# Patient Record
Sex: FEMALE | Race: WHITE | Employment: OTHER | URBAN - METROPOLITAN AREA
[De-identification: names, ages, dates, MRNs, and addresses within clinical notes are randomized per-mention and may not be internally consistent; named-entity substitution may affect disease eponyms.]

---

## 2017-01-30 DIAGNOSIS — G47.00 INSOMNIA: Primary | ICD-10-CM

## 2017-01-30 DIAGNOSIS — F32.A DEPRESSIVE DISORDER: ICD-10-CM

## 2017-02-01 NOTE — TELEPHONE ENCOUNTER
Amitriptyline 25 mg     Last Written Prescription Date: 10/13/15  Last Fill Quantity: 60, # refills: 5  Last Office Visit with [facility], [facility] or [facility] prescribing provider: 10/31/16        BP Readings from Last 3 Encounters:   10/31/16 118/68   08/01/16 130/66   06/14/16 120/68     Last PHQ-9 score on record=   PHQ-9 SCORE 9/27/2016   Total Score -   Total Score 1

## 2017-02-01 NOTE — TELEPHONE ENCOUNTER
Prescription approved per [facility] Refill Protocol. Sent PHQ9 by mail. Due end of March.  Rachael Ramirez RN- Triage [employer]

## 2017-02-08 DIAGNOSIS — F41.1 GAD (GENERALIZED ANXIETY DISORDER): ICD-10-CM

## 2017-02-08 NOTE — TELEPHONE ENCOUNTER
diazepam (VALIUM) 5 MG tablet      Last Written Prescription Date:  10/14/16  Last Fill Quantity: 30,   # refills: 3  Last [place] Visit with [facility] or [facility] prescribing provider: 10/31/16  Future Office visit:       Routing refill request to provider for review/approval because:  Drug not on the [facility], [facility] or [facility] refill protocol or controlled substance

## 2017-02-10 DIAGNOSIS — F41.9 ANXIETY: Primary | ICD-10-CM

## 2017-02-10 ASSESSMENT — PATIENT HEALTH QUESTIONNAIRE - PHQ9: SUM OF ALL RESPONSES TO PHQ QUESTIONS 1-9: 2

## 2017-02-11 RX ORDER — DIAZEPAM 5 MG
TABLET ORAL
Qty: 30 TABLET | Refills: 3 | Status: SHIPPED | OUTPATIENT
Start: 2017-02-11 | End: 2017-06-05

## 2017-02-13 RX ORDER — DIAZEPAM 5 MG
TABLET ORAL
Qty: 30 TABLET | Refills: 2 | OUTPATIENT
Start: 2017-02-13

## 2017-02-13 NOTE — TELEPHONE ENCOUNTER
This Rx was already filled on the 10th and faxed the 11th.  [facility] said I did not have security to decline this Rx  Routing to Dr Velasquez.  Rachael Ramirez RN- Triage [employer]        diazepam (VALIUM) 5 MG tablet      Last Written Prescription Date:  2/11/17  Last Fill Quantity: 30,   # refills: 3  Last Office Visit with [facility], [facility] or [facility] prescribing provider: 10/31/16  Future Office visit:

## 2017-02-13 NOTE — TELEPHONE ENCOUNTER
Yes it has, but I'm not sure if it is a change in policy but epic will not let me decline this RX.  Could you please decline to pharmacy.  Rachael Ramirez RN- Triage [employer]

## 2017-02-22 ENCOUNTER — OFFICE VISIT (OUTPATIENT)
Dept: FAMILY MEDICINE | Facility: CLINIC | Age: 72
End: 2017-02-22
Payer: COMMERCIAL

## 2017-02-22 VITALS
RESPIRATION RATE: 14 BRPM | OXYGEN SATURATION: 96 % | DIASTOLIC BLOOD PRESSURE: 82 MMHG | HEART RATE: 68 BPM | BODY MASS INDEX: 32.19 KG/M2 | TEMPERATURE: 99.9 F | SYSTOLIC BLOOD PRESSURE: 126 MMHG | WEIGHT: 176 LBS

## 2017-02-22 DIAGNOSIS — N76.0 VAGINITIS AND VULVOVAGINITIS: Primary | ICD-10-CM

## 2017-02-22 LAB
MICRO REPORT STATUS: NORMAL
SPECIMEN SOURCE: NORMAL
WET PREP SPEC: NORMAL

## 2017-02-22 PROCEDURE — 87210 SMEAR WET MOUNT SALINE/INK: CPT | Performed by: PHYSICIAN ASSISTANT

## 2017-02-22 PROCEDURE — 99207 ZZC NO CHARGE LOS: CPT | Performed by: PHYSICIAN ASSISTANT

## 2017-02-22 NOTE — MR AVS SNAPSHOT
After Visit Summary   2/22/2017    Sadaf Rubi    MRN: [id number]           Patient Information     Date Of Birth          1945        Visit Information        Provider Department      2/22/2017 3:50 PM Leesa Bellamy PA-C [facility]        Today's Diagnoses     Vaginitis and vulvovaginitis    -  1      Care Instructions    Types of Vaginal Infections:  Bacterial vaginosis (BV). BV is due to an imbalance in the normal bacteria in the vagina. Lactobacillus bacteria decrease. As a result, the numbers of bad bacteria increase.  Candidiasis(yeast infection). Yeast is a type of fungus. A yeast infection occurs when yeast cells in the vagina increase. They then attack vaginal tissues. A type of yeast called Candida albicans is often involved.  Trichomoniasis ( trich ). Trich is a parasite. It is passed from one person to another during sex. Men with trich often don t have any symptoms. In women, it can take weeks or months before symptoms appear.  Vaginal Infection: Bacterial Vaginosis  Both good and bad bacteria are present in a healthy vagina. Bacterial vaginosis (BV) occurs when these bacteria get out of balance. The numbers of good bacteria decrease. This allows the numbers of bad bacteria to increase and cause BV. In most cases, BV is not a serious problem. This sheet tells you more about BV and how it can be treated.  Causes of Bacterial Vaginosis  The cause of BV is not clear. Douching may lead to it. Having sex with a new partner or more than one partner makes it more likely.  Symptoms of Bacterial Vaginosis  Symptoms of BV vary for each woman. Some women have few symptoms or none at all. If symptoms are present, they can include:    Thin, milky white or gray discharge    Unpleasant  fishy  odor    Irritation, itching, and burning at opening of vagina    Burning or irritation with sex or urination   Diagnosing Bacterial Vaginosis  Your  healthcare provider will ask about your symptoms and health history. He or she will also perform a pelvic exam. This is an exam of your vagina and cervix. A sample of vaginal fluid or discharge may be taken. This sample is checked for signs of BV.  Treating Bacterial Vaginosis  BV is often treated with antibiotics. They may be given in oral pill form or as a vaginal cream. To use these medications:    Be sure to take all of your medication, even if your symptoms go away.    If you re taking antibiotic pills, do not drink alcohol until you re finished with all of your medication.    If you re using vaginal cream, apply it as directed. Be aware that the cream may make condoms and diaphragms less effective.    Call your healthcare provider if symptoms do not go away within 4 days of starting treatment. Also call if you have a reaction to the medication.   Why Treatment Matters  Even if you have no symptoms or your symptoms are mild, BV should be treated. Untreated BV can lead to health problems such as:    Increased risk of  delivery if you re pregnant.    Increased risk of complications after surgery on the reproductive organs.    Possible increased risk of pelvic inflammatory disease (PID).   Preventing Vaginal Infection  These steps can help you stay comfortable during treatment of a vaginal infection. They also help prevent vaginal infections in the future.  Keeping a Healthy Balance  Factors that change the normal balance in the vagina calead to a vaginal infection. To help keep the balance normal, try these tips:  Change out of wet bathing suits and damp exercise clothes as soon as possible. Yeast thrive in a warm, moist environment.  Avoid wearing tight pants. Choose cotton underwear and pantyhose that have a cotton crotch. Cotton keeps you cooler and drier than synthetics.  Don't douche unless directed by your health care provider. Douching can destroy friendly bacteria and change the vagina's normal  "balance.  Wipe from front to back after using the toilet. This prevents bacteria from spreading from the anus to the vulva.  Wash the vulva with mild, unscented soap or with plain water.  Wash your diaphragm, spermicide applicators, and sex toys with mild soap and water after use. Dry them thoroughly before putting them away.  Change tampons often (every 2 hours to 4 hours). Leaving a tampon in for too long may disrupt the balance of vaginal bacteria.  Staying Healthy Overall  Good overall health can help you resist infection. To be healthier:  Help protect yourself from STDs by using latex condoms for intercourse. Ask your health care provider for more information about safer sex.  Eat a variety of healthy foods.  Exercise regularly. Maintain a healthy weight. If you need to lose weight, ask your health care provider for advice on how to start.  Get enough rest and sleep.        Follow-ups after your visit        Who to contact     If you have questions or need follow up information about today's clinic visit or your schedule please contact [facility] directly at [phone].  Normal or non-critical lab and imaging results will be communicated to you by [employer], letter or phone within 4 business days after the clinic has received the results. If you do not hear from us within 7 days, please contact the clinic through [employer] or phone. If you have a critical or abnormal lab result, we will notify you by phone as soon as possible.  Submit refill requests through [email or web address] or call your pharmacy and they will forward the refill request to us. Please allow 3 business days for your refill to be completed.          Additional Information About Your Visit        [email or web address] Information     [email or web address] lets you send messages to your doctor, view your test results, renew your prescriptions, schedule appointments and more. To sign up, go to [email or web address] . Click on \"Log in\" on the left side of " "the screen, which will take you to the Welcome page. Then click on \"Sign up Now\" on the right side of the page.     You will be asked to enter the access code listed below, as well as some personal information. Please follow the directions to create your username and password.     Your access code is: [email or web address]  Expires: 2017  4:15 PM     Your access code will  in 90 days. If you need help or a new code, please call your [facility] clinic or [phone].        Care EveryWhere ID     This is your Care EveryWhere ID. This could be used by other organizations to access your [facility] medical records  [id number]        Your Vitals Were     Pulse Temperature Respirations Pulse Oximetry BMI (Body Mass Index)       68 99.9  F (37.7  C) (Tympanic) 14 96% 32.19 kg/m2        Blood Pressure from Last 3 Encounters:   17 126/82   10/31/16 118/68   16 130/66    Weight from Last 3 Encounters:   17 176 lb (79.8 kg)   10/31/16 174 lb 9.6 oz (79.2 kg)   16 172 lb (78 kg)              We Performed the Following     Wet prep        Primary Care Provider Office Phone # Fax #    Sean Velasquez [phone]       [facility] [address] [address]        Thank you!     Thank you for choosing [facility]  for your care. Our goal is always to provide you with excellent care. Hearing back from our patients is one way we can continue to improve our services. Please take a few minutes to complete the written survey that you may receive in the mail after your visit with us. Thank you!             Your Updated Medication List - Protect others around you: Learn how to safely use, store and throw away your medicines at www.disposemymeds.org.          This list is accurate as of: 17  4:46 PM.  Always use your most recent med list.                   Brand Name Dispense Instructions for use    acetaminophen-codeine 300-30 MG per " tablet    TYLENOL #3    30 tablet    TAKE ONE TABLET BY MOUTH TWICE A DAY AS NEEDED       * amitriptyline 25 MG tablet    ELAVIL    60 tablet    TAKE 2 TABLETS (50 MG) BYMOUTH NIGHTLY AS NEEDED  FOR SLEEP       * amitriptyline 25 MG tablet    ELAVIL    60 tablet    TAKE 2 TABLETS (50 MG) BYMOUTH NIGHTLY AS NEEDED  FOR SLEEP       amLODIPine 5 MG tablet    NORVASC    90 tablet    Take 1 tablet (5 mg) by mouth daily       aspirin 81 MG tablet     120 tablet    Take 1 tablet (81 mg) by mouth daily       cyanocolbalamin 500 MCG tablet    vitamin  B-12     Take 1 tablet by mouth daily.       diazepam 5 MG tablet    VALIUM    30 tablet    TAKE ONE TABLET BY MOUTH EVERY DAY AS NEEDED FOR  ANXIETY       escitalopram 10 MG tablet    LEXAPRO    90 tablet    Take 1 tablet (10 mg) by mouth daily       levothyroxine 75 MCG tablet    SYNTHROID/LEVOTHROID    30 tablet    TAKE 1 TABLET (75 MCG) BY MOUTH DAILY       nadolol 40 MG tablet    CORGARD    90 tablet    TAKE 1 TABLET (40 MG) BY MOUTH DAILY       polyethylene glycol powder    MIRALAX/GLYCOLAX    1020 g    TAKE 17 G BY MOUTH 2 TIMES DAILY       pravastatin 40 MG tablet    PRAVACHOL    90 tablet    TAKE 1 TABLET (40 MG) BY MOUTH DAILY       vitamin D 2000 UNITS tablet     100 tablet    Take 2,000 Units by mouth daily       * Notice:  This list has 2 medication(s) that are the same as other medications prescribed for you. Read the directions carefully, and ask your doctor or other care provider to review them with you.

## 2017-02-22 NOTE — PROGRESS NOTES
SUBJECTIVE:                                                    Sadaf Rubi is a 71 year old female who presents to clinic today for the following health issues:      Vaginal Symptoms     Onset: 1 week    Description:  Vaginal Discharge: none   Itching (Pruritis): YES  Burning sensation:  no   Odor: no     Accompanying Signs & Symptoms:  Pain with Urination: no   Abdominal Pain: no   Fever: no    History:   Sexually active: no   New Partner: no   Possibility of Pregnancy:  No    Precipitating factors:   Recent Antibiotic Use: no     Alleviating factors:  n/a   Therapies Tried and outcome: n/a  Additional complaints: None    HPI additional notes: Sadaf presents today with   Chief Complaint   Patient presents with     Vaginal Problem   Pt started have stomach upset after rooming and sample collection and had to rush to the bathroom.  Does not feel well and needs to head home before symptoms get worse.  Talked briefly with pt, said that was fine and we would call her about her test results which were not back yet at the time.     ROS:  C: Negative for fever, chills, recent change in weight  : POSITIVE for vaginal itching/ irritation. Negative for dysuria, vaginal discharge and vaginal discharge with odor  P: Negative for changes in mood or affect  ROS otherwise negative.    Chart Review:  History   Smoking Status     Never Smoker   Smokeless Tobacco     Never Used     PHQ-9 SCORE 3/21/2016 9/27/2016 2/9/2017   Total Score - - -   Total Score 1 1 2     SHAWNEE-7 SCORE 3/21/2016 4/20/2016 6/9/2016   Total Score 5 0 0     Patient Active Problem List   Diagnosis     Anxiety     Insomnia     Dysthymic disorder     Palpitation     Shortness of breath     Osteopenia     Anemia     Preventive measure     Family history of coagulation disorder     Hypothyroidism     Vitamin D deficiency disease     Hypertension goal BP (blood pressure) < 140/80     Hyperlipidemia with target LDL less than 100     Dizziness     Balance  problems     Abnormal liver enzymes     ACP (advance care planning)     Episodic tension-type headache, not intractable     Pain in unspecified knee     Slow transit constipation     Insomnia, unspecified insomnia     SHAWNEE (generalized anxiety disorder)     Hypothyroidism, unspecified type     Past Surgical History   Procedure Laterality Date     Knee surgery  1/2006     meniscus left     Lumpectomy breast  1/2001     benign     Urethra surgery  1977     Knee surgery  2007     right meniscus     Knee surgery  09/10/2012     right knee replacement ; L TKA 3/13     Gyn surgery  1990's     hysterectomy SAMANTHA & BSO     Problem list, Medication list, Allergies, Medical/Social/Surg hx reviewed in [facility], updated as appropriate.   OBJECTIVE:                                                    /82 (BP Location: Right arm, Patient Position: Chair, Cuff Size: Adult Regular)  Pulse 68  Temp 99.9  F (37.7  C) (Tympanic)  Resp 14  Wt 176 lb (79.8 kg)  SpO2 96%  BMI 32.19 kg/m2  Body mass index is 32.19 kg/(m^2).  Not performed, pt had to leave clinic    Diagnostic test results: Negative wet prep.     ASSESSMENT/PLAN:                                                          ICD-10-CM    1. Vaginitis and vulvovaginitis N76.0 Wet prep     Visit ended as pt had to leave due to acute GI illness. Will call pt with test results and start treatment if needed.    Please see patient instructions for treatment details.    Follow up in 7-10 days if not improving as anticipated.    Leesa Bellamy PA-C  [facility]

## 2017-02-22 NOTE — PATIENT INSTRUCTIONS
Types of Vaginal Infections:  Bacterial vaginosis (BV). BV is due to an imbalance in the normal bacteria in the vagina. Lactobacillus bacteria decrease. As a result, the numbers of bad bacteria increase.  Candidiasis(yeast infection). Yeast is a type of fungus. A yeast infection occurs when yeast cells in the vagina increase. They then attack vaginal tissues. A type of yeast called Candida albicans is often involved.  Trichomoniasis ( trich ). Trich is a parasite. It is passed from one person to another during sex. Men with trich often don t have any symptoms. In women, it can take weeks or months before symptoms appear.  Vaginal Infection: Bacterial Vaginosis  Both good and bad bacteria are present in a healthy vagina. Bacterial vaginosis (BV) occurs when these bacteria get out of balance. The numbers of good bacteria decrease. This allows the numbers of bad bacteria to increase and cause BV. In most cases, BV is not a serious problem. This sheet tells you more about BV and how it can be treated.  Causes of Bacterial Vaginosis  The cause of BV is not clear. Douching may lead to it. Having sex with a new partner or more than one partner makes it more likely.  Symptoms of Bacterial Vaginosis  Symptoms of BV vary for each woman. Some women have few symptoms or none at all. If symptoms are present, they can include:    Thin, milky white or gray discharge    Unpleasant  fishy  odor    Irritation, itching, and burning at opening of vagina    Burning or irritation with sex or urination   Diagnosing Bacterial Vaginosis  Your healthcare provider will ask about your symptoms and health history. He or she will also perform a pelvic exam. This is an exam of your vagina and cervix. A sample of vaginal fluid or discharge may be taken. This sample is checked for signs of BV.  Treating Bacterial Vaginosis  BV is often treated with antibiotics. They may be given in oral pill form or as a vaginal cream. To use these  medications:    Be sure to take all of your medication, even if your symptoms go away.    If you re taking antibiotic pills, do not drink alcohol until you re finished with all of your medication.    If you re using vaginal cream, apply it as directed. Be aware that the cream may make condoms and diaphragms less effective.    Call your healthcare provider if symptoms do not go away within 4 days of starting treatment. Also call if you have a reaction to the medication.   Why Treatment Matters  Even if you have no symptoms or your symptoms are mild, BV should be treated. Untreated BV can lead to health problems such as:    Increased risk of  delivery if you re pregnant.    Increased risk of complications after surgery on the reproductive organs.    Possible increased risk of pelvic inflammatory disease (PID).   Preventing Vaginal Infection  These steps can help you stay comfortable during treatment of a vaginal infection. They also help prevent vaginal infections in the future.  Keeping a Healthy Balance  Factors that change the normal balance in the vagina calead to a vaginal infection. To help keep the balance normal, try these tips:  Change out of wet bathing suits and damp exercise clothes as soon as possible. Yeast thrive in a warm, moist environment.  Avoid wearing tight pants. Choose cotton underwear and pantyhose that have a cotton crotch. Cotton keeps you cooler and drier than synthetics.  Don't douche unless directed by your health care provider. Douching can destroy friendly bacteria and change the vagina's normal balance.  Wipe from front to back after using the toilet. This prevents bacteria from spreading from the anus to the vulva.  Wash the vulva with mild, unscented soap or with plain water.  Wash your diaphragm, spermicide applicators, and sex toys with mild soap and water after use. Dry them thoroughly before putting them away.  Change tampons often (every 2 hours to 4 hours). Leaving a  tampon in for too long may disrupt the balance of vaginal bacteria.  Staying Healthy Overall  Good overall health can help you resist infection. To be healthier:  Help protect yourself from STDs by using latex condoms for intercourse. Ask your health care provider for more information about safer sex.  Eat a variety of healthy foods.  Exercise regularly. Maintain a healthy weight. If you need to lose weight, ask your health care provider for advice on how to start.  Get enough rest and sleep.

## 2017-03-03 DIAGNOSIS — K59.01 SLOW TRANSIT CONSTIPATION: ICD-10-CM

## 2017-03-06 RX ORDER — POLYETHYLENE GLYCOL 3350 17 G/17G
POWDER, FOR SOLUTION ORAL
Qty: 527 G | Refills: 11 | Status: SHIPPED | OUTPATIENT
Start: 2017-03-06 | End: 2018-05-04

## 2017-03-06 NOTE — TELEPHONE ENCOUNTER
Prescription approved per [facility] Refill Protocol.  Rachael Ramirez RN- Triage [employer]

## 2017-03-06 NOTE — TELEPHONE ENCOUNTER
polyethylene glycol (MIRALAX/GLYCOLAX   Last Written Prescription Date: 12/9/15  Last Fill Quantity: 1020 g,  # refills: 11   Last Office Visit with [facility], [facility] or [facility] prescribing provider: 2/22/17

## 2017-04-07 DIAGNOSIS — G44.219 EPISODIC TENSION-TYPE HEADACHE, NOT INTRACTABLE: ICD-10-CM

## 2017-04-07 NOTE — TELEPHONE ENCOUNTER
Controlled Substance Refill Request for acetaminophen-codeine (TYLENOL #3) 300-30 MG per tablet  Problem List Complete:  Yes    Last Written Prescription Date:  11/29/2016  Last Fill Quantity: 30,   # refills: 1    [place] [facility] [place]: 2/22/2017    Clinic visit frequency required: unspecified     Future Office visit:     Controlled substance agreement on file: No.     Processing:  May be called or faxed to pharmacy   checked in past 6 months?  No, route to RN chronic pain not on problem list [place]  review not required    Thank you,  Kevin Santos RN

## 2017-04-25 DIAGNOSIS — I10 HYPERTENSION GOAL BP (BLOOD PRESSURE) < 140/80: ICD-10-CM

## 2017-04-26 NOTE — TELEPHONE ENCOUNTER
amLODIPine (NORVASC) 5 MG tablet      Last Written Prescription Date: 10/31/16  Last Fill Quantity: 90, # refills: 3  Last Office Visit with [facility], [facility] or [facility] prescribing provider: 2/22/17       Potassium   Date Value Ref Range Status   08/01/2016 4.5 3.4 - 5.3 mmol/L Final     Creatinine   Date Value Ref Range Status   08/01/2016 0.86 0.52 - 1.04 mg/dL Final     BP Readings from Last 3 Encounters:   02/22/17 126/82   10/31/16 118/68   08/01/16 130/66

## 2017-04-28 RX ORDER — AMLODIPINE BESYLATE 5 MG/1
TABLET ORAL
Qty: 60 TABLET | Refills: 2 | OUTPATIENT
Start: 2017-04-28

## 2017-05-02 DIAGNOSIS — I10 HYPERTENSION GOAL BP (BLOOD PRESSURE) < 140/80: ICD-10-CM

## 2017-05-02 RX ORDER — AMLODIPINE BESYLATE 5 MG/1
5 TABLET ORAL DAILY
Qty: 90 TABLET | Refills: 2 | Status: SHIPPED | OUTPATIENT
Start: 2017-05-02 | End: 2018-02-01

## 2017-05-02 RX ORDER — AMLODIPINE BESYLATE 5 MG/1
TABLET ORAL
Qty: 90 TABLET | Refills: 3 | OUTPATIENT
Start: 2017-05-02

## 2017-05-02 NOTE — TELEPHONE ENCOUNTER
Dose change since 10/31/16 to 5 mg once daily.   Rx was set at historical on 10/31/16 and was never sent to the pharmacy.     Prescription approved per [facility] Refill Protocol.

## 2017-05-02 NOTE — TELEPHONE ENCOUNTER
Amlodipine 5 mg      Last Written Prescription Date: 10/31/16  Last Fill Quantity: 90, # refills: 3    Last Office Visit with [facility], [facility] or [facility] prescribing provider:  2/22/17   Future Office Visit:        BP Readings from Last 3 Encounters:   02/22/17 126/82   10/31/16 118/68   08/01/16 130/66

## 2017-05-21 DIAGNOSIS — F41.1 GAD (GENERALIZED ANXIETY DISORDER): ICD-10-CM

## 2017-05-22 NOTE — TELEPHONE ENCOUNTER
escitalopram (LEXAPRO) 10 MG tablet     Last Written Prescription Date: 10/31/16  Last Fill Quantity: 90, # refills: 3  Last Office Visit with [facility] primary care provider:  2/22/17        Last PHQ-9 score on record=   PHQ-9 SCORE 2/9/2017   Total Score -   Total Score 2

## 2017-05-23 RX ORDER — ESCITALOPRAM OXALATE 10 MG/1
TABLET ORAL
Qty: 90 TABLET | Refills: 2 | Status: SHIPPED | OUTPATIENT
Start: 2017-05-23 | End: 2018-07-07

## 2017-06-05 ENCOUNTER — OFFICE VISIT (OUTPATIENT)
Dept: FAMILY MEDICINE | Facility: CLINIC | Age: 72
End: 2017-06-05
Payer: COMMERCIAL

## 2017-06-05 VITALS
SYSTOLIC BLOOD PRESSURE: 120 MMHG | OXYGEN SATURATION: 96 % | BODY MASS INDEX: 31.83 KG/M2 | RESPIRATION RATE: 20 BRPM | HEART RATE: 71 BPM | HEIGHT: 62 IN | DIASTOLIC BLOOD PRESSURE: 68 MMHG | TEMPERATURE: 97.9 F | WEIGHT: 173 LBS

## 2017-06-05 DIAGNOSIS — E03.9 HYPOTHYROIDISM, UNSPECIFIED TYPE: ICD-10-CM

## 2017-06-05 DIAGNOSIS — R39.11 URINARY HESITANCY: Primary | ICD-10-CM

## 2017-06-05 DIAGNOSIS — I10 HYPERTENSION GOAL BP (BLOOD PRESSURE) < 140/80: ICD-10-CM

## 2017-06-05 DIAGNOSIS — E78.5 HYPERLIPIDEMIA WITH TARGET LDL LESS THAN 100: ICD-10-CM

## 2017-06-05 DIAGNOSIS — G44.219 EPISODIC TENSION-TYPE HEADACHE, NOT INTRACTABLE: ICD-10-CM

## 2017-06-05 DIAGNOSIS — F41.1 GAD (GENERALIZED ANXIETY DISORDER): ICD-10-CM

## 2017-06-05 DIAGNOSIS — D50.8 OTHER IRON DEFICIENCY ANEMIA: ICD-10-CM

## 2017-06-05 LAB
BASOPHILS # BLD AUTO: 0 10E9/L (ref 0–0.2)
BASOPHILS NFR BLD AUTO: 0.3 %
DIFFERENTIAL METHOD BLD: NORMAL
EOSINOPHIL # BLD AUTO: 0.3 10E9/L (ref 0–0.7)
EOSINOPHIL NFR BLD AUTO: 2.9 %
ERYTHROCYTE [DISTWIDTH] IN BLOOD BY AUTOMATED COUNT: 13.4 % (ref 10–15)
HCT VFR BLD AUTO: 44.9 % (ref 35–47)
HGB BLD-MCNC: 15.2 G/DL (ref 11.7–15.7)
LYMPHOCYTES # BLD AUTO: 2.4 10E9/L (ref 0.8–5.3)
LYMPHOCYTES NFR BLD AUTO: 27 %
MCH RBC QN AUTO: 30.6 PG (ref 26.5–33)
MCHC RBC AUTO-ENTMCNC: 33.9 G/DL (ref 31.5–36.5)
MCV RBC AUTO: 91 FL (ref 78–100)
MONOCYTES # BLD AUTO: 0.6 10E9/L (ref 0–1.3)
MONOCYTES NFR BLD AUTO: 6.9 %
NEUTROPHILS # BLD AUTO: 5.5 10E9/L (ref 1.6–8.3)
NEUTROPHILS NFR BLD AUTO: 62.9 %
PLATELET # BLD AUTO: 201 10E9/L (ref 150–450)
RBC # BLD AUTO: 4.96 10E12/L (ref 3.8–5.2)
WBC # BLD AUTO: 8.7 10E9/L (ref 4–11)

## 2017-06-05 PROCEDURE — 82728 ASSAY OF FERRITIN: CPT | Performed by: INTERNAL MEDICINE

## 2017-06-05 PROCEDURE — 84443 ASSAY THYROID STIM HORMONE: CPT | Performed by: INTERNAL MEDICINE

## 2017-06-05 PROCEDURE — 83540 ASSAY OF IRON: CPT | Performed by: INTERNAL MEDICINE

## 2017-06-05 PROCEDURE — 83550 IRON BINDING TEST: CPT | Performed by: INTERNAL MEDICINE

## 2017-06-05 PROCEDURE — 85025 COMPLETE CBC W/AUTO DIFF WBC: CPT | Performed by: INTERNAL MEDICINE

## 2017-06-05 PROCEDURE — 36415 COLL VENOUS BLD VENIPUNCTURE: CPT | Performed by: INTERNAL MEDICINE

## 2017-06-05 PROCEDURE — 99214 OFFICE O/P EST MOD 30 MIN: CPT | Performed by: INTERNAL MEDICINE

## 2017-06-05 RX ORDER — PRAVASTATIN SODIUM 40 MG
TABLET ORAL
Qty: 90 TABLET | Refills: 3 | Status: SHIPPED | OUTPATIENT
Start: 2017-06-05 | End: 2018-06-24

## 2017-06-05 RX ORDER — LEVOTHYROXINE SODIUM 75 UG/1
TABLET ORAL
Qty: 90 TABLET | Refills: 3 | Status: SHIPPED | OUTPATIENT
Start: 2017-06-05 | End: 2018-06-09

## 2017-06-05 RX ORDER — NADOLOL 40 MG/1
TABLET ORAL
Qty: 90 TABLET | Refills: 3 | Status: SHIPPED | OUTPATIENT
Start: 2017-06-05 | End: 2018-05-14

## 2017-06-05 RX ORDER — DIAZEPAM 5 MG
TABLET ORAL
Qty: 30 TABLET | Refills: 3 | Status: SHIPPED | OUTPATIENT
Start: 2017-06-05 | End: 2017-11-01

## 2017-06-05 ASSESSMENT — ANXIETY QUESTIONNAIRES
6. BECOMING EASILY ANNOYED OR IRRITABLE: NOT AT ALL
2. NOT BEING ABLE TO STOP OR CONTROL WORRYING: NOT AT ALL
5. BEING SO RESTLESS THAT IT IS HARD TO SIT STILL: NOT AT ALL
GAD7 TOTAL SCORE: 0
1. FEELING NERVOUS, ANXIOUS, OR ON EDGE: NOT AT ALL
3. WORRYING TOO MUCH ABOUT DIFFERENT THINGS: NOT AT ALL
IF YOU CHECKED OFF ANY PROBLEMS ON THIS QUESTIONNAIRE, HOW DIFFICULT HAVE THESE PROBLEMS MADE IT FOR YOU TO DO YOUR WORK, TAKE CARE OF THINGS AT HOME, OR GET ALONG WITH OTHER PEOPLE: NOT DIFFICULT AT ALL
7. FEELING AFRAID AS IF SOMETHING AWFUL MIGHT HAPPEN: NOT AT ALL

## 2017-06-05 ASSESSMENT — PATIENT HEALTH QUESTIONNAIRE - PHQ9: 5. POOR APPETITE OR OVEREATING: NOT AT ALL

## 2017-06-05 NOTE — MR AVS SNAPSHOT
After Visit Summary   6/5/2017    Sadaf Rubi    MRN: [id number]           Patient Information     Date Of Birth          1945        Visit Information        Provider Department      6/5/2017 3:30 PM Sean Velasquez MD [facility]        Today's Diagnoses     Urinary hesitancy    -  1    Hypothyroidism, unspecified type        Hypertension goal BP (blood pressure) < 140/80        SHAWNEE (generalized anxiety disorder)        Episodic tension-type headache, not intractable        Other iron deficiency anemia        Hyperlipidemia with target LDL less than 100          Care Instructions    You are planning a urology consult.                     You are planning to try tapering down the amitriptyline,and possibly stopping this and/or the lexapro.                                         I will let you know your lab results.              Follow-ups after your visit        Additional Services     UROLOGY ADULT REFERRAL       Your provider has referred you to: [facility]: [facility] [phone]   [email or web address]    Please be aware that coverage of these services is subject to the terms and limitations of your health insurance plan.  Call member services at your health plan with any benefit or coverage questions.      Please bring the following with you to your appointment:    (1) Any X-Rays, CTs or MRIs which have been performed.  Contact the facility where they were done to arrange for  prior to your scheduled appointment.    (2) List of current medications  (3) This referral request   (4) Any documents/labs given to you for this referral                  Who to contact     If you have questions or need follow up information about today's clinic visit or your schedule please contact [facility] directly at [phone].  Normal or non-critical lab and imaging results will be communicated  "to you by MyChart, letter or phone within 4 business days after the clinic has received the results. If you do not hear from us within 7 days, please contact the clinic through MyChart or phone. If you have a critical or abnormal lab result, we will notify you by phone as soon as possible.  Submit refill requests through [employer] or call your pharmacy and they will forward the refill request to us. Please allow 3 business days for your refill to be completed.          Additional Information About Your Visit        Care EveryWhere ID     This is your Care EveryWhere ID. This could be used by other organizations to access your [facility] medical records  [id number]        Your Vitals Were     Pulse Temperature Respirations Height Pulse Oximetry Breastfeeding?    71 97.9  F (36.6  C) 20 5' 2\" (1.575 m) 96% No    BMI (Body Mass Index)                   31.64 kg/m2            Blood Pressure from Last 3 Encounters:   06/05/17 120/68   02/22/17 126/82   10/31/16 118/68    Weight from Last 3 Encounters:   06/05/17 173 lb (78.5 kg)   02/22/17 176 lb (79.8 kg)   10/31/16 174 lb 9.6 oz (79.2 kg)              We Performed the Following     CBC with platelets and differential     Ferritin     Iron and iron binding capacity     TSH with free T4 reflex     UROLOGY ADULT REFERRAL          Today's Medication Changes          These changes are accurate as of: 6/5/17  4:00 PM.  If you have any questions, ask your nurse or doctor.               These medicines have changed or have updated prescriptions.        Dose/Directions    acetaminophen-codeine 300-30 MG per tablet   Commonly known as:  TYLENOL #3   This may have changed:  See the new instructions.   Used for:  Episodic tension-type headache, not intractable   Changed by:  Sean Velasquez MD        TAKE ONE TABLET BY MOUTH TWICE A DAY AS NEEDED   Quantity:  30 tablet   Refills:  3       escitalopram 10 MG tablet   Commonly known as:  LEXAPRO   This may have changed:  See the new " instructions.   Used for:  SHAWNEE (generalized anxiety disorder)        TAKE 1 TABLET (10 MG) BY MOUTH DAILY   Quantity:  90 tablet   Refills:  2       levothyroxine 75 MCG tablet   Commonly known as:  SYNTHROID/LEVOTHROID   This may have changed:  See the new instructions.   Used for:  Hypothyroidism, unspecified type   Changed by:  Sean Velasquez MD        TAKE 1 TABLET (75 MCG) BY MOUTH DAILY   Quantity:  90 tablet   Refills:  3       nadolol 40 MG tablet   Commonly known as:  CORGARD   This may have changed:  See the new instructions.   Used for:  Hypertension goal BP (blood pressure) < 140/80   Changed by:  Sean Velasquez MD        TAKE 1 TABLET (40 MG) BY MOUTH DAILY   Quantity:  90 tablet   Refills:  3       pravastatin 40 MG tablet   Commonly known as:  PRAVACHOL   This may have changed:  See the new instructions.   Used for:  Hyperlipidemia with target LDL less than 100   Changed by:  Sean Velasquez MD        TAKE 1 TABLET (40 MG) BY MOUTH DAILY   Quantity:  90 tablet   Refills:  3            Where to get your medicines      These medications were sent to [address] [address]     Phone:  [phone]     levothyroxine 75 MCG tablet    nadolol 40 MG tablet    pravastatin 40 MG tablet         Some of these will need a paper prescription and others can be bought over the counter.  Ask your nurse if you have questions.     Bring a paper prescription for each of these medications     acetaminophen-codeine 300-30 MG per tablet    diazepam 5 MG tablet                Primary Care Provider Office Phone # Fax #    Sean Velasquez [phone]       [facility] [address]  [facility] [address]        Thank you!     Thank you for choosing [facility]  for your care. Our goal is always to provide you with excellent care. Hearing back from our patients is one way we can  continue to improve our services. Please take a few minutes to complete the written survey that you may receive in the mail after your visit with us. Thank you!             Your Updated Medication List - Protect others around you: Learn how to safely use, store and throw away your medicines at www.disposemymeds.org.          This list is accurate as of: 6/5/17  4:00 PM.  Always use your most recent med list.                   Brand Name Dispense Instructions for use    acetaminophen-codeine 300-30 MG per tablet    TYLENOL #3    30 tablet    TAKE ONE TABLET BY MOUTH TWICE A DAY AS NEEDED       amitriptyline 25 MG tablet    ELAVIL    60 tablet    TAKE 2 TABLETS (50 MG) BYMOUTH NIGHTLY AS NEEDED  FOR SLEEP       amLODIPine 5 MG tablet    NORVASC    90 tablet    Take 1 tablet (5 mg) by mouth daily       aspirin 81 MG tablet     120 tablet    Take 1 tablet (81 mg) by mouth daily       cyanocolbalamin 500 MCG tablet    vitamin  B-12     Take 1 tablet by mouth daily.       diazepam 5 MG tablet    VALIUM    30 tablet    TAKE ONE TABLET BY MOUTH EVERY DAY AS NEEDED FOR  ANXIETY       escitalopram 10 MG tablet    LEXAPRO    90 tablet    TAKE 1 TABLET (10 MG) BY MOUTH DAILY       levothyroxine 75 MCG tablet    SYNTHROID/LEVOTHROID    90 tablet    TAKE 1 TABLET (75 MCG) BY MOUTH DAILY       nadolol 40 MG tablet    CORGARD    90 tablet    TAKE 1 TABLET (40 MG) BY MOUTH DAILY       polyethylene glycol powder    MIRALAX/GLYCOLAX    527 g    TAKE 17 G BY MOUTH 2     TIMES DAILY       pravastatin 40 MG tablet    PRAVACHOL    90 tablet    TAKE 1 TABLET (40 MG) BY MOUTH DAILY       vitamin D 2000 UNITS tablet     100 tablet    Take 2,000 Units by mouth daily

## 2017-06-05 NOTE — PROGRESS NOTES
"  SUBJECTIVE:                                                    Sadaf Rubi is a 71 year old female who presents to clinic today for the following health issues:      Medication Followup of Escitalopram and Elavil, Iron, and Vit. D    Taking Medication as prescribed: not recently on the Escitaloprm    Side Effects:  Fatigue and other?    Medication Helping Symptoms:  Has concerns       Hyperlipidemia Follow-Up      Rate your low fat/cholesterol diet?: good    Taking statin?  Yes, no muscle aches from statin    Other lipid medications/supplements?:  none     Hypertension Follow-up      Outpatient blood pressures are being checked at home.      Low Salt Diet: no added salt      Also has concerns re: Bladder/Vaginal \"prolapse\" issue. Needs refills to cover when she is to be out of town.                                                                                                                                                                                                                                                                                                                                                                    Urinary hesitancy; getting worse.                           Only tolerates 2.5 mg lexapro ; sx are better.  Wants to stop to at some point.             Also wants to stop amitriptyline.        She still takes 50 mg at h.s.              Still having HAs.    She thinks it unlikely that amitriptyline is preventing any of her headaches.                           She is going out of town. She wants me to write a refill for diazepam and Tylenol with codeine.                       We reviewed her iron deficiency anemia. She did have a colonoscopy, she reports small polyps were removed, and a three-year follow-up was advised. We never got that report.            Also here for hypertension follow-up.               In addition, she would like eventually to stop taking pravastatin.          " she is trying to learn more about calorie counting.   she was surprised to learn that one of her favorite restaurant meals contained 1800 say.                                                                   Problem list and histories reviewed & adjusted, as indicated.      Patient Active Problem List    Diagnosis Date Noted     Hypertension goal BP (blood pressure) < 140/80 03/21/2013     Priority: High     Palpitation      Priority: High     false + nuc med stress test; cor angio shows only a 20% LAD lesion.    CONSIDER A STATIN ; (pt is not interested; wants to work on her diet)               30 day event monitor no arrythmias found in 5/11       Shortness of breath      Priority: High     CT pulm angio neg; spirometry nml       Dysthymic disorder 10/15/2012     Priority: High     Buspirone since 3/12;chronic valium prn use       Anxiety      Priority: High     Onset 1980's or earlier                    Chronic buspar; uses valium also, 5 mg per day or less.                   In 3/16, stopped buspar and started lexapro with good results.     Last  check 08/04/2016-no concerns.  No CSA on file              Urinary hesitancy 06/05/2017     Priority: Medium     hx of urethral dilatations, and then surgery       Other iron deficiency anemia 06/05/2017     Priority: Medium     Hypothyroidism, unspecified type 08/01/2016     Priority: Medium     SHAWNEE (generalized anxiety disorder) 03/21/2016     Priority: Medium     Patient is followed by Sean Velasquez MD for ongoing prescription of anxiolytic medication.  All refills should be approved by this provider, or covering partner.    Medication(s): diazepam 5 mg.   Maximum quantity per month: 30  Clinic visit frequency required: Q 6  months     Controlled substance agreement:  Encounter-Level CSA:     There are no encounter-level csa.        Pain Clinic evaluation in the past: No    DIRE Total Score(s):  No flowsheet data found.    Last [facility] website verification:   done on ?   [email or web address]                 Insomnia, unspecified insomnia 11/09/2015     Priority: Medium     Slow transit constipation 10/07/2015     Priority: Medium     Episodic tension-type headache, not intractable 10/05/2015     Priority: Medium     Uses occas Tyl #3 for this            Migraines per pt; triggers include lack of sleep, change in barometric pressure.                   Has seen neuro in the past.      check 7-11-16  No CSA       Pain in unspecified knee 10/05/2015     Priority: Medium      check 7-11-16, no concerns  No CSA       Abnormal liver enzymes 07/03/2014     Priority: Medium     ALT>AST in 7/14; better,not normal in 8/14.           nml in 7/15       Dizziness 06/16/2014     Priority: Medium     Better with less computer scrolling       Balance problems 06/16/2014     Priority: Medium     Hypothyroidism 03/21/2013     Priority: Medium     Vitamin D deficiency disease 03/21/2013     Priority: Medium     Hyperlipidemia with target LDL less than 100 03/21/2013     Priority: Medium     > 20 lb weight loss in 2013 via dieting.                    LDL >150; statin suggested in 7/15  Diagnosis updated by automated process. Provider to review and confirm.       Family history of coagulation disorder 02/26/2013     Priority: Medium     Pt reports her sister was + for factor V Leiden; pt reports that she is negative for this       Anemia 12/18/2012     Priority: Medium     hgb 11+ and ferritin 9 in 12/12; likely due to blood loss with TKA; will Rx with iron; B-12 348; add 500 mcg;         hgb 14.9 with ferritin 30 in 12/13; finish bottle of iron and then stop.      14.7 and 35 in 7/15.  15 and 23 in 8/16       Osteopenia 12/14/2012     Priority: Medium     T -1.7 L fem neck , -0.3 spine in 12/12; similar to 6/10 results.       Vit D 26; incr by 1000 IU                          In 12/12, vit D > 96; stop for one month, then take 2,000 IU per day       Insomnia      Priority:  "Medium     rx amitriptyline started many yrs ago;       Has some possible SE by ; she wants to continue       ACP (advance care planning) 2015     Priority: Low     Advance Care Planning 2015: ACP Review and Resources Provided:  Reviewed chart for advance care plan.  Sadaf Rubi has no plan or code status on file. Discussed available resources and provided with information. Added by Sayra Cruz             Preventive measure 2013     Priority: Low     [employer] data base under the 12 note.             Mammogram ;7/15                     colonoscopy ; attempted ; aborted; poor prep ; redone the next day; no report; pt reports still not totally cleaned out; per pt, she had \"benign polyps\"  3 year f/u advised by LUIS                      S/p SAMANTHA/BSO       Past Surgical History:   Procedure Laterality Date     GYN SURGERY      hysterectomy SAMANTHA & BSO     KNEE SURGERY  2006    meniscus left     KNEE SURGERY      right meniscus     KNEE SURGERY  09/10/2012    right knee replacement ; L TKA 3/13     LUMPECTOMY BREAST  2001    benign     URETHRA SURGERY       Social History     Social History     Marital status:      Spouse name: N/A     Number of children: N/A     Years of education: N/A     Occupational History     Not on file.     Social History Main Topics     Smoking status: Never Smoker     Smokeless tobacco: Never Used     Alcohol use Yes      Comment: glass of wine with dinner     Drug use: No     Sexual activity: No     Other Topics Concern     Parent/Sibling W/ Cabg, Mi Or Angioplasty Before 65f 55m? No     Social History Narrative    Mother  when pt was 8 yrs old       Current Outpatient Prescriptions   Medication Sig Dispense Refill     escitalopram (LEXAPRO) 10 MG tablet TAKE 1 TABLET (10 MG) BY MOUTH DAILY (Patient taking differently: TAKing 1/4 tablet every day) 90 tablet 2     amLODIPine (NORVASC) 5 MG tablet Take 1 tablet (5 mg) by mouth " daily 90 tablet 2     acetaminophen-codeine (TYLENOL #3) 300-30 MG per tablet TAKE ONE TABLET BY MOUTH TWICE A DAY AS NEEDED 30 tablet 0     polyethylene glycol (MIRALAX/GLYCOLAX) powder TAKE 17 G BY MOUTH 2     TIMES DAILY 527 g 11     diazepam (VALIUM) 5 MG tablet TAKE ONE TABLET BY MOUTH EVERY DAY AS NEEDED FOR  ANXIETY 30 tablet 3     levothyroxine (SYNTHROID, LEVOTHROID) 75 MCG tablet TAKE 1 TABLET (75 MCG) BY MOUTH DAILY 30 tablet 11     pravastatin (PRAVACHOL) 40 MG tablet TAKE 1 TABLET (40 MG) BY MOUTH DAILY 90 tablet 3     nadolol (CORGARD) 40 MG tablet TAKE 1 TABLET (40 MG) BY MOUTH DAILY 90 tablet 3     amitriptyline (ELAVIL) 25 MG tablet TAKE 2 TABLETS (50 MG) BYMOUTH NIGHTLY AS NEEDED  FOR SLEEP 60 tablet 5     Cholecalciferol (VITAMIN D) 2000 UNITS tablet Take 2,000 Units by mouth daily 100 tablet 3     aspirin 81 MG tablet Take 1 tablet (81 mg) by mouth daily 120 tablet      cyanocolbalamin (VITAMIN  B-12) 500 MCG tablet Take 1 tablet by mouth daily.       Allergies   Allergen Reactions     Bees      Ciprofloxacin      Not an allergy but did not tolerate well in 4/13     Other [Seasonal Allergies]      msg and mushrooms     Penicillins      BP Readings from Last 3 Encounters:   06/05/17 120/68   02/22/17 126/82   10/31/16 118/68    Wt Readings from Last 3 Encounters:   06/05/17 173 lb (78.5 kg)   02/22/17 176 lb (79.8 kg)   10/31/16 174 lb 9.6 oz (79.2 kg)                    Reviewed and updated as needed this visit by clinical staff  Tobacco  Allergies  Meds  Med Hx  Surg Hx  Fam Hx  Soc Hx      Reviewed and updated as needed this visit by Provider         ROS:  CONSTITUTIONAL:NEGATIVE for fever, chills, change in weight  RESP:NEGATIVE for significant cough or SOB  CV: NEGATIVE for chest pain, palpitations or peripheral edema  GI: NEGATIVE for hematochezia and melena  : negative for dysuria and hematuria    OBJECTIVE:                                                    /68 (BP Location:  "Left arm, Patient Position: Chair, Cuff Size: Adult Large)  Pulse 71  Temp 97.9  F (36.6  C)  Resp 20  Ht 5' 2\" (1.575 m)  Wt 173 lb (78.5 kg)  SpO2 96%  Breastfeeding? No  BMI 31.64 kg/m2  Body mass index is 31.64 kg/(m^2).  GENERAL APPEARANCE: alert and no distress  CV: regular rates and rhythm, normal S1 S2, no S3 or S4 and no murmur, click or rub  PSYCH: anxious    Diagnostic test results:  Pending      ASSESSMENT/PLAN:                                                        ICD-10-CM    1. Urinary hesitancy R39.11 UROLOGY ADULT REFERRAL    hx of urethral dilatations, and then surgery   2. Other iron deficiency anemia D50.8 Iron and iron binding capacity     Ferritin     CBC with platelets and differential   3. Hypothyroidism, unspecified type E03.9 levothyroxine (SYNTHROID/LEVOTHROID) 75 MCG tablet     TSH with free T4 reflex   4. Hypertension goal BP (blood pressure) < 140/80 I10 nadolol (CORGARD) 40 MG tablet   5. SHAWNEE (generalized anxiety disorder) F41.1 diazepam (VALIUM) 5 MG tablet   6. Episodic tension-type headache, not intractable G44.219 acetaminophen-codeine (TYLENOL #3) 300-30 MG per tablet   7. Hyperlipidemia with target LDL less than 100 E78.5 pravastatin (PRAVACHOL) 40 MG tablet       Summary and implications:  We reviewed multiple issues. I suggested she not be too aggressive  with tapering and stopping amitriptyline and/or Lexapro.                          Refilled her medications.                          Check labs and adjust medications as indicated.  Referred to urology.  Patient Instructions   You are planning a urology consult.                     You are planning to try tapering down the amitriptyline,and possibly stopping this and/or the lexapro.                                         I will let you know your lab results.          Sean Velasquez MD  [facility]   Results for orders placed or performed in visit on 06/05/17   TSH with free T4 " reflex   Result Value Ref Range    TSH 1.14 0.40 - 4.00 mU/L   Iron and iron binding capacity   Result Value Ref Range    Iron 80 35 - 180 ug/dL    Iron Binding Cap 318 240 - 430 ug/dL    Iron Saturation Index 25 15 - 46 %   Ferritin   Result Value Ref Range    Ferritin 42 8 - 252 ng/mL   CBC with platelets and differential   Result Value Ref Range    WBC 8.7 4.0 - 11.0 10e9/L    RBC Count 4.96 3.8 - 5.2 10e12/L    Hemoglobin 15.2 11.7 - 15.7 g/dL    Hematocrit 44.9 35.0 - 47.0 %    MCV 91 78 - 100 fl    MCH 30.6 26.5 - 33.0 pg    MCHC 33.9 31.5 - 36.5 g/dL    RDW 13.4 10.0 - 15.0 %    Platelet Count 201 150 - 450 10e9/L    Diff Method Automated Method     % Neutrophils 62.9 %    % Lymphocytes 27.0 %    % Monocytes 6.9 %    % Eosinophils 2.9 %    % Basophils 0.3 %    Absolute Neutrophil 5.5 1.6 - 8.3 10e9/L    Absolute Lymphocytes 2.4 0.8 - 5.3 10e9/L    Absolute Monocytes 0.6 0.0 - 1.3 10e9/L    Absolute Eosinophils 0.3 0.0 - 0.7 10e9/L    Absolute Basophils 0.0 0.0 - 0.2 10e9/L     Letter sent.                The thyroid level is good. Keep taking the same dosage.  Your hemoglobin and iron levels are also good.

## 2017-06-05 NOTE — PATIENT INSTRUCTIONS
You are planning a urology consult.                     You are planning to try tapering down the amitriptyline,and possibly stopping this and/or the lexapro.                                         I will let you know your lab results.

## 2017-06-05 NOTE — LETTER
[place] [id number] [facility]  [address]  [facility] [address] [phone]                                                                                                           Sadaf Rubi  [address]  [facility] [address]    June 7, 2017      Dear Sadaf,    The results of your recent tests were reviewed and are enclosed.               The thyroid level is good. Keep taking the same dosage.  Your hemoglobin and iron levels are also good.       Thank you for choosing [facility].  We appreciate the opportunity to serve you and look forward to supporting your healthcare needs in the future.    If you have any questions or concerns, please call me or my staff at [phone].      Sincerely,    Sean Velasquez MD    Results for orders placed or performed in visit on 06/05/17   TSH with free T4 reflex   Result Value Ref Range    TSH 1.14 0.40 - 4.00 mU/L   Iron and iron binding capacity   Result Value Ref Range    Iron 80 35 - 180 ug/dL    Iron Binding Cap 318 240 - 430 ug/dL    Iron Saturation Index 25 15 - 46 %   Ferritin   Result Value Ref Range    Ferritin 42 8 - 252 ng/mL   CBC with platelets and differential   Result Value Ref Range    WBC 8.7 4.0 - 11.0 10e9/L    RBC Count 4.96 3.8 - 5.2 10e12/L    Hemoglobin 15.2 11.7 - 15.7 g/dL    Hematocrit 44.9 35.0 - 47.0 %    MCV 91 78 - 100 fl    MCH 30.6 26.5 - 33.0 pg    MCHC 33.9 31.5 - 36.5 g/dL    RDW 13.4 10.0 - 15.0 %    Platelet Count 201 150 - 450 10e9/L    Diff Method Automated Method     % Neutrophils 62.9 %    % Lymphocytes 27.0 %    % Monocytes 6.9 %    % Eosinophils 2.9 %    % Basophils 0.3 %    Absolute Neutrophil 5.5 1.6 - 8.3 10e9/L    Absolute Lymphocytes 2.4 0.8 - 5.3 10e9/L    Absolute Monocytes 0.6 0.0 - 1.3 10e9/L    Absolute Eosinophils 0.3 0.0 - 0.7 10e9/L    Absolute Basophils 0.0 0.0 - 0.2 10e9/L

## 2017-06-05 NOTE — NURSING NOTE
"Chief Complaint   Patient presents with     Fatigue     Hypertension     Lipids     Recheck Medication       Initial /68 (BP Location: Left arm, Patient Position: Chair, Cuff Size: Adult Large)  Pulse 71  Temp 97.9  F (36.6  C)  Resp 20  Ht 5' 2\" (1.575 m)  Wt 173 lb (78.5 kg)  SpO2 96%  Breastfeeding? No  BMI 31.64 kg/m2 Estimated body mass index is 31.64 kg/(m^2) as calculated from the following:    Height as of this encounter: 5' 2\" (1.575 m).    Weight as of this encounter: 173 lb (78.5 kg).  Medication Reconciliation: complete   Rafaela Leahy LPN  "

## 2017-06-05 NOTE — LETTER
My Depression Action Plan  Name: Sadaf Rubi   Date of Birth 1945  Date: 6/5/2017    My doctor: Sean Velasquez   My clinic: [address]  [facility] [address] [phone]          GREEN    ZONE   Good Control    What it looks like:     Things are going generally well. You have normal up s and down s. You may even feel depressed from time to time, but bad moods usually last less than a day.   What you need to do:  1. Continue to care for yourself (see self care plan)  2. Check your depression survival kit and update it as needed  3. Follow your physician s recommendations including any medication.  4. Do not stop taking medication unless you consult with your physician first.           YELLOW         ZONE Getting Worse    What it looks like:     Depression is starting to interfere with your life.     It may be hard to get out of bed; you may be starting to isolate yourself from others.    Symptoms of depression are starting to last most all day and this has happened for several days.     You may have suicidal thoughts but they are not constant.   What you need to do:     1. Call your care team, your response to treatment will improve if you keep your care team informed of your progress. Yellow periods are signs an adjustment may need to be made.     2. Continue your self-care, even if you have to fake it!    3. Talk to someone in your support network    4. Open up your depression survival kit           RED    ZONE Medical Alert - Get Help    What it looks like:     Depression is seriously interfering with your life.     You may experience these or other symptoms: You can t get out of bed most days, can t work or engage in other necessary activities, you have trouble taking care of basic hygiene, or basic responsibilities, thoughts of suicide or death that will not go away, self-injurious behavior.     What you need to  do:  1. Call your care team and request a same-day appointment. If they are not available (weekends or after hours) call your local crisis line, emergency room or 911.      Electronically signed by: Rafaela Leahy, June 5, 2017    Depression Self Care Plan / Survival Kit    Self-Care for Depression  Here s the deal. Your body and mind are really not as separate as most people think.  What you do and think affects how you feel and how you feel influences what you do and think. This means if you do things that people who feel good do, it will help you feel better.  Sometimes this is all it takes.  There is also a place for medication and therapy depending on how severe your depression is, so be sure to consult with your medical provider and/ or Behavioral Health Consultant if your symptoms are worsening or not improving.     In order to better manage my stress, I will:    Exercise  Get some form of exercise, every day. This will help reduce pain and release endorphins, the  feel good  chemicals in your brain. This is almost as good as taking antidepressants!  This is not the same as joining a gym and then never going! (they count on that by the way ) It can be as simple as just going for a walk or doing some gardening, anything that will get you moving.      Hygiene   Maintain good hygiene (Get out of bed in the morning, Make your bed, Brush your teeth, Take a shower, and Get dressed like you were going to work, even if you are unemployed).  If your clothes don't fit try to get ones that do.    Diet  I will strive to eat foods that are good for me, drink plenty of water, and avoid excessive sugar, caffeine, alcohol, and other mood-altering substances.  Some foods that are helpful in depression are: complex carbohydrates, B vitamins, flaxseed, fish or fish oil, fresh fruits and vegetables.    Psychotherapy  I agree to participate in Individual Therapy (if recommended).    Medication  If prescribed medications, I  agree to take them.  Missing doses can result in serious side effects.  I understand that drinking alcohol, or other illicit drug use, may cause potential side effects.  I will not stop my medication abruptly without first discussing it with my provider.    Staying Connected With Others  I will stay in touch with my friends, family members, and my primary care provider/team.    Use your imagination  Be creative.  We all have a creative side; it doesn t matter if it s oil painting, sand castles, or mud pies! This will also kick up the endorphins.    Witness Beauty  (AKA stop and smell the roses) Take a look outside, even in mid-winter. Notice colors, textures. Watch the squirrels and birds.     Service to others  Be of service to others.  There is always someone else in need.  By helping others we can  get out of ourselves  and remember the really important things.  This also provides opportunities for practicing all the other parts of the program.    Humor  Laugh and be silly!  Adjust your TV habits for less news and crime-drama and more comedy.    Control your stress  Try breathing deep, massage therapy, biofeedback, and meditation. Find time to relax each day.     My support system    Clinic Contact:  Phone number:    Contact 1:  Phone number:    Contact 2:  Phone number:    Restorationist/:  Phone number:    Therapist:  Phone number:    Local crisis center:    Phone number:    Other community support:  Phone number:

## 2017-06-06 LAB
FERRITIN SERPL-MCNC: 42 NG/ML (ref 8–252)
IRON SATN MFR SERPL: 25 % (ref 15–46)
IRON SERPL-MCNC: 80 UG/DL (ref 35–180)
TIBC SERPL-MCNC: 318 UG/DL (ref 240–430)
TSH SERPL DL<=0.005 MIU/L-ACNC: 1.14 MU/L (ref 0.4–4)

## 2017-06-06 ASSESSMENT — ANXIETY QUESTIONNAIRES: GAD7 TOTAL SCORE: 0

## 2017-06-06 ASSESSMENT — PATIENT HEALTH QUESTIONNAIRE - PHQ9: SUM OF ALL RESPONSES TO PHQ QUESTIONS 1-9: 0

## 2017-06-09 DIAGNOSIS — R39.11 URINARY HESITANCY: Primary | ICD-10-CM

## 2017-06-19 ENCOUNTER — OFFICE VISIT (OUTPATIENT)
Dept: UROLOGY | Facility: CLINIC | Age: 72
End: 2017-06-19
Payer: COMMERCIAL

## 2017-06-19 VITALS
WEIGHT: 170 LBS | HEIGHT: 63 IN | SYSTOLIC BLOOD PRESSURE: 130 MMHG | HEART RATE: 70 BPM | DIASTOLIC BLOOD PRESSURE: 86 MMHG | BODY MASS INDEX: 30.12 KG/M2

## 2017-06-19 DIAGNOSIS — N30.01 ACUTE CYSTITIS WITH HEMATURIA: Primary | ICD-10-CM

## 2017-06-19 DIAGNOSIS — R39.11 URINARY HESITANCY: ICD-10-CM

## 2017-06-19 LAB
ALBUMIN UR-MCNC: NEGATIVE MG/DL
APPEARANCE UR: ABNORMAL
BILIRUB UR QL STRIP: NEGATIVE
COLOR UR AUTO: YELLOW
GLUCOSE UR STRIP-MCNC: NEGATIVE MG/DL
HGB UR QL STRIP: ABNORMAL
KETONES UR STRIP-MCNC: NEGATIVE MG/DL
LEUKOCYTE ESTERASE UR QL STRIP: ABNORMAL
NITRATE UR QL: POSITIVE
PH UR STRIP: 7 PH (ref 5–7)
RESIDUAL VOLUME (RV) (EXTERNAL): 140
SP GR UR STRIP: 1.01 (ref 1–1.03)
URN SPEC COLLECT METH UR: ABNORMAL
UROBILINOGEN UR STRIP-ACNC: 0.2 EU/DL (ref 0.2–1)

## 2017-06-19 PROCEDURE — 87088 URINE BACTERIA CULTURE: CPT | Performed by: PHYSICIAN ASSISTANT

## 2017-06-19 PROCEDURE — 87186 SC STD MICRODIL/AGAR DIL: CPT | Performed by: PHYSICIAN ASSISTANT

## 2017-06-19 PROCEDURE — 81003 URINALYSIS AUTO W/O SCOPE: CPT | Performed by: PHYSICIAN ASSISTANT

## 2017-06-19 PROCEDURE — 87086 URINE CULTURE/COLONY COUNT: CPT | Performed by: PHYSICIAN ASSISTANT

## 2017-06-19 PROCEDURE — 99203 OFFICE O/P NEW LOW 30 MIN: CPT | Mod: 25 | Performed by: PHYSICIAN ASSISTANT

## 2017-06-19 PROCEDURE — 51798 US URINE CAPACITY MEASURE: CPT | Performed by: PHYSICIAN ASSISTANT

## 2017-06-19 RX ORDER — SULFAMETHOXAZOLE/TRIMETHOPRIM 800-160 MG
1 TABLET ORAL 2 TIMES DAILY
Qty: 14 TABLET | Refills: 0 | Status: SHIPPED | OUTPATIENT
Start: 2017-06-19 | End: 2017-08-30

## 2017-06-19 ASSESSMENT — PAIN SCALES - GENERAL: PAINLEVEL: NO PAIN (0)

## 2017-06-19 NOTE — LETTER
"6/19/2017       RE: Sadaf Rubi  [address]  [facility] [address]     Dear Colleague,    Thank you for referring your patient, Sadaf Rubi, to the [facility] [place] at [facility]. Please see a copy of my visit note below.    CC: Urinary hesitancy.    HPI: It is a pleasure to see Sadaf Rubi, a very pleasant 71 year old female asked to be seen in consultation by Dr. Velasquez for the above. This problem has been going on for years and is progressively worsening. The patient voids q four hours during the day, nocturia x one. There is no urgency associated with symptoms no urge incontinence. The patient does not wear protective pads.  No leakage with coughing, sneezing, bending at the waist.  She  knows she has a cystocele and sometimes can see a bulge.  Saw 10-15 years ago Dr. Jed Redmond for urethral dilation and \"cauterization of the bladder\" for recurrent UTIs. He eventually surgically opened the urethra more.  No recurrent UTIs for many years.  Could not leave a sample initally, and PVR was 140 after voiding 1 hour ago.      Denies any dysuria, gross hematuria, pyuria.  Constantly has a sensation of incomplete bladder emptying, needing to strain or need to return to void again.  No history of kidney stones. The patient does not have any neurologic issues other than intermittent migraines.  Intermittently reports constipation with migraine medication use. Fluid consumption includes a minimal amount of water daily, and one cup of coffee.    Past Medical History:   Diagnosis Date     Anxiety      Depressive disorder, not elsewhere classified     1960's; due to 1st marriage; sx relieved with divorce     Hypertension      Insomnia      Knee pain      Palpitation 11/11    false + nuc med stress test; cor angio shows only a 20% LAD lesion.        Shortness of breath 11/11    CT pulm angio neg; spirometry nml     " Unspecified hypothyroidism     Hypothyroidism     Past Surgical History:   Procedure Laterality Date     GYN SURGERY  1990's    hysterectomy SAMANTHA & BSO     KNEE SURGERY  1/2006    meniscus left     KNEE SURGERY  2007    right meniscus     KNEE SURGERY  09/10/2012    right knee replacement ; L TKA 3/13     LUMPECTOMY BREAST  1/2001    benign     URETHRA SURGERY  1977     Current Outpatient Prescriptions   Medication Sig Dispense Refill     amitriptyline (ELAVIL) 25 MG tablet TAKE 2 TABLETS BY MOUTH NIGHTLY AS NEEDED FOR SLEEP 180 tablet 2     levothyroxine (SYNTHROID/LEVOTHROID) 75 MCG tablet TAKE 1 TABLET (75 MCG) BY MOUTH DAILY 90 tablet 3     nadolol (CORGARD) 40 MG tablet TAKE 1 TABLET (40 MG) BY MOUTH DAILY 90 tablet 3     diazepam (VALIUM) 5 MG tablet TAKE ONE TABLET BY MOUTH EVERY DAY AS NEEDED FOR  ANXIETY 30 tablet 3     acetaminophen-codeine (TYLENOL #3) 300-30 MG per tablet TAKE ONE TABLET BY MOUTH TWICE A DAY AS NEEDED 30 tablet 3     pravastatin (PRAVACHOL) 40 MG tablet TAKE 1 TABLET (40 MG) BY MOUTH DAILY 90 tablet 3     escitalopram (LEXAPRO) 10 MG tablet TAKE 1 TABLET (10 MG) BY MOUTH DAILY (Patient taking differently: TAKing 1/4 tablet every day) 90 tablet 2     amLODIPine (NORVASC) 5 MG tablet Take 1 tablet (5 mg) by mouth daily 90 tablet 2     polyethylene glycol (MIRALAX/GLYCOLAX) powder TAKE 17 G BY MOUTH 2     TIMES DAILY 527 g 11     Cholecalciferol (VITAMIN D) 2000 UNITS tablet Take 2,000 Units by mouth daily 100 tablet 3     aspirin 81 MG tablet Take 1 tablet (81 mg) by mouth daily 120 tablet      cyanocolbalamin (VITAMIN  B-12) 500 MCG tablet Take 1 tablet by mouth daily.       Allergies   Allergen Reactions     Bees      Ciprofloxacin      Not an allergy but did not tolerate well in 4/13     Other [Seasonal Allergies]      msg and mushrooms     Penicillins      Family History: There is no h/o  malignancy.  There is no h/o urolithiasis.    Social History: The patient does not smoke  cigarettes (5 pack/year history and quit 32 years ago). Denies EtOH and illicit drug use.      ROS: A comprehensive 14 point ROS was obtained and otherwise negative except for that outlined above in the HPI.    PHYSICAL EXAM:   There were no vitals filed for this visit.  GENERAL: Well groomed, well developed, well nourished female in NAD.  HEENT: EOMI, AT, NC.  SKIN: Warm to touch, dry.  No visible rashes or lesions on examined areas.  RESP: No increased respiratory effort.   LYMPH: No LE edema.  ABD: Soft, NT, ND.  No CVAT bilaterally.  MS: Full ROM in extremities.    LABORATORY: Pending    IMAGING: None      ASSESSMENT/PLAN:  Ms. Sadaf Rubi is a very pleasant 71 year old female with urinary hesitancy. We discussed potential management options including continued observation, behavioral modifications such as timed voiding, double voiding, pelvic floor physical therapy, medications (anticholinergics or Myrbetriq), intravesical Botox injections, PTNS, InterStim, or additional evaluation with cystoscopy and/or video urodynamics to further evaluate the anatomy and function of the lower urinary tract. The patient has elected to proceed with the following:  - Urodynamics and follow-up with Dr. Simpson with possible cystoscopy  - Reviewed behavioral therapies, such as timed voiding, pelvic floor relaxation while voiding and not voiding in a hurry.  Consider possible pelvic floor physical therapy and biofeedback in the future if she is willing at that time.    I have enjoyed participating in the medical care of this very pleasant patient.  Please don't hesitate to contact me with any questions or concerns.     30 min spent face to face with the patient, >50% of that time spent on counseling and coordination of care.       Again, thank you for allowing me to participate in the care of your patient.      Sincerely,    Katiana Driscoll PA-C, MARY

## 2017-06-19 NOTE — PROGRESS NOTES
"CC: Urinary hesitancy.    HPI: It is a pleasure to see Sadaf Rubi, a very pleasant 71 year old female asked to be seen in consultation by Dr. Velasquez for the above. This problem has been going on for years and is progressively worsening. The patient voids q four hours during the day, nocturia x one. There is no urgency associated with symptoms no urge incontinence. The patient does not wear protective pads.  No leakage with coughing, sneezing, bending at the waist.  She  knows she has a cystocele and sometimes can see a bulge.  Saw 10-15 years ago Dr. Jed Redmond for urethral dilation and \"cauterization of the bladder\" for recurrent UTIs. He eventually surgically opened the urethra more.  No recurrent UTIs for many years.  Could not leave a sample initally, and PVR was 140 after voiding 1 hour ago.      Denies any dysuria, gross hematuria, pyuria.  Constantly has a sensation of incomplete bladder emptying, needing to strain or need to return to void again.  No history of kidney stones. The patient does not have any neurologic issues other than intermittent migraines.  Intermittently reports constipation with migraine medication use. Fluid consumption includes a minimal amount of water daily, and one cup of coffee.    Past Medical History:   Diagnosis Date     Anxiety      Depressive disorder, not elsewhere classified     1960's; due to 1st marriage; sx relieved with divorce     Hypertension      Insomnia      Knee pain      Palpitation 11/11    false + nuc med stress test; cor angio shows only a 20% LAD lesion.        Shortness of breath 11/11    CT pulm angio neg; spirometry nml     Unspecified hypothyroidism     Hypothyroidism     Past Surgical History:   Procedure Laterality Date     GYN SURGERY  1990's    hysterectomy SAMANTHA & BSO     KNEE SURGERY  1/2006    meniscus left     KNEE SURGERY  2007    right meniscus     KNEE SURGERY  09/10/2012    right knee replacement ; L TKA 3/13     LUMPECTOMY BREAST  " 1/2001    benign     URETHRA SURGERY  1977     Current Outpatient Prescriptions   Medication Sig Dispense Refill     amitriptyline (ELAVIL) 25 MG tablet TAKE 2 TABLETS BY MOUTH NIGHTLY AS NEEDED FOR SLEEP 180 tablet 2     levothyroxine (SYNTHROID/LEVOTHROID) 75 MCG tablet TAKE 1 TABLET (75 MCG) BY MOUTH DAILY 90 tablet 3     nadolol (CORGARD) 40 MG tablet TAKE 1 TABLET (40 MG) BY MOUTH DAILY 90 tablet 3     diazepam (VALIUM) 5 MG tablet TAKE ONE TABLET BY MOUTH EVERY DAY AS NEEDED FOR  ANXIETY 30 tablet 3     acetaminophen-codeine (TYLENOL #3) 300-30 MG per tablet TAKE ONE TABLET BY MOUTH TWICE A DAY AS NEEDED 30 tablet 3     pravastatin (PRAVACHOL) 40 MG tablet TAKE 1 TABLET (40 MG) BY MOUTH DAILY 90 tablet 3     escitalopram (LEXAPRO) 10 MG tablet TAKE 1 TABLET (10 MG) BY MOUTH DAILY (Patient taking differently: TAKing 1/4 tablet every day) 90 tablet 2     amLODIPine (NORVASC) 5 MG tablet Take 1 tablet (5 mg) by mouth daily 90 tablet 2     polyethylene glycol (MIRALAX/GLYCOLAX) powder TAKE 17 G BY MOUTH 2     TIMES DAILY 527 g 11     Cholecalciferol (VITAMIN D) 2000 UNITS tablet Take 2,000 Units by mouth daily 100 tablet 3     aspirin 81 MG tablet Take 1 tablet (81 mg) by mouth daily 120 tablet      cyanocolbalamin (VITAMIN  B-12) 500 MCG tablet Take 1 tablet by mouth daily.       Allergies   Allergen Reactions     Bees      Ciprofloxacin      Not an allergy but did not tolerate well in 4/13     Other [Seasonal Allergies]      msg and mushrooms     Penicillins      Family History: There is no h/o  malignancy.  There is no h/o urolithiasis.    Social History: The patient does not smoke cigarettes (5 pack/year history and quit 32 years ago). Denies EtOH and illicit drug use.      ROS: A comprehensive 14 point ROS was obtained and otherwise negative except for that outlined above in the HPI.    PHYSICAL EXAM:   There were no vitals filed for this visit.  GENERAL: Well groomed, well developed, well nourished female  in NAD.  HEENT: EOMI, AT, NC.  SKIN: Warm to touch, dry.  No visible rashes or lesions on examined areas.  RESP: No increased respiratory effort.   LYMPH: No LE edema.  ABD: Soft, NT, ND.  No CVAT bilaterally.  MS: Full ROM in extremities.    LABORATORY: Pending    IMAGING: None      ASSESSMENT/PLAN:  Ms. Sadaf Rubi is a very pleasant 71 year old female with urinary hesitancy. We discussed potential management options including continued observation, behavioral modifications such as timed voiding, double voiding, pelvic floor physical therapy, medications (anticholinergics or Myrbetriq), intravesical Botox injections, PTNS, InterStim, or additional evaluation with cystoscopy and/or video urodynamics to further evaluate the anatomy and function of the lower urinary tract. The patient has elected to proceed with the following:  - Urodynamics and follow-up with Dr. Simpson with possible cystoscopy  - Reviewed behavioral therapies, such as timed voiding, pelvic floor relaxation while voiding and not voiding in a hurry.  Consider possible pelvic floor physical therapy and biofeedback in the future if she is willing at that time.    I have enjoyed participating in the medical care of this very pleasant patient.  Please don't hesitate to contact me with any questions or concerns.     Katiana Driscoll PA-C  [facility]    30 min spent face to face with the patient, >50% of that time spent on counseling and coordination of care.

## 2017-06-19 NOTE — MR AVS SNAPSHOT
After Visit Summary   6/19/2017    Sadaf Rubi    MRN: [id number]           Patient Information     Date Of Birth          1945        Visit Information        Provider Department      6/19/2017 11:30 AM Katiana Driscoll PA-C [facility]        Today's Diagnoses     Urinary hesitancy           Follow-ups after your visit        Your next 10 appointments already scheduled     Jul 17, 2017  2:00 PM CDT   (Arrive by 1:45 PM)   Urodynamics with UA NURSE   [facility] ([facility])    [address]  [address]  [facility] [id number] [phone]            Sep 13, 2017 10:00 AM CDT   NEW FEMALE PELVIC with Sayda Simpson MD,  CYF   [facility] ([facility])    [address]  [address]  [facility] [id number] [phone]              Who to contact     If you have questions or need follow up information about today's clinic visit or your schedule please contact [facility] directly at [phone].  Normal or non-critical lab and imaging results will be communicated to you by MyChart, letter or phone within 4 business days after the clinic has received the results. If you do not hear from us within 7 days, please contact the clinic through MyChart or phone. If you have a critical or abnormal lab result, we will notify you by phone as soon as possible.  Submit refill requests through [facility] or call your pharmacy and they will forward the refill request to us. Please allow 3 business days for your refill to be completed.          Additional Information About Your Visit        Care EveryWhere ID     This is your Care EveryWhere ID. This could be used by other organizations to access your [place] medical records  [id number]        Your Vitals Were     Pulse Height Breastfeeding? BMI (Body Mass  "Index)          70 1.6 m (5' 3\") No 30.11 kg/m2         Blood Pressure from Last 3 Encounters:   06/19/17 130/86   06/05/17 120/68   02/22/17 126/82    Weight from Last 3 Encounters:   06/19/17 77.1 kg (170 lb)   06/05/17 78.5 kg (173 lb)   02/22/17 79.8 kg (176 lb)              We Performed the Following     MEASURE POST-VOID RESIDUAL URINE/BLADDER CAPACITY, US NON-IMAGING     UA without Microscopic          Today's Medication Changes          These changes are accurate as of: 6/19/17 12:28 PM.  If you have any questions, ask your nurse or doctor.               These medicines have changed or have updated prescriptions.        Dose/Directions    escitalopram 10 MG tablet   Commonly known as:  LEXAPRO   This may have changed:  See the new instructions.   Used for:  SHAWNEE (generalized anxiety disorder)        TAKE 1 TABLET (10 MG) BY MOUTH DAILY   Quantity:  90 tablet   Refills:  2                Primary Care Provider Office Phone # Fax #    Sean Velasquez [phone]       [facility] [address] [facility] [address]        Thank you!     Thank you for choosing [facility] [place]  for your care. Our goal is always to provide you with excellent care. Hearing back from our patients is one way we can continue to improve our services. Please take a few minutes to complete the written survey that you may receive in the mail after your visit with us. Thank you!             Your Updated Medication List - Protect others around you: Learn how to safely use, store and throw away your medicines at www.disposemymeds.org.          This list is accurate as of: 6/19/17 12:28 PM.  Always use your most recent med list.                   Brand Name Dispense Instructions for use    acetaminophen-codeine 300-30 MG per tablet    TYLENOL #3    30 tablet    TAKE ONE TABLET BY MOUTH TWICE A DAY AS NEEDED       amitriptyline 25 MG tablet    ELAVIL    180 tablet    " TAKE 2 TABLETS BY MOUTH NIGHTLY AS NEEDED FOR SLEEP       amLODIPine 5 MG tablet    NORVASC    90 tablet    Take 1 tablet (5 mg) by mouth daily       aspirin 81 MG tablet     120 tablet    Take 1 tablet (81 mg) by mouth daily       cyanocolbalamin 500 MCG tablet    vitamin  B-12     Take 1 tablet by mouth daily.       diazepam 5 MG tablet    VALIUM    30 tablet    TAKE ONE TABLET BY MOUTH EVERY DAY AS NEEDED FOR  ANXIETY       escitalopram 10 MG tablet    LEXAPRO    90 tablet    TAKE 1 TABLET (10 MG) BY MOUTH DAILY       levothyroxine 75 MCG tablet    SYNTHROID/LEVOTHROID    90 tablet    TAKE 1 TABLET (75 MCG) BY MOUTH DAILY       nadolol 40 MG tablet    CORGARD    90 tablet    TAKE 1 TABLET (40 MG) BY MOUTH DAILY       polyethylene glycol powder    MIRALAX/GLYCOLAX    527 g    TAKE 17 G BY MOUTH 2     TIMES DAILY       pravastatin 40 MG tablet    PRAVACHOL    90 tablet    TAKE 1 TABLET (40 MG) BY MOUTH DAILY       vitamin D 2000 UNITS tablet     100 tablet    Take 2,000 Units by mouth daily

## 2017-06-19 NOTE — NURSING NOTE
Chief Complaint   Patient presents with     Urinary Problem     Patient is here for hesitency. She said that in th epast she was seeing a urologist to have her urethera dilated. She used to have frequent UTI's.     Ning Osborne LPN 11:48 AM June 19, 2017

## 2017-06-22 LAB
BACTERIA SPEC CULT: ABNORMAL
Lab: ABNORMAL
MICRO REPORT STATUS: ABNORMAL
MICROORGANISM SPEC CULT: ABNORMAL
SPECIMEN SOURCE: ABNORMAL

## 2017-06-23 NOTE — PROGRESS NOTES
Called patient and she did pick-up ABT. She is also coming in on nurse clinic next week on Monday. Rachelle Busby LPN

## 2017-07-12 DIAGNOSIS — R39.11 URINARY HESITANCY: Primary | ICD-10-CM

## 2017-07-17 ENCOUNTER — OFFICE VISIT (OUTPATIENT)
Dept: UROLOGY | Facility: CLINIC | Age: 72
End: 2017-07-17
Payer: COMMERCIAL

## 2017-07-17 DIAGNOSIS — R39.11 URINARY HESITANCY: ICD-10-CM

## 2017-07-17 LAB
ALBUMIN UR-MCNC: NEGATIVE MG/DL
APPEARANCE UR: CLEAR
BILIRUB UR QL STRIP: NEGATIVE
COLOR UR AUTO: YELLOW
GLUCOSE UR STRIP-MCNC: NEGATIVE MG/DL
HGB UR QL STRIP: NEGATIVE
KETONES UR STRIP-MCNC: NEGATIVE MG/DL
LEUKOCYTE ESTERASE UR QL STRIP: ABNORMAL
NITRATE UR QL: NEGATIVE
PH UR STRIP: 7 PH (ref 5–7)
SP GR UR STRIP: 1.01 (ref 1–1.03)
URN SPEC COLLECT METH UR: ABNORMAL
UROBILINOGEN UR STRIP-ACNC: 0.2 EU/DL (ref 0.2–1)

## 2017-07-17 PROCEDURE — 81003 URINALYSIS AUTO W/O SCOPE: CPT | Performed by: UROLOGY

## 2017-07-17 PROCEDURE — 51784 ANAL/URINARY MUSCLE STUDY: CPT | Mod: TC | Performed by: UROLOGY

## 2017-07-17 PROCEDURE — 51728 CYSTOMETROGRAM W/VP: CPT | Mod: TC | Performed by: UROLOGY

## 2017-07-17 PROCEDURE — 51741 ELECTRO-UROFLOWMETRY FIRST: CPT | Mod: TC | Performed by: UROLOGY

## 2017-07-17 PROCEDURE — 51798 US URINE CAPACITY MEASURE: CPT | Performed by: UROLOGY

## 2017-07-17 PROCEDURE — 51797 INTRAABDOMINAL PRESSURE TEST: CPT | Mod: TC | Performed by: UROLOGY

## 2017-07-17 NOTE — PATIENT INSTRUCTIONS
You have just had urodynamics.  You may burn with urination and may see blood on the toilet paper when you wipe.  Take one antibiotic this afternoon and one on the morning.  Call with any questions or concerns.    Nicole Bragg LPN

## 2017-07-17 NOTE — MR AVS SNAPSHOT
After Visit Summary   7/17/2017    Sadaf Rubi    MRN: [id number]           Patient Information     Date Of Birth          1945        Visit Information        Provider Department      7/17/2017 2:00 PM  NURSE [facility]        Today's Diagnoses     Urinary hesitancy          Care Instructions    You have just had urodynamics.  You may burn with urination and may see blood on the toilet paper when you wipe.  Take one antibiotic this afternoon and one on the morning.  Call with any questions or concerns.    Nicole Bragg LPN            Follow-ups after your visit        Your next 10 appointments already scheduled     Sep 05, 2017  3:30 PM CDT   Nurse Visit with  NURSE   [facility] ([facility])    [address]  [address]  [facility] [id number] [phone]            Sep 13, 2017 10:00 AM CDT   NEW FEMALE PELVIC with Sayda Simpson MD,  CYF   [facility] ([facility])    [address]  [address]  [facility] [id number] [phone]              Who to contact     If you have questions or need follow up information about today's clinic visit or your schedule please contact [facility] directly at [phone].  Normal or non-critical lab and imaging results will be communicated to you by MyChart, letter or phone within 4 business days after the clinic has received the results. If you do not hear from us within 7 days, please contact the clinic through MyChart or phone. If you have a critical or abnormal lab result, we will notify you by phone as soon as possible.  Submit refill requests through Kindermint or call your pharmacy and they will forward the refill request to us. Please allow 3 business days for your refill to be completed.          Additional Information About Your Visit         Care EveryWhere ID     This is your Care EveryWhere ID. This could be used by other organizations to access your [place] medical records  [id number]         Blood Pressure from Last 3 Encounters:   06/19/17 130/86   06/05/17 120/68   02/22/17 126/82    Weight from Last 3 Encounters:   06/19/17 77.1 kg (170 lb)   06/05/17 78.5 kg (173 lb)   02/22/17 79.8 kg (176 lb)              We Performed the Following     CYSTOMETROGRAM COMPLEX W VOIDING PRESS PROFILE (04317)     EMG ANAL/URETH SPHINCTER, OTHER THAN NEEDLE (83280)     UA without Microscopic [[id number]]     VOIDING PRESSURE STUDY, INTRA-ABDOMINAL (03490)          Today's Medication Changes          These changes are accurate as of: 7/17/17  3:34 PM.  If you have any questions, ask your nurse or doctor.               These medicines have changed or have updated prescriptions.        Dose/Directions    escitalopram 10 MG tablet   Commonly known as:  LEXAPRO   This may have changed:  See the new instructions.   Used for:  SHAWNEE (generalized anxiety disorder)        TAKE 1 TABLET (10 MG) BY MOUTH DAILY   Quantity:  90 tablet   Refills:  2                Primary Care Provider Office Phone # Fax #    Sean Velasquez [phone]       [facility] [address] [facility] [address]        Equal Access to Services     JESSICA MCCULLOUGH AH: Hadii joellen ku hadasho Soomaali, waaxda luqadaha, qaybta kaalmada adeegyada, isha ritchie. So [facility] [phone].    ATENCIÓN: Si habla español, tiene a agrawal disposición servicios gratuitos de asistencia lingüística. Llame al [phone].    We comply with applicable federal civil rights laws and [place] laws. We do not discriminate on the basis of race, color, national origin, age, disability sex, sexual orientation or gender identity.            Thank you!     Thank you for choosing [facility] [place]  for your care. Our goal is always to  provide you with excellent care. Hearing back from our patients is one way we can continue to improve our services. Please take a few minutes to complete the written survey that you may receive in the mail after your visit with us. Thank you!             Your Updated Medication List - Protect others around you: Learn how to safely use, store and throw away your medicines at www.disposemymeds.org.          This list is accurate as of: 7/17/17  3:34 PM.  Always use your most recent med list.                   Brand Name Dispense Instructions for use Diagnosis    acetaminophen-codeine 300-30 MG per tablet    TYLENOL #3    30 tablet    TAKE ONE TABLET BY MOUTH TWICE A DAY AS NEEDED    Episodic tension-type headache, not intractable       amitriptyline 25 MG tablet    ELAVIL    180 tablet    TAKE 2 TABLETS BY MOUTH NIGHTLY AS NEEDED FOR SLEEP    Insomnia, Depressive disorder       amLODIPine 5 MG tablet    NORVASC    90 tablet    Take 1 tablet (5 mg) by mouth daily    Hypertension goal BP (blood pressure) < 140/80       aspirin 81 MG tablet     120 tablet    Take 1 tablet (81 mg) by mouth daily        cyanocolbalamin 500 MCG tablet    vitamin  B-12     Take 1 tablet by mouth daily.        diazepam 5 MG tablet    VALIUM    30 tablet    TAKE ONE TABLET BY MOUTH EVERY DAY AS NEEDED FOR  ANXIETY    SHAWNEE (generalized anxiety disorder)       escitalopram 10 MG tablet    LEXAPRO    90 tablet    TAKE 1 TABLET (10 MG) BY MOUTH DAILY    SHAWNEE (generalized anxiety disorder)       levothyroxine 75 MCG tablet    SYNTHROID/LEVOTHROID    90 tablet    TAKE 1 TABLET (75 MCG) BY MOUTH DAILY    Hypothyroidism, unspecified type       nadolol 40 MG tablet    CORGARD    90 tablet    TAKE 1 TABLET (40 MG) BY MOUTH DAILY    Hypertension goal BP (blood pressure) < 140/80       polyethylene glycol powder    MIRALAX/GLYCOLAX    527 g    TAKE 17 G BY MOUTH 2     TIMES DAILY    Slow transit constipation       pravastatin 40 MG tablet    PRAVACHOL    90  tablet    TAKE 1 TABLET (40 MG) BY MOUTH DAILY    Hyperlipidemia with target LDL less than 100       sulfamethoxazole-trimethoprim 800-160 MG per tablet    BACTRIM DS    14 tablet    Take 1 tablet by mouth 2 times daily    Acute cystitis with hematuria       vitamin D 2000 UNITS tablet     100 tablet    Take 2,000 Units by mouth daily

## 2017-07-17 NOTE — PROGRESS NOTES
Sadaf MELQUIADES Betancourton comes into clinic today at the request of Katiana CONLEY for urodynamics.  This service provided today was under the supervising provider of the day, Brandt Daley, who was available if needed.  Procedure explained.  Consent form signed.  Patient tolerated procedure fairly well.  Will take one antibiotic this afternoon and one in the morning.  See UDS sheet for more info.  Nicole Bragg LPN

## 2017-08-03 ENCOUNTER — TELEPHONE (OUTPATIENT)
Dept: UROLOGY | Facility: CLINIC | Age: 72
End: 2017-08-03

## 2017-08-03 NOTE — TELEPHONE ENCOUNTER
Returning patient's phone call.  She had urodynamics 7/17/17 and is wondering about results.  I explained that typically a patient will have a follow up appointment to go over the results.  I told her I would see if Katiana would give her a call with results.  I will route this call to Katiana.  Nicole Bragg LPN

## 2017-08-04 NOTE — TELEPHONE ENCOUNTER
Patient has nurse appointment beginning of September for UA.  She has an appointment the following week with Dr. Simpson for pelvic exam and possible cysto.  Patient aware.  Nicole Bragg LPN

## 2017-08-30 ENCOUNTER — OFFICE VISIT (OUTPATIENT)
Dept: FAMILY MEDICINE | Facility: CLINIC | Age: 72
End: 2017-08-30
Payer: COMMERCIAL

## 2017-08-30 VITALS
DIASTOLIC BLOOD PRESSURE: 80 MMHG | SYSTOLIC BLOOD PRESSURE: 122 MMHG | WEIGHT: 172 LBS | BODY MASS INDEX: 30.48 KG/M2 | HEART RATE: 60 BPM | TEMPERATURE: 97.8 F | HEIGHT: 63 IN | OXYGEN SATURATION: 98 % | RESPIRATION RATE: 20 BRPM

## 2017-08-30 DIAGNOSIS — R39.11 URINARY HESITANCY: Primary | ICD-10-CM

## 2017-08-30 DIAGNOSIS — F41.9 ANXIETY: ICD-10-CM

## 2017-08-30 DIAGNOSIS — F32.A DEPRESSIVE DISORDER: ICD-10-CM

## 2017-08-30 DIAGNOSIS — G47.00 INSOMNIA, UNSPECIFIED TYPE: ICD-10-CM

## 2017-08-30 PROCEDURE — 99213 OFFICE O/P EST LOW 20 MIN: CPT | Performed by: INTERNAL MEDICINE

## 2017-08-30 NOTE — MR AVS SNAPSHOT
After Visit Summary   8/30/2017    Sadaf Rubi    MRN: [id number]           Patient Information     Date Of Birth          1945        Visit Information        Provider Department      8/30/2017 3:15 PM Sean Velasquez MD [facility]        Today's Diagnoses     Urinary hesitancy    -  1    Palpitation        Anxiety          Care Instructions    Let's do this: cut back the amitriptyline to 25 mg at bedtime.                Go ahead with the cystoscopy.                                   Keep checking your vital signs.             Follow-ups after your visit        Your next 10 appointments already scheduled     Sep 05, 2017  3:30 PM CDT   Nurse Visit with  NURSE   [facility] [place] ([facility])    [address]  [address]  [facility] [id number] [phone]            Sep 13, 2017 10:00 AM CDT   NEW FEMALE PELVIC with Sayda Simpson MD,  CYF   [facility] [place] ([facility])    [address]  [address]  [facility] [phone] [phone]              Who to contact     If you have questions or need follow up information about today's clinic visit or your schedule please contact [facility] directly at [phone].  Normal or non-critical lab and imaging results will be communicated to you by MyChart, letter or phone within 4 business days after the clinic has received the results. If you do not hear from us within 7 days, please contact the clinic through MyChart or phone. If you have a critical or abnormal lab result, we will notify you by phone as soon as possible.  Submit refill requests through Kartela or call your pharmacy and they will forward the refill request to us. Please allow 3 business days for your refill to be completed.          Additional Information About Your Visit        Care EveryWhere ID     This  "is your Care EveryWhere ID. This could be used by other organizations to access your [place] medical records  [id number]        Your Vitals Were     Pulse Temperature Respirations Height Pulse Oximetry Breastfeeding?    60 97.8  F (36.6  C) 20 5' 3\" (1.6 m) 98% No    BMI (Body Mass Index)                   30.47 kg/m2            Blood Pressure from Last 3 Encounters:   08/30/17 122/80   06/19/17 130/86   06/05/17 120/68    Weight from Last 3 Encounters:   08/30/17 172 lb (78 kg)   06/19/17 170 lb (77.1 kg)   06/05/17 173 lb (78.5 kg)              Today, you had the following     No orders found for display         Today's Medication Changes          These changes are accurate as of: 8/30/17  4:01 PM.  If you have any questions, ask your nurse or doctor.               These medicines have changed or have updated prescriptions.        Dose/Directions    escitalopram 10 MG tablet   Commonly known as:  LEXAPRO   This may have changed:  See the new instructions.   Used for:  SHAWNEE (generalized anxiety disorder)        TAKE 1 TABLET (10 MG) BY MOUTH DAILY   Quantity:  90 tablet   Refills:  2                Primary Care Provider Office Phone # Fax #    Sean Velasquez [phone] [address] [facility] [address]        Equal Access to Services     JESSICA MCCULLOUGH : Hadii joellen ku hadasho Soomaali, waaxda luqadaha, qaybta kaalmada adeegyada, isha ritchie. So [facility] [phone].    ATENCIÓN: Si habla español, tiene a agrawal disposición servicios gratuitos de asistencia lingüística. Llame al [phone].    We comply with applicable federal civil rights laws and [place] laws. We do not discriminate on the basis of race, color, national origin, age, disability sex, sexual orientation or gender identity.            Thank you!     Thank you for choosing [facility]  for your care. Our goal is always to provide you with excellent care. Hearing " back from our patients is one way we can continue to improve our services. Please take a few minutes to complete the written survey that you may receive in the mail after your visit with us. Thank you!             Your Updated Medication List - Protect others around you: Learn how to safely use, store and throw away your medicines at www.disposemymeds.org.          This list is accurate as of: 8/30/17  4:01 PM.  Always use your most recent med list.                   Brand Name Dispense Instructions for use Diagnosis    acetaminophen-codeine 300-30 MG per tablet    TYLENOL #3    30 tablet    TAKE ONE TABLET BY MOUTH TWICE A DAY AS NEEDED    Episodic tension-type headache, not intractable       amitriptyline 25 MG tablet    ELAVIL    180 tablet    TAKE 2 TABLETS BY MOUTH NIGHTLY AS NEEDED FOR SLEEP    Insomnia, Depressive disorder       amLODIPine 5 MG tablet    NORVASC    90 tablet    Take 1 tablet (5 mg) by mouth daily    Hypertension goal BP (blood pressure) < 140/80       aspirin 81 MG tablet     120 tablet    Take 1 tablet (81 mg) by mouth daily        cyanocolbalamin 500 MCG tablet    vitamin  B-12     Take 1 tablet by mouth daily.        diazepam 5 MG tablet    VALIUM    30 tablet    TAKE ONE TABLET BY MOUTH EVERY DAY AS NEEDED FOR  ANXIETY    SHAWNEE (generalized anxiety disorder)       escitalopram 10 MG tablet    LEXAPRO    90 tablet    TAKE 1 TABLET (10 MG) BY MOUTH DAILY    SHAWNEE (generalized anxiety disorder)       levothyroxine 75 MCG tablet    SYNTHROID/LEVOTHROID    90 tablet    TAKE 1 TABLET (75 MCG) BY MOUTH DAILY    Hypothyroidism, unspecified type       nadolol 40 MG tablet    CORGARD    90 tablet    TAKE 1 TABLET (40 MG) BY MOUTH DAILY    Hypertension goal BP (blood pressure) < 140/80       polyethylene glycol powder    MIRALAX/GLYCOLAX    527 g    TAKE 17 G BY MOUTH 2     TIMES DAILY    Slow transit constipation       pravastatin 40 MG tablet    PRAVACHOL    90 tablet    TAKE 1 TABLET (40 MG) BY MOUTH  DAILY    Hyperlipidemia with target LDL less than 100       vitamin D 2000 UNITS tablet     100 tablet    Take 2,000 Units by mouth daily

## 2017-08-30 NOTE — PATIENT INSTRUCTIONS
Let's do this: cut back the amitriptyline to 25 mg at bedtime.                Go ahead with the cystoscopy.                                   Keep checking your vital signs.

## 2017-08-30 NOTE — PROGRESS NOTES
"  SUBJECTIVE:   Sadaf Rubi is a 71 year old female who presents to clinic today for the following health issues:      Discuss Urologist appts.-has concerns               Went for urodynamics 7/17/17; pt states problems with urethral catheterization, and other aspects of the procedures etc.              I can find no result in EP[facility] for any of this testing.               She relates problems with the lab there also; does not want to go back to the urology clinic; \" I don't feel comfortable with what they are doing\".                cc.                            She has not cut back amitriptyline ; \" I forgot\".        Problem list and histories reviewed & adjusted, as indicated.      Current Outpatient Prescriptions   Medication Sig Dispense Refill     amitriptyline (ELAVIL) 25 MG tablet TAKE 2 TABLETS BY MOUTH NIGHTLY AS NEEDED FOR SLEEP 180 tablet 2     levothyroxine (SYNTHROID/LEVOTHROID) 75 MCG tablet TAKE 1 TABLET (75 MCG) BY MOUTH DAILY 90 tablet 3     nadolol (CORGARD) 40 MG tablet TAKE 1 TABLET (40 MG) BY MOUTH DAILY 90 tablet 3     diazepam (VALIUM) 5 MG tablet TAKE ONE TABLET BY MOUTH EVERY DAY AS NEEDED FOR  ANXIETY 30 tablet 3     acetaminophen-codeine (TYLENOL #3) 300-30 MG per tablet TAKE ONE TABLET BY MOUTH TWICE A DAY AS NEEDED 30 tablet 3     pravastatin (PRAVACHOL) 40 MG tablet TAKE 1 TABLET (40 MG) BY MOUTH DAILY 90 tablet 3     escitalopram (LEXAPRO) 10 MG tablet TAKE 1 TABLET (10 MG) BY MOUTH DAILY (Patient taking differently: TAKing 1/4 tablet every day) 90 tablet 2     amLODIPine (NORVASC) 5 MG tablet Take 1 tablet (5 mg) by mouth daily 90 tablet 2     polyethylene glycol (MIRALAX/GLYCOLAX) powder TAKE 17 G BY MOUTH 2     TIMES DAILY 527 g 11     Cholecalciferol (VITAMIN D) 2000 UNITS tablet Take 2,000 Units by mouth daily 100 tablet 3     aspirin 81 MG tablet Take 1 tablet (81 mg) by mouth daily 120 tablet      cyanocolbalamin (VITAMIN  B-12) 500 MCG tablet Take 1 tablet by mouth " "daily.       Allergies   Allergen Reactions     Bees      Ciprofloxacin      Not an allergy but did not tolerate well in 4/13     Other [Seasonal Allergies]      msg and mushrooms     Penicillins      Wasp Venom Protein Hives     BP Readings from Last 3 Encounters:   08/30/17 122/80   06/19/17 130/86   06/05/17 120/68    Wt Readings from Last 3 Encounters:   08/30/17 172 lb (78 kg)   06/19/17 170 lb (77.1 kg)   06/05/17 173 lb (78.5 kg)                      Reviewed and updated as needed this visit by clinical staff       Reviewed and updated as needed this visit by Provider         ROS:  CONSTITUTIONAL:NEGATIVE for fever, chills, change in weight  : negative for, dysuria and hematuria    OBJECTIVE:                                                    /80 (BP Location: Left arm, Patient Position: Chair, Cuff Size: Adult Regular)  Pulse 60  Temp 97.8  F (36.6  C)  Resp 20  Ht 5' 3\" (1.6 m)  Wt 172 lb (78 kg)  SpO2 98%  Breastfeeding? No  BMI 30.47 kg/m2  Body mass index is 30.47 kg/(m^2).  GENERAL APPEARANCE: alert and mild distress  PSYCH: anxious    Diagnostic test results:  none        ASSESSMENT/PLAN:                                                        ICD-10-CM    1. Urinary hesitancy R39.11    2. Anxiety F41.9    3. Insomnia, unspecified type G47.00 amitriptyline (ELAVIL) 25 MG tablet   4. Depressive disorder F32.9 amitriptyline (ELAVIL) 25 MG tablet       We reviewed her situation at length.         I expressed confidence in her upcoming urology appointment with .     I strongly suggested that she keep this appointment and have the cystoscopy done.                         In the meantime, I again suggested to her that amitriptyline certainly could be aggravating her urinary hesitancy.            Patient Instructions   Let's do this: cut back the amitriptyline to 25 mg at bedtime.                Go ahead with the cystoscopy.                                   Keep checking your vital " signs.         Sean Velasquez MD  [facility]

## 2017-08-30 NOTE — NURSING NOTE
"Chief Complaint   Patient presents with     Consult       Initial /80 (BP Location: Left arm, Patient Position: Chair, Cuff Size: Adult Regular)  Pulse 60  Temp 97.8  F (36.6  C)  Resp 20  Ht 5' 3\" (1.6 m)  Wt 172 lb (78 kg)  SpO2 98%  Breastfeeding? No  BMI 30.47 kg/m2 Estimated body mass index is 30.47 kg/(m^2) as calculated from the following:    Height as of this encounter: 5' 3\" (1.6 m).    Weight as of this encounter: 172 lb (78 kg).  Medication Reconciliation: complete   Rafaela Leahy LPN  "

## 2017-09-11 DIAGNOSIS — R35.0 URINARY FREQUENCY: Primary | ICD-10-CM

## 2017-09-11 LAB
ALBUMIN UR-MCNC: NEGATIVE MG/DL
APPEARANCE UR: CLEAR
BILIRUB UR QL STRIP: NEGATIVE
COLOR UR AUTO: YELLOW
GLUCOSE UR STRIP-MCNC: NEGATIVE MG/DL
HGB UR QL STRIP: NEGATIVE
KETONES UR STRIP-MCNC: NEGATIVE MG/DL
LEUKOCYTE ESTERASE UR QL STRIP: ABNORMAL
NITRATE UR QL: NEGATIVE
PH UR STRIP: 6 PH (ref 5–7)
SOURCE: ABNORMAL
SP GR UR STRIP: 1.02 (ref 1–1.03)
UROBILINOGEN UR STRIP-ACNC: 0.2 EU/DL (ref 0.2–1)

## 2017-09-11 PROCEDURE — 87086 URINE CULTURE/COLONY COUNT: CPT | Performed by: UROLOGY

## 2017-09-11 PROCEDURE — 99211 OFF/OP EST MAY X REQ PHY/QHP: CPT | Performed by: UROLOGY

## 2017-09-11 PROCEDURE — 81003 URINALYSIS AUTO W/O SCOPE: CPT | Performed by: UROLOGY

## 2017-09-11 NOTE — PROGRESS NOTES
Sadaf Rubi comes in on the nurse clinic today to leave urine specimen for UAUC one week prior to cysto with Dr Simpson. Specimen obtained and will call if needed as dictated by report.      This service provided today was under the supervising provider of the day Dr Torres, who was available if needed.      KATE SoniA

## 2017-09-12 LAB
BACTERIA SPEC CULT: NO GROWTH
Lab: NORMAL
SPECIMEN SOURCE: NORMAL

## 2017-09-13 ENCOUNTER — OFFICE VISIT (OUTPATIENT)
Dept: UROLOGY | Facility: CLINIC | Age: 72
End: 2017-09-13
Payer: COMMERCIAL

## 2017-09-13 VITALS
DIASTOLIC BLOOD PRESSURE: 84 MMHG | WEIGHT: 170 LBS | HEIGHT: 63 IN | HEART RATE: 60 BPM | SYSTOLIC BLOOD PRESSURE: 112 MMHG | BODY MASS INDEX: 30.12 KG/M2

## 2017-09-13 DIAGNOSIS — N39.0 RECURRENT UTI: ICD-10-CM

## 2017-09-13 DIAGNOSIS — R33.9 INCOMPLETE BLADDER EMPTYING: ICD-10-CM

## 2017-09-13 DIAGNOSIS — M62.89 PELVIC FLOOR DYSFUNCTION: ICD-10-CM

## 2017-09-13 DIAGNOSIS — R39.11 URINARY HESITANCY: Primary | ICD-10-CM

## 2017-09-13 DIAGNOSIS — N39.8 VOIDING DYSFUNCTION: ICD-10-CM

## 2017-09-13 DIAGNOSIS — N39.498 OTHER URINARY INCONTINENCE: ICD-10-CM

## 2017-09-13 DIAGNOSIS — M79.18 MYALGIA OF PELVIC FLOOR: ICD-10-CM

## 2017-09-13 LAB — RESIDUAL VOLUME (RV) (EXTERNAL): 204

## 2017-09-13 PROCEDURE — 51741 ELECTRO-UROFLOWMETRY FIRST: CPT | Mod: 26 | Performed by: UROLOGY

## 2017-09-13 PROCEDURE — 51798 US URINE CAPACITY MEASURE: CPT | Performed by: UROLOGY

## 2017-09-13 PROCEDURE — 52000 CYSTOURETHROSCOPY: CPT | Performed by: UROLOGY

## 2017-09-13 PROCEDURE — 51784 ANAL/URINARY MUSCLE STUDY: CPT | Mod: 26 | Performed by: UROLOGY

## 2017-09-13 PROCEDURE — 51728 CYSTOMETROGRAM W/VP: CPT | Mod: 26 | Performed by: UROLOGY

## 2017-09-13 PROCEDURE — 51797 INTRAABDOMINAL PRESSURE TEST: CPT | Mod: 26 | Performed by: UROLOGY

## 2017-09-13 PROCEDURE — 99207 ZZC NO CHARGE LOS: CPT | Performed by: UROLOGY

## 2017-09-13 ASSESSMENT — ENCOUNTER SYMPTOMS
LEG SWELLING: 0
SKIN CHANGES: 0
DYSURIA: 0
FLANK PAIN: 0
HEMATURIA: 0
TACHYCARDIA: 0
DIFFICULTY URINATING: 1
ORTHOPNEA: 0
HYPOTENSION: 0
LIGHT-HEADEDNESS: 0
CLAUDICATION: 0
PALPITATIONS: 1
HYPERTENSION: 0
EXERCISE INTOLERANCE: 0
LEG PAIN: 0
SYNCOPE: 0
POOR WOUND HEALING: 0
NAIL CHANGES: 0
SLEEP DISTURBANCES DUE TO BREATHING: 0

## 2017-09-13 ASSESSMENT — PAIN SCALES - GENERAL: PAINLEVEL: NO PAIN (0)

## 2017-09-13 NOTE — MR AVS SNAPSHOT
After Visit Summary   9/13/2017    Sadaf Rubi    MRN: [id number]           Patient Information     Date Of Birth          1945        Visit Information        Provider Department      9/13/2017 10:00 AM Sayda Simpson MD; [facility] [place]        Today's Diagnoses     Recurrent UTI    -  1    Urinary hesitancy        Voiding dysfunction        Myalgia of pelvic floor        Pelvic floor dysfunction        Incomplete bladder emptying        Other urinary incontinence          Care Instructions    Websites with free information:    American Urogynecologic Society patient website: www.[email or web address]    Total Control Program: www.totalcontrolprogram.com    Please see one of the dedicated pelvic floor physical therapist ([facility] [phone])    Please submit a urine specimen next week to look for cancer cells    Please return to see me in 3 months, sooner if needed    It was a pleasure meeting with you today.  Thank you for allowing me and my team the privilege of caring for you today.  YOU are the reason we are here, and I truly hope we provided you with the excellent service you deserve.  Please let us know if there is anything else we can do for you so that we can be sure you are leaving completely satisfied with your care experience.                Follow-ups after your visit        Additional Services     JUSTIN Physical Therapy Referral       **This order will print in the [facility] Scheduling Office**    Physical Therapy, Hand Therapy and Chiropractic Care are available through:    *[facility]  *[facility]  *[facility]    Call one number to schedule at any of the above locations: [phone].    Your provider has referred you to: Physical Therapy at [facility] or [facility]    Indication/Reason for Referral: Women's Health (Please Complete Special Programs  SmartList)  Onset of Illness: years  Therapy Orders: Evaluate and Treat  Special Programs: None and Women's Health: Pelvic Dysfunction: myofascial tenderness of the pelvic floor, pelvic floor dysfucntion  Pelvic Floor Weakness: urinary hesitancy, voiding dysfunction, incomplete emptying, small volume urinary incontinence and  Biofeedback, E-Stim/TENS/TENS Rental if deemed appropriate by therapist, Exercise/HEP and Myofascial Release/Massage (internal)  Special Request: Exercise: Home Exercise Program  Manual Therapy: Myofascial Release/Massage (internal)  Modalities: As Indicated E-Stim/TENS    Fabiano Mario      Additional Comments for the Therapist or Chiropractor: No kegels please.  Core strengthening    Please be aware that coverage of these services is subject to the terms and limitations of your health insurance plan.  Call member services at your health plan with any benefit or coverage questions.      Please bring the following to your appointment:    *Your personal calendar for scheduling future appointments  *Comfortable clothing                  Your next 10 appointments already scheduled     Dec 20, 2017  4:15 PM CST   Return Visit with Sayda Simpson MD   [facility] [place] ([facility])    [address]  [address]  [place] [id number] [phone]              Future tests that were ordered for you today     Open Future Orders        Priority Expected Expires Ordered    Cytology non gyn [[id number]] Routine 9/18/2017 9/13/2018 9/13/2017            Who to contact     If you have questions or need follow up information about today's clinic visit or your schedule please contact [facility] [place] directly at [phone].  Normal or non-critical lab and imaging results will be communicated to you by MyChart, letter or phone within 4 business days after the clinic has received the results. If you do not hear from us  "within 7 days, please contact the clinic through [employer] or phone. If you have a critical or abnormal lab result, we will notify you by phone as soon as possible.  Submit refill requests through [employer] or call your pharmacy and they will forward the refill request to us. Please allow 3 business days for your refill to be completed.          Additional Information About Your Visit        Snapverse[employer]vet Information     [employer] lets you send messages to your doctor, view your test results, renew your prescriptions, schedule appointments and more. To sign up, go to [email or web address] . Click on \"Log in\" on the left side of the screen, which will take you to the Welcome page. Then click on \"Sign up Now\" on the right side of the page.     You will be asked to enter the access code listed below, as well as some personal information. Please follow the directions to create your username and password.     Your access code is: [email or web address]  Expires: 2017 11:22 AM     Your access code will  in 90 days. If you need help or a new code, please call your [place] clinic or [phone].        Care EveryWhere ID     This is your Care EveryWhere ID. This could be used by other organizations to access your [place] medical records  [id number]        Your Vitals Were     Pulse Height Breastfeeding? BMI (Body Mass Index)          60 1.6 m (5' 3\") No 30.11 kg/m2         Blood Pressure from Last 3 Encounters:   17 112/84   17 122/80   17 130/86    Weight from Last 3 Encounters:   17 77.1 kg (170 lb)   17 78 kg (172 lb)   17 77.1 kg (170 lb)              We Performed the Following     JUSTIN Physical Therapy Referral     MEASURE POST-VOID RESIDUAL URINE/BLADDER CAPACITY, US NON-IMAGING     UA without Microscopic          Today's Medication Changes          These changes are accurate as of: 17 11:22 AM.  If you have any questions, ask your nurse or doctor.               These medicines " have changed or have updated prescriptions.        Dose/Directions    escitalopram 10 MG tablet   Commonly known as:  LEXAPRO   This may have changed:  See the new instructions.   Used for:  SHAWNEE (generalized anxiety disorder)        TAKE 1 TABLET (10 MG) BY MOUTH DAILY   Quantity:  90 tablet   Refills:  2                Primary Care Provider Office Phone # Fax #    Sean Velasquez [phone]       [address] [facility] [address]        Equal Access to Services     [facility] : Hadii aad ku hadasho Soomaali, waaxda luqadaha, qaybta kaalmada adeegyada, waxay idiin hayaan adeeg kharash la'debbie . So [facility] [place] [facility] [phone].    ATENCIÓN: Si habla español, tiene a agrawal disposición servicios gratuitos de asistencia lingüística. [facility] [phone].    We comply with applicable federal civil rights laws and [place] laws. We do not discriminate on the basis of race, color, national origin, age, disability sex, sexual orientation or gender identity.            Thank you!     Thank you for choosing [facility] [place]  for your care. Our goal is always to provide you with excellent care. Hearing back from our patients is one way we can continue to improve our services. Please take a few minutes to complete the written survey that you may receive in the mail after your visit with us. Thank you!             Your Updated Medication List - Protect others around you: Learn how to safely use, store and throw away your medicines at www.disposemymeds.org.          This list is accurate as of: 9/13/17 11:22 AM.  Always use your most recent med list.                   Brand Name Dispense Instructions for use Diagnosis    acetaminophen-codeine 300-30 MG per tablet    TYLENOL #3    30 tablet    TAKE ONE TABLET BY MOUTH TWICE A DAY AS NEEDED    Episodic tension-type headache, not intractable       amitriptyline 25 MG tablet    ELAVIL    90 tablet    Take 1 tablet (25 mg) by mouth  At Bedtime    Insomnia, unspecified type, Depressive disorder       amLODIPine 5 MG tablet    NORVASC    90 tablet    Take 1 tablet (5 mg) by mouth daily    Hypertension goal BP (blood pressure) < 140/80       aspirin 81 MG tablet     120 tablet    Take 1 tablet (81 mg) by mouth daily        cyanocolbalamin 500 MCG tablet    vitamin  B-12     Take 1 tablet by mouth daily.        diazepam 5 MG tablet    VALIUM    30 tablet    TAKE ONE TABLET BY MOUTH EVERY DAY AS NEEDED FOR  ANXIETY    SHAWNEE (generalized anxiety disorder)       escitalopram 10 MG tablet    LEXAPRO    90 tablet    TAKE 1 TABLET (10 MG) BY MOUTH DAILY    SHAWNEE (generalized anxiety disorder)       levothyroxine 75 MCG tablet    SYNTHROID/LEVOTHROID    90 tablet    TAKE 1 TABLET (75 MCG) BY MOUTH DAILY    Hypothyroidism, unspecified type       nadolol 40 MG tablet    CORGARD    90 tablet    TAKE 1 TABLET (40 MG) BY MOUTH DAILY    Hypertension goal BP (blood pressure) < 140/80       polyethylene glycol powder    MIRALAX/GLYCOLAX    527 g    TAKE 17 G BY MOUTH 2     TIMES DAILY    Slow transit constipation       pravastatin 40 MG tablet    PRAVACHOL    90 tablet    TAKE 1 TABLET (40 MG) BY MOUTH DAILY    Hyperlipidemia with target LDL less than 100       vitamin D 2000 UNITS tablet     100 tablet    Take 2,000 Units by mouth daily

## 2017-09-13 NOTE — PATIENT INSTRUCTIONS
Websites with free information:    American Urogynecologic Society patient website: www.[email or web address]    Total Control Program: www.totalcontrolprogram.com    Please see one of the dedicated pelvic floor physical therapist ([facility] [phone])    Please submit a urine specimen next week to look for cancer cells    Please return to see me in 3 months, sooner if needed    It was a pleasure meeting with you today.  Thank you for allowing me and my team the privilege of caring for you today.  YOU are the reason we are here, and I truly hope we provided you with the excellent service you deserve.  Please let us know if there is anything else we can do for you so that we can be sure you are leaving completely satisfied with your care experience.

## 2017-09-13 NOTE — LETTER
"9/13/2017       RE: Sadaf Rubi  [address]  [place] [address]     Dear Colleague,    Thank you for referring your patient, Sadaf Rubi, to the [facility] [place] at [facility]. Please see a copy of my visit note below.    September 13, 2017    Return visit    Patient returns today for follow up.  She has expressed some dissatisfaction with the urodynamics testing.   She denies any changes in her health since last visit.    /84  Pulse 60  Ht 1.6 m (5' 3\")  Wt 77.1 kg (170 lb)  Breastfeeding? No  BMI 30.11 kg/m2  She is comfortable, in no distress, non-labored breathing.  Abdomen is soft, non-tender, non-distended.  Normal external female genitalia.  Negative CST.  Pelvic exam are remarkable for myofascial tenderness of the pelvic floor    Cystoscopy Note: After informed consent was obtained patient was prepped and draped in the standard fashion.  The flexible cystoscope was inserted into a normal appearing urethral meatus.  The urothelium was carefully examined and there were no obvious tumors, masses, stones, foreign bodies, or other urothelial abnormalities noted.  Bilateral ureteral orifices were noted in the normal orthotopic position and both effluxed clear urine.  The cystoscope was retroflexed and the bladder neck was unremarkable.  The urethra was carefully examined upon removing the cystoscope and was unremarkable.  Patient tolerated the procedure without complications noted.       mL by bladder scan initially, after voiding a second time < 150 mL    UDS reviewed by me.  1st sensation at 130mL, strong desire 158 mL and alf 172 mL Good compliance.  No USI, DO, DOI but did demonstrate transurethral urine loss when she was getting dressed, apparently felt the need to defecate throughout the entire study.  Her PVR was 29mL    A/P: 71 year old F with history of Miguel A, urinary hesitancy, voiding " dysfunction, myofascial tenderness of the pelvic floor and pelvic floor dysfucntion    Urine cytology    We discussed how her pelvic floor symptoms are related to the physical exam findings and her pelvic floor myofascial dysfunction. Especially in light of variable PVRs  We discussed how the recommended treatment is dedicated pelvic floor therapy.  We discussed how the pelvic floor physical therapy works and patient is agreeable.  Referral was placed.      RTC 3 months, sooner if needed    Sayda Simpson MD MPH   of Urology    CC  Patient Care Team:  Sean Velasquez MD as PCP - General (Internal Medicine)

## 2017-09-13 NOTE — PROGRESS NOTES
"September 13, 2017    Return visit    Patient returns today for follow up.  She has expressed some dissatisfaction with the urodynamics testing.   She denies any changes in her health since last visit.    /84  Pulse 60  Ht 1.6 m (5' 3\")  Wt 77.1 kg (170 lb)  Breastfeeding? No  BMI 30.11 kg/m2  She is comfortable, in no distress, non-labored breathing.  Abdomen is soft, non-tender, non-distended.  Normal external female genitalia.  Negative CST.  Pelvic exam are remarkable for myofascial tenderness of the pelvic floor    Cystoscopy Note: After informed consent was obtained patient was prepped and draped in the standard fashion.  The flexible cystoscope was inserted into a normal appearing urethral meatus.  The urothelium was carefully examined and there were no obvious tumors, masses, stones, foreign bodies, or other urothelial abnormalities noted.  Bilateral ureteral orifices were noted in the normal orthotopic position and both effluxed clear urine.  The cystoscope was retroflexed and the bladder neck was unremarkable.  The urethra was carefully examined upon removing the cystoscope and was unremarkable.  Patient tolerated the procedure without complications noted.       mL by bladder scan initially, after voiding a second time < 150 mL    UDS reviewed by me.  1st sensation at 130mL, strong desire 158 mL and group home 172 mL Good compliance.  No USI, DO, DOI but did demonstrate transurethral urine loss when she was getting dressed, apparently felt the need to defecate throughout the entire study.  Her PVR was 29mL    A/P: 71 year old F with history of Miguel A, urinary hesitancy, voiding dysfunction, myofascial tenderness of the pelvic floor and pelvic floor dysfucntion    Urine cytology    We discussed how her pelvic floor symptoms are related to the physical exam findings and her pelvic floor myofascial dysfunction. Especially in light of variable PVRs  We discussed how the recommended treatment is " dedicated pelvic floor therapy.  We discussed how the pelvic floor physical therapy works and patient is agreeable.  Referral was placed.      RTC 3 months, sooner if needed    Sayda Simpson MD MPH   of Urology    CC  Patient Care Team:  Sean Velasquez MD as PCP - General (Internal Medicine)

## 2017-09-13 NOTE — NURSING NOTE
Chief Complaint   Patient presents with     Incontinence     Patient is here for not having good bladder control.      Ning Osborne LPN 9:33 AM September 13, 2017

## 2017-09-18 DIAGNOSIS — N39.498 OTHER URINARY INCONTINENCE: ICD-10-CM

## 2017-09-18 DIAGNOSIS — R33.9 INCOMPLETE BLADDER EMPTYING: ICD-10-CM

## 2017-09-18 DIAGNOSIS — N39.8 VOIDING DYSFUNCTION: ICD-10-CM

## 2017-09-18 DIAGNOSIS — R39.11 URINARY HESITANCY: ICD-10-CM

## 2017-09-18 PROCEDURE — 88112 CYTOPATH CELL ENHANCE TECH: CPT | Performed by: UROLOGY

## 2017-09-19 LAB — COPATH REPORT: NORMAL

## 2017-09-22 ENCOUNTER — TELEPHONE (OUTPATIENT)
Dept: UROLOGY | Facility: CLINIC | Age: 72
End: 2017-09-22

## 2017-09-22 NOTE — TELEPHONE ENCOUNTER
Returning patient's phone call.  She is requesting results of her urine cytology.  I told her results were negative.  She was very happy.  Nicole Bragg LPN

## 2017-10-05 ENCOUNTER — THERAPY VISIT (OUTPATIENT)
Dept: PHYSICAL THERAPY | Facility: CLINIC | Age: 72
End: 2017-10-05
Payer: COMMERCIAL

## 2017-10-05 DIAGNOSIS — M99.05 PELVIC SOMATIC DYSFUNCTION: Primary | ICD-10-CM

## 2017-10-05 DIAGNOSIS — N39.46 MIXED INCONTINENCE URGE AND STRESS (MALE)(FEMALE): ICD-10-CM

## 2017-10-05 PROCEDURE — G8987 SELF CARE CURRENT STATUS: HCPCS | Mod: GP | Performed by: PHYSICAL THERAPIST

## 2017-10-05 PROCEDURE — G8988 SELF CARE GOAL STATUS: HCPCS | Mod: GP | Performed by: PHYSICAL THERAPIST

## 2017-10-05 PROCEDURE — 97112 NEUROMUSCULAR REEDUCATION: CPT | Mod: GP | Performed by: PHYSICAL THERAPIST

## 2017-10-05 PROCEDURE — 97140 MANUAL THERAPY 1/> REGIONS: CPT | Mod: GP | Performed by: PHYSICAL THERAPIST

## 2017-10-05 PROCEDURE — 97530 THERAPEUTIC ACTIVITIES: CPT | Mod: GP | Performed by: PHYSICAL THERAPIST

## 2017-10-05 PROCEDURE — 97161 PT EVAL LOW COMPLEX 20 MIN: CPT | Mod: GP | Performed by: PHYSICAL THERAPIST

## 2017-10-05 PROCEDURE — 97110 THERAPEUTIC EXERCISES: CPT | Mod: GP | Performed by: PHYSICAL THERAPIST

## 2017-10-05 NOTE — PROGRESS NOTES
Subjective:  SUBJECTIVE:  Patient reports onset of symptoms of urgency and frequency of urination and at times feels fearful of leaking or loss of control of her bladder.  She has a history of urethral dilations in the past, this was done several times.  Then ended up having urethral enlargement surgery.  She has gone through UDS but reports it was an uncomfortable test and was unable to complete it because it was not inserted correctly.  MD orders dated 9/13/2017 .Symptoms include urgency, frequency and difficulties emptying completely, slow stream.  Since onset symptoms have been getting better, worse or staying the same? Same     Urination:  Do you leak on the way to the bathroom or with a strong urge to void? Yes    Do you leak with cough,sneeze, jumping, running?Yes   Any other activities that cause leaking? NA  Do you have triggers that make you feel you can't wait to go to the bathroom? Not answered .  Type of pad and number used per day? 1 Poise Pad at times  When you leak what is the amount? small    How long can you delay the need to urinate? depends.   How many times do you get up to urinate at night? 1-3  Can you stop the flow of urine when on the toilet? Yes  Is the volume of urine passed usually: average. (8sec rule=  250ml with average bladder storing  400-600ml)    Do you strain to pass urine? Yes  Do you have a slow or hesitant urinary stream? sometimes  Do you have difficulty initiating the urine stream? Sometimes but seldom    How many bladder infections have you had in last 12 months?none    Fluid intake(one glass is 8oz or one cup) 5 glasses/day, 1-2 caffinated glasses/day  1-2 alcohol glasses/day.  Also reports she drinks 1 mocha lite a day and 1 glass of Snapple juice    Bowel habits:  Frequency of bowel movements?1 times a day  Consistancy of stool? soft formed,   Do you ignore the urge to defecate? No  Do you strain to pass stool? No    Pelvic Pain:  When do you have pelvic pain? no  Given  birth? Yes Any complications?no, # of vaginal delieveries?3,  # of episiotomies?2.  Are you sexually active?No  Have you ever been worried for your physical safety? No  Any abdominal or pelvic surgeries? Hysterectomy, surgical urethral enlargement  Are you having any regular exercise?no  Have you practiced the PF(kegel) exercises for 4 or more weeks?no    HPI                    Objective:    System                                 Pelvic Dysfunction Evaluation:      Diagnostic Tests:        Post Void Residual:  140 cc per MD note      Cystoscopy:  Negative and normal            Flexibility:    Tightness present at:Adductors; Piriformis and Gluteals    Abdominal Wall:    Diastasis Recti:  Normal      Pelvic Clock Exam:          Levator ANI:  ++        External Assessment:    Skin Condition:  Normal    Bearing Down/Coughing:  Normal  Tissue Symmetry:  Normal    Muscle Contraction/Perineal Mobility:  Slight lift, no urogential triangle descent  Internal Assessment:      Contraction/Grade:  Weak squeeze, 2 second hold (2)          SEMG Biofeedback:    Equipment:  MR20    Suraface electrode placement--Perianal:  Yes  Baseline EMG PM:  3 uV          Position:  Supine                     General     ROS    Assessment/Plan:      Patient is a 71 year old female with pelvic complaints.    Patient has the following significant findings with corresponding treatment plan.                Diagnosis 1:  Pelvic floor dysfunction  Pain -  manual therapy, self management, education and home program  Decreased ROM/flexibility - manual therapy, therapeutic exercise, therapeutic activity and home program  Decreased strength - therapeutic exercise, therapeutic activities and home program  Impaired muscle performance - biofeedback, neuro re-education and home program  Decreased function - therapeutic activities and home program  Impaired posture - neuro re-education, therapeutic activities and home program    Therapy Evaluation Codes:    1) History comprised of:   Personal factors that impact the plan of care:      Past/current experiences.    Comorbidity factors that impact the plan of care are:      Bowel/bladder changes, Depression, High blood pressure and Overweight.     Medications impacting care: Anti-depressant and High blood pressure.  2) Examination of Body Systems comprised of:   Body structures and functions that impact the plan of care:      Lumbar spine and Pelvis.   Activity limitations that impact the plan of care are:      Frequency, Urgency and Urinary incontinence.  3) Clinical presentation characteristics are:   Stable/Uncomplicated.  4) Decision-Making    Low complexity using standardized patient assessment instrument and/or measureable assessment of functional outcome.  Cumulative Therapy Evaluation is: Low complexity.    Previous and current functional limitations:  (See Goal Flow Sheet for this information)    Short term and Long term goals: (See Goal Flow Sheet for this information)     Communication ability:  Patient appears to be able to clearly communicate and understand verbal and written communication and follow directions correctly.  Treatment Explanation - The following has been discussed with the patient:   RX ordered/plan of care  Anticipated outcomes  Possible risks and side effects  This patient would benefit from PT intervention to resume normal activities.   Rehab potential is good.    Frequency:  1 X week, once daily  Duration:  for 4 weeks tapering to 1 X a month over 2 months  Discharge Plan:  Achieve all LTG.  Independent in home treatment program.  Reach maximal therapeutic benefit.    Please refer to the daily flowsheet for treatment today, total treatment time and time spent performing 1:1 timed codes.

## 2017-10-05 NOTE — LETTER
DEPARTMENT OF HEALTH AND HUMAN SERVICES  CENTERS FOR MEDICARE & MEDICAID SERVICES    PLAN/UPDATED PLAN OF PROGRESS FOR OUTPATIENT REHABILITATION    PATIENTS NAME:  Sadaf Rubi   : 1945  PROVIDER NUMBER:    [id number]  [id number]   PROVIDER NAME: [place] [facility]YS[facility]ERAP[facility]  MEDICAL RECORD NUMBER: [id number]   START OF CARE DATE:  SOC Date: 10/05/17   TYPE:  PT  PRIMARY/TREATMENT DIAGNOSIS: (Pertinent Medical Diagnosis)  Pelvic somatic dysfunction  Mixed incontinence urge and stress (male)(female)  VISITS FROM START OF CARE: 1 Rxs Used: 1     Subjective:  SUBJECTIVE:  Patient reports onset of symptoms of urgency and frequency of urination and at times feels fearful of leaking or loss of control of her bladder.  She has a history of urethral dilations in the past, this was done several times.  Then ended up having urethral enlargement surgery.  She has gone through UDS but reports it was an uncomfortable test and was unable to complete it because it was not inserted correctly.  MD orders dated 2017 .Symptoms include urgency, frequency and difficulties emptying completely, slow stream.  Since onset symptoms have been getting better, worse or staying the same? Same     Urination:  Do you leak on the way to the bathroom or with a strong urge to void? Yes    Do you leak with cough,sneeze, jumping, running?Yes   Any other activities that cause leaking? NA  Do you have triggers that make you feel you can't wait to go to the bathroom? Not answered .  Type of pad and number used per day? 1 Poise Pad at times  When you leak what is the amount? small  How long can you delay the need to urinate? depends.   How many times do you get up to urinate at night? 1-3  Can you stop the flow of urine when on the toilet? Yes  Is the volume of urine passed usually: average. (8sec rule=  250ml with average bladder storing  400-600ml)  Do you strain to pass urine? Yes  Do you have a slow  or hesitant urinary stream? sometimes  Do you have difficulty initiating the urine stream? Sometimes but seldom  How many bladder infections have you had in last 12 months?none  Fluid intake(one glass is 8oz or one cup) 5 glasses/day, 1-2 caffinated glasses/day  1-2 alcohol glasses/day.  Also reports she drinks 1 mocha lite a day and 1 glass of Snapple juice  Bowel habits:  PATIENTS NAME:  Sadaf Rubi   : 1945    Frequency of bowel movements?1 times a day  Consistancy of stool? soft formed,   Do you ignore the urge to defecate? No  Do you strain to pass stool? No  Pelvic Pain:  When do you have pelvic pain? no  Given birth? Yes Any complications?no, # of vaginal delieveries?3,  # of episiotomies?2.  Are you sexually active?No  Have you ever been worried for your physical safety? No  Any abdominal or pelvic surgeries? Hysterectomy, surgical urethral enlargement  Are you having any regular exercise?no  Have you practiced the PF(kegel) exercises for 4 or more weeks?no               Objective:  Pelvic Dysfunction Evaluation:    Diagnostic Tests:    Post Void Residual:  140 cc per MD note  Cystoscopy:  Negative and normal  Flexibility:    Tightness present at:Adductors; Piriformis and Gluteals  Abdominal Wall:    Diastasis Recti:  Normal  Pelvic Clock Exam:   Levator ANI:  ++  External Assessment:    Skin Condition:  Normal  Bearing Down/Coughing:  Normal  Tissue Symmetry:  Normal  Muscle Contraction/Perineal Mobility:  Slight lift, no urogential triangle descent  Internal Assessment:    Contraction/Grade:  Weak squeeze, 2 second hold (2)  SEMG Biofeedback:    Equipment:  [employer]  [facility] electrode placement--Perianal:  Yes  Baseline EMG PM:  3 uV  Position:  Supine       Assessment/Plan:    Patient is a 71 year old female with pelvic complaints.    Patient has the following significant findings with corresponding treatment plan.                Diagnosis 1:  Pelvic floor dysfunction  Pain -  manual therapy,  self management, education and home program  Decreased ROM/flexibility - manual therapy, therapeutic exercise, therapeutic activity and home program  Decreased strength - therapeutic exercise, therapeutic activities and home program  Impaired muscle performance - biofeedback, neuro re-education and home program  Decreased function - therapeutic activities and home program  Impaired posture - neuro re-education, therapeutic activities and home program  Therapy Evaluation Codes:   1) History comprised of:   Personal factors that impact the plan of care:      Past/current experiences.    Comorbidity factors that impact the plan of care are:      Bowel/bladder changes, Depression, High blood pressure and Overweight.     Medications impacting care: Anti-depressant and High blood pressure.  2) Examination of Body Systems comprised of:   Body structures and functions that impact the plan of care:      Lumbar spine and Pelvis.   Activity limitations that impact the plan of care are:      Frequency, Urgency and Urinary incontinence.  3) Clinical presentation characteristics are:   Stable/Uncomplicated.  4) Decision-Making    Low complexity using standardized patient assessment instrument and/or measureable assessment of functional outcome.  Cumulative Therapy Evaluation is: Low complexity.  Previous and current functional limitations:  (See Goal Flow Sheet for this information)    Short term and Long term goals: (See Goal Flow Sheet for this information)   Communication ability:  Patient appears to be able to clearly communicate and understand verbal and written communication and follow directions correctly.  Treatment Explanation - The following has been discussed with the patient:   RX ordered/plan of care  Anticipated outcomes  Possible risks and side effects  This patient would benefit from PT intervention to resume normal activities.   Rehab potential is good.  Frequency:  1 X week, once daily  Duration:  for 4 weeks  "tapering to 1 X a month over 2 months  Discharge Plan:  Achieve all LTG.  Independent in home treatment program.  Reach maximal therapeutic benefit.                Caregiver Signature/Credentials _____________________________ Date ________       Treating Provider: Aly Scherer PT   I have reviewed and certified the need for these services and plan of treatment while under my care.        PHYSICIAN'S SIGNATURE:   ____________________________________  Date___________     Sayda See Gabriele Simpson MD    Certification period:  Beginning of Cert date period: 10/05/17 to  End of Cert period date: 01/02/18     Functional Level Progress Report: Please see attached \"Goal Flow sheet for Functional level.\"    ____X____ Continue Services or       ________ DC Services                Service dates: From  SOC Date: 10/05/17 date to present                         "

## 2017-10-06 PROBLEM — M99.05 PELVIC SOMATIC DYSFUNCTION: Status: ACTIVE | Noted: 2017-10-06

## 2017-10-06 PROBLEM — N39.46 MIXED INCONTINENCE URGE AND STRESS (MALE)(FEMALE): Status: ACTIVE | Noted: 2017-10-06

## 2017-10-06 NOTE — PROGRESS NOTES
Subjective:    Patient is a 71 year old female presenting with rehab left ankle/foot hpi.                                      Pertinent medical history includes:  Overweight, high blood pressure, depression, implanted device, thyroid problems and migraines.  Medical allergies: yes (latex, penicillin).  Other surgeries include:  Orthopedic surgery and other (hysterectomy, B TKAs).  Current medications:  Thyroid medication, high blood pressure medication and anti-depressants.      Primary job tasks include:  Prolonged sitting and lifting.                                Objective:    System    Physical Exam    General     ROS    Assessment/Plan:

## 2017-10-17 ENCOUNTER — THERAPY VISIT (OUTPATIENT)
Dept: PHYSICAL THERAPY | Facility: CLINIC | Age: 72
End: 2017-10-17
Payer: COMMERCIAL

## 2017-10-17 DIAGNOSIS — M99.05 PELVIC SOMATIC DYSFUNCTION: ICD-10-CM

## 2017-10-17 DIAGNOSIS — N39.46 MIXED INCONTINENCE URGE AND STRESS (MALE)(FEMALE): ICD-10-CM

## 2017-10-17 PROCEDURE — 97112 NEUROMUSCULAR REEDUCATION: CPT | Mod: GP | Performed by: PHYSICAL THERAPIST

## 2017-10-17 PROCEDURE — 97110 THERAPEUTIC EXERCISES: CPT | Mod: GP | Performed by: PHYSICAL THERAPIST

## 2017-10-17 PROCEDURE — 97140 MANUAL THERAPY 1/> REGIONS: CPT | Mod: GP | Performed by: PHYSICAL THERAPIST

## 2017-11-01 DIAGNOSIS — F41.9 ANXIETY: ICD-10-CM

## 2017-11-01 DIAGNOSIS — F41.1 GAD (GENERALIZED ANXIETY DISORDER): ICD-10-CM

## 2017-11-01 NOTE — TELEPHONE ENCOUNTER
Message routed to provider per new protocol - send all Rx's to PCP instead of team. Rx can be called to pharmacy if approved.

## 2017-11-01 NOTE — TELEPHONE ENCOUNTER
Controlled Substance Refill Request for diazepam (VALIUM) 5 MG tablet    Problem List Complete:  No   Onset 1980's or earlier                    Chronic buspar; uses valium also, 5 mg per day or less.                   In 3/16, stopped buspar and started lexapro with good results.     Last  check 08/04/2016-no concerns.  No CSA on file           PROVIDER TO CONSIDER COMPLETION OF PROBLEM LIST AND OVERVIEW/CONTROLLED SUBSTANCE AGREEMENT    Last Written Prescription Date:  06/05/2017  Last Fill Quantity: 30,   # refills: 3    Last Office Visit with [facility] [place] care provider: 08/30/2017    Future Office visit:     Controlled substance agreement on file: No.     Processing:  Fax Rx to [facility] pharmacy     checked in past 6 months?  Yes Last  check 08/04/2016-no concerns.

## 2017-11-02 RX ORDER — DIAZEPAM 5 MG
TABLET ORAL
Qty: 30 TABLET | Refills: 3 | Status: SHIPPED | OUTPATIENT
Start: 2017-11-02 | End: 2018-02-14

## 2017-11-14 ENCOUNTER — THERAPY VISIT (OUTPATIENT)
Dept: PHYSICAL THERAPY | Facility: CLINIC | Age: 72
End: 2017-11-14
Payer: COMMERCIAL

## 2017-11-14 DIAGNOSIS — N39.46 MIXED INCONTINENCE URGE AND STRESS (MALE)(FEMALE): ICD-10-CM

## 2017-11-14 DIAGNOSIS — M99.05 PELVIC SOMATIC DYSFUNCTION: ICD-10-CM

## 2017-11-14 PROCEDURE — 97140 MANUAL THERAPY 1/> REGIONS: CPT | Mod: GP | Performed by: PHYSICAL THERAPIST

## 2017-11-14 PROCEDURE — 97530 THERAPEUTIC ACTIVITIES: CPT | Mod: GP | Performed by: PHYSICAL THERAPIST

## 2017-12-07 ENCOUNTER — THERAPY VISIT (OUTPATIENT)
Dept: PHYSICAL THERAPY | Facility: CLINIC | Age: 72
End: 2017-12-07
Payer: COMMERCIAL

## 2017-12-07 DIAGNOSIS — N39.46 MIXED INCONTINENCE URGE AND STRESS (MALE)(FEMALE): ICD-10-CM

## 2017-12-07 DIAGNOSIS — M99.05 PELVIC SOMATIC DYSFUNCTION: ICD-10-CM

## 2017-12-07 PROCEDURE — 97530 THERAPEUTIC ACTIVITIES: CPT | Mod: GP | Performed by: PHYSICAL THERAPIST

## 2017-12-07 PROCEDURE — 97140 MANUAL THERAPY 1/> REGIONS: CPT | Mod: GP | Performed by: PHYSICAL THERAPIST

## 2017-12-07 PROCEDURE — G8988 SELF CARE GOAL STATUS: HCPCS | Mod: GP | Performed by: PHYSICAL THERAPIST

## 2017-12-07 PROCEDURE — G8987 SELF CARE CURRENT STATUS: HCPCS | Mod: GP | Performed by: PHYSICAL THERAPIST

## 2017-12-30 ENCOUNTER — RADIANT APPOINTMENT (OUTPATIENT)
Dept: MAMMOGRAPHY | Facility: CLINIC | Age: 72
End: 2017-12-30
Attending: INTERNAL MEDICINE
Payer: COMMERCIAL

## 2017-12-30 DIAGNOSIS — Z12.31 VISIT FOR SCREENING MAMMOGRAM: ICD-10-CM

## 2017-12-30 PROCEDURE — G0202 SCR MAMMO BI INCL CAD: HCPCS | Mod: TC

## 2018-01-15 ENCOUNTER — OFFICE VISIT (OUTPATIENT)
Dept: FAMILY MEDICINE | Facility: CLINIC | Age: 73
End: 2018-01-15
Payer: COMMERCIAL

## 2018-01-15 VITALS
TEMPERATURE: 102.3 F | HEART RATE: 81 BPM | WEIGHT: 172 LBS | OXYGEN SATURATION: 95 % | SYSTOLIC BLOOD PRESSURE: 124 MMHG | BODY MASS INDEX: 30.47 KG/M2 | DIASTOLIC BLOOD PRESSURE: 84 MMHG | RESPIRATION RATE: 20 BRPM

## 2018-01-15 DIAGNOSIS — R50.9 FEVER, UNSPECIFIED FEVER CAUSE: Primary | ICD-10-CM

## 2018-01-15 DIAGNOSIS — J18.9 WALKING PNEUMONIA: ICD-10-CM

## 2018-01-15 PROBLEM — D50.8 OTHER IRON DEFICIENCY ANEMIA: Status: ACTIVE | Noted: 2017-06-05

## 2018-01-15 LAB
FLUAV+FLUBV AG SPEC QL: NEGATIVE
FLUAV+FLUBV AG SPEC QL: NEGATIVE
SPECIMEN SOURCE: NORMAL

## 2018-01-15 PROCEDURE — 99213 OFFICE O/P EST LOW 20 MIN: CPT | Performed by: PHYSICIAN ASSISTANT

## 2018-01-15 PROCEDURE — 87804 INFLUENZA ASSAY W/OPTIC: CPT | Performed by: PHYSICIAN ASSISTANT

## 2018-01-15 RX ORDER — CODEINE PHOSPHATE AND GUAIFENESIN 10; 100 MG/5ML; MG/5ML
2 SOLUTION ORAL EVERY 4 HOURS PRN
Qty: 120 ML | Refills: 0 | Status: SHIPPED | OUTPATIENT
Start: 2018-01-15 | End: 2018-01-20

## 2018-01-15 RX ORDER — BENZONATATE 200 MG/1
200 CAPSULE ORAL 3 TIMES DAILY PRN
Qty: 30 CAPSULE | Refills: 1 | Status: SHIPPED | OUTPATIENT
Start: 2018-01-15 | End: 2018-02-26

## 2018-01-15 RX ORDER — OSELTAMIVIR PHOSPHATE 75 MG/1
75 CAPSULE ORAL 2 TIMES DAILY
Qty: 10 CAPSULE | Refills: 0 | Status: CANCELLED | OUTPATIENT
Start: 2018-01-15

## 2018-01-15 RX ORDER — AZITHROMYCIN 250 MG/1
TABLET, FILM COATED ORAL
Qty: 6 TABLET | Refills: 0 | Status: SHIPPED | OUTPATIENT
Start: 2018-01-15 | End: 2018-02-26

## 2018-01-15 NOTE — PROGRESS NOTES
SUBJECTIVE:   Sadaf Rubi is a 72 year old female who presents to clinic today for the following health issues:    Additional concerns: hand tremors. Started weeks ago.    RESPIRATORY SYMPTOMS      Duration: x 1 day    Description  nasal congestion, headache, rhinorrhea, cough and chills    Severity: severe    Accompanying signs and symptoms: See below    History (predisposing factors):  none    Precipitating or alleviating factors: None    Therapies tried and outcome:  guaifenesin    ENT Symptoms             Symptoms: cc Present Absent Comment   Fever/Chills  X     Fatigue  X     Muscle Aches   X    Eye Irritation   X    Sneezing  X     Nasal Noam/Drg  X     Sinus Pressure/Pain   X    Loss of smell   X    Dental pain   X    Sore Throat   X    Swollen Glands   X    Ear Pain/Fullness   X    Cough  X     Wheeze  X  mild   Chest Pain  X  When coughing   Shortness of breath   X    Rash   X    Other   X      Symptom duration:  x 1 day   Symptom severity:  severe   Treatments tried:  Robitussin   Contacts:   was seen this past sat. Dx with bronchitis, was not tested for flu because he had had symptoms for too long.       Reviewed and updated as needed this visit by clinical staff  Tobacco  Allergies  Meds  Problems  Med Hx  Surg Hx  Fam Hx  Soc Hx        Reviewed and updated as needed this visit by Provider  Tobacco  Allergies  Meds  Problems  Med Hx  Surg Hx  Fam Hx  Soc Hx        Additional complaints: None    HPI additional notes: Danny presents today with   Chief Complaint   Patient presents with     URI     cough   Has a history of chronic nasal congestion.  But symptoms got progressively worse with severe cough yesterday.  She has not been checking her temperature at home but has felt feverish.  Has not been eating well and has trouble sleeping due to cough.         ROS:  C: POSITIVE for fever and chills.  Skin: Negative for worrisome rashes or lesions  ENT/MOUTH:POSITIVE for congestion,  sore throat and post-nasal drainage.  Negative for ear pain and sinus pressure.  Resp: POSITIVE for cough occasionally productive with  SOB and wheezing  MS: Negative for significant arthralgias or myalgias  NEURO: POSITIVE for headache, migraine and dizziness/lightheadedness  P: Negative for changes in mood or affect  ROS otherwise negative.    Chart Review:  History   Smoking Status     Never Smoker   Smokeless Tobacco     Never Used     Patient Active Problem List   Diagnosis     Dysthymic disorder     Palpitation     Shortness of breath     Osteopenia     Preventive measure     Family history of coagulation disorder     Vitamin D deficiency disease     Hypertension goal BP (blood pressure) < 140/80     Hyperlipidemia with target LDL less than 100     Dizziness     Balance problems     Abnormal liver enzymes     ACP (advance care planning)     Episodic tension-type headache, not intractable     Pain in unspecified knee     Slow transit constipation     Persistent insomnia     SHAWNEE (generalized anxiety disorder)     Hypothyroidism, unspecified type     Urinary hesitancy     Other iron deficiency anemia     Pelvic somatic dysfunction     Mixed incontinence urge and stress (male)(female)     Past Surgical History:   Procedure Laterality Date     GYN SURGERY  1990's    hysterectomy SAMANTHA & BSO     KNEE SURGERY  1/2006    meniscus left     KNEE SURGERY  2007    right meniscus     KNEE SURGERY  09/10/2012    right knee replacement ; L TKA 3/13     LUMPECTOMY BREAST  1/2001    benign     URETHRA SURGERY  1977     Problem list, Medication list, Allergies, Medical/Social/Surg hx reviewed in [facility], updated as appropriate.   OBJECTIVE:                                                    /84 (BP Location: Left arm, Patient Position: Sitting, Cuff Size: Adult Regular)  Pulse 81  Temp 102.3  F (39.1  C) (Tympanic)  Resp 20  Wt 172 lb (78 kg)  SpO2 95%  BMI 30.47 kg/m2  Body mass index is 30.47 kg/(m^2).  GENERAL: alert,  active and mild distress  EYES: Grossly normal to inspection, EOMI, PERRL  HENT: Ear canals normal; TMs pearly gray without effusion. Nasal mucosa moist without edema or discharge. Oral mucous membranes moist, no lesions or ulcerations. Pharynx pink.  Uvula midline.  No postnasal drainage. Sinuses non-tender to palpation.  NECK: Non-tender, no adenopathy.  RESP: no rales or rhonchi, decreased respiratory effort and cough with deep inspiration   CV: regular rate and rhythm, normal S1 S2.  No peripheral edema.  SKIN: no suspicious lesions, no rashes  PSYCH: Alert and oriented times 3;  Able to articulate logical thoughts. Affect is normal.    Diagnostic test results: Negative rapid flu     ASSESSMENT/PLAN:                                                          ICD-10-CM    1. Fever, unspecified fever cause R50.9 Influenza A/B antigen     guaiFENesin-codeine (ROBITUSSIN AC) 100-10 MG/5ML SOLN solution     benzonatate (TESSALON) 200 MG capsule     azithromycin (ZITHROMAX) 250 MG tablet   2. Walking pneumonia J18.9 azithromycin (ZITHROMAX) 250 MG tablet     Call if worsening or starting having wheezing while breathing and will send in prescription for prednisone.    Please see patient instructions for treatment details.    Follow up in 7-10 days if not improving as anticipated.    Leesa Bellamy PA-C  [facility]

## 2018-01-15 NOTE — MR AVS SNAPSHOT
"              After Visit Summary   1/15/2018    Sadaf Rubi    MRN: [id number]           Patient Information     Date Of Birth          1945        Visit Information        Provider Department      1/15/2018 2:10 PM Leesa Bellamy PA-C [facility]        Today's Diagnoses     Fever, unspecified fever cause    -  1    Walking pneumonia           Follow-ups after your visit        Your next 10 appointments already scheduled     Feb 14, 2018  4:15 PM CST   Return Visit with Sayda Simpson MD   [facility] ([facility])    [address]  [address]  [facility] [address] [phone]              Who to contact     If you have questions or need follow up information about today's clinic visit or your schedule please contact [facility] directly at [phone].  Normal or non-critical lab and imaging results will be communicated to you by Contattahart, letter or phone within 4 business days after the clinic has received the results. If you do not hear from us within 7 days, please contact the clinic through Contattahart or phone. If you have a critical or abnormal lab result, we will notify you by phone as soon as possible.  Submit refill requests through Hobby or call your pharmacy and they will forward the refill request to us. Please allow 3 business days for your refill to be completed.          Additional Information About Your Visit        [employer] Information     Hobby lets you send messages to your doctor, view your test results, renew your prescriptions, schedule appointments and more. To sign up, go to [email or web address] . Click on \"Log in\" on the left side of the screen, which will take you to the Welcome page. Then click on \"Sign up Now\" on the right side of the page.     You will be asked to enter the access code listed below, as well as some personal " information. Please follow the directions to create your username and password.     Your access code is: [email or web address]  Expires: 4/15/2018  3:04 PM     Your access code will  in 90 days. If you need help or a new code, please call your [facility] clinic or [phone].        Care EveryWhere ID     This is your Care EveryWhere ID. This could be used by other organizations to access your [facility] medical records  [id number]        Your Vitals Were     Pulse Temperature Respirations Pulse Oximetry BMI (Body Mass Index)       81 102.3  F (39.1  C) (Tympanic) 20 95% 30.47 kg/m2        Blood Pressure from Last 3 Encounters:   01/15/18 124/84   17 112/84   17 122/80    Weight from Last 3 Encounters:   01/15/18 172 lb (78 kg)   17 170 lb (77.1 kg)   17 172 lb (78 kg)              We Performed the Following     DEPRESSION ACTION PLAN (DAP)     Influenza A/B antigen          Today's Medication Changes          These changes are accurate as of: 1/15/18  3:04 PM.  If you have any questions, ask your nurse or doctor.               Start taking these medicines.        Dose/Directions    azithromycin 250 MG tablet   Commonly known as:  ZITHROMAX   Used for:  Walking pneumonia, Fever, unspecified fever cause   Started by:  Leesa Bellamy PA-C        Two tablets first day, then one tablet daily for four days.   Quantity:  6 tablet   Refills:  0       benzonatate 200 MG capsule   Commonly known as:  TESSALON   Used for:  Fever, unspecified fever cause   Started by:  Leesa Bellamy PA-C        Dose:  200 mg   Take 1 capsule (200 mg) by mouth 3 times daily as needed for cough   Quantity:  30 capsule   Refills:  1       guaiFENesin-codeine 100-10 MG/5ML Soln solution   Commonly known as:  ROBITUSSIN AC   Used for:  Fever, unspecified fever cause   Started by:  Leesa Bellamy PA-C        Dose:  2 tsp.   Take 10 mLs by mouth every 4 hours as needed for cough    Quantity:  120 mL   Refills:  0         These medicines have changed or have updated prescriptions.        Dose/Directions    escitalopram 10 MG tablet   Commonly known as:  LEXAPRO   This may have changed:  See the new instructions.   Used for:  SHAWNEE (generalized anxiety disorder)        TAKE 1 TABLET (10 MG) BY MOUTH DAILY   Quantity:  90 tablet   Refills:  2            Where to get your medicines      These medications were sent to [facility] [address]  [address] [place] [id number]     Phone:  [phone]     azithromycin 250 MG tablet    benzonatate 200 MG capsule         Some of these will need a paper prescription and others can be bought over the counter.  Ask your nurse if you have questions.     Bring a paper prescription for each of these medications     guaiFENesin-codeine 100-10 MG/5ML Soln solution                Primary Care Provider Office Phone # Fax #    Sean Velasquez [phone] [address] [facility] [address]        Equal Access to Services     JESSICA [facility]BRENT : Hadii aad ku hadasho Soomaali, waaxda luqadaha, qaybta kaalmada adeegyada, waxay idiin hayaan adeeg kharash lakelly . So [facility] [phone].    ATENCIÓN: Si habla español, tiene a agrawal disposición servicios gratuitos de asistencia lingüística. [facility] [phone].    We comply with applicable federal civil rights laws and [place] laws. We do not discriminate on the basis of race, color, national origin, age, disability, sex, sexual orientation, or gender identity.            Thank you!     [facility]. Our goal is always to provide you with excellent care. Hearing back from our patients is one way we can continue to improve our services. Please take a few minutes to complete the written survey that you may receive in the mail after your visit with us. Thank you!             Your Updated  Medication List - Protect others around you: Learn how to safely use, store and throw away your medicines at www.disposemymeds.org.          This list is accurate as of: 1/15/18  3:04 PM.  Always use your most recent med list.                   Brand Name Dispense Instructions for use Diagnosis    acetaminophen-codeine 300-30 MG per tablet    TYLENOL #3    30 tablet    TAKE ONE TABLET BY MOUTH TWICE A DAY AS NEEDED    Episodic tension-type headache, not intractable       amitriptyline 25 MG tablet    ELAVIL    90 tablet    Take 1 tablet (25 mg) by mouth At Bedtime    Insomnia, unspecified type, Depressive disorder       amLODIPine 5 MG tablet    NORVASC    90 tablet    Take 1 tablet (5 mg) by mouth daily    Hypertension goal BP (blood pressure) < 140/80       aspirin 81 MG tablet     120 tablet    Take 1 tablet (81 mg) by mouth daily        azithromycin 250 MG tablet    ZITHROMAX    6 tablet    Two tablets first day, then one tablet daily for four days.    Walking pneumonia, Fever, unspecified fever cause       benzonatate 200 MG capsule    TESSALON    30 capsule    Take 1 capsule (200 mg) by mouth 3 times daily as needed for cough    Fever, unspecified fever cause       cyanocolbalamin 500 MCG tablet    vitamin  B-12     Take 1 tablet by mouth daily.        diazepam 5 MG tablet    VALIUM    30 tablet    TAKE ONE TABLET BY MOUTH EVERY DAY AS NEEDED FOR  ANXIETY    SHAWNEE (generalized anxiety disorder)       escitalopram 10 MG tablet    LEXAPRO    90 tablet    TAKE 1 TABLET (10 MG) BY MOUTH DAILY    SHAWNEE (generalized anxiety disorder)       guaiFENesin-codeine 100-10 MG/5ML Soln solution    ROBITUSSIN AC    120 mL    Take 10 mLs by mouth every 4 hours as needed for cough    Fever, unspecified fever cause       levothyroxine 75 MCG tablet    SYNTHROID/LEVOTHROID    90 tablet    TAKE 1 TABLET (75 MCG) BY MOUTH DAILY    Hypothyroidism, unspecified type       nadolol 40 MG tablet    CORGARD    90 tablet    TAKE 1 TABLET (40  MG) BY MOUTH DAILY    Hypertension goal BP (blood pressure) < 140/80       polyethylene glycol powder    MIRALAX/GLYCOLAX    527 g    TAKE 17 G BY MOUTH 2     TIMES DAILY    Slow transit constipation       pravastatin 40 MG tablet    PRAVACHOL    90 tablet    TAKE 1 TABLET (40 MG) BY MOUTH DAILY    Hyperlipidemia with target LDL less than 100       vitamin D 2000 UNITS tablet     100 tablet    Take 2,000 Units by mouth daily

## 2018-01-15 NOTE — NURSING NOTE
"Chief Complaint   Patient presents with     URI     cough       Initial /84 (BP Location: Left arm, Patient Position: Sitting, Cuff Size: Adult Regular)  Pulse 81  Temp 102.3  F (39.1  C) (Tympanic)  Resp 20  Wt 172 lb (78 kg)  SpO2 95%  BMI 30.47 kg/m2 Estimated body mass index is 30.47 kg/(m^2) as calculated from the following:    Height as of 9/13/17: 5' 3\" (1.6 m).    Weight as of this encounter: 172 lb (78 kg).  Medication Reconciliation: complete     Princess SANDRA Gold CMA      "

## 2018-01-16 ENCOUNTER — TELEPHONE (OUTPATIENT)
Dept: FAMILY MEDICINE | Facility: CLINIC | Age: 73
End: 2018-01-16

## 2018-01-16 DIAGNOSIS — J98.01 ACUTE BRONCHOSPASM: Primary | ICD-10-CM

## 2018-01-16 RX ORDER — PREDNISONE 20 MG/1
20 TABLET ORAL 2 TIMES DAILY
Qty: 10 TABLET | Refills: 0 | Status: SHIPPED | OUTPATIENT
Start: 2018-01-16 | End: 2018-01-21

## 2018-01-16 NOTE — TELEPHONE ENCOUNTER
"Patient called reporting breathing problems  \"huffing and puffing\". Also has intermittent cough. Pt is currently taking Rx's prescribed yesterday. Please advice if prednisone appropriate.   "

## 2018-01-16 NOTE — TELEPHONE ENCOUNTER
spoke with patient's  - told him Prednisone was sent for patient .  Patient is seeing Dr. Sean Velasquez tomorrow.  Nurse line phone number given for their use is needed.  Flu screen neg for A &B yesterday.  dx'd as walking pneumonia.

## 2018-01-18 DIAGNOSIS — R50.9 FEVER, UNSPECIFIED FEVER CAUSE: ICD-10-CM

## 2018-01-20 RX ORDER — CODEINE PHOSPHATE AND GUAIFENESIN 10; 100 MG/5ML; MG/5ML
2 SOLUTION ORAL EVERY 4 HOURS PRN
Qty: 120 ML | Refills: 0 | Status: SHIPPED | OUTPATIENT
Start: 2018-01-20 | End: 2018-02-26

## 2018-02-01 DIAGNOSIS — I10 HYPERTENSION GOAL BP (BLOOD PRESSURE) < 140/80: ICD-10-CM

## 2018-02-05 RX ORDER — AMLODIPINE BESYLATE 5 MG/1
5 TABLET ORAL DAILY
Qty: 90 TABLET | Refills: 3 | Status: SHIPPED | OUTPATIENT
Start: 2018-02-05 | End: 2018-10-22

## 2018-02-05 NOTE — TELEPHONE ENCOUNTER
Routing refill request to provider for review/approval because:  Labs not current:  creatinine

## 2018-02-05 NOTE — TELEPHONE ENCOUNTER
"Requested Prescriptions   Pending Prescriptions Disp Refills     amLODIPine (NORVASC) 5 MG tablet  Last Written Prescription Date:  05/02/2017  Last Fill Quantity: 90,  # refills: 2   Last Office Visit  1/15/2018        with  G, P or [facility] prescribing provider:     Future Office Visit:      90 tablet 2     Sig: Take 1 tablet (5 mg) by mouth daily    Calcium Channel Blockers Protocol  Failed    2/1/2018 11:58 AM       Failed - Normal serum creatinine on file in past 12 months    Recent Labs   Lab Test  08/01/16   1515   CR  0.86            Passed - Blood pressure under 140/90    BP Readings from Last 3 Encounters:   01/15/18 124/84   09/13/17 112/84   08/30/17 122/80                Passed - Recent or future visit with authorizing provider    Patient had office visit in the last year or has a visit in the next 30 days with authorizing provider.  See \"Patient Info\" tab in inbasket, or \"Choose Columns\" in Meds & Orders section of the refill encounter.            Passed - Patient is age 18 or older       Passed - No active pregnancy on record       Passed - No positive pregnancy test in past 12 months        "

## 2018-02-07 DIAGNOSIS — G44.219 EPISODIC TENSION-TYPE HEADACHE, NOT INTRACTABLE: ICD-10-CM

## 2018-02-08 NOTE — TELEPHONE ENCOUNTER
acetaminophen-codeine (TYLENOL #3) 300-30 MG per tablet      Last Written Prescription Date:  6/5/2017  Last Fill Quantity: 30,   # refills: 3  Last Office Visit: 1/15/2018  Future Office visit:       Routing refill request to provider for review/approval because:  Drug not on the [facility], [facility] or [facility] refill protocol or controlled substance

## 2018-02-10 ENCOUNTER — OFFICE VISIT (OUTPATIENT)
Dept: FAMILY MEDICINE | Facility: CLINIC | Age: 73
End: 2018-02-10
Payer: COMMERCIAL

## 2018-02-10 VITALS
TEMPERATURE: 97.6 F | SYSTOLIC BLOOD PRESSURE: 120 MMHG | DIASTOLIC BLOOD PRESSURE: 80 MMHG | HEART RATE: 57 BPM | BODY MASS INDEX: 30.03 KG/M2 | WEIGHT: 169.5 LBS | OXYGEN SATURATION: 95 %

## 2018-02-10 DIAGNOSIS — H65.91 OME (OTITIS MEDIA WITH EFFUSION), RIGHT: ICD-10-CM

## 2018-02-10 DIAGNOSIS — Z87.01 HISTORY OF PNEUMONIA: Primary | ICD-10-CM

## 2018-02-10 DIAGNOSIS — J30.0 CHRONIC VASOMOTOR RHINITIS: ICD-10-CM

## 2018-02-10 PROCEDURE — 99213 OFFICE O/P EST LOW 20 MIN: CPT | Performed by: FAMILY MEDICINE

## 2018-02-10 RX ORDER — AZITHROMYCIN 500 MG/1
500 TABLET, FILM COATED ORAL DAILY
Qty: 3 TABLET | Refills: 0 | Status: SHIPPED | OUTPATIENT
Start: 2018-02-10 | End: 2018-02-26

## 2018-02-10 RX ORDER — FLUTICASONE PROPIONATE 50 MCG
1-2 SPRAY, SUSPENSION (ML) NASAL DAILY
Qty: 1 BOTTLE | Refills: 11 | Status: SHIPPED | OUTPATIENT
Start: 2018-02-10 | End: 2019-02-18

## 2018-02-10 NOTE — MR AVS SNAPSHOT
After Visit Summary   2/10/2018    Sadaf Rubi    MRN: [id number]           Patient Information     Date Of Birth          1945        Visit Information        Provider Department      2/10/2018 9:45 AM Anson Osullivan MD [facility]        Today's Diagnoses     History of pneumonia    -  1    OME (otitis media with effusion), right        Chronic vasomotor rhinitis          Care Instructions    (Z87.01) History of pneumonia  (primary encounter diagnosis)  Comment: January 15 2018  Plan:      (H65.91) OME (otitis media with effusion), right  Comment: reddened  right ear     Plan: azithromycin (ZITHROMAX) 500 MG tablet             (J30.0) Chronic vasomotor rhinitis  Comment:    Plan: fluticasone (FLONASE) 50 MCG/ACT spray                         Follow-ups after your visit        Your next 10 appointments already scheduled     Feb 14, 2018  4:15 PM CST   Return Visit with Sayda Simpson MD   [facility] ([facility])    [address]  [address]  [place] [id number] [phone]            Feb 19, 2018 10:30 AM CST   [facility]    [address] [facility]  [address]  [place] [address] [phone]           Bring a current list of meds and any records pertaining to this visit. For Physicals, please bring immunization records and any forms needing to be filled out. Please arrive 10 minutes early to complete paperwork.              Who to contact     If you have questions or need follow up information about today's clinic visit or your schedule please contact [facility] directly at [phone].  Normal or non-critical lab and imaging results will be communicated to you by MyChart, letter or phone within 4 business days after the clinic  "has received the results. If you do not hear from us within 7 days, please contact the clinic through [employer] or phone. If you have a critical or abnormal lab result, we will notify you by phone as soon as possible.  Submit refill requests through [employer] or call your pharmacy and they will forward the refill request to us. Please allow 3 business days for your refill to be completed.          Additional Information About Your Visit        [employer] Information     [employer] lets you send messages to your doctor, view your test results, renew your prescriptions, schedule appointments and more. To sign up, go to [email or web address] . Click on \"Log in\" on the left side of the screen, which will take you to the Welcome page. Then click on \"Sign up Now\" on the right side of the page.     You will be asked to enter the access code listed below, as well as some personal information. Please follow the directions to create your username and password.     Your access code is: [email or web address]  Expires: 4/15/2018  3:04 PM     Your access code will  in 90 days. If you need help or a new code, please call your [facility] clinic or [phone].        Care EveryWhere ID     This is your Care EveryWhere ID. This could be used by other organizations to access your [facility] medical records  [id number]        Your Vitals Were     Pulse Temperature Pulse Oximetry BMI (Body Mass Index)          57 97.6  F (36.4  C) (Tympanic) 95% 30.03 kg/m2         Blood Pressure from Last 3 Encounters:   02/10/18 120/80   01/15/18 124/84   17 112/84    Weight from Last 3 Encounters:   02/10/18 169 lb 8 oz (76.9 kg)   01/15/18 172 lb (78 kg)   17 170 lb (77.1 kg)              Today, you had the following     No orders found for display         Today's Medication Changes          These changes are accurate as of 2/10/18 10:16 AM.  If you have any questions, ask your nurse or doctor.               Start taking these medicines.        " Dose/Directions    fluticasone 50 MCG/ACT spray   Commonly known as:  FLONASE   Used for:  Chronic vasomotor rhinitis   Started by:  Anson Osullivan MD        Dose:  1-2 spray   Spray 1-2 sprays into both nostrils daily   Quantity:  1 Bottle   Refills:  11         These medicines have changed or have updated prescriptions.        Dose/Directions    * azithromycin 250 MG tablet   Commonly known as:  ZITHROMAX   This may have changed:  Another medication with the same name was added. Make sure you understand how and when to take each.   Used for:  Walking pneumonia, Fever, unspecified fever cause   Changed by:  Anson Osullivan MD        Two tablets first day, then one tablet daily for four days.   Quantity:  6 tablet   Refills:  0       * azithromycin 500 MG tablet   Commonly known as:  ZITHROMAX   This may have changed:  You were already taking a medication with the same name, and this prescription was added. Make sure you understand how and when to take each.   Used for:  OME (otitis media with effusion), right   Changed by:  Anson Osullivan MD        Dose:  500 mg   Take 1 tablet (500 mg) by mouth daily   Quantity:  3 tablet   Refills:  0       escitalopram 10 MG tablet   Commonly known as:  LEXAPRO   This may have changed:  See the new instructions.   Used for:  SHAWNEE (generalized anxiety disorder)        TAKE 1 TABLET (10 MG) BY MOUTH DAILY   Quantity:  90 tablet   Refills:  2       * Notice:  This list has 2 medication(s) that are the same as other medications prescribed for you. Read the directions carefully, and ask your doctor or other care provider to review them with you.         Where to get your medicines      These medications were sent to [facility] - [address] [address]     Phone:  [phone]     azithromycin 500 MG tablet    fluticasone 50 MCG/ACT spray                Primary Care Provider Office Phone # Fax #     Sean Velasquez [phone]       [address] [facility] [address]        Equal Access to Services     ALBINOGARRICK JAMEL : Hadozzy joellen mcintyre juano Somitchell, waaxda luqadaha, qaybta kaalmada adezoeyda, isha wolfgrant chau. So [facility] [phone].    ATENCIÓN: Si habla español, tiene a agrawal disposición servicios gratuitos de asistencia lingüística. Llame al [phone].    We comply with applicable federal civil rights laws and [place] laws. We do not discriminate on the basis of race, color, national origin, age, disability, sex, sexual orientation, or gender identity.            Thank you!     Thank you for choosing [facility]  for your care. Our goal is always to provide you with excellent care. Hearing back from our patients is one way we can continue to improve our services. Please take a few minutes to complete the written survey that you may receive in the mail after your visit with us. Thank you!             Your Updated Medication List - Protect others around you: Learn how to safely use, store and throw away your medicines at www.disposemymeds.org.          This list is accurate as of 2/10/18 10:16 AM.  Always use your most recent med list.                   Brand Name Dispense Instructions for use Diagnosis    acetaminophen-codeine 300-30 MG per tablet    TYLENOL #3    30 tablet    TAKE ONE TABLET BY MOUTH TWICE A DAY AS NEEDED    Episodic tension-type headache, not intractable       amitriptyline 25 MG tablet    ELAVIL    90 tablet    Take 1 tablet (25 mg) by mouth At Bedtime    Insomnia, unspecified type, Depressive disorder       amLODIPine 5 MG tablet    NORVASC    90 tablet    Take 1 tablet (5 mg) by mouth daily    Hypertension goal BP (blood pressure) < 140/80       aspirin 81 MG tablet     120 tablet    Take 1 tablet (81 mg) by mouth daily        * azithromycin 250 MG tablet    ZITHROMAX    6 tablet    Two tablets first day, then  one tablet daily for four days.    Walking pneumonia, Fever, unspecified fever cause       * azithromycin 500 MG tablet    ZITHROMAX    3 tablet    Take 1 tablet (500 mg) by mouth daily    OME (otitis media with effusion), right       benzonatate 200 MG capsule    TESSALON    30 capsule    Take 1 capsule (200 mg) by mouth 3 times daily as needed for cough    Fever, unspecified fever cause       cyanocolbalamin 500 MCG tablet    vitamin  B-12     Take 1 tablet by mouth daily.        diazepam 5 MG tablet    VALIUM    30 tablet    TAKE ONE TABLET BY MOUTH EVERY DAY AS NEEDED FOR  ANXIETY    SHAWNEE (generalized anxiety disorder)       escitalopram 10 MG tablet    LEXAPRO    90 tablet    TAKE 1 TABLET (10 MG) BY MOUTH DAILY    SHAWNEE (generalized anxiety disorder)       fluticasone 50 MCG/ACT spray    FLONASE    1 Bottle    Spray 1-2 sprays into both nostrils daily    Chronic vasomotor rhinitis       guaiFENesin-codeine 100-10 MG/5ML Soln solution    ROBITUSSIN AC    120 mL    Take 10 mLs by mouth every 4 hours as needed for cough    Fever, unspecified fever cause       levothyroxine 75 MCG tablet    SYNTHROID/LEVOTHROID    90 tablet    TAKE 1 TABLET (75 MCG) BY MOUTH DAILY    Hypothyroidism, unspecified type       nadolol 40 MG tablet    CORGARD    90 tablet    TAKE 1 TABLET (40 MG) BY MOUTH DAILY    Hypertension goal BP (blood pressure) < 140/80       polyethylene glycol powder    MIRALAX/GLYCOLAX    527 g    TAKE 17 G BY MOUTH 2     TIMES DAILY    Slow transit constipation       pravastatin 40 MG tablet    PRAVACHOL    90 tablet    TAKE 1 TABLET (40 MG) BY MOUTH DAILY    Hyperlipidemia with target LDL less than 100       vitamin D 2000 UNITS tablet     100 tablet    Take 2,000 Units by mouth daily        * Notice:  This list has 2 medication(s) that are the same as other medications prescribed for you. Read the directions carefully, and ask your doctor or other care provider to review them with you.

## 2018-02-10 NOTE — PROGRESS NOTES
SUBJECTIVE:   Sadaf Rubi is a 72 year old female who presents to clinic today for the following health issues:      EAR      Duration: 1 WEEK    Description (location/character/radiation): RIGHT EAR PLUGGED    Intensity:  moderate    Accompanying signs and symptoms: some pain    History (similar episodes/previous evaluation): None    Precipitating or alleviating factors: None    Therapies tried and outcome: None     (Z87.01) History of pneumonia  (primary encounter diagnosis)  Comment: January 15 2018  OBJECTIVE LUNGS HAVE CLEARED   NO PROLONGED EXPIRATORY PHASE   NO WHEEZING OR RHONCHI  Plan:  SUPPORTIVE    (H65.91) OME (otitis media with effusion), right  Comment: reddened  right ear   OBJECTIVE RED RIGHT EAR WITH LOSS OF LANDMARK  NORMAL LEFT TYMPANIC MEMBRANE     Plan: azithromycin (ZITHROMAX) 500 MG tablet         X 3 DAYS   SIDE EFFECTS BENEFITS AND RISKS DISCUSSED    MEDICATION RISKS SIDE EFFECTS BENEFITS AND RISKS DISCUSSED   TREATMENT PROGNOSIS BENEFITS AND RISKS DISCUSSED     (J30.0) Chronic vasomotor rhinitis  Comment:  HISTORY OF NON ALLERGIC PERENNIAL RHINITIS   WORSE WITH COLD AND DUST EXPOSURE  OBJECTIVE RHINITIS   Plan: fluticasone (FLONASE) 50 MCG/ACT spray         1-2 PUFFS TWICE DAILY           Problem list and histories reviewed & adjusted, as indicated.  Additional history: as documented            Topic Date Due     TETANUS IMMUNIZATION (SYSTEM ASSIGNED)  09/27/2016     PHQ-9 Q6 MONTHS  12/05/2017               .  Current Outpatient Prescriptions   Medication Sig Dispense Refill     azithromycin (ZITHROMAX) 500 MG tablet Take 1 tablet (500 mg) by mouth daily 3 tablet 0     fluticasone (FLONASE) 50 MCG/ACT spray Spray 1-2 sprays into both nostrils daily 1 Bottle 11     acetaminophen-codeine (TYLENOL #3) 300-30 MG per tablet TAKE ONE TABLET BY MOUTH TWICE A DAY AS NEEDED 30 tablet 3     amLODIPine (NORVASC) 5 MG tablet Take 1 tablet (5 mg) by mouth daily 90 tablet 3      guaiFENesin-codeine (ROBITUSSIN AC) 100-10 MG/5ML SOLN solution Take 10 mLs by mouth every 4 hours as needed for cough 120 mL 0     benzonatate (TESSALON) 200 MG capsule Take 1 capsule (200 mg) by mouth 3 times daily as needed for cough 30 capsule 1     azithromycin (ZITHROMAX) 250 MG tablet Two tablets first day, then one tablet daily for four days. 6 tablet 0     diazepam (VALIUM) 5 MG tablet TAKE ONE TABLET BY MOUTH EVERY DAY AS NEEDED FOR  ANXIETY 30 tablet 3     amitriptyline (ELAVIL) 25 MG tablet Take 1 tablet (25 mg) by mouth At Bedtime 90 tablet 3     levothyroxine (SYNTHROID/LEVOTHROID) 75 MCG tablet TAKE 1 TABLET (75 MCG) BY MOUTH DAILY 90 tablet 3     nadolol (CORGARD) 40 MG tablet TAKE 1 TABLET (40 MG) BY MOUTH DAILY 90 tablet 3     pravastatin (PRAVACHOL) 40 MG tablet TAKE 1 TABLET (40 MG) BY MOUTH DAILY 90 tablet 3     escitalopram (LEXAPRO) 10 MG tablet TAKE 1 TABLET (10 MG) BY MOUTH DAILY (Patient taking differently: TAKing 1/2 tablet every day) 90 tablet 2     polyethylene glycol (MIRALAX/GLYCOLAX) powder TAKE 17 G BY MOUTH 2     TIMES DAILY 527 g 11     Cholecalciferol (VITAMIN D) 2000 UNITS tablet Take 2,000 Units by mouth daily 100 tablet 3     aspirin 81 MG tablet Take 1 tablet (81 mg) by mouth daily 120 tablet      cyanocolbalamin (VITAMIN  B-12) 500 MCG tablet Take 1 tablet by mouth daily.                Allergies   Allergen Reactions     Bees      Ciprofloxacin      Influenza Vaccine Live      Other [Seasonal Allergies]      msg and mushrooms     Penicillins      Wasp Venom Protein Hives           Immunization History   Administered Date(s) Administered     Influenza (High Dose) 3 valent vaccine 10/03/2013, 09/28/2015, 10/31/2016     Influenza (IIV3) PF 08/12/2011, 12/03/2012     Pneumo Conj 13-V (2010&after) 07/27/2015     Pneumococcal 23 valent 08/03/2010     TD (ADULT, 7+) 09/27/2006     Zoster vaccine, live 12/03/2012               reports that she drinks alcohol.          reports that  she does not use illicit drugs.        family history includes Breast Cancer in her mother; C.A.D. in her father.        indicated that her mother is . She indicated that her father is . She indicated that her sister is . She indicated that her daughter is alive. She indicated that both of her sons are alive.          has a past surgical history that includes knee surgery (2006); Lumpectomy breast (2001); Urethra surgery (); knee surgery (); knee surgery (09/10/2012); and GYN surgery ().         reports that she does not engage in sexual activity.    .  Pediatric History   Patient Guardian Status     Not on file.     Other Topics Concern     Parent/Sibling W/ Cabg, Mi Or Angioplasty Before 65f 55m? No     Social History Narrative    Mother  when pt was 8 yrs old               reports that she has never smoked. She has never used smokeless tobacco.        Medical, social, surgical, and family histories reviewed.        Labs reviewed in EPIC  Patient Active Problem List   Diagnosis     Dysthymic disorder     Palpitation     Shortness of breath     Osteopenia     Preventive measure     Family history of coagulation disorder     Vitamin D deficiency disease     Hypertension goal BP (blood pressure) < 140/80     Hyperlipidemia with target LDL less than 100     Dizziness     Balance problems     Abnormal liver enzymes     ACP (advance care planning)     Episodic tension-type headache, not intractable     Pain in unspecified knee     Slow transit constipation     Persistent insomnia     SHAWNEE (generalized anxiety disorder)     Hypothyroidism, unspecified type     Urinary hesitancy     Other iron deficiency anemia     Pelvic somatic dysfunction     Mixed incontinence urge and stress (male)(female)       Past Surgical History:   Procedure Laterality Date     GYN SURGERY      hysterectomy SAMANTHA & BSO     KNEE SURGERY  2006    meniscus left     KNEE SURGERY  2007    right  meniscus     KNEE SURGERY  09/10/2012    right knee replacement ; L TKA 3/13     LUMPECTOMY BREAST  2001    benign     URETHRA SURGERY           Social History   Substance Use Topics     Smoking status: Never Smoker     Smokeless tobacco: Never Used     Alcohol use Yes      Comment: glass of wine with dinner       Family History   Problem Relation Age of Onset     Breast Cancer Mother       age 50     C.A.D. Father       age 71             Current Outpatient Prescriptions   Medication Sig Dispense Refill     azithromycin (ZITHROMAX) 500 MG tablet Take 1 tablet (500 mg) by mouth daily 3 tablet 0     fluticasone (FLONASE) 50 MCG/ACT spray Spray 1-2 sprays into both nostrils daily 1 Bottle 11     acetaminophen-codeine (TYLENOL #3) 300-30 MG per tablet TAKE ONE TABLET BY MOUTH TWICE A DAY AS NEEDED 30 tablet 3     amLODIPine (NORVASC) 5 MG tablet Take 1 tablet (5 mg) by mouth daily 90 tablet 3     guaiFENesin-codeine (ROBITUSSIN AC) 100-10 MG/5ML SOLN solution Take 10 mLs by mouth every 4 hours as needed for cough 120 mL 0     benzonatate (TESSALON) 200 MG capsule Take 1 capsule (200 mg) by mouth 3 times daily as needed for cough 30 capsule 1     azithromycin (ZITHROMAX) 250 MG tablet Two tablets first day, then one tablet daily for four days. 6 tablet 0     diazepam (VALIUM) 5 MG tablet TAKE ONE TABLET BY MOUTH EVERY DAY AS NEEDED FOR  ANXIETY 30 tablet 3     amitriptyline (ELAVIL) 25 MG tablet Take 1 tablet (25 mg) by mouth At Bedtime 90 tablet 3     levothyroxine (SYNTHROID/LEVOTHROID) 75 MCG tablet TAKE 1 TABLET (75 MCG) BY MOUTH DAILY 90 tablet 3     nadolol (CORGARD) 40 MG tablet TAKE 1 TABLET (40 MG) BY MOUTH DAILY 90 tablet 3     pravastatin (PRAVACHOL) 40 MG tablet TAKE 1 TABLET (40 MG) BY MOUTH DAILY 90 tablet 3     escitalopram (LEXAPRO) 10 MG tablet TAKE 1 TABLET (10 MG) BY MOUTH DAILY (Patient taking differently: TAKing 1/2 tablet every day) 90 tablet 2     polyethylene glycol  (MIRALAX/GLYCOLAX) powder TAKE 17 G BY MOUTH 2     TIMES DAILY 527 g 11     Cholecalciferol (VITAMIN D) 2000 UNITS tablet Take 2,000 Units by mouth daily 100 tablet 3     aspirin 81 MG tablet Take 1 tablet (81 mg) by mouth daily 120 tablet      cyanocolbalamin (VITAMIN  B-12) 500 MCG tablet Take 1 tablet by mouth daily.             Recent Labs   Lab Test  06/05/17   1620  08/01/16   1515  07/27/15   0919  08/11/14   0848  07/02/14   0808   12/07/12   0809   LDL   --   105*  156*   --   152*   --   133*   HDL   --    --   75   --   87   --   64   TRIG   --    --   71   --   66   --   64   ALT   --   25  29  80*  110*   < >  21   CR   --   0.86  0.70   --   0.80   < >  0.80   GFRESTIMATED   --   65  83   --   71   < >  71   GFRESTBLACK   --   79  >90   --   86   < >  86   POTASSIUM   --   4.5  3.9   --   4.3   < >  3.8   TSH  1.14  2.32  1.44   --   3.22   < >  2.24    < > = values in this interval not displayed.            BP Readings from Last 6 Encounters:   02/10/18 120/80   01/15/18 124/84   09/13/17 112/84   08/30/17 122/80   06/19/17 130/86   06/05/17 120/68           Wt Readings from Last 3 Encounters:   02/10/18 169 lb 8 oz (76.9 kg)   01/15/18 172 lb (78 kg)   09/13/17 170 lb (77.1 kg)                 Positive symptoms or findings indicated by bold designation:         ROS: 10 point ROS neg other than the symptoms noted above in the HPI.except  has Dysthymic disorder; Palpitation; Shortness of breath; Osteopenia; Preventive measure; Family history of coagulation disorder; Vitamin D deficiency disease; Hypertension goal BP (blood pressure) < 140/80; Hyperlipidemia with target LDL less than 100; Dizziness; Balance problems; Abnormal liver enzymes; ACP (advance care planning); Episodic tension-type headache, not intractable; Pain in unspecified knee; Slow transit constipation; Persistent insomnia; SHAWNEE (generalized anxiety disorder); Hypothyroidism, unspecified type; Urinary hesitancy; Other iron deficiency  anemia; Pelvic somatic dysfunction; and Mixed incontinence urge and stress (male)(female) on her problem list.  Review Of Systems    Skin: negative for, pigmentation, acne, rash, scaling    Eyes: negative for, visual blurring, double vision    Ears/Nose/Throat: nasal congestion, sneezing, postnasal drainage, hearing loss, hoarseness    Respiratory:  RECENT PNEUMONIA    Cardiovascular: negative for, palpitations, tachycardia, irregular heart beat and chest pain    Gastrointestinal: poor appetite, dysphagia, nausea, vomiting and heartburn    Genitourinary: negative for, nocturia and dysuria    Musculoskeletal: arthritis    Neurologic: negative    Psychiatric: negative    Hematologic/Lymphatic/Immunologic: negative    Endocrine: negative                PE:  /80  Pulse 57  Temp 97.6  F (36.4  C) (Tympanic)  Wt 169 lb 8 oz (76.9 kg)  SpO2 95%  BMI 30.03 kg/m2 Body mass index is 30.03 kg/(m^2).        Constitutional: general appearance, well nourished, well developed, in no acute distress, well developed, appears stated age, normal body habitus,          Eyes:; The patient has normal eyelids sclerae and conjunctivae :          Ears/Nose/Throat: external ear, overall: normal appearance; external nose, overall: benign appearance, normal moujth gums and lips   RIGHT OM WITH RED DRUM     DECREASE HEARING AND LOSS LANDMARK    RIM OF WAX     LEFT EAR WITHIN NORMAL LIMITS         Neck: thyroid, overall: normal size, normal consistency, nontender,          Respiratory:  palpation of chest, overall: normal excursion,    Clear to percussion and auscultation     NO Tachypnea    NORMAL  Color  NORMAL         Cardiovascular:  Good color with no peripheral edema  NORMAL   Regular sinus rhythm without murmur.  Physiologic heart sounds   Heart is unelarged    .     Chest/Breast: normal shape           Abdominal exam,  Liver and spleen are  unenlarged        Tenderness    Scars              Urogenital; no renal, flank or  bladder  tenderness;          Lymphatic: neck nodes,     Other nodes         Integument: inspection of skin, no rash, lesions; and, palpation, no induration, no tenderness.          Neurologic mental status, overall: alert and oriented; gait, no ataxia, no unsteadiness; coordination, no tremors; cranial nerves, overall: normal motor, overall: normal bulk, tone.          Psychiatric: orientation/consciousness, overall: oriented to person, place and time; behavior/psychomotor activity, no tics, normal psychomotor activity; mood and affect, overall: normal mood and affect; appearance, overall: well-groomed, good eye contact; speech, overall: normal quality, no aphasia and normal quality, quantity, intact.        Diagnostic Test Results:  Results for orders placed or performed in visit on 01/15/18   Influenza A/B antigen   Result Value Ref Range    Influenza A/B Agn Specimen Nasal     Influenza A Negative NEG^Negative    Influenza B Negative NEG^Negative           ICD-10-CM    1. History of pneumonia Z87.01     January 15 2018   2. OME (otitis media with effusion), right H65.91 azithromycin (ZITHROMAX) 500 MG tablet    reddened  right ear      3. Chronic vasomotor rhinitis J30.0 fluticasone (FLONASE) 50 MCG/ACT spray              .    Side effects benefits and risks thoroughly discussed. .she may come in early if unimproved or getting worse          Please drink 2 glasses of water prior to meals and walk 15-30 minutes after meals        I spent  15 MINUTES   with patient discussing the following issues   The primary encounter diagnosis was History of pneumonia. Diagnoses of OME (otitis media with effusion), right and Chronic vasomotor rhinitis were also pertinent to this visit. over half of which involved counseling and coordination of care.      Patient Instructions   (Z87.01) History of pneumonia  (primary encounter diagnosis)  Comment: January 15 2018  Plan:      (H65.91) OME (otitis media with effusion),  right  Comment: reddened  right ear     Plan: azithromycin (ZITHROMAX) 500 MG tablet             (J30.0) Chronic vasomotor rhinitis  Comment:    Plan: fluticasone (FLONASE) 50 MCG/ACT spray                           ALL THE ABOVE PROBLEMS ARE STABLE AND MED CHANGES AS NOTED        Diet:  MEDITERRANEAN DIET         Exercise:  NOT APPLICABLE   Exercises Range of motion, balance, isometric, and strengthening exercises 30 repetitions twice daily of involved joints            .LOU CHAMBERS MD 2/10/2018 12:58 PM  February 10, 2018

## 2018-02-10 NOTE — LETTER
[facility]  [address] [id number] [phone]                                                                                                           Sadaf Rubi  [address]  [facility] [address]    February 10, 2018      Dear Sadaf,        Assessment: Lower back pain    Plan: do these exercises at least twice daily:  Standing or sitting exercise can be done every two hours    Serafin' Flexion Versus Alesia   Extension Exercises For   Low Back Pain   Examples of Serafin' Flexion Exercises  1. Pelvic tilt.  Please press the small of your back against the floor.  Start with 5-10  and increase to 100 count over one month   2. Single Knee to chest. Lie on your back with legs in bent position. Alternate one leg and the other very slowly bringing the knee to chest.  Start with a count of 5-10   Over one month work up to 100  3. Double knee to chest.  Lie on your back with knees in bent position.  Bring both knees to the chest slowly hold for a count of 5-to 10. Over one month work to 100  4. Partial sit-up or crunch.  Lie on your back in bent leg position.  Please bring your body with arms crossed in front  To 30 degrees of flexion. Start with 5-10 over one month work up to 100 or more  5. Sit back.  Please sit on the side of the bed or a stair landing  And lie backwards until the abdominal muscles start to quiver.  Hold for a count of 5-10 and over a month work up to 100.  5. Hamstring stretch.  Please extend your legs while sitting on the floor as tolerated for 5-10 count.  Gradually increase to count of 30 over one month      Alternately find a stair landing or sturdy chair and place heel  In a comfortable level of extension  And stretch one hamstring at a time for 5-10 seconds.  Increase to count of 30 over one month                         Squat.  Stand with legs comfortably apart and lower the body slowly by  flexing the knees  for count of 5-10 over one month increase to 30.  Useful for anterior disc protrusion, facette joint arthritis spond-10ylolysis, spondylolisthesis and spinal stenosis    Sweta method or extension exercises  Useful disc bulging posteriorly  1. Prone pressups  Please lie on your abdomen.   Please do a push with the upper half of your body only.  This should not cause the pain to shoot down your buttock thigh leg or foot  Start with 10 and repeat x 3 one minute apart.  Repeat x 10 every 1-2 hours  Pain tends to increase in the center of back  And leaves buttock thighs legs and foot over time  You may shift your pelvis in opposite direction of buttock and leg pain to achieve even better results  2. Superman with arms extended  Or at the side Simultaneously lift your arms and legs off the floor. Start with 5-10 over one month increase to 100.  3. Cat stretch or cobra Start  As if in extended position of prone pressup but hold the stretch for 5 or 10 count. Over one moth to count of 30.  4.  Extensions can be done in standing position  As well if prone pressup is inconvenient.  Shift your pelvis in opposite direction of limb pain.  Put your hands  Flat on your buttocks and lean backwards without loss of balance.  Count 10 x 3 times then 10 extensions hourly       Thank you for choosing [facility].  We appreciate the opportunity to serve you and look forward to supporting your healthcare needs in the future.    If you have any questions or concerns, please call me or my staff at [phone].      Sincerely,    Anson Osullivan Jr MD

## 2018-02-10 NOTE — PATIENT INSTRUCTIONS
(Z87.01) History of pneumonia  (primary encounter diagnosis)  Comment: January 15 2018  Plan:      (H65.91) OME (otitis media with effusion), right  Comment: reddened  right ear     Plan: azithromycin (ZITHROMAX) 500 MG tablet             (J30.0) Chronic vasomotor rhinitis  Comment:    Plan: fluticasone (FLONASE) 50 MCG/ACT spray

## 2018-02-10 NOTE — NURSING NOTE
"Chief Complaint   Patient presents with     Ear Problem     RIGHT       Initial /80  Pulse 57  Temp 97.6  F (36.4  C) (Tympanic)  Wt 169 lb 8 oz (76.9 kg)  SpO2 95%  BMI 30.03 kg/m2 Estimated body mass index is 30.03 kg/(m^2) as calculated from the following:    Height as of 9/13/17: 5' 3\" (1.6 m).    Weight as of this encounter: 169 lb 8 oz (76.9 kg).  Medication Reconciliation: complete   .Rafat HUERTA      "

## 2018-02-14 DIAGNOSIS — F41.1 GAD (GENERALIZED ANXIETY DISORDER): ICD-10-CM

## 2018-02-15 RX ORDER — DIAZEPAM 5 MG
TABLET ORAL
Qty: 30 TABLET | Refills: 2 | Status: SHIPPED | OUTPATIENT
Start: 2018-02-15 | End: 2018-05-16

## 2018-02-15 NOTE — TELEPHONE ENCOUNTER
Controlled Substance Refill Request for diazepam (VALIUM) 5 MG tablet  Problem List Complete:  Yes  Patient is followed by Sean Velasquez MD for ongoing prescription of anxiolytic medication.  All refills should be approved by this provider, or covering partner.    Medication(s): diazepam 5 mg.   Maximum quantity per month: 30  Clinic visit frequency required: Q 6  months     Controlled substance agreement:  Encounter-Level CSA:     There are no encounter-level csa.        Pain Clinic evaluation in the past: No    DIRE Total Score(s):  No flowsheet data found.    Last [facility] website verification:  done on ?   [email or web address]    Onset 1980's or earlier                    Chronic buspar; uses valium also, 5 mg per day or less.                   In 3/16, stopped buspar and started lexapro with good results.     Last  check 08/04/2016-no concerns.  No CSA on file            checked in past 6 months?  Yes 02/15/2018 - no concerns    Last OV 02/10/2018.    diazepam (VALIUM) 5 MG tablet 30 tablet 3 11/2/2017

## 2018-02-26 ENCOUNTER — OFFICE VISIT (OUTPATIENT)
Dept: FAMILY MEDICINE | Facility: CLINIC | Age: 73
End: 2018-02-26
Payer: COMMERCIAL

## 2018-02-26 VITALS
OXYGEN SATURATION: 93 % | SYSTOLIC BLOOD PRESSURE: 122 MMHG | TEMPERATURE: 98.4 F | DIASTOLIC BLOOD PRESSURE: 70 MMHG | HEART RATE: 63 BPM

## 2018-02-26 DIAGNOSIS — M54.50 MIDLINE LOW BACK PAIN WITHOUT SCIATICA, UNSPECIFIED CHRONICITY: Primary | ICD-10-CM

## 2018-02-26 DIAGNOSIS — H65.01 RIGHT ACUTE SEROUS OTITIS MEDIA, RECURRENCE NOT SPECIFIED: ICD-10-CM

## 2018-02-26 DIAGNOSIS — G25.0 ESSENTIAL TREMOR: ICD-10-CM

## 2018-02-26 PROCEDURE — 99214 OFFICE O/P EST MOD 30 MIN: CPT | Performed by: PHYSICIAN ASSISTANT

## 2018-02-26 RX ORDER — TIZANIDINE 2 MG/1
2 TABLET ORAL 3 TIMES DAILY PRN
Qty: 30 TABLET | Refills: 3 | Status: SHIPPED | OUTPATIENT
Start: 2018-02-26 | End: 2018-04-09

## 2018-02-26 NOTE — MR AVS SNAPSHOT
After Visit Summary   2/26/2018    Sadaf Rubi    MRN: [id number]           Patient Information     Date Of Birth          1945        Visit Information        Provider Department      2/26/2018 10:30 AM Leesa Bellamy PA-C [facility]        Today's Diagnoses     Midline low back pain without sciatica, unspecified chronicity    -  1    Right acute serous otitis media, recurrence not specified        Essential tremor          Care Instructions    Middle Ear Pain/ Middle Ear Fluid (Serous Otitis Media)  Home Treatments:    Apply warm wash cloth or a heating pad to the each ear for 10-15 minutes at a time, 3-4 times daily to encourage fluid drainage.    Use decongestants:    pseudoephedrine (Sudafed)- 60 mg every 4-6 hours for up to 2 weeks. Avoid using before bedtime as it may keep you awake.  May cause an increase in blood pressure.    Afrin nasal spray- DO NOT use for longer than 3 days.  This will cause rebound congestion and worsening of symptoms.    Fluticasone (Flonase) nasal spray- now OTC medication that helps with chronic congestion.  Must be used daily and may take up to 2 weeks before improvement is noted.    Nasacort nasal spray-  OTC medication that helps with chronic congestion.  Must be used daily and may take up to 2 weeks before improvement is noted.    Do not try and pop your ears and be careful blowing your nose so you do not put a hole in your ear drum.    Stay well hydrated to thin mucus.    Take ibuprofen (600mg every 6 hours with food or water) or tylenol (500mg every 6 hours) for pain.  Normally, the eustachian tube helps keep the air pressure inside the middle ear the same as the air pressure outside the middle ear. If there is a problem with the eustachian tube, the air pressure inside the middle ear won t be the same as the air pressure outside it. This can cause ear pain, hearing loss, and other symptoms. Most eustachian  tube problems are not serious. They usually last only a short time and get better on their own.  Common causes of eustachian tube problems are:  Illnesses or conditions that make the eustachian tubes swollen or inflamed - These include colds, allergies, ear infections, or sinus infections.   Sudden air pressure changes - Sudden air pressure changes can happen when people fly in an airplane, scuba dive, or drive in the mountains.   Growths that block the eustachian tube   Common symptoms of a eustachian tube problem include:  Ear pain   Feeling pressure or fullness in the ear   Trouble hearing   Ringing in the ear   Feeling dizzy    Treatment depends on what s causing the eustachian tube problem. Depending on your individual situation, your medical provider might treat you with 1 or more of the following:  Nose sprays   Antihistamines - These medicines are usually used to treat allergies. They help stop itching, sneezing, and runny nose symptoms. If your symptoms were triggered by allergies, taking an antihistamine can help stop these symptoms.  Decongestants - These medicines can help with stuffy nose symptoms.   Antibiotic medicines - If you have an infection caused by bacteria, your doctor can treat it with antibiotics.   Call your healthcare provider if you have:    a temperature of 101.5 degrees F (38.6 degrees C) or higher that persists even after you take acetaminophen, aspirin, or ibuprofen    a severe headache or worsening pain around the ear    swelling around the ear    increasing dizziness    worsening of hearing problems    weakness of one side of your face.    - Use hot or cold packs (which ever gives more relief) for 15 minutes at a time at least three times daily.  - Stretch and massage the muscles affected.  - Muscle relaxants at night.  Can take every 8 hours if you are not too drowsy when taking or take a 1/2 of a tab during the day and a whole tab at night.  - If no improvement in 1 week, call and  "I will put in an order for PT.              Follow-ups after your visit        Your next 10 appointments already scheduled     Mar 28, 2018  4:15 PM CDT   Return Visit with Sayda Simpson MD   [facility] ([facility])    [address]  [address]  [facility] [address] [phone]              Who to contact     If you have questions or need follow up information about today's clinic visit or your schedule please contact [facility] directly at [phone].  Normal or non-critical lab and imaging results will be communicated to you by [employer], letter or phone within 4 business days after the clinic has received the results. If you do not hear from us within 7 days, please contact the clinic through [employer] or phone. If you have a critical or abnormal lab result, we will notify you by phone as soon as possible.  Submit refill requests through "[employer] or call your pharmacy and they will forward the refill request to us. Please allow 3 business days for your refill to be completed.          Additional Information About Your Visit        MyChart Information     "[employer] lets you send messages to your doctor, view your test results, renew your prescriptions, schedule appointments and more. To sign up, go to [email or web address] . Click on \"Log in\" on the left side of the screen, which will take you to the Welcome page. Then click on \"Sign up Now\" on the right side of the page.     You will be asked to enter the access code listed below, as well as some personal information. Please follow the directions to create your username and password.     Your access code is: [email or web address]  Expires: 4/15/2018  3:04 PM     Your access code will  in 90 days. If you need help or a new code, please call your [facility] or [phone].        Care EveryWhere ID     This is your Care EveryWhere ID. This could be used by other " organizations to access your [place] medical records  [id number]        Your Vitals Were     Pulse Temperature Pulse Oximetry             63 98.4  F (36.9  C) (Tympanic) 93%          Blood Pressure from Last 3 Encounters:   02/26/18 122/70   02/10/18 120/80   01/15/18 124/84    Weight from Last 3 Encounters:   02/10/18 169 lb 8 oz (76.9 kg)   01/15/18 172 lb (78 kg)   09/13/17 170 lb (77.1 kg)              Today, you had the following     No orders found for display         Today's Medication Changes          These changes are accurate as of 2/26/18 11:41 AM.  If you have any questions, ask your nurse or doctor.               Start taking these medicines.        Dose/Directions    tiZANidine 2 MG tablet   Commonly known as:  ZANAFLEX   Used for:  Midline low back pain without sciatica, unspecified chronicity   Started by:  Leesa Bellamy PA-C        Dose:  2 mg   Take 1 tablet (2 mg) by mouth 3 times daily as needed   Quantity:  30 tablet   Refills:  3         These medicines have changed or have updated prescriptions.        Dose/Directions    escitalopram 10 MG tablet   Commonly known as:  LEXAPRO   This may have changed:  See the new instructions.   Used for:  SHAWNEE (generalized anxiety disorder)        TAKE 1 TABLET (10 MG) BY MOUTH DAILY   Quantity:  90 tablet   Refills:  2         Stop taking these medicines if you haven't already. Please contact your care team if you have questions.     azithromycin 250 MG tablet   Commonly known as:  ZITHROMAX   Stopped by:  Leesa Bellamy PA-C           azithromycin 500 MG tablet   Commonly known as:  ZITHROMAX   Stopped by:  Leesa Bellamy PA-C           benzonatate 200 MG capsule   Commonly known as:  TESSALON   Stopped by:  Leesa Bellamy PA-C           guaiFENesin-codeine 100-10 MG/5ML Soln solution   Commonly known as:  ROBITUSSIN AC   Stopped by:  Leesa Bellamy PA-C                Where to  get your medicines      These medications were sent to [facility] - [place], [place] - [address]  [address], [facility] [address]     Phone:  [phone]     tiZANidine 2 MG tablet                Primary Care Provider Office Phone # Fax #    Sean Velasquez [phone]       [address] [facility] [address]        Equal Access to Services     JESSICA MCCULLOUGH : Hadii aad ku hadasho Soomaali, waaxda luqadaha, qaybta kaalmada adeegyada, waxay idiin hayaan adeeg kharash la'aan ah. So [facility] [phone].    ATENCIÓN: Si habla español, tiene a agrawal disposición servicios gratuitos de asistencia lingüística. Ll[facility] [phone].    We comply with applicable federal civil rights laws and [place] laws. We do not discriminate on the basis of race, color, national origin, age, disability, sex, sexual orientation, or gender identity.            Thank you!     Thank you for choosing [facility]  for your care. Our goal is always to provide you with excellent care. Hearing back from our patients is one way we can continue to improve our services. Please take a few minutes to complete the written survey that you may receive in the mail after your visit with us. Thank you!             Your Updated Medication List - Protect others around you: Learn how to safely use, store and throw away your medicines at www.disposemymeds.org.          This list is accurate as of 2/26/18 11:41 AM.  Always use your most recent med list.                   Brand Name Dispense Instructions for use Diagnosis    acetaminophen-codeine 300-30 MG per tablet    TYLENOL #3    30 tablet    TAKE ONE TABLET BY MOUTH TWICE A DAY AS NEEDED    Episodic tension-type headache, not intractable       amitriptyline 25 MG tablet    ELAVIL    90 tablet    Take 1 tablet (25 mg) by mouth At Bedtime    Insomnia, unspecified type, Depressive disorder       amLODIPine 5 MG tablet    NORVASC    90  tablet    Take 1 tablet (5 mg) by mouth daily    Hypertension goal BP (blood pressure) < 140/80       aspirin 81 MG tablet     120 tablet    Take 1 tablet (81 mg) by mouth daily        cyanocolbalamin 500 MCG tablet    vitamin  B-12     Take 1 tablet by mouth daily.        diazepam 5 MG tablet    VALIUM    30 tablet    TAKE ONE TABLET BY MOUTH EVERY DAY AS NEEDED ANXIETY    SHAWNEE (generalized anxiety disorder)       escitalopram 10 MG tablet    LEXAPRO    90 tablet    TAKE 1 TABLET (10 MG) BY MOUTH DAILY    SHAWNEE (generalized anxiety disorder)       fluticasone 50 MCG/ACT spray    FLONASE    1 Bottle    Spray 1-2 sprays into both nostrils daily    Chronic vasomotor rhinitis       levothyroxine 75 MCG tablet    SYNTHROID/LEVOTHROID    90 tablet    TAKE 1 TABLET (75 MCG) BY MOUTH DAILY    Hypothyroidism, unspecified type       nadolol 40 MG tablet    CORGARD    90 tablet    TAKE 1 TABLET (40 MG) BY MOUTH DAILY    Hypertension goal BP (blood pressure) < 140/80       polyethylene glycol powder    MIRALAX/GLYCOLAX    527 g    TAKE 17 G BY MOUTH 2     TIMES DAILY    Slow transit constipation       pravastatin 40 MG tablet    PRAVACHOL    90 tablet    TAKE 1 TABLET (40 MG) BY MOUTH DAILY    Hyperlipidemia with target LDL less than 100       tiZANidine 2 MG tablet    ZANAFLEX    30 tablet    Take 1 tablet (2 mg) by mouth 3 times daily as needed    Midline low back pain without sciatica, unspecified chronicity       vitamin D 2000 UNITS tablet     100 tablet    Take 2,000 Units by mouth daily
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
## 2018-02-26 NOTE — NURSING NOTE
"Chief Complaint   Patient presents with     Back Pain       Initial /70  Pulse 63  Temp 98.4  F (36.9  C) (Tympanic)  SpO2 93% Estimated body mass index is 30.03 kg/(m^2) as calculated from the following:    Height as of 9/13/17: 5' 3\" (1.6 m).    Weight as of 2/10/18: 169 lb 8 oz (76.9 kg).  Medication Reconciliation: complete   .Rafat HUERTA    "

## 2018-04-09 ENCOUNTER — RADIANT APPOINTMENT (OUTPATIENT)
Dept: GENERAL RADIOLOGY | Facility: CLINIC | Age: 73
End: 2018-04-09
Attending: INTERNAL MEDICINE
Payer: COMMERCIAL

## 2018-04-09 ENCOUNTER — OFFICE VISIT (OUTPATIENT)
Dept: FAMILY MEDICINE | Facility: CLINIC | Age: 73
End: 2018-04-09
Payer: COMMERCIAL

## 2018-04-09 VITALS
WEIGHT: 172 LBS | RESPIRATION RATE: 20 BRPM | SYSTOLIC BLOOD PRESSURE: 120 MMHG | HEIGHT: 63 IN | HEART RATE: 56 BPM | BODY MASS INDEX: 30.48 KG/M2 | DIASTOLIC BLOOD PRESSURE: 70 MMHG | TEMPERATURE: 98.8 F | OXYGEN SATURATION: 97 %

## 2018-04-09 DIAGNOSIS — G89.29 CHRONIC MIDLINE THORACIC BACK PAIN: Primary | ICD-10-CM

## 2018-04-09 DIAGNOSIS — M85.80 OSTEOPENIA, UNSPECIFIED LOCATION: ICD-10-CM

## 2018-04-09 DIAGNOSIS — G89.29 CHRONIC MIDLINE THORACIC BACK PAIN: ICD-10-CM

## 2018-04-09 DIAGNOSIS — H91.91 HEARING LOSS OF RIGHT EAR, UNSPECIFIED HEARING LOSS TYPE: ICD-10-CM

## 2018-04-09 DIAGNOSIS — M54.6 CHRONIC MIDLINE THORACIC BACK PAIN: Primary | ICD-10-CM

## 2018-04-09 DIAGNOSIS — Z78.0 ASYMPTOMATIC POSTMENOPAUSAL STATUS: ICD-10-CM

## 2018-04-09 DIAGNOSIS — M54.6 CHRONIC MIDLINE THORACIC BACK PAIN: ICD-10-CM

## 2018-04-09 PROCEDURE — 72070 X-RAY EXAM THORAC SPINE 2VWS: CPT | Mod: FY

## 2018-04-09 PROCEDURE — 99213 OFFICE O/P EST LOW 20 MIN: CPT | Performed by: INTERNAL MEDICINE

## 2018-04-09 ASSESSMENT — PAIN SCALES - GENERAL: PAINLEVEL: EXTREME PAIN (8)

## 2018-04-09 NOTE — NURSING NOTE
"Chief Complaint   Patient presents with     Musculoskeletal Problem     Ear Problem       Initial /70 (BP Location: Left arm, Patient Position: Chair, Cuff Size: Adult Large)  Pulse 56  Temp 98.8  F (37.1  C)  Resp 20  Ht 5' 3\" (1.6 m)  Wt 172 lb (78 kg)  SpO2 97%  Breastfeeding? No  BMI 30.47 kg/m2 Estimated body mass index is 30.47 kg/(m^2) as calculated from the following:    Height as of this encounter: 5' 3\" (1.6 m).    Weight as of this encounter: 172 lb (78 kg).  Medication Reconciliation: complete   Rafaela Leahy LPN  "

## 2018-04-09 NOTE — PATIENT INSTRUCTIONS
Let's do this:    See ENT regarding your hearing loss.                          Please do a bone density in the near future.                    Please see me in the office approximately 2 weeks after your bone density.

## 2018-04-09 NOTE — PROGRESS NOTES
SUBJECTIVE:   Sadaf Rubi is a 72 year old female who presents to clinic today for the following health issues:      Medication Followup of mid-low back pain    Taking Medication as prescribed: yes    Side Effects:  None    Medication Helping Symptoms:  NO(Zanaflex not helping)                                      This patient reports an approximately one-year history of lower thoracic midline back pain.             At times this is very severe.  She does not recall an abrupt onset.                        This bothers her with doing any kind of housework, or even walking.               Local heat helps; may take 40 mg ibuprofen hs.                                                                        She also has multiple weeks now of right ear plugging and hearing loss.        Antibiotics were tried and then Dilip-Synephrine spray                       she has observed that her hearing is very poor in that ear.                           .  problem list and histories reviewed & adjusted, as indicated.      Current Outpatient Prescriptions   Medication Sig Dispense Refill     tiZANidine (ZANAFLEX) 2 MG tablet Take 1 tablet (2 mg) by mouth 3 times daily as needed 30 tablet 3     diazepam (VALIUM) 5 MG tablet TAKE ONE TABLET BY MOUTH EVERY DAY AS NEEDED ANXIETY 30 tablet 2     fluticasone (FLONASE) 50 MCG/ACT spray Spray 1-2 sprays into both nostrils daily 1 Bottle 11     acetaminophen-codeine (TYLENOL #3) 300-30 MG per tablet TAKE ONE TABLET BY MOUTH TWICE A DAY AS NEEDED 30 tablet 3     amLODIPine (NORVASC) 5 MG tablet Take 1 tablet (5 mg) by mouth daily 90 tablet 3     amitriptyline (ELAVIL) 25 MG tablet Take 1 tablet (25 mg) by mouth At Bedtime 90 tablet 3     levothyroxine (SYNTHROID/LEVOTHROID) 75 MCG tablet TAKE 1 TABLET (75 MCG) BY MOUTH DAILY 90 tablet 3     nadolol (CORGARD) 40 MG tablet TAKE 1 TABLET (40 MG) BY MOUTH DAILY 90 tablet 3     pravastatin (PRAVACHOL) 40 MG tablet TAKE 1 TABLET (40 MG) BY  "MOUTH DAILY 90 tablet 3     escitalopram (LEXAPRO) 10 MG tablet TAKE 1 TABLET (10 MG) BY MOUTH DAILY (Patient taking differently: TAKing 1/2 tablet every day) 90 tablet 2     polyethylene glycol (MIRALAX/GLYCOLAX) powder TAKE 17 G BY MOUTH 2     TIMES DAILY 527 g 11     Cholecalciferol (VITAMIN D) 2000 UNITS tablet Take 2,000 Units by mouth daily 100 tablet 3     aspirin 81 MG tablet Take 1 tablet (81 mg) by mouth daily 120 tablet      cyanocolbalamin (VITAMIN  B-12) 500 MCG tablet Take 1 tablet by mouth daily.       Allergies   Allergen Reactions     Bees      Ciprofloxacin      Influenza Vaccine Live      Other [Seasonal Allergies]      msg and mushrooms     Penicillins      Wasp Venom Protein Hives     BP Readings from Last 3 Encounters:   02/26/18 122/70   02/10/18 120/80   01/15/18 124/84    Wt Readings from Last 3 Encounters:   02/10/18 169 lb 8 oz (76.9 kg)   01/15/18 172 lb (78 kg)   09/13/17 170 lb (77.1 kg)                    Reviewed and updated as needed this visit by clinical staff       Reviewed and updated as needed this visit by Provider         ROS:  CONSTITUTIONAL:NEGATIVE for fever, chills, change in weight  ENT/MOUTH: NEGATIVE for ear pain right  MUSCULOSKELETAL: NEGATIVE for radicular pain  and she also has chronic low lumbar pain    OBJECTIVE:                                                    /70 (BP Location: Left arm, Patient Position: Chair, Cuff Size: Adult Large)  Pulse 56  Temp 98.8  F (37.1  C)  Resp 20  Ht 5' 3\" (1.6 m)  Wt 172 lb (78 kg)  SpO2 97%  Breastfeeding? No  BMI 30.47 kg/m2  Body mass index is 30.47 kg/(m^2).  GENERAL APPEARANCE: alert  HENT: No erythema right TM, no cerumen in the right ear canal  Musculoskeletal: Mild kyphosis, no tenderness over the thoracic spine  Diagnostic test results:  Recent Results (from the past 24 hour(s))   XR Thoracic Spine 2 Views    Narrative    XR THORACIC SPINE 2 VW 4/9/2018 1:46 PM     HISTORY: ; Chronic midline thoracic back " pain; Chronic midline  thoracic back pain    COMPARISON: None      Impression    IMPRESSION: There is mild thoracic scoliosis, convex right. The  lateral view is limited by motion artifact. No definite fracture is  seen. There are changes of mild multilevel degenerative disc disease.    RIRI FONTENOT MD          ASSESSMENT/PLAN:                                                        ICD-10-CM    1. Chronic midline thoracic back pain M54.6 XR Thoracic Spine 2 Views    G89.29     onset approx 4/17   2. Hearing loss of right ear, unspecified hearing loss type H91.91 OTOLARYNGOLOGY REFERRAL   3. Osteopenia, unspecified location M85.80 DX Hip/Pelvis/Spine   4. Asymptomatic postmenopausal status Z78.0 DX Hip/Pelvis/Spine       Ongoing lower thoracic pain.             Consider PT: need a bone density first (last exam in 2012)  Patient Instructions   Let's do this:    See ENT regarding your hearing loss.                          Please do a bone density in the near future.                    Please see me in the office approximately 2 weeks after your bone density.       Sean Velasquez MD  [facility]

## 2018-04-09 NOTE — MR AVS SNAPSHOT
After Visit Summary   4/9/2018    Sadaf Rubi    MRN: [id number]           Patient Information     Date Of Birth          1945        Visit Information        Provider Department      4/9/2018 1:00 PM Sean Velasquez MD [facility]        Today's Diagnoses     Chronic midline thoracic back pain    -  1    Hearing loss of right ear, unspecified hearing loss type        Osteopenia, unspecified location        Asymptomatic postmenopausal status          Care Instructions    Let's do this:    See ENT regarding your hearing loss.                          Please do a bone density in the near future.                    Please see me in the office approximately 2 weeks after your bone density.           Follow-ups after your visit        Additional Services     OTOLARYNGOLOGY REFERRAL       Your provider has referred you to: [facility]: [facility] [phone]   [email or web address]  [place] [phone]   [email or web address]    Please be aware that coverage of these services is subject to the terms and limitations of your health insurance plan.  Call member services at your health plan with any benefit or coverage questions.      Please bring the following with you to your appointment:    (1) Any X-Rays, CTs or MRIs which have been performed.  Contact the facility where they were done to arrange for  prior to your scheduled appointment.   (2) List of current medications  (3) This referral request   (4) Any documents/labs given to you for this referral                  Your next 10 appointments already scheduled     May 09, 2018  4:30 PM CDT   Return Visit with Sayda Simpson MD   [facility] ([facility])    [address]  [address]  [facility] [address] [phone]              Future  "tests that were ordered for you today     Open Future Orders        Priority Expected Expires Ordered    DX Hip/Pelvis/Spine Routine  2019            Who to contact     If you have questions or need follow up information about today's clinic visit or your schedule please contact [facility] directly at [phone].  Normal or non-critical lab and imaging results will be communicated to you by MyChart, letter or phone within 4 business days after the clinic has received the results. If you do not hear from us within 7 days, please contact the clinic through [employer] or phone. If you have a critical or abnormal lab result, we will notify you by phone as soon as possible.  Submit refill requests through [employer] or call your pharmacy and they will forward the refill request to us. Please allow 3 business days for your refill to be completed.          Additional Information About Your Visit        [employer] Information     [employer] lets you send messages to your doctor, view your test results, renew your prescriptions, schedule appointments and more. To sign up, go to [email or web address] . Click on \"Log in\" on the left side of the screen, which will take you to the Welcome page. Then click on \"Sign up Now\" on the right side of the page.     You will be asked to enter the access code listed below, as well as some personal information. Please follow the directions to create your username and password.     Your access code is: [email or web address]  Expires: 4/15/2018  4:04 PM     Your access code will  in 90 days. If you need help or a new code, please call your [facility] clinic or [phone].        Care EveryWhere ID     This is your Care EveryWhere ID. This could be used by other organizations to access your [facility] medical records  [id number]        Your Vitals Were     Pulse Temperature Respirations Height Pulse Oximetry Breastfeeding?    56 98.8  F (37.1  C) 20 5' 3\" " (1.6 m) 97% No    BMI (Body Mass Index)                   30.47 kg/m2            Blood Pressure from Last 3 Encounters:   04/09/18 120/70   02/26/18 122/70   02/10/18 120/80    Weight from Last 3 Encounters:   04/09/18 172 lb (78 kg)   02/10/18 169 lb 8 oz (76.9 kg)   01/15/18 172 lb (78 kg)              We Performed the Following     OTOLARYNGOLOGY REFERRAL          Today's Medication Changes          These changes are accurate as of 4/9/18  1:48 PM.  If you have any questions, ask your nurse or doctor.               These medicines have changed or have updated prescriptions.        Dose/Directions    escitalopram 10 MG tablet   Commonly known as:  LEXAPRO   This may have changed:  See the new instructions.   Used for:  SHAWNEE (generalized anxiety disorder)        TAKE 1 TABLET (10 MG) BY MOUTH DAILY   Quantity:  90 tablet   Refills:  2         Stop taking these medicines if you haven't already. Please contact your care team if you have questions.     tiZANidine 2 MG tablet   Commonly known as:  ZANAFLEX   Stopped by:  Sean Velasquez MD                    Primary Care Provider Office Phone # Fax #    Sean Velasquez [phone] [address] [facility] [address]        Equal Access to Services     GARRICK MCCULLOUGH AH: Hadii joellen ku hadasho Soomaali, waaxda luqadaha, qaybta kaalmada adeegyada, waxay idiin hayjosen teresita ritchie. So [facility] [phone].    ATENCIÓN: Si habla español, tiene a agrawal disposición servicios gratuitos de asistencia lingüística. Llame al [phone].    We comply with applicable federal civil rights laws and [place] laws. We do not discriminate on the basis of race, color, national origin, age, disability, sex, sexual orientation, or gender identity.            Thank you!     Thank you for choosing [facility]  for your care. Our goal is always to provide you with excellent care. Hearing back from our patients is one way we can  continue to improve our services. Please take a few minutes to complete the written survey that you may receive in the mail after your visit with us. Thank you!             Your Updated Medication List - Protect others around you: Learn how to safely use, store and throw away your medicines at www.disposemymeds.org.          This list is accurate as of 4/9/18  1:48 PM.  Always use your most recent med list.                   Brand Name Dispense Instructions for use Diagnosis    acetaminophen-codeine 300-30 MG per tablet    TYLENOL #3    30 tablet    TAKE ONE TABLET BY MOUTH TWICE A DAY AS NEEDED    Episodic tension-type headache, not intractable       amitriptyline 25 MG tablet    ELAVIL    90 tablet    Take 1 tablet (25 mg) by mouth At Bedtime    Insomnia, unspecified type, Depressive disorder       amLODIPine 5 MG tablet    NORVASC    90 tablet    Take 1 tablet (5 mg) by mouth daily    Hypertension goal BP (blood pressure) < 140/80       aspirin 81 MG tablet     120 tablet    Take 1 tablet (81 mg) by mouth daily        cyanocolbalamin 500 MCG tablet    vitamin  B-12     Take 1 tablet by mouth daily.        diazepam 5 MG tablet    VALIUM    30 tablet    TAKE ONE TABLET BY MOUTH EVERY DAY AS NEEDED ANXIETY    SHAWNEE (generalized anxiety disorder)       escitalopram 10 MG tablet    LEXAPRO    90 tablet    TAKE 1 TABLET (10 MG) BY MOUTH DAILY    SHAWNEE (generalized anxiety disorder)       fluticasone 50 MCG/ACT spray    FLONASE    1 Bottle    Spray 1-2 sprays into both nostrils daily    Chronic vasomotor rhinitis       levothyroxine 75 MCG tablet    SYNTHROID/LEVOTHROID    90 tablet    TAKE 1 TABLET (75 MCG) BY MOUTH DAILY    Hypothyroidism, unspecified type       nadolol 40 MG tablet    CORGARD    90 tablet    TAKE 1 TABLET (40 MG) BY MOUTH DAILY    Hypertension goal BP (blood pressure) < 140/80       polyethylene glycol powder    MIRALAX/GLYCOLAX    527 g    TAKE 17 G BY MOUTH 2     TIMES DAILY    Slow transit  constipation       pravastatin 40 MG tablet    PRAVACHOL    90 tablet    TAKE 1 TABLET (40 MG) BY MOUTH DAILY    Hyperlipidemia with target LDL less than 100       vitamin D 2000 UNITS tablet     100 tablet    Take 2,000 Units by mouth daily

## 2018-04-23 ENCOUNTER — RADIANT APPOINTMENT (OUTPATIENT)
Dept: BONE DENSITY | Facility: CLINIC | Age: 73
End: 2018-04-23
Attending: INTERNAL MEDICINE
Payer: COMMERCIAL

## 2018-04-23 DIAGNOSIS — M85.80 OSTEOPENIA, UNSPECIFIED LOCATION: ICD-10-CM

## 2018-04-23 DIAGNOSIS — Z78.0 ASYMPTOMATIC POSTMENOPAUSAL STATUS: ICD-10-CM

## 2018-04-23 PROCEDURE — 77080 DXA BONE DENSITY AXIAL: CPT | Performed by: FAMILY MEDICINE

## 2018-05-02 ENCOUNTER — OFFICE VISIT (OUTPATIENT)
Dept: FAMILY MEDICINE | Facility: CLINIC | Age: 73
End: 2018-05-02
Payer: COMMERCIAL

## 2018-05-02 ENCOUNTER — RADIANT APPOINTMENT (OUTPATIENT)
Dept: GENERAL RADIOLOGY | Facility: CLINIC | Age: 73
End: 2018-05-02
Attending: INTERNAL MEDICINE
Payer: COMMERCIAL

## 2018-05-02 VITALS
HEIGHT: 63 IN | BODY MASS INDEX: 29.95 KG/M2 | OXYGEN SATURATION: 96 % | TEMPERATURE: 97.8 F | WEIGHT: 169 LBS | HEART RATE: 98 BPM | RESPIRATION RATE: 20 BRPM | SYSTOLIC BLOOD PRESSURE: 120 MMHG | DIASTOLIC BLOOD PRESSURE: 70 MMHG

## 2018-05-02 DIAGNOSIS — D50.8 OTHER IRON DEFICIENCY ANEMIA: ICD-10-CM

## 2018-05-02 DIAGNOSIS — M54.50 CHRONIC MIDLINE LOW BACK PAIN WITHOUT SCIATICA: ICD-10-CM

## 2018-05-02 DIAGNOSIS — G89.29 CHRONIC UPPER BACK PAIN: ICD-10-CM

## 2018-05-02 DIAGNOSIS — M85.80 OSTEOPENIA, UNSPECIFIED LOCATION: Primary | ICD-10-CM

## 2018-05-02 DIAGNOSIS — G89.29 CHRONIC PAIN OF LEFT ANKLE: ICD-10-CM

## 2018-05-02 DIAGNOSIS — M54.9 CHRONIC UPPER BACK PAIN: ICD-10-CM

## 2018-05-02 DIAGNOSIS — G89.29 CHRONIC MIDLINE LOW BACK PAIN WITHOUT SCIATICA: ICD-10-CM

## 2018-05-02 DIAGNOSIS — E78.5 HYPERLIPIDEMIA WITH TARGET LDL LESS THAN 100: ICD-10-CM

## 2018-05-02 DIAGNOSIS — M25.572 CHRONIC PAIN OF LEFT ANKLE: ICD-10-CM

## 2018-05-02 DIAGNOSIS — R13.10 DYSPHAGIA, UNSPECIFIED TYPE: ICD-10-CM

## 2018-05-02 DIAGNOSIS — E03.9 HYPOTHYROIDISM, UNSPECIFIED TYPE: ICD-10-CM

## 2018-05-02 LAB
BASOPHILS # BLD AUTO: 0 10E9/L (ref 0–0.2)
BASOPHILS NFR BLD AUTO: 0.3 %
DIFFERENTIAL METHOD BLD: NORMAL
EOSINOPHIL # BLD AUTO: 0.2 10E9/L (ref 0–0.7)
EOSINOPHIL NFR BLD AUTO: 2.9 %
ERYTHROCYTE [DISTWIDTH] IN BLOOD BY AUTOMATED COUNT: 13.1 % (ref 10–15)
HCT VFR BLD AUTO: 42.7 % (ref 35–47)
HGB BLD-MCNC: 14.5 G/DL (ref 11.7–15.7)
LYMPHOCYTES # BLD AUTO: 2 10E9/L (ref 0.8–5.3)
LYMPHOCYTES NFR BLD AUTO: 32.6 %
MCH RBC QN AUTO: 31.1 PG (ref 26.5–33)
MCHC RBC AUTO-ENTMCNC: 34 G/DL (ref 31.5–36.5)
MCV RBC AUTO: 92 FL (ref 78–100)
MONOCYTES # BLD AUTO: 0.5 10E9/L (ref 0–1.3)
MONOCYTES NFR BLD AUTO: 8.3 %
NEUTROPHILS # BLD AUTO: 3.5 10E9/L (ref 1.6–8.3)
NEUTROPHILS NFR BLD AUTO: 55.9 %
PLATELET # BLD AUTO: 186 10E9/L (ref 150–450)
RBC # BLD AUTO: 4.66 10E12/L (ref 3.8–5.2)
WBC # BLD AUTO: 6.2 10E9/L (ref 4–11)

## 2018-05-02 PROCEDURE — 80053 COMPREHEN METABOLIC PANEL: CPT | Performed by: INTERNAL MEDICINE

## 2018-05-02 PROCEDURE — 73610 X-RAY EXAM OF ANKLE: CPT | Mod: LT

## 2018-05-02 PROCEDURE — 99214 OFFICE O/P EST MOD 30 MIN: CPT | Performed by: INTERNAL MEDICINE

## 2018-05-02 PROCEDURE — 84443 ASSAY THYROID STIM HORMONE: CPT | Performed by: INTERNAL MEDICINE

## 2018-05-02 PROCEDURE — 85025 COMPLETE CBC W/AUTO DIFF WBC: CPT | Performed by: INTERNAL MEDICINE

## 2018-05-02 PROCEDURE — 36415 COLL VENOUS BLD VENIPUNCTURE: CPT | Performed by: INTERNAL MEDICINE

## 2018-05-02 PROCEDURE — 82728 ASSAY OF FERRITIN: CPT | Performed by: INTERNAL MEDICINE

## 2018-05-02 PROCEDURE — 83721 ASSAY OF BLOOD LIPOPROTEIN: CPT | Performed by: INTERNAL MEDICINE

## 2018-05-02 NOTE — LETTER
May 3, 2018      Sadaf MELQUIADES Greyson  [address]  [facility] [address]        Dear ,    We are writing to inform you of your test results.                Your lab results are good, including the kidney,liver,bone marrow,glucose,cholesterol,and thyroid results.      Keep taking the same medications.                          The ankle x-ray shows mild arthritis.          Wearing good supportive shoes more of the time may help.     Resulted Orders   Comprehensive metabolic panel (BMP + Alb, Alk Phos, ALT, AST, Total. Bili, TP)   Result Value Ref Range    Sodium 141 133 - 144 mmol/L    Potassium 4.4 3.4 - 5.3 mmol/L    Chloride 108 94 - 109 mmol/L    Carbon Dioxide 25 20 - 32 mmol/L    Anion Gap 8 3 - 14 mmol/L    Glucose 86 70 - 99 mg/dL      Comment:      Non Fasting    Urea Nitrogen 13 7 - 30 mg/dL    Creatinine 0.66 0.52 - 1.04 mg/dL    GFR Estimate 88 >60 mL/min/1.7m2      Comment:      Non  GFR Calc    GFR Estimate If Black >90 >60 mL/min/1.7m2      Comment:       GFR Calc    Calcium 9.7 8.5 - 10.1 mg/dL    Bilirubin Total 0.3 0.2 - 1.3 mg/dL    Albumin 4.1 3.4 - 5.0 g/dL    Protein Total 7.0 6.8 - 8.8 g/dL    Alkaline Phosphatase 64 40 - 150 U/L    ALT 24 0 - 50 U/L    AST 17 0 - 45 U/L   Ferritin   Result Value Ref Range    Ferritin 34 8 - 252 ng/mL   CBC with platelets and differential   Result Value Ref Range    WBC 6.2 4.0 - 11.0 10e9/L    RBC Count 4.66 3.8 - 5.2 10e12/L    Hemoglobin 14.5 11.7 - 15.7 g/dL    Hematocrit 42.7 35.0 - 47.0 %    MCV 92 78 - 100 fl    MCH 31.1 26.5 - 33.0 pg    MCHC 34.0 31.5 - 36.5 g/dL    RDW 13.1 10.0 - 15.0 %    Platelet Count 186 150 - 450 10e9/L    Diff Method Automated Method     % Neutrophils 55.9 %    % Lymphocytes 32.6 %    % Monocytes 8.3 %    % Eosinophils 2.9 %    % Basophils 0.3 %    Absolute Neutrophil 3.5 1.6 - 8.3 10e9/L    Absolute Lymphocytes 2.0 0.8 - 5.3 10e9/L    Absolute Monocytes 0.5 0.0 - 1.3 10e9/L     Absolute Eosinophils 0.2 0.0 - 0.7 10e9/L    Absolute Basophils 0.0 0.0 - 0.2 10e9/L   TSH with free T4 reflex   Result Value Ref Range    TSH 0.71 0.40 - 4.00 mU/L   LDL cholesterol direct   Result Value Ref Range    LDL Cholesterol Direct 104 (H) <100 mg/dL      Comment:      Above desirable:  100-129 mg/dl  Borderline High:  130-159 mg/dL  High:             160-189 mg/dL  Very high:       >189 mg/dl         If you have any questions or concerns, please call the clinic at the number listed above.       Sincerely,        Sean Velasquez MD

## 2018-05-02 NOTE — PROGRESS NOTES
SUBJECTIVE:   Sadaf Rubi is a 72 year old female who presents to clinic today for the following health issues:      Discuss recent Dexa Scan results                  May need some dental work soon.     May need an extraction.         Chronic dysphagia.   This sx has not changed for many years.             These issues were d/w pt wrt Rx for osteoporosis.                                                              Due for labs.                      C/o sharp intermittent L ankle pain. No hx of injuries.  Often walks without shoes in the house.                Ongoing back pain.                                             Problem list and histories reviewed & adjusted, as indicated.      Current Outpatient Prescriptions   Medication Sig Dispense Refill     acetaminophen-codeine (TYLENOL #3) 300-30 MG per tablet TAKE ONE TABLET BY MOUTH TWICE A DAY AS NEEDED 30 tablet 3     amitriptyline (ELAVIL) 25 MG tablet Take 1 tablet (25 mg) by mouth At Bedtime 90 tablet 3     amLODIPine (NORVASC) 5 MG tablet Take 1 tablet (5 mg) by mouth daily 90 tablet 3     aspirin 81 MG tablet Take 1 tablet (81 mg) by mouth daily 120 tablet      Cholecalciferol (VITAMIN D) 2000 UNITS tablet Take 2,000 Units by mouth daily 100 tablet 3     cyanocolbalamin (VITAMIN  B-12) 500 MCG tablet Take 1 tablet by mouth daily.       diazepam (VALIUM) 5 MG tablet TAKE ONE TABLET BY MOUTH EVERY DAY AS NEEDED ANXIETY 30 tablet 2     escitalopram (LEXAPRO) 10 MG tablet TAKE 1 TABLET (10 MG) BY MOUTH DAILY (Patient taking differently: TAKing 1/2 tablet every day) 90 tablet 2     fluticasone (FLONASE) 50 MCG/ACT spray Spray 1-2 sprays into both nostrils daily 1 Bottle 11     levothyroxine (SYNTHROID/LEVOTHROID) 75 MCG tablet TAKE 1 TABLET (75 MCG) BY MOUTH DAILY 90 tablet 3     nadolol (CORGARD) 40 MG tablet TAKE 1 TABLET (40 MG) BY MOUTH DAILY 90 tablet 3     polyethylene glycol (MIRALAX/GLYCOLAX) powder TAKE 17 G BY MOUTH 2     TIMES DAILY 527 g 11      pravastatin (PRAVACHOL) 40 MG tablet TAKE 1 TABLET (40 MG) BY MOUTH DAILY 90 tablet 3     BP Readings from Last 3 Encounters:   18 120/70   18 120/70   18 122/70    Wt Readings from Last 3 Encounters:   18 169 lb (76.7 kg)   18 172 lb (78 kg)   02/10/18 169 lb 8 oz (76.9 kg)            Recent Results (from the past 744 hour(s))   XR Thoracic Spine 2 Views    Narrative    XR THORACIC SPINE 2 VW 2018 1:46 PM     HISTORY: ; Chronic midline thoracic back pain; Chronic midline  thoracic back pain    COMPARISON: None      Impression    IMPRESSION: There is mild thoracic scoliosis, convex right. The  lateral view is limited by motion artifact. No definite fracture is  seen. There are changes of mild multilevel degenerative disc disease.    RIRI FONTENOT MD   DX Hip/Pelvis/Spine    Narrative    BONE DENSITOMETRY  [facility] [address]  [address] [place] [address]  2018      PATIENT: Sadaf Rubi  CHART: [id number]   :  1945  AGE:  72 year old  SEX:  female   REFERRING PHYSICIAN:  Dr Sean Velasquez         PROCEDURE:  Bone density scanning was performed using DXA technology of   the lumbar spine and hip.  Scanning was performed on a HoloVserv scanner.    Reporting is completed in the form of a T-score.  The T-score represents   the standard deviation from peak bone mass based on a young healthy adult.     REFERENCE T-SCORES:       Normal                -1.0 and greater                                 Osteopenia         Between -1.0 and -2.5                                             Osteoporosis     -2.5 and less                                       RISK FACTORS: Early surgical menopause, fracture of left fibula age 54    (details not otherwise noted).    CURRENT TREATMENT:  Vitamin D     FINDINGS:                        Lumbar Spine (L1-L4) T-score:  -1.1                        Left Femoral Neck T-score:  -2.4                         Right Femoral Neck T-score:  -1.7                              Lumbar (L1-L4) BMD: 0.927             Previous:   1.012                        Total Hip Mean BMD: 0.986             Previous:   0.955 Left only on 12/7/2012     Comparison is made to another DXA performed on the same Hologic machine on   12/7/2012.       IMPRESSION  Osteopenia (low bone mass)  Degenerative changes of the spine  Recommendations include ensuring adequate daily Calcium and Vitamin D   intake  Follow up scan can be considered in three years.    Patient had a study performed previously, however the prior images are not   available or of too poor quality to compare to the current study.    Current NOF guidelines recommend treatment for patients with the   following:  - Prior hip or vertebral fracture  - T-score -2.5 or below  - A 10 year risk of any major osteoporotic fracture >20% or 10 year risk   of hip fracture >3%, as calculated using the FRAX calculator   (www.shef.ac.uk/FRAX).      This patient's risks with the use of FRAX (based on available information)   are 14 % for major osteoporotic fracture and 3.6 % for hip fracture   without prior fracture in the model.     This patient's risks with the use of FRAX (based on available information)   are 21 % for major osteoporotic fracture and 5.3 % for hip fracture with   prior fracture in the model.     Based on these guidelines, treatment (in addition to calcium and vitamin   D) is recommended for this patient, after ruling out other causes of   osteoporosis/low bone density.  While this is meant as an aid to clinical   decision-making, clinical judgment must still be used.       ADRIANA TORRES M.D.                   Reviewed and updated as needed this visit by clinical staff       Reviewed and updated as needed this visit by Provider         ROS:see above plusCONSTITUTIONAL:NEGATIVE for fever, chills, change in weight  RESP:NEGATIVE for significant cough or SOB  CV: NEGATIVE for chest pain,  "palpitations or peripheral edema  GI: NEGATIVE for hematochezia, melena, nausea and poor appetite    OBJECTIVE:                                                    /70 (BP Location: Right arm, Patient Position: Chair, Cuff Size: Adult Large)  Pulse 98  Temp 97.8  F (36.6  C)  Resp 20  Ht 5' 3\" (1.6 m)  Wt 169 lb (76.7 kg)  SpO2 96%  Breastfeeding? No  BMI 29.94 kg/m2  Body mass index is 29.94 kg/(m^2).  GENERAL APPEARANCE: alert and no distress  RESP: lungs clear to auscultation - no rales, rhonchi or wheezes  CV: regular rates and rhythm, normal S1 S2, no S3 or S4 and no murmur, click or rub  MS: normal range of motion L ankle    Diagnostic test results:  Xray - L ankle; pending     ASSESSMENT/PLAN:                                                        ICD-10-CM    1. Osteopenia, unspecified location M85.80    2. Dysphagia, unspecified type R13.10     chronic;no change; pills and food   3. Chronic pain of left ankle M25.572 XR Ankle Left G/E 3 Views    G89.29    4. Hyperlipidemia with target LDL less than 100 E78.5 Comprehensive metabolic panel (BMP + Alb, Alk Phos, ALT, AST, Total. Bili, TP)     LDL cholesterol direct   5. Hypothyroidism, unspecified type E03.9 TSH with free T4 reflex   6. Chronic upper back pain M54.9     G89.29    7. Chronic midline low back pain without sciatica M54.5     G89.29     per pt,intermittent;dating back many years.     has seen a spine surgeon;has \"bulging discs\"   8. Other iron deficiency anemia D50.8 Ferritin     CBC with platelets and differential       Summary and implications:  multiple issues d/w pt.                                     When she is finished with dental work then plan Reclast or Prolia.     Chronic duration of stable dysphagia suggests not a major underlying problem; dysmotility?                                         Check labs and adjust medications as indicated.  Await Radiology report on ankle xray; wear shoes more often.             Patient " Instructions   I will let you know your lab and xray  results.   Let me know when you are finished with your dental work.                                Sean Velasquez MD  [facility] [address]   Results for orders placed or performed in visit on 05/02/18   Comprehensive metabolic panel (BMP + Alb, Alk Phos, ALT, AST, Total. Bili, TP)   Result Value Ref Range    Sodium 141 133 - 144 mmol/L    Potassium 4.4 3.4 - 5.3 mmol/L    Chloride 108 94 - 109 mmol/L    Carbon Dioxide 25 20 - 32 mmol/L    Anion Gap 8 3 - 14 mmol/L    Glucose 86 70 - 99 mg/dL    Urea Nitrogen 13 7 - 30 mg/dL    Creatinine 0.66 0.52 - 1.04 mg/dL    GFR Estimate 88 >60 mL/min/1.7m2    GFR Estimate If Black >90 >60 mL/min/1.7m2    Calcium 9.7 8.5 - 10.1 mg/dL    Bilirubin Total 0.3 0.2 - 1.3 mg/dL    Albumin 4.1 3.4 - 5.0 g/dL    Protein Total 7.0 6.8 - 8.8 g/dL    Alkaline Phosphatase 64 40 - 150 U/L    ALT 24 0 - 50 U/L    AST 17 0 - 45 U/L   Ferritin   Result Value Ref Range    Ferritin 34 8 - 252 ng/mL   CBC with platelets and differential   Result Value Ref Range    WBC 6.2 4.0 - 11.0 10e9/L    RBC Count 4.66 3.8 - 5.2 10e12/L    Hemoglobin 14.5 11.7 - 15.7 g/dL    Hematocrit 42.7 35.0 - 47.0 %    MCV 92 78 - 100 fl    MCH 31.1 26.5 - 33.0 pg    MCHC 34.0 31.5 - 36.5 g/dL    RDW 13.1 10.0 - 15.0 %    Platelet Count 186 150 - 450 10e9/L    Diff Method Automated Method     % Neutrophils 55.9 %    % Lymphocytes 32.6 %    % Monocytes 8.3 %    % Eosinophils 2.9 %    % Basophils 0.3 %    Absolute Neutrophil 3.5 1.6 - 8.3 10e9/L    Absolute Lymphocytes 2.0 0.8 - 5.3 10e9/L    Absolute Monocytes 0.5 0.0 - 1.3 10e9/L    Absolute Eosinophils 0.2 0.0 - 0.7 10e9/L    Absolute Basophils 0.0 0.0 - 0.2 10e9/L   TSH with free T4 reflex   Result Value Ref Range    TSH 0.71 0.40 - 4.00 mU/L   LDL cholesterol direct   Result Value Ref Range    LDL Cholesterol Direct 104 (H) <100 mg/dL     Recent Results (from the past 48 hour(s))   XR Ankle  Left G/E 3 Views    Narrative    XR ANKLE LT G/E 3 VW 5/2/2018 11:42 AM    HISTORY: Chronic left ankle pain.    COMPARISON: None.    FINDINGS: The ankle mortise joint is congruent. No fracture or  malalignment. Mild tibiotalar degenerative change. Osseous structures  are otherwise within normal limits.      Impression    IMPRESSION: Mild tibiotalar degenerative change.    LAURI REYES MD       Letter sent.                             Your lab results are good, including the kidney,liver,bone marrow,glucose,cholesterol,and thyroid results.      Keep taking the same medications.  The ankle x-ray shows mild arthritis.          Wearing good supportive shoes more of the time may help.

## 2018-05-02 NOTE — MR AVS SNAPSHOT
After Visit Summary   5/2/2018    Sadaf Rubi    MRN: [id number]           Patient Information     Date Of Birth          1945        Visit Information        Provider Department      5/2/2018 11:00 AM Sean Velasquez MD [facility]        Today's Diagnoses     Osteopenia, unspecified location    -  1    Dysphagia, unspecified type        Chronic pain of left ankle        Hyperlipidemia with target LDL less than 100        Hypothyroidism, unspecified type        Chronic upper back pain        Chronic midline low back pain without sciatica        Other iron deficiency anemia          Care Instructions    I will let you know your lab and xray  results.   Let me know when you are finished with your dental work.                                    Follow-ups after your visit        Your next 10 appointments already scheduled     May 09, 2018  4:30 PM CDT   Return Visit with Sayda Simpson MD   [facility] ([facility])    [address]  [address]  [place] [address] [phone]              Who to contact     If you have questions or need follow up information about today's clinic visit or your schedule please contact [facility] directly at [phone].  Normal or non-critical lab and imaging results will be communicated to you by MyChart, letter or phone within 4 business days after the clinic has received the results. If you do not hear from us within 7 days, please contact the clinic through MyChart or phone. If you have a critical or abnormal lab result, we will notify you by phone as soon as possible.  Submit refill requests through [employer] or call your pharmacy and they will forward the refill request to us. Please allow 3 business days for your refill to be completed.          Additional Information About Your Visit        MyChart Information     [employer] lets  "you send messages to your doctor, view your test results, renew your prescriptions, schedule appointments and more. To sign up, go to [email or web address] . Click on \"Log in\" on the left side of the screen, which will take you to the Welcome page. Then click on \"Sign up Now\" on the right side of the page.     You will be asked to enter the access code listed below, as well as some personal information. Please follow the directions to create your username and password.     Your access code is: [email or web address]  Expires: 2018 11:54 AM     Your access code will  in 90 days. If you need help or a new code, please call your [facility] clinic or [phone].        Care EveryWhere ID     This is your Care EveryWhere ID. This could be used by other organizations to access your [facility] medical records  [id number]        Your Vitals Were     Pulse Temperature Respirations Height Pulse Oximetry Breastfeeding?    98 97.8  F (36.6  C) 20 5' 3\" (1.6 m) 96% No    BMI (Body Mass Index)                   29.94 kg/m2            Blood Pressure from Last 3 Encounters:   18 120/70   18 120/70   18 122/70    Weight from Last 3 Encounters:   18 169 lb (76.7 kg)   18 172 lb (78 kg)   02/10/18 169 lb 8 oz (76.9 kg)              We Performed the Following     CBC with platelets and differential     Comprehensive metabolic panel (BMP + Alb, Alk Phos, ALT, AST, Total. Bili, TP)     Ferritin     LDL cholesterol direct     TSH with free T4 reflex          Today's Medication Changes          These changes are accurate as of 18 11:54 AM.  If you have any questions, ask your nurse or doctor.               These medicines have changed or have updated prescriptions.        Dose/Directions    escitalopram 10 MG tablet   Commonly known as:  LEXAPRO   This may have changed:  See the new instructions.   Used for:  SHAWNEE (generalized anxiety disorder)        TAKE 1 TABLET (10 MG) BY MOUTH DAILY   Quantity:  90 " tablet   Refills:  2                Primary Care Provider Office Phone # Fax #    Sean Velasquez [phone]       [address] [facility] [address]        Equal Access to Services     JESSICA MCCULLOUGH : Hadii aad ku hadmargareto Soomaali, waaxda luqadaha, qaybta kaalmada adeegyada, isha gomezn adediann monte laKelliedebbie ritchie. So [facility] [phone].    ATENCIÓN: Si habla español, tiene a agrawal disposición servicios gratuitos de asistencia lingüística. Llame al [phone].    We comply with applicable federal civil rights laws and [place] laws. We do not discriminate on the basis of race, color, national origin, age, disability, sex, sexual orientation, or gender identity.            Thank you!     Thank you for choosing [facility]  for your care. Our goal is always to provide you with excellent care. Hearing back from our patients is one way we can continue to improve our services. Please take a few minutes to complete the written survey that you may receive in the mail after your visit with us. Thank you!             Your Updated Medication List - Protect others around you: Learn how to safely use, store and throw away your medicines at www.disposemymeds.org.          This list is accurate as of 5/2/18 11:54 AM.  Always use your most recent med list.                   Brand Name Dispense Instructions for use Diagnosis    acetaminophen-codeine 300-30 MG per tablet    TYLENOL #3    30 tablet    TAKE ONE TABLET BY MOUTH TWICE A DAY AS NEEDED    Episodic tension-type headache, not intractable       amitriptyline 25 MG tablet    ELAVIL    90 tablet    Take 1 tablet (25 mg) by mouth At Bedtime    Insomnia, unspecified type, Depressive disorder       amLODIPine 5 MG tablet    NORVASC    90 tablet    Take 1 tablet (5 mg) by mouth daily    Hypertension goal BP (blood pressure) < 140/80       aspirin 81 MG tablet     120 tablet    Take 1 tablet (81 mg) by mouth daily         cyanocolbalamin 500 MCG tablet    vitamin  B-12     Take 1 tablet by mouth daily.        diazepam 5 MG tablet    VALIUM    30 tablet    TAKE ONE TABLET BY MOUTH EVERY DAY AS NEEDED ANXIETY    SHAWNEE (generalized anxiety disorder)       escitalopram 10 MG tablet    LEXAPRO    90 tablet    TAKE 1 TABLET (10 MG) BY MOUTH DAILY    SHAWNEE (generalized anxiety disorder)       fluticasone 50 MCG/ACT spray    FLONASE    1 Bottle    Spray 1-2 sprays into both nostrils daily    Chronic vasomotor rhinitis       levothyroxine 75 MCG tablet    SYNTHROID/LEVOTHROID    90 tablet    TAKE 1 TABLET (75 MCG) BY MOUTH DAILY    Hypothyroidism, unspecified type       nadolol 40 MG tablet    CORGARD    90 tablet    TAKE 1 TABLET (40 MG) BY MOUTH DAILY    Hypertension goal BP (blood pressure) < 140/80       polyethylene glycol powder    MIRALAX/GLYCOLAX    527 g    TAKE 17 G BY MOUTH 2     TIMES DAILY    Slow transit constipation       pravastatin 40 MG tablet    PRAVACHOL    90 tablet    TAKE 1 TABLET (40 MG) BY MOUTH DAILY    Hyperlipidemia with target LDL less than 100       vitamin D 2000 units tablet     100 tablet    Take 2,000 Units by mouth daily

## 2018-05-02 NOTE — NURSING NOTE
"Chief Complaint   Patient presents with     Results     recent DEXA       Initial /70 (BP Location: Right arm, Patient Position: Chair, Cuff Size: Adult Large)  Pulse 98  Temp 97.8  F (36.6  C)  Resp 20  Ht 5' 3\" (1.6 m)  Wt 169 lb (76.7 kg)  SpO2 96%  Breastfeeding? No  BMI 29.94 kg/m2 Estimated body mass index is 29.94 kg/(m^2) as calculated from the following:    Height as of this encounter: 5' 3\" (1.6 m).    Weight as of this encounter: 169 lb (76.7 kg).  Medication Reconciliation: complete   Rafaela Leahy LPN  "

## 2018-05-03 LAB
ALBUMIN SERPL-MCNC: 4.1 G/DL (ref 3.4–5)
ALP SERPL-CCNC: 64 U/L (ref 40–150)
ALT SERPL W P-5'-P-CCNC: 24 U/L (ref 0–50)
ANION GAP SERPL CALCULATED.3IONS-SCNC: 8 MMOL/L (ref 3–14)
AST SERPL W P-5'-P-CCNC: 17 U/L (ref 0–45)
BILIRUB SERPL-MCNC: 0.3 MG/DL (ref 0.2–1.3)
BUN SERPL-MCNC: 13 MG/DL (ref 7–30)
CALCIUM SERPL-MCNC: 9.7 MG/DL (ref 8.5–10.1)
CHLORIDE SERPL-SCNC: 108 MMOL/L (ref 94–109)
CO2 SERPL-SCNC: 25 MMOL/L (ref 20–32)
CREAT SERPL-MCNC: 0.66 MG/DL (ref 0.52–1.04)
FERRITIN SERPL-MCNC: 34 NG/ML (ref 8–252)
GFR SERPL CREATININE-BSD FRML MDRD: 88 ML/MIN/1.7M2
GLUCOSE SERPL-MCNC: 86 MG/DL (ref 70–99)
LDLC SERPL DIRECT ASSAY-MCNC: 104 MG/DL
POTASSIUM SERPL-SCNC: 4.4 MMOL/L (ref 3.4–5.3)
PROT SERPL-MCNC: 7 G/DL (ref 6.8–8.8)
SODIUM SERPL-SCNC: 141 MMOL/L (ref 133–144)
TSH SERPL DL<=0.005 MIU/L-ACNC: 0.71 MU/L (ref 0.4–4)

## 2018-05-04 DIAGNOSIS — K59.01 SLOW TRANSIT CONSTIPATION: ICD-10-CM

## 2018-05-04 RX ORDER — POLYETHYLENE GLYCOL 3350 17 G/17G
POWDER, FOR SOLUTION ORAL
Qty: 527 G | Refills: 11 | Status: SHIPPED | OUTPATIENT
Start: 2018-05-04 | End: 2019-01-01

## 2018-05-04 NOTE — TELEPHONE ENCOUNTER
"Requested Prescriptions   Pending Prescriptions Disp Refills     polyethylene glycol (MIRALAX/GLYCOLAX) powder 527 g 11    Laxatives Protocol Passed     5/4/2018 11:34 AM                              Passed - Patient is age 6 or older       Passed - Recent (12 mo) or future (30 days) visit within the authorizing provider's specialty    Patient had office visit in the last 12 months or has a visit in the next 30 days with authorizing provider or within the authorizing provider's specialty.  See \"Patient Info\" tab in inbasket, or \"Choose Columns\" in Meds & Orders section of the refill encounter.            Last Written Prescription Date:  3/6/17  Last Fill Quantity: 527 g,  # refills: 11   Last office visit: 5/2/2018 with prescribing provider:     Future Office Visit:      "

## 2018-05-04 NOTE — TELEPHONE ENCOUNTER
Prescription approved per [facility], [facility] or [facility] refill protocol.  Juanita Toro RN - Triage  [facility]

## 2018-05-10 DIAGNOSIS — I10 HYPERTENSION GOAL BP (BLOOD PRESSURE) < 140/80: ICD-10-CM

## 2018-05-10 RX ORDER — ATENOLOL 50 MG/1
50 TABLET ORAL DAILY
Qty: 90 TABLET | Refills: 4 | Status: SHIPPED | OUTPATIENT
Start: 2018-05-10 | End: 2018-10-22

## 2018-05-10 RX ORDER — NADOLOL 40 MG/1
TABLET ORAL
Qty: 90 TABLET | Refills: 3 | OUTPATIENT
Start: 2018-05-10

## 2018-05-10 NOTE — TELEPHONE ENCOUNTER
"Requested Prescriptions   Pending Prescriptions Disp Refills     CORGARD 40 MG tablet [Pharmacy Med Name: CORGARD 40MG TAB 40 TAB] 90 tablet 3     Sig: TAKE 1 TABLET (40 MG) BY MOUTH DAILY    Beta-Blockers Protocol Passed    5/10/2018 11:42 AM       Passed - Blood pressure under 140/90 in past 12 months    BP Readings from Last 3 Encounters:   05/02/18 120/70   04/09/18 120/70   02/26/18 122/70                Passed - Patient is age 6 or older       Passed - Recent (12 mo) or future (30 days) visit within the authorizing provider's specialty    Patient had office visit in the last 12 months or has a visit in the next 30 days with authorizing provider or within the authorizing provider's specialty.  See \"Patient Info\" tab in inbasket, or \"Choose Columns\" in Meds & Orders section of the refill encounter.              "

## 2018-05-10 NOTE — TELEPHONE ENCOUNTER
Per pharmacy note on script:  MEDICATION UNAVAILABLE, IS THERE AN EQUIVALENT MED THAT CAN BE PRESCRIBED?

## 2018-05-10 NOTE — TELEPHONE ENCOUNTER
According to this note, nadolol is no longer available.         Assuming this is true, I have sent an Rx to her pharmacy for another beta blocker, namely atenolol 50 mg per day.       Please let them know.

## 2018-05-14 ENCOUNTER — OFFICE VISIT (OUTPATIENT)
Dept: FAMILY MEDICINE | Facility: CLINIC | Age: 73
End: 2018-05-14
Payer: COMMERCIAL

## 2018-05-14 VITALS
DIASTOLIC BLOOD PRESSURE: 68 MMHG | TEMPERATURE: 97.5 F | OXYGEN SATURATION: 97 % | RESPIRATION RATE: 20 BRPM | HEIGHT: 63 IN | WEIGHT: 169 LBS | SYSTOLIC BLOOD PRESSURE: 124 MMHG | HEART RATE: 59 BPM | BODY MASS INDEX: 29.95 KG/M2

## 2018-05-14 DIAGNOSIS — G89.29 CHRONIC UPPER BACK PAIN: ICD-10-CM

## 2018-05-14 DIAGNOSIS — G89.29 CHRONIC MIDLINE LOW BACK PAIN WITHOUT SCIATICA: ICD-10-CM

## 2018-05-14 DIAGNOSIS — M54.6 CHRONIC MIDLINE THORACIC BACK PAIN: ICD-10-CM

## 2018-05-14 DIAGNOSIS — R13.10 DYSPHAGIA, UNSPECIFIED TYPE: ICD-10-CM

## 2018-05-14 DIAGNOSIS — M54.50 CHRONIC MIDLINE LOW BACK PAIN WITHOUT SCIATICA: ICD-10-CM

## 2018-05-14 DIAGNOSIS — M54.9 CHRONIC UPPER BACK PAIN: ICD-10-CM

## 2018-05-14 DIAGNOSIS — M85.80 OSTEOPENIA, UNSPECIFIED LOCATION: Primary | ICD-10-CM

## 2018-05-14 DIAGNOSIS — G89.29 CHRONIC MIDLINE THORACIC BACK PAIN: ICD-10-CM

## 2018-05-14 PROCEDURE — 99213 OFFICE O/P EST LOW 20 MIN: CPT | Performed by: INTERNAL MEDICINE

## 2018-05-14 RX ORDER — ZOLEDRONIC ACID 5 MG/100ML
5 INJECTION, SOLUTION INTRAVENOUS ONCE
Status: CANCELLED
Start: 2018-05-15 | End: 2018-05-15

## 2018-05-14 NOTE — PATIENT INSTRUCTIONS
Let's plan a Reclast infusion to help prevent fractures.              We will plan to do this once per year,for 3 years.                       Keep up a good calcium and vitamin D intake.                                You are also planning some physical therapy.

## 2018-05-14 NOTE — PROGRESS NOTES
"  SUBJECTIVE:   Sadaf Rubi is a 72 year old female who presents to clinic today for the following health issues:    Discuss treatment for Osteoporosis        Here are the bone density details from the problem list:  T -1.7 L fem neck , -0.3 spine in 12/12; similar to 6/10 results.       Vit D 26; incr by 1000 IU                          In 12/12, vit D > 96; stop for one month, then take 2,000 IU per day         In 4/18, T -1.1 spine, - 2.4 L fem neck, - 1.7 R; letter sent; pharm Rx advised.               Here are the notes from the last visit:              \"  When she is finished with dental work then plan Reclast or Prolia.     Chronic duration of stable dysphagia suggests not a major underlying problem; dysmotility?    \"                  She has subsequently seen her dentist, and gotten the okay for bisphosphonate treatment.                                    She also wants to go ahead with some physical therapy for her chronic back pain.              Current Outpatient Prescriptions   Medication Sig Dispense Refill     acetaminophen-codeine (TYLENOL #3) 300-30 MG per tablet TAKE ONE TABLET BY MOUTH TWICE A DAY AS NEEDED 30 tablet 3     amitriptyline (ELAVIL) 25 MG tablet Take 1 tablet (25 mg) by mouth At Bedtime 90 tablet 3     amLODIPine (NORVASC) 5 MG tablet Take 1 tablet (5 mg) by mouth daily 90 tablet 3     aspirin 81 MG tablet Take 1 tablet (81 mg) by mouth daily 120 tablet      atenolol (TENORMIN) 50 MG tablet Take 1 tablet (50 mg) by mouth daily 90 tablet 4     Cholecalciferol (VITAMIN D) 2000 UNITS tablet Take 2,000 Units by mouth daily 100 tablet 3     cyanocolbalamin (VITAMIN  B-12) 500 MCG tablet Take 1 tablet by mouth daily.       diazepam (VALIUM) 5 MG tablet TAKE ONE TABLET BY MOUTH EVERY DAY AS NEEDED ANXIETY 30 tablet 2     escitalopram (LEXAPRO) 10 MG tablet TAKE 1 TABLET (10 MG) BY MOUTH DAILY (Patient taking differently: TAKing 1/2 tablet every day) 90 tablet 2     fluticasone " "(FLONASE) 50 MCG/ACT spray Spray 1-2 sprays into both nostrils daily 1 Bottle 11     levothyroxine (SYNTHROID/LEVOTHROID) 75 MCG tablet TAKE 1 TABLET (75 MCG) BY MOUTH DAILY 90 tablet 3     nadolol (CORGARD) 40 MG tablet TAKE 1 TABLET (40 MG) BY MOUTH DAILY 90 tablet 3     polyethylene glycol (MIRALAX/GLYCOLAX) powder TAKE 17 G BY MOUTH 2     TIMES DAILY 527 g 11     pravastatin (PRAVACHOL) 40 MG tablet TAKE 1 TABLET (40 MG) BY MOUTH DAILY 90 tablet 3     Allergies   Allergen Reactions     Bees      Ciprofloxacin      Influenza Vaccine Live      Other [Seasonal Allergies]      msg and mushrooms     Penicillins      Wasp Venom Protein Hives     BP Readings from Last 3 Encounters:   05/02/18 120/70   04/09/18 120/70   02/26/18 122/70    Wt Readings from Last 3 Encounters:   05/02/18 169 lb (76.7 kg)   04/09/18 172 lb (78 kg)   02/10/18 169 lb 8 oz (76.9 kg)                    Reviewed and updated as needed this visit by clinical staff       Reviewed and updated as needed this visit by Provider         ROS:  CONSTITUTIONAL:NEGATIVE for fever, chills, change in weight  MUSCULOSKELETAL: POSITIVE  for back pain   NEURO: NEGATIVE for weakness, dizziness or paresthesias    OBJECTIVE:                                                    /68 (BP Location: Left arm, Patient Position: Chair, Cuff Size: Adult Large)  Pulse 59  Temp 97.5  F (36.4  C)  Resp 20  Ht 5' 3\" (1.6 m)  Wt 169 lb (76.7 kg)  SpO2 97%  Breastfeeding? No  BMI 29.94 kg/m2  There is no height or weight on file to calculate BMI.  GENERAL APPEARANCE: alert and no distress  MS: decreased range of motion spine    Diagnostic test results:  none      ASSESSMENT/PLAN:                                                        ICD-10-CM    1. Osteopenia, unspecified location M85.80 [facility] PT, HAND, AND CHIROPRACTIC REFERRAL   2. Dysphagia, unspecified type R13.10    3. Chronic upper back pain M54.9 [facility] PT, HAND, AND CHIROPRACTIC REFERRAL    G89.29    4. Chronic " midline thoracic back pain M54.6 [facility] PT, HAND, AND CHIROPRACTIC REFERRAL    G89.29    5. Chronic midline low back pain without sciatica M54.5 [facility] PT, HAND, AND CHIROPRACTIC REFERRAL    G89.29               We discussed treatment options at length.                   Patient Instructions   Let's plan a Reclast infusion to help prevent fractures.              We will plan to do this once per year,for 3 years.                       Keep up a good calcium and vitamin D intake.                                You are also planning some physical therapy.                       Sean Velasquez MD  [facility]

## 2018-05-14 NOTE — MR AVS SNAPSHOT
After Visit Summary   5/14/2018    Sadaf Rubi    MRN: [id number]           Patient Information     Date Of Birth          1945        Visit Information        Provider Department      5/14/2018 2:00 PM Sean Velasquez MD [facility]        Today's Diagnoses     Osteopenia, unspecified location    -  1    Dysphagia, unspecified type        Chronic upper back pain        Chronic midline thoracic back pain        Chronic midline low back pain without sciatica          Care Instructions    Let's plan a Reclast infusion to help prevent fractures.              We will plan to do this once per year,for 3 years.                       Keep up a good calcium and vitamin D intake.                                You are also planning some physical therapy.                           Follow-ups after your visit        Additional Services     [facility] PT, HAND, AND CHIROPRACTIC REFERRAL       **This order will print in the [facility] Scheduling Office**    Physical Therapy, Hand Therapy and Chiropractic Care are available through:    *[facility]  *[facility]  *[facility]    Call one number to schedule at any of the above locations: [phone].    Your provider has referred you to: [facility] - PT and/or Chiropractic Care determined by clinical presentation at [facility] or [facility] Initial Visit    Indication/Reason for Referral: Low,Mid,and Upper Back Pain  Onset of Illness: ongoing  Therapy Orders: Evaluate and Treat  Special Programs: None  Special Request: None    Fabiano Mario      Additional Comments for the Therapist or Chiropractor: plan a home exercise program.    Please be aware that coverage of these services is subject to the terms and limitations of your health insurance plan.  Call member services at your health plan with any benefit or coverage questions.      Please bring the following to your  "appointment:    *Your personal calendar for scheduling future appointments  *Comfortable clothing                  Who to contact     If you have questions or need follow up information about today's clinic visit or your schedule please contact [facility] directly at [phone].  Normal or non-critical lab and imaging results will be communicated to you by Oxtexhart, letter or phone within 4 business days after the clinic has received the results. If you do not hear from us within 7 days, please contact the clinic through Oxtexhart or phone. If you have a critical or abnormal lab result, we will notify you by phone as soon as possible.  Submit refill requests through Nortal AS or call your pharmacy and they will forward the refill request to us. Please allow 3 business days for your refill to be completed.          Additional Information About Your Visit        [employer] Information     Nortal AS lets you send messages to your doctor, view your test results, renew your prescriptions, schedule appointments and more. To sign up, go to [email or web address] . Click on \"Log in\" on the left side of the screen, which will take you to the Welcome page. Then click on \"Sign up Now\" on the right side of the page.     You will be asked to enter the access code listed below, as well as some personal information. Please follow the directions to create your username and password.     Your access code is: [email or web address]  Expires: 2018 11:54 AM     Your access code will  in 90 days. If you need help or a new code, please call your [place] clinic or [phone].        Care EveryWhere ID     This is your Care EveryWhere ID. This could be used by other organizations to access your [place] medical records  [id number]        Your Vitals Were     Pulse Temperature Respirations Height Pulse Oximetry Breastfeeding?    59 97.5  F (36.4  C) 20 5' 3\" (1.6 m) 97% No    BMI (Body Mass Index)          "          29.94 kg/m2            Blood Pressure from Last 3 Encounters:   05/14/18 124/68   05/02/18 120/70   04/09/18 120/70    Weight from Last 3 Encounters:   05/14/18 169 lb (76.7 kg)   05/02/18 169 lb (76.7 kg)   04/09/18 172 lb (78 kg)              We Performed the Following     [facility] PT, HAND, AND CHIROPRACTIC REFERRAL          Today's Medication Changes          These changes are accurate as of 5/14/18  2:34 PM.  If you have any questions, ask your nurse or doctor.               These medicines have changed or have updated prescriptions.        Dose/Directions    escitalopram 10 MG tablet   Commonly known as:  LEXAPRO   This may have changed:  See the new instructions.   Used for:  SHAWNEE (generalized anxiety disorder)        TAKE 1 TABLET (10 MG) BY MOUTH DAILY   Quantity:  90 tablet   Refills:  2         Stop taking these medicines if you haven't already. Please contact your care team if you have questions.     nadolol 40 MG tablet   Commonly known as:  CORGARD   Stopped by:  Sean Velasquez MD                    Primary Care Provider Office Phone # Fax #    Sean Velasquez [phone] [address] [facility] [address]        Equal Access to Services     JESSICA MCCULLOUGH AH: Hadii joellen mcintyre hadasho Soomaali, waaxda luqadaha, qaybta kaalmada adeegyada, isha monique haydebbie ritchie. So [facility] [phone].    ATENCIÓN: Si habla español, tiene a agrawal disposición servicios gratuitos de asistencia lingüística. Llame al [phone].    We comply with applicable federal civil rights laws and [place] laws. We do not discriminate on the basis of race, color, national origin, age, disability, sex, sexual orientation, or gender identity.            Thank you!     Thank you for choosing [facility]  for your care. Our goal is always to provide you with excellent care. Hearing back from our patients is one way we can continue to improve our services.  Please take a few minutes to complete the written survey that you may receive in the mail after your visit with us. Thank you!             Your Updated Medication List - Protect others around you: Learn how to safely use, store and throw away your medicines at www.disposemymeds.org.          This list is accurate as of 5/14/18  2:34 PM.  Always use your most recent med list.                   Brand Name Dispense Instructions for use Diagnosis    acetaminophen-codeine 300-30 MG per tablet    TYLENOL #3    30 tablet    TAKE ONE TABLET BY MOUTH TWICE A DAY AS NEEDED    Episodic tension-type headache, not intractable       amitriptyline 25 MG tablet    ELAVIL    90 tablet    Take 1 tablet (25 mg) by mouth At Bedtime    Insomnia, unspecified type, Depressive disorder       amLODIPine 5 MG tablet    NORVASC    90 tablet    Take 1 tablet (5 mg) by mouth daily    Hypertension goal BP (blood pressure) < 140/80       aspirin 81 MG tablet     120 tablet    Take 1 tablet (81 mg) by mouth daily        atenolol 50 MG tablet    TENORMIN    90 tablet    Take 1 tablet (50 mg) by mouth daily    Hypertension goal BP (blood pressure) < 140/80       cyanocolbalamin 500 MCG tablet    vitamin  B-12     Take 1 tablet by mouth daily.        diazepam 5 MG tablet    VALIUM    30 tablet    TAKE ONE TABLET BY MOUTH EVERY DAY AS NEEDED ANXIETY    SHAWNEE (generalized anxiety disorder)       escitalopram 10 MG tablet    LEXAPRO    90 tablet    TAKE 1 TABLET (10 MG) BY MOUTH DAILY    SHAWNEE (generalized anxiety disorder)       fluticasone 50 MCG/ACT spray    FLONASE    1 Bottle    Spray 1-2 sprays into both nostrils daily    Chronic vasomotor rhinitis       levothyroxine 75 MCG tablet    SYNTHROID/LEVOTHROID    90 tablet    TAKE 1 TABLET (75 MCG) BY MOUTH DAILY    Hypothyroidism, unspecified type       polyethylene glycol powder    MIRALAX/GLYCOLAX    527 g    TAKE 17 G BY MOUTH 2     TIMES DAILY    Slow transit constipation       pravastatin 40 MG  tablet    PRAVACHOL    90 tablet    TAKE 1 TABLET (40 MG) BY MOUTH DAILY    Hyperlipidemia with target LDL less than 100       vitamin D 2000 units tablet     100 tablet    Take 2,000 Units by mouth daily

## 2018-05-16 DIAGNOSIS — F41.1 GAD (GENERALIZED ANXIETY DISORDER): ICD-10-CM

## 2018-05-16 NOTE — TELEPHONE ENCOUNTER
Requested Prescriptions   Pending Prescriptions Disp Refills     diazepam (VALIUM) 5 MG tablet [Pharmacy Med Name: DIAZEPAM 5MG TAB 5 TAB] 30 tablet 2     Sig: TAKE ONE TABLET BY MOUTH EVERY DAY AS NEEDED ANXIETY    There is no refill protocol information for this order        Last Written Prescription Date:  2/15/18  Last Fill Quantity: 30,   # refills: 2  Last Office Visit: 5/14/18  Future Office visit:       Routing refill request to provider for review/approval because:  Drug not on the G, P or [facility] refill protocol or controlled substance

## 2018-05-17 RX ORDER — DIAZEPAM 5 MG
TABLET ORAL
Qty: 30 TABLET | Refills: 2 | Status: SHIPPED | OUTPATIENT
Start: 2018-05-17 | End: 2018-08-16

## 2018-05-17 NOTE — TELEPHONE ENCOUNTER
I am out of the office.        I will be back  to [facility] on Tuesday, May 29.        This refill is ok with me.                Please have one of the other providers sign it.

## 2018-06-04 ENCOUNTER — INFUSION THERAPY VISIT (OUTPATIENT)
Dept: INFUSION THERAPY | Facility: CLINIC | Age: 73
End: 2018-06-04
Attending: INTERNAL MEDICINE
Payer: COMMERCIAL

## 2018-06-04 ENCOUNTER — THERAPY VISIT (OUTPATIENT)
Dept: PHYSICAL THERAPY | Facility: CLINIC | Age: 73
End: 2018-06-04
Payer: COMMERCIAL

## 2018-06-04 VITALS
RESPIRATION RATE: 16 BRPM | TEMPERATURE: 97.6 F | DIASTOLIC BLOOD PRESSURE: 77 MMHG | SYSTOLIC BLOOD PRESSURE: 136 MMHG | HEART RATE: 58 BPM

## 2018-06-04 DIAGNOSIS — M85.80 OSTEOPENIA, UNSPECIFIED LOCATION: ICD-10-CM

## 2018-06-04 DIAGNOSIS — G89.29 CHRONIC MIDLINE THORACIC BACK PAIN: ICD-10-CM

## 2018-06-04 DIAGNOSIS — M54.9 UPPER BACK PAIN: Primary | ICD-10-CM

## 2018-06-04 DIAGNOSIS — M54.9 CHRONIC UPPER BACK PAIN: Primary | ICD-10-CM

## 2018-06-04 DIAGNOSIS — M54.6 CHRONIC MIDLINE THORACIC BACK PAIN: ICD-10-CM

## 2018-06-04 DIAGNOSIS — G89.29 CHRONIC UPPER BACK PAIN: Primary | ICD-10-CM

## 2018-06-04 DIAGNOSIS — M54.50 LEFT-SIDED LOW BACK PAIN WITHOUT SCIATICA: ICD-10-CM

## 2018-06-04 DIAGNOSIS — R13.10 DYSPHAGIA, UNSPECIFIED TYPE: ICD-10-CM

## 2018-06-04 PROCEDURE — G8978 MOBILITY CURRENT STATUS: HCPCS | Mod: GP | Performed by: PHYSICAL THERAPIST

## 2018-06-04 PROCEDURE — 96365 THER/PROPH/DIAG IV INF INIT: CPT

## 2018-06-04 PROCEDURE — 97110 THERAPEUTIC EXERCISES: CPT | Mod: GP | Performed by: PHYSICAL THERAPIST

## 2018-06-04 PROCEDURE — 25000128 H RX IP 250 OP 636: Performed by: INTERNAL MEDICINE

## 2018-06-04 PROCEDURE — G8979 MOBILITY GOAL STATUS: HCPCS | Mod: GP | Performed by: PHYSICAL THERAPIST

## 2018-06-04 PROCEDURE — 97161 PT EVAL LOW COMPLEX 20 MIN: CPT | Mod: GP | Performed by: PHYSICAL THERAPIST

## 2018-06-04 RX ORDER — ZOLEDRONIC ACID 5 MG/100ML
5 INJECTION, SOLUTION INTRAVENOUS ONCE
Status: CANCELLED
Start: 2018-06-04 | End: 2018-06-04

## 2018-06-04 RX ORDER — ZOLEDRONIC ACID 5 MG/100ML
5 INJECTION, SOLUTION INTRAVENOUS ONCE
Status: COMPLETED | OUTPATIENT
Start: 2018-06-04 | End: 2018-06-04

## 2018-06-04 RX ADMIN — ZOLEDRONIC ACID 5 MG: 5 INJECTION, SOLUTION INTRAVENOUS at 13:13

## 2018-06-04 ASSESSMENT — PAIN SCALES - GENERAL: PAINLEVEL: NO PAIN (0)

## 2018-06-04 NOTE — PROGRESS NOTES
Infusion Nursing Note:  Sadaf ARNETT Rubi presents today for Reclast.    Patient seen by provider today: No   present during visit today: Not Applicable.    Note: Educated patient on side effects of Reclast. Micromedex handout given to and reviewed with patient.    Intravenous Access:  Peripheral IV placed.    Treatment Conditions:  Results reviewed, labs MET treatment parameters, ok to proceed with treatment.  Labs from 5/2/2018   Ca 9.7  Cr 0.66    Post Infusion Assessment:  Patient tolerated infusion without incident.  Site patent and intact, free from redness, edema or discomfort.  No evidence of extravasations.  Access discontinued per protocol.    Discharge Plan:   Discharge instructions reviewed with: Patient.  Patient and/or family verbalized understanding of discharge instructions and all questions answered.  Copy of AVS reviewed with patient and/or family.  Patient will return in one year for next appointment.  Patient discharged in stable condition accompanied by: self and .  Departure Mode: Ambulatory.    Renu Heredia RN

## 2018-06-04 NOTE — MR AVS SNAPSHOT
After Visit Summary   6/4/2018    Sadaf Rubi    MRN: [id number]           Patient Information     Date Of Birth          1945        Visit Information        Provider Department      6/4/2018 1:00 PM  INFUSION CHAIR 2 [facility]        Today's Diagnoses     Chronic upper back pain    -  1    Dysphagia, unspecified type        Chronic midline thoracic back pain        Osteopenia, unspecified location           Follow-ups after your visit        Your next 10 appointments already scheduled     Jun 11, 2018 10:00 AM CDT   [facility] Spine with Katiana Peterson PT   [facility] [place] [facility] ([facility] [place]  )    [address] [place] [address] [address]  [place] [address]   [phone]            Jun 18, 2018  1:20 PM CDT   JUSTIN Spine with Mago Galo PT   [facility] ([facility] [place]  )    [address] [place], [place] [address]  [place] [address] [phone]            Jun 25, 2018  5:10 PM CDT   JUSTIN Spine with Mago Galo PT   [facility] ([facility] [place]  )    [address] [place] [address] [address]  [place] [address] [phone]            Jul 02, 2018 10:40 AM CDT   JUSTIN Spine with Mago Galo PT   [facility] ([facility] [place]  )    [address] [place], [place] [address]  [place] [address] [phone]            Jul 09, 2018 11:20 AM CDT   JUSTIN Spine with Katiana Peterson PT   [facility] ([facility] [place]  )    [address] [place], [place] [address]  [place] [address] [phone]            Jul 16, 2018 10:40 AM CDT   JUSTIN Spine with Mago Galo PT   [facility] ([facility] [place]  )    [address] [place], [place] [address]  [place] [address] [phone]            Jul 23, 2018 10:40 AM CDT   JUSTIN Spine with Mago Galo PT   [facility]  [facility] [facility] ([facility] [place]  )    [address]  [place] [address]   [phone]            Jul 30, 2018 10:40 AM CDT   JUSTIN Spine with Mago Galo, PT   [facility]  [place] [facility] ([facility] [place]  )    [address]  [place] [address]   [phone]            Aug 06, 2018 10:40 AM CDT   JUSTIN Spine with Mago Galo, PT   [facility] [facility] ([facility] [place]  )    [address]  [place] [address]   [phone]            Aug 13, 2018 10:40 AM CDT   JUSTIN Spine with Mago Galo, PT   [facility] ([facility] [place]  )    [address]  [place] [address]   [phone]              Who to contact     If you have questions or need follow up information about today's clinic visit or your schedule please contact [facility] AND [facility] directly at [phone].  Normal or non-critical lab and imaging results will be communicated to you by MyChart, letter or phone within 4 business days after the clinic has received the results. If you do not hear from us within 7 days, please contact the clinic through Codes[employer] Cooperativehart or phone. If you have a critical or abnormal lab result, we will notify you by phone as soon as possible.  Submit refill requests through Swidjit or call your pharmacy and they will forward the refill request to us. Please allow 3 business days for your refill to be completed.          Additional Information About Your Visit        Swidjit Information     Swidjit lets you send messages to your doctor, view your test results, renew your prescriptions, schedule appointments and more. To sign up, go to [email or web address] . Click on \"Log in\" on the left side of the screen, which will take you to the Welcome page. Then click on \"Sign up Now\" on the right side of the page.     You will be asked to enter the access code " listed below, as well as some personal information. Please follow the directions to create your username and password.     Your access code is: [email or web address]  Expires: 2018 11:54 AM     Your access code will  in 90 days. If you need help or a new code, please call your [place] clinic or [phone].        Care EveryWhere ID     This is your Care EveryWhere ID. This could be used by other organizations to access your [place] medical records  [id number]        Your Vitals Were     Pulse Temperature Respirations             58 97.6  F (36.4  C) (Oral) 16          Blood Pressure from Last 3 Encounters:   18 136/77   18 124/68   18 120/70    Weight from Last 3 Encounters:   18 76.7 kg (169 lb)   18 76.7 kg (169 lb)   18 78 kg (172 lb)              Today, you had the following     No orders found for display         Today's Medication Changes          These changes are accurate as of 18  1:37 PM.  If you have any questions, ask your nurse or doctor.               These medicines have changed or have updated prescriptions.        Dose/Directions    escitalopram 10 MG tablet   Commonly known as:  LEXAPRO   This may have changed:  See the new instructions.   Used for:  SHAWNEE (generalized anxiety disorder)        TAKE 1 TABLET (10 MG) BY MOUTH DAILY   Quantity:  90 tablet   Refills:  2                Primary Care Provider Office Phone # Fax #    Sean Velasquez [phone] [address] [facility] [address]        Equal Access to Services     : Hadii joellen mcintyre hadasho Somitchell, waaxda luqadaha, qaybta kaalmada isha castellanos idiin hayaan adeeg kharash la'aan . So [facility] [phone].    ATENCIÓN: Si habla español, tiene a agrawal disposición servicios gratuitos de asistencia lingüística. Llame al [phone].    We comply with applicable federal civil rights laws and [place] laws. We do not discriminate on the basis of race, color,  national origin, age, disability, sex, sexual orientation, or gender identity.            Thank you!     Thank you for choosing [facility] AND [facility]  for your care. Our goal is always to provide you with excellent care. Hearing back from our patients is one way we can continue to improve our services. Please take a few minutes to complete the written survey that you may receive in the mail after your visit with us. Thank you!             Your Updated Medication List - Protect others around you: Learn how to safely use, store and throw away your medicines at www.disposemymeds.org.          This list is accurate as of 6/4/18  1:37 PM.  Always use your most recent med list.                   Brand Name Dispense Instructions for use Diagnosis    acetaminophen-codeine 300-30 MG per tablet    TYLENOL #3    30 tablet    TAKE ONE TABLET BY MOUTH TWICE A DAY AS NEEDED    Episodic tension-type headache, not intractable       amitriptyline 25 MG tablet    ELAVIL    90 tablet    Take 1 tablet (25 mg) by mouth At Bedtime    Insomnia, unspecified type, Depressive disorder       amLODIPine 5 MG tablet    NORVASC    90 tablet    Take 1 tablet (5 mg) by mouth daily    Hypertension goal BP (blood pressure) < 140/80       aspirin 81 MG tablet     120 tablet    Take 1 tablet (81 mg) by mouth daily        atenolol 50 MG tablet    TENORMIN    90 tablet    Take 1 tablet (50 mg) by mouth daily    Hypertension goal BP (blood pressure) < 140/80       cyanocolbalamin 500 MCG tablet    vitamin  B-12     Take 1 tablet by mouth daily.        diazepam 5 MG tablet    VALIUM    30 tablet    TAKE ONE TABLET BY MOUTH EVERY DAY AS NEEDED ANXIETY    SHAWNEE (generalized anxiety disorder)       escitalopram 10 MG tablet    LEXAPRO    90 tablet    TAKE 1 TABLET (10 MG) BY MOUTH DAILY    SHAWNEE (generalized anxiety disorder)       fluticasone 50 MCG/ACT spray    FLONASE    1 Bottle    Spray 1-2 sprays into both nostrils daily    Chronic  vasomotor rhinitis       levothyroxine 75 MCG tablet    SYNTHROID/LEVOTHROID    90 tablet    TAKE 1 TABLET (75 MCG) BY MOUTH DAILY    Hypothyroidism, unspecified type       polyethylene glycol powder    MIRALAX/GLYCOLAX    527 g    TAKE 17 G BY MOUTH 2     TIMES DAILY    Slow transit constipation       pravastatin 40 MG tablet    PRAVACHOL    90 tablet    TAKE 1 TABLET (40 MG) BY MOUTH DAILY    Hyperlipidemia with target LDL less than 100       vitamin D 2000 units tablet     100 tablet    Take 2,000 Units by mouth daily

## 2018-06-04 NOTE — PROGRESS NOTES
[facility] Initial Evaluation  Subjective:  Patient is a 72 year old female presenting with rehab cervical spine hpi.                                      Pertinent medical history includes:  High blood pressure, migraines/headaches, osteoarthritis and overweight.  Medical allergies: yes (Latex and penicillin).  Other surgeries include:  Orthopedic surgery and other (Knee replacements and hysterectomy).  Current medications:  Thyroid medication, pain medication, anti-depressants and high blood pressure medication.  Current occupation is Retired.    Primary job tasks include:  Prolonged sitting, prolonged standing and lifting.                                Objective:  System    Physical Exam    General     ROS    Assessment/Plan:
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
## 2018-06-04 NOTE — PROGRESS NOTES
[facility] Initial Evaluation  Subjective:  HPI                    Objective:  System    Physical Exam    General     ROS    Assessment/Plan:

## 2018-06-11 ENCOUNTER — THERAPY VISIT (OUTPATIENT)
Dept: PHYSICAL THERAPY | Facility: CLINIC | Age: 73
End: 2018-06-11
Payer: COMMERCIAL

## 2018-06-11 DIAGNOSIS — M54.50 LEFT-SIDED LOW BACK PAIN WITHOUT SCIATICA: ICD-10-CM

## 2018-06-11 DIAGNOSIS — M54.9 UPPER BACK PAIN: ICD-10-CM

## 2018-06-11 PROCEDURE — 97530 THERAPEUTIC ACTIVITIES: CPT | Mod: GP | Performed by: PHYSICAL THERAPIST

## 2018-06-11 PROCEDURE — 97112 NEUROMUSCULAR REEDUCATION: CPT | Mod: GP | Performed by: PHYSICAL THERAPIST

## 2018-06-11 PROCEDURE — 97110 THERAPEUTIC EXERCISES: CPT | Mod: GP | Performed by: PHYSICAL THERAPIST

## 2018-06-11 NOTE — MR AVS SNAPSHOT
After Visit Summary   6/11/2018    Sadaf Rubi    MRN: [id number]           Patient Information     Date Of Birth          1945        Visit Information        Provider Department      6/11/2018 10:00 AM Katiana Peterson, PT [facility]        Today's Diagnoses     Left-sided low back pain without sciatica        Upper back pain           Follow-ups after your visit        Your next 10 appointments already scheduled     Jun 18, 2018  1:20 PM CDT   JUSTIN Spine with Mago Galo, PT   [facility] [facility] ([facility] [place]  )    [address] [place], [place] [id number]  [place] [address]   [phone]            Jun 25, 2018  5:10 PM CDT   JUSTIN Spine with Mago Galo PT   [facility] [place] [facility] ([facility] [place]  )    [address] [place], [place] [id number]  [facility] [address]   [phone]            Jul 02, 2018 10:40 AM CDT   JUSTIN Spine with Mago Galo PT   [facility] [facility] ([facility] [place]  )    [address] [place], [place] [id number]  [facility] [address]   [phone]            Jul 09, 2018 11:20 AM CDT   JUSTIN Spine with Katiana Peterson PT   [facility] ([facility] [place]  )    [address] [place], [place] [id number]  [facility] [address]   [phone]            Jul 16, 2018 10:40 AM CDT   JUSTIN Spine with Mago Galo PT   [facility]  [facility] ([facility] [place]  )    [address] [place], [place] [id number]  [facility] [address] [phone]            Jul 23, 2018 10:40 AM CDT   JUSTIN Spine with Mago Galo PT   [facility] [place] [facility] ([facility] [place]  )    [address] [place], [place] [id number]  [facility] [address]   [phone]            Jul 30, 2018 10:40 AM CDT   JUSTIN Spine with Mago Galo PT   [facility]  [facility] [facility] ([facility] [place]  )    [address] [place] [address]  [id number]  [place] [address]   [phone]            Aug 06, 2018 10:40 AM CDT   JUSTIN Spine with Mago Galo PT   [facility] ([facility]  )    [address]  [place] [address]   [phone]            Aug 13, 2018 10:40 AM CDT   JUSTIN Spine with Mago Galo PT   [facility] ([facility]  )    [address]  [place] [address]   [phone]              Who to contact     If you have questions or need follow up information about today's clinic visit or your schedule please contact [facility] directly at [phone].  Normal or non-critical lab and imaging results will be communicated to you by MyChart, letter or phone within 4 business days after the clinic has received the results. If you do not hear from us within 7 days, please contact the clinic through [email or web address] or phone. If you have a critical or abnormal lab result, we will notify you by phone as soon as possible.  Submit refill requests through Bocada or call your pharmacy and they will forward the refill request to us. Please allow 3 business days for your refill to be completed.          Additional Information About Your Visit        Preen.Mehart Information     Bocada lets you send messages to your doctor, view your test results, renew your prescriptions, schedule appointments and more. To sign up, go to [email or web address] . Click on \"Log in\" on the left side of the screen, which will take you to the Welcome page. Then click on \"Sign up Now\" on the right side of the page.     You will be asked to enter the access code listed below, as well as some personal information. Please follow the directions to create your username and password.     Your access code is: [email or web address]  Expires: 2018 11:54 AM     Your access code will  in 90 days. If you need help or a new code, please " call your [facility] clinic or [phone].        Care EveryWhere ID     This is your Care EveryWhere ID. This could be used by other organizations to access your [facility] medical records  [id number]         Blood Pressure from Last 3 Encounters:   06/04/18 136/77   05/14/18 124/68   05/02/18 120/70    Weight from Last 3 Encounters:   05/14/18 76.7 kg (169 lb)   05/02/18 76.7 kg (169 lb)   04/09/18 78 kg (172 lb)              We Performed the Following     NEUROMUSCULAR RE-EDUCATION     Therapeutic Activities     THERAPEUTIC EXERCISES          Today's Medication Changes          These changes are accurate as of 6/11/18 10:40 AM.  If you have any questions, ask your nurse or doctor.               These medicines have changed or have updated prescriptions.        Dose/Directions    escitalopram 10 MG tablet   Commonly known as:  LEXAPRO   This may have changed:  See the new instructions.   Used for:  SHAWNEE (generalized anxiety disorder)        TAKE 1 TABLET (10 MG) BY MOUTH DAILY   Quantity:  90 tablet   Refills:  2                Primary Care Provider Office Phone # Fax #    Sean Velasquez [phone] [address] [facility] [address]        Equal Access to Services     JESSICA MCCULLOUGH AH: Hadii joellen mcintyre hadasho Soomaali, waaxda luqadaha, qaybta kaalmada adeegyada, isha ritchie. So [facility] [phone].    ATENCIÓN: Si habla español, tiene a agrawal disposición servicios gratuitos de asistencia lingüística. Llame al [phone].    We comply with applicable federal civil rights laws and [place] laws. We do not discriminate on the basis of race, color, national origin, age, disability, sex, sexual orientation, or gender identity.            Thank you!     Thank you for choosing [facility]  for your care. Our goal is always to provide you with excellent care. Hearing back from our patients is one way we can continue to  improve our services. Please take a few minutes to complete the written survey that you may receive in the mail after your visit with us. Thank you!             Your Updated Medication List - Protect others around you: Learn how to safely use, store and throw away your medicines at www.disposemymeds.org.          This list is accurate as of 6/11/18 10:40 AM.  Always use your most recent med list.                   Brand Name Dispense Instructions for use Diagnosis    acetaminophen-codeine 300-30 MG per tablet    TYLENOL #3    30 tablet    TAKE ONE TABLET BY MOUTH TWICE A DAY AS NEEDED    Episodic tension-type headache, not intractable       amitriptyline 25 MG tablet    ELAVIL    90 tablet    Take 1 tablet (25 mg) by mouth At Bedtime    Insomnia, unspecified type, Depressive disorder       amLODIPine 5 MG tablet    NORVASC    90 tablet    Take 1 tablet (5 mg) by mouth daily    Hypertension goal BP (blood pressure) < 140/80       aspirin 81 MG tablet     120 tablet    Take 1 tablet (81 mg) by mouth daily        atenolol 50 MG tablet    TENORMIN    90 tablet    Take 1 tablet (50 mg) by mouth daily    Hypertension goal BP (blood pressure) < 140/80       cyanocolbalamin 500 MCG tablet    vitamin  B-12     Take 1 tablet by mouth daily.        diazepam 5 MG tablet    VALIUM    30 tablet    TAKE ONE TABLET BY MOUTH EVERY DAY AS NEEDED ANXIETY    SHAWNEE (generalized anxiety disorder)       escitalopram 10 MG tablet    LEXAPRO    90 tablet    TAKE 1 TABLET (10 MG) BY MOUTH DAILY    SHAWNEE (generalized anxiety disorder)       fluticasone 50 MCG/ACT spray    FLONASE    1 Bottle    Spray 1-2 sprays into both nostrils daily    Chronic vasomotor rhinitis       levothyroxine 75 MCG tablet    SYNTHROID/LEVOTHROID    90 tablet    TAKE 1 TABLET (75 MCG) BY MOUTH DAILY    Hypothyroidism, unspecified type       polyethylene glycol powder    MIRALAX/GLYCOLAX    527 g    TAKE 17 G BY MOUTH 2     TIMES DAILY    Slow transit constipation        pravastatin 40 MG tablet    PRAVACHOL    90 tablet    TAKE 1 TABLET (40 MG) BY MOUTH DAILY    Hyperlipidemia with target LDL less than 100       vitamin D 2000 units tablet     100 tablet    Take 2,000 Units by mouth daily

## 2018-06-14 ENCOUNTER — OFFICE VISIT (OUTPATIENT)
Dept: FAMILY MEDICINE | Facility: CLINIC | Age: 73
End: 2018-06-14
Payer: COMMERCIAL

## 2018-06-14 VITALS
WEIGHT: 169 LBS | TEMPERATURE: 99.6 F | DIASTOLIC BLOOD PRESSURE: 72 MMHG | OXYGEN SATURATION: 97 % | SYSTOLIC BLOOD PRESSURE: 110 MMHG | BODY MASS INDEX: 29.95 KG/M2 | HEART RATE: 60 BPM | RESPIRATION RATE: 14 BRPM | HEIGHT: 63 IN

## 2018-06-14 DIAGNOSIS — N30.01 ACUTE CYSTITIS WITH HEMATURIA: Primary | ICD-10-CM

## 2018-06-14 DIAGNOSIS — R30.0 DYSURIA: ICD-10-CM

## 2018-06-14 LAB
ALBUMIN UR-MCNC: 30 MG/DL
APPEARANCE UR: ABNORMAL
BACTERIA #/AREA URNS HPF: ABNORMAL /HPF
BILIRUB UR QL STRIP: NEGATIVE
COLOR UR AUTO: YELLOW
GLUCOSE UR STRIP-MCNC: NEGATIVE MG/DL
HGB UR QL STRIP: ABNORMAL
KETONES UR STRIP-MCNC: NEGATIVE MG/DL
LEUKOCYTE ESTERASE UR QL STRIP: ABNORMAL
NITRATE UR QL: POSITIVE
NON-SQ EPI CELLS #/AREA URNS LPF: ABNORMAL /LPF
PH UR STRIP: 5.5 PH (ref 5–7)
RBC #/AREA URNS AUTO: ABNORMAL /HPF
SOURCE: ABNORMAL
SP GR UR STRIP: 1.02 (ref 1–1.03)
UROBILINOGEN UR STRIP-ACNC: 0.2 EU/DL (ref 0.2–1)
WBC #/AREA URNS AUTO: ABNORMAL /HPF

## 2018-06-14 PROCEDURE — 87086 URINE CULTURE/COLONY COUNT: CPT | Performed by: PHYSICIAN ASSISTANT

## 2018-06-14 PROCEDURE — 87088 URINE BACTERIA CULTURE: CPT | Performed by: PHYSICIAN ASSISTANT

## 2018-06-14 PROCEDURE — 87186 SC STD MICRODIL/AGAR DIL: CPT | Performed by: PHYSICIAN ASSISTANT

## 2018-06-14 PROCEDURE — 81001 URINALYSIS AUTO W/SCOPE: CPT | Performed by: PHYSICIAN ASSISTANT

## 2018-06-14 PROCEDURE — 99213 OFFICE O/P EST LOW 20 MIN: CPT | Performed by: PHYSICIAN ASSISTANT

## 2018-06-14 RX ORDER — NITROFURANTOIN 25; 75 MG/1; MG/1
100 CAPSULE ORAL 2 TIMES DAILY
Qty: 14 CAPSULE | Refills: 0 | Status: SHIPPED | OUTPATIENT
Start: 2018-06-14 | End: 2018-06-18

## 2018-06-14 RX ORDER — FLUCONAZOLE 150 MG/1
150 TABLET ORAL ONCE
Qty: 1 TABLET | Refills: 0 | Status: CANCELLED | OUTPATIENT
Start: 2018-06-14 | End: 2018-06-14

## 2018-06-14 NOTE — PROGRESS NOTES
SUBJECTIVE:   Sadaf Rubi is a 72 year old female who presents to clinic today for the following health issues:        URINARY TRACT SYMPTOMS      Duration: X1 month    Description  dysuria    Intensity:  moderate    Accompanying signs and symptoms:  Fever/chills: YES--low grade 99.8  Flank pain no   Nausea and vomiting: no   Vaginal symptoms:  itching  Abdominal/Pelvic Pain: no     History  History of frequent UTI's: yes--40 years ago  History of kidney stones: no   Sexually Active: no   Possibility of pregnancy: No    Precipitating or alleviating factors: None    Therapies tried and outcome: none   Outcome:   Reviewed and updated as needed this visit by clinical staff  Tobacco  Allergies  Meds  Problems  Med Hx  Surg Hx  Fam Hx  Soc Hx        Reviewed and updated as needed this visit by Provider  Tobacco  Allergies  Meds  Problems  Med Hx  Surg Hx  Fam Hx  Soc Hx        Additional complaints: None    HPI additional notes: Danny presents today with   Chief Complaint   Patient presents with     UTI     dysuria   Was doing PT for pelvic floor and thought that was the cause of her pain.      Creatinine   Date Value Ref Range Status   05/02/2018 0.66 0.52 - 1.04 mg/dL Final          ROS:  C: POSITIVE for fever and chills.  CV: Negative for chest pain or peripheral edema  : POSITIVE for dysuria and frequency, urgency. Negative for hematuria  MS: Negative for flank pain  P: Negative for changes in mood or affect  ROS otherwise negative.    Chart Review:  History   Smoking Status     Never Smoker   Smokeless Tobacco     Never Used     PHQ-9 SCORE 9/27/2016 2/9/2017 6/5/2017   Total Score - - -   Total Score 1 2 0     SHAWNEE-7 SCORE 4/20/2016 6/9/2016 6/5/2017   Total Score 0 0 0       Patient Active Problem List   Diagnosis     Dysthymic disorder     Palpitation     Shortness of breath     Osteopenia     Preventive measure     Family history of coagulation disorder     Vitamin D deficiency disease      "Hypertension goal BP (blood pressure) < 140/80     Hyperlipidemia with target LDL less than 100     Dizziness     Balance problems     Abnormal liver enzymes     ACP (advance care planning)     Episodic tension-type headache, not intractable     Pain in unspecified knee     Slow transit constipation     Persistent insomnia     SHAWNEE (generalized anxiety disorder)     Hypothyroidism, unspecified type     Urinary hesitancy     Other iron deficiency anemia     Pelvic somatic dysfunction     Mixed incontinence urge and stress (male)(female)     Essential tremor     Chronic midline thoracic back pain     Chronic upper back pain     Chronic midline low back pain without sciatica     Dysphagia, unspecified type     Left-sided low back pain without sciatica     Past Surgical History:   Procedure Laterality Date     GYN SURGERY  1990's    hysterectomy SAMANTHA & BSO     KNEE SURGERY  1/2006    meniscus left     KNEE SURGERY  2007    right meniscus     KNEE SURGERY  09/10/2012    right knee replacement ; L TKA 3/13     LUMPECTOMY BREAST  1/2001    benign     URETHRA SURGERY  1977     Problem list, Medication list, Allergies, Medical/Social/Surg hx reviewed in [employer], updated as appropriate.   OBJECTIVE:                                                    /72 (BP Location: Left arm, Patient Position: Sitting, Cuff Size: Adult Regular)  Pulse 60  Temp 99.6  F (37.6  C) (Tympanic)  Resp 14  Ht 5' 3\" (1.6 m)  Wt 169 lb (76.7 kg)  SpO2 97%  Breastfeeding? No  BMI 29.94 kg/m2  Body mass index is 29.94 kg/(m^2).  GENERAL: healthy, alert, in no acute distress  HENT: Mucous mebranes moist.  SKIN: no suspicious lesions, no rashes  PSYCH: Alert and oriented times 3;  Able to articulate logical thoughts. Affect is normal.    Diagnostic test results:   Results for orders placed or performed in visit on 06/14/18 (from the past 24 hour(s))   UA reflex to Microscopic and Culture   Result Value Ref Range    Color Urine Yellow     " Appearance Urine Cloudy     Glucose Urine Negative NEG^Negative mg/dL    Bilirubin Urine Negative NEG^Negative    Ketones Urine Negative NEG^Negative mg/dL    Specific Gravity Urine 1.025 1.003 - 1.035    Blood Urine Small (A) NEG^Negative    pH Urine 5.5 5.0 - 7.0 pH    Protein Albumin Urine 30 (A) NEG^Negative mg/dL    Urobilinogen Urine 0.2 0.2 - 1.0 EU/dL    Nitrite Urine Positive (A) NEG^Negative    Leukocyte Esterase Urine Large (A) NEG^Negative    Source Midstream Urine    Urine Microscopic   Result Value Ref Range    WBC Urine 25-50 (A) OTO5^0 - 5 /HPF    RBC Urine 2-5 (A) OTO2^O - 2 /HPF    Squamous Epithelial /LPF Urine Moderate (A) FEW^Few /LPF    Bacteria Urine Moderate (A) NEG^Negative /HPF        ASSESSMENT/PLAN:                                                          ICD-10-CM    1. Acute cystitis with hematuria N30.01 nitroFURantoin, macrocrystal-monohydrate, (MACROBID) 100 MG capsule   2. Dysuria R30.0 UA reflex to Microscopic and Culture     Urine Microscopic     Will call if urine culture recommends antibiotic change.      Please see patient instructions for treatment details.    Follow up in 7-10 days if not improving as anticipated.    Leesa Bellamy PA-C  [facility]

## 2018-06-14 NOTE — MR AVS SNAPSHOT
After Visit Summary   6/14/2018    Sadaf Rubi    MRN: [id number]           Patient Information     Date Of Birth          1945        Visit Information        Provider Department      6/14/2018 9:30 AM Leesa Bellamy PA-C [facility]        Today's Diagnoses     Acute cystitis with hematuria    -  1    Dysuria          Care Instructions    1. Start antibiotic today.  2. Increase fluids to help flush urinary tract.    3. Take ibuprofen (600mg every 6 hours with food or water) or tylenol (500mg every 6 hours) for pain.   4. OTC urinary pain reliever Azo/Uristat can be used to prevent discomfort. It changes the color of the urine (usually to orange or red) and may stain fabric. Do not use for longer than 48 hours since symptoms should have cleared by this time once antibiotics have been started.  If you are still having severe symptoms, you need to follow up with the clinic.  5. When culture results return, we will call and change your medication if needed.  Follow up immediately if you start to have high fever, severe back pain, nausea or vomiting.  To prevent future infections:  Drink plenty of water.   Do not delay urinating when you feel the need to urinate.   Keep the vaginal area clean. Wipe from front to back after using the toilet. Be sure to gently wash the genital area each time you bathe or shower. However, use soap only on the outside of your vagina. The chemicals in soap may cause additional irritation.   Urinate after intercourse. Never combine anal and vaginal intercourse.   Wear cotton underwear, which allows better air circulation than nylon. Wear pantyhose with cotton crotches.   Avoid tight clothes in the genital area, such as control-top pantyhose and tight jeans. Do not wear a wet bathing suit for long periods of time.               Follow-ups after your visit        Your next 10 appointments already scheduled     Jun 18, 2018   1:20 PM CDT   [facility] Spine with Mago [place], PT   [facility] ([facility] [place]  )    [address] [place], [place] [id number]  [place] [address] [phone]            Jun 25, 2018  5:10 PM CDT   JUSTIN Spine with Mago [place], PT   [facility] ([facility] [place]  )    [address] [place], [place] [id number]  [place] [address] [phone]            Jul 02, 2018 10:40 AM CDT   JUSTIN Spine with Mago [place], PT   [facility] ([facility] [place]  )    [address] [place], [place] [id number]  [place] [address] [phone]            Jul 09, 2018 11:20 AM CDT   [facility] Spine with Katiana [place], PT   [facility] ([facility] [place]  )    [address] [place], [place] [id number]  [place] [address] [phone]            Jul 16, 2018 10:40 AM CDT   JUSTIN Spine with Mago [place], PT   [facility] ([facility] [place]  )    [address] [place], [place] [id number]  [place] [address] [phone]            Jul 23, 2018 10:40 AM CDT   JUSTIN Spine with Mago [place], PT   [facility] ([facility] [place]  )    [address] [place], [place] [id number]  [place] [address] [phone]            Jul 30, 2018 10:40 AM CDT   JUSTIN Spine with Mago [place], PT   [facility] ([facility] [place]  )    [address] [place], [place] [id number]  [facility] [address] [phone]            Aug 06, 2018 10:40 AM CDT   JUSTIN Spine with Mago [place], PT   [facility] ([facility] [place]  )    [address] [place], [place] [id number]  [place] [address] [phone]            Aug 13, 2018 10:40 AM CDT   JUSTIN Spine with Mago [place], PT   [facility] [place] [facility] ([facility] [place]  )    [address] [facility] [address]  [place] [address] [phone]              Who to contact     If you  "have questions or need follow up information about today's clinic visit or your schedule please contact [facility] directly at [phone].  Normal or non-critical lab and imaging results will be communicated to you by MyChart, letter or phone within 4 business days after the clinic has received the results. If you do not hear from us within 7 days, please contact the clinic through MyChart or phone. If you have a critical or abnormal lab result, we will notify you by phone as soon as possible.  Submit refill requests through [employer] or call your pharmacy and they will forward the refill request to us. Please allow 3 business days for your refill to be completed.          Additional Information About Your Visit        Care EveryWhere ID     This is your Care EveryWhere ID. This could be used by other organizations to access your [facility] medical records  [id number]        Your Vitals Were     Pulse Temperature Respirations Height Pulse Oximetry Breastfeeding?    60 99.6  F (37.6  C) (Tympanic) 14 5' 3\" (1.6 m) 97% No    BMI (Body Mass Index)                   29.94 kg/m2            Blood Pressure from Last 3 Encounters:   06/14/18 110/72   06/04/18 136/77   05/14/18 124/68    Weight from Last 3 Encounters:   06/14/18 169 lb (76.7 kg)   05/14/18 169 lb (76.7 kg)   05/02/18 169 lb (76.7 kg)              We Performed the Following     UA reflex to Microscopic and Culture     Urine Microscopic          Today's Medication Changes          These changes are accurate as of 6/14/18 10:14 AM.  If you have any questions, ask your nurse or doctor.               Start taking these medicines.        Dose/Directions    nitroFURantoin (macrocrystal-monohydrate) 100 MG capsule   Commonly known as:  MACROBID   Used for:  Acute cystitis with hematuria   Started by:  Leesa Bellamy PA-C        Dose:  100 mg   Take 1 capsule (100 mg) by mouth 2 times daily   Quantity:  14 capsule "   Refills:  0         These medicines have changed or have updated prescriptions.        Dose/Directions    escitalopram 10 MG tablet   Commonly known as:  LEXAPRO   This may have changed:  See the new instructions.   Used for:  SHAWNEE (generalized anxiety disorder)        TAKE 1 TABLET (10 MG) BY MOUTH DAILY   Quantity:  90 tablet   Refills:  2            Where to get your medicines      These medications were sent to [facility] [address]  [address] [place] [address]     Phone:  [phone]     nitroFURantoin (macrocrystal-monohydrate) 100 MG capsule                Primary Care Provider Office Phone # Fax #    Sean Velasquez [phone]       [address] [facility] [address]        Equal Access to Services     JESSICA MCCULLOUGH AH: Hadii joellen mcintyre hadasho Soomaali, waaxda luqadaha, qaybta kaalmada adeegyada, waxay idiin haydebbie trevizo . So [facility] [phone].    ATENCIÓN: Si habla español, tiene a agrawal disposición servicios gratuitos de asistencia lingüística. Llame al [phone].    We comply with applicable federal civil rights laws and [place] laws. We do not discriminate on the basis of race, color, national origin, age, disability, sex, sexual orientation, or gender identity.            Thank you!     Thank you for choosing [facility]  for your care. Our goal is always to provide you with excellent care. Hearing back from our patients is one way we can continue to improve our services. Please take a few minutes to complete the written survey that you may receive in the mail after your visit with us. Thank you!             Your Updated Medication List - Protect others around you: Learn how to safely use, store and throw away your medicines at www.disposemymeds.org.          This list is accurate as of 6/14/18 10:14 AM.  Always use your most recent med list.                   Brand Name Dispense  Instructions for use Diagnosis    acetaminophen-codeine 300-30 MG per tablet    TYLENOL #3    30 tablet    TAKE ONE TABLET BY MOUTH TWICE A DAY AS NEEDED    Episodic tension-type headache, not intractable       amitriptyline 25 MG tablet    ELAVIL    90 tablet    Take 1 tablet (25 mg) by mouth At Bedtime    Insomnia, unspecified type, Depressive disorder       amLODIPine 5 MG tablet    NORVASC    90 tablet    Take 1 tablet (5 mg) by mouth daily    Hypertension goal BP (blood pressure) < 140/80       aspirin 81 MG tablet     120 tablet    Take 1 tablet (81 mg) by mouth daily        atenolol 50 MG tablet    TENORMIN    90 tablet    Take 1 tablet (50 mg) by mouth daily    Hypertension goal BP (blood pressure) < 140/80       cyanocolbalamin 500 MCG tablet    vitamin  B-12     Take 1 tablet by mouth daily.        diazepam 5 MG tablet    VALIUM    30 tablet    TAKE ONE TABLET BY MOUTH EVERY DAY AS NEEDED ANXIETY    SHAWNEE (generalized anxiety disorder)       escitalopram 10 MG tablet    LEXAPRO    90 tablet    TAKE 1 TABLET (10 MG) BY MOUTH DAILY    SHAWNEE (generalized anxiety disorder)       fluticasone 50 MCG/ACT spray    FLONASE    1 Bottle    Spray 1-2 sprays into both nostrils daily    Chronic vasomotor rhinitis       levothyroxine 75 MCG tablet    SYNTHROID/LEVOTHROID    90 tablet    TAKE 1 TABLET (75 MCG) BY MOUTH DAILY    Hypothyroidism, unspecified type       nitroFURantoin (macrocrystal-monohydrate) 100 MG capsule    MACROBID    14 capsule    Take 1 capsule (100 mg) by mouth 2 times daily    Acute cystitis with hematuria       polyethylene glycol powder    MIRALAX/GLYCOLAX    527 g    TAKE 17 G BY MOUTH 2     TIMES DAILY    Slow transit constipation       pravastatin 40 MG tablet    PRAVACHOL    90 tablet    TAKE 1 TABLET (40 MG) BY MOUTH DAILY    Hyperlipidemia with target LDL less than 100       vitamin D 2000 units tablet     100 tablet    Take 2,000 Units by mouth daily

## 2018-06-17 LAB
BACTERIA SPEC CULT: ABNORMAL
SPECIMEN SOURCE: ABNORMAL

## 2018-06-18 ENCOUNTER — TELEPHONE (OUTPATIENT)
Dept: FAMILY MEDICINE | Facility: CLINIC | Age: 73
End: 2018-06-18

## 2018-06-18 ENCOUNTER — THERAPY VISIT (OUTPATIENT)
Dept: PHYSICAL THERAPY | Facility: CLINIC | Age: 73
End: 2018-06-18
Payer: COMMERCIAL

## 2018-06-18 DIAGNOSIS — M54.50 LEFT-SIDED LOW BACK PAIN WITHOUT SCIATICA: ICD-10-CM

## 2018-06-18 DIAGNOSIS — N30.01 ACUTE CYSTITIS WITH HEMATURIA: Primary | ICD-10-CM

## 2018-06-18 DIAGNOSIS — M54.9 UPPER BACK PAIN: Primary | ICD-10-CM

## 2018-06-18 PROCEDURE — 97112 NEUROMUSCULAR REEDUCATION: CPT | Mod: GP | Performed by: PHYSICAL THERAPIST

## 2018-06-18 PROCEDURE — 97110 THERAPEUTIC EXERCISES: CPT | Mod: GP | Performed by: PHYSICAL THERAPIST

## 2018-06-18 PROCEDURE — 97140 MANUAL THERAPY 1/> REGIONS: CPT | Mod: GP | Performed by: PHYSICAL THERAPIST

## 2018-06-18 RX ORDER — SULFAMETHOXAZOLE/TRIMETHOPRIM 800-160 MG
1 TABLET ORAL 2 TIMES DAILY
Qty: 20 TABLET | Refills: 0 | Status: SHIPPED | OUTPATIENT
Start: 2018-06-18 | End: 2019-02-18

## 2018-06-18 NOTE — TELEPHONE ENCOUNTER
Reason for Call:  Form, our goal is to have forms completed with 72 hours, however, some forms may require a visit or additional information.    Type of letter, form or note:  medical    Who is the form from?: Home care    Where did the form come from: form was faxed in    What clinic location was the form placed at?: [facility]    Where the form was placed: 's Box: Sean Velasquez MD    What number is listed as a contact on the form?: [phone]  Phone  [phone]       Additional comments: [facility]     Call taken on 6/18/2018 at 1:47 PM by Dede Devine

## 2018-06-18 NOTE — TELEPHONE ENCOUNTER
Pt called me back after I left her a VM about her urine culture results. She states that she feels like she is still having sxs of UTI and that she thinks the macrobid is not working. Please advise.

## 2018-06-18 NOTE — TELEPHONE ENCOUNTER
Called pt and let her know that a new Rx for bactrim was sent to her pharmacy and that she can stop the macrobid.

## 2018-06-22 ENCOUNTER — APPOINTMENT (OUTPATIENT)
Dept: MRI IMAGING | Facility: CLINIC | Age: 73
End: 2018-06-22
Attending: EMERGENCY MEDICINE
Payer: COMMERCIAL

## 2018-06-22 ENCOUNTER — HOSPITAL ENCOUNTER (EMERGENCY)
Facility: CLINIC | Age: 73
Discharge: HOME | End: 2018-06-23
Attending: EMERGENCY MEDICINE | Admitting: EMERGENCY MEDICINE
Payer: COMMERCIAL

## 2018-06-22 ENCOUNTER — APPOINTMENT (OUTPATIENT)
Dept: CT IMAGING | Facility: CLINIC | Age: 73
End: 2018-06-22
Attending: EMERGENCY MEDICINE
Payer: COMMERCIAL

## 2018-06-22 ENCOUNTER — TELEPHONE (OUTPATIENT)
Dept: FAMILY MEDICINE | Facility: CLINIC | Age: 73
End: 2018-06-22

## 2018-06-22 DIAGNOSIS — R25.1 TREMOR: ICD-10-CM

## 2018-06-22 DIAGNOSIS — G25.3 MYOCLONUS: ICD-10-CM

## 2018-06-22 LAB
ALBUMIN SERPL-MCNC: 4.3 G/DL (ref 3.4–5)
ALBUMIN UR-MCNC: NEGATIVE MG/DL
ALP SERPL-CCNC: 69 U/L (ref 40–150)
ALT SERPL W P-5'-P-CCNC: 25 U/L (ref 0–50)
ANION GAP SERPL CALCULATED.3IONS-SCNC: 9 MMOL/L (ref 3–14)
APPEARANCE UR: CLEAR
AST SERPL W P-5'-P-CCNC: 19 U/L (ref 0–45)
BASOPHILS # BLD AUTO: 0.1 10E9/L (ref 0–0.2)
BASOPHILS NFR BLD AUTO: 0.7 %
BILIRUB SERPL-MCNC: 0.3 MG/DL (ref 0.2–1.3)
BILIRUB UR QL STRIP: NEGATIVE
BUN SERPL-MCNC: 16 MG/DL (ref 7–30)
CALCIUM SERPL-MCNC: 9.6 MG/DL (ref 8.5–10.1)
CHLORIDE SERPL-SCNC: 105 MMOL/L (ref 94–109)
CK SERPL-CCNC: 97 U/L (ref 30–225)
CO2 SERPL-SCNC: 23 MMOL/L (ref 20–32)
COLOR UR AUTO: NORMAL
CREAT SERPL-MCNC: 0.92 MG/DL (ref 0.52–1.04)
CRP SERPL-MCNC: <2.9 MG/L (ref 0–8)
DIFFERENTIAL METHOD BLD: NORMAL
EOSINOPHIL # BLD AUTO: 0.2 10E9/L (ref 0–0.7)
EOSINOPHIL NFR BLD AUTO: 2.7 %
ERYTHROCYTE [DISTWIDTH] IN BLOOD BY AUTOMATED COUNT: 13.4 % (ref 10–15)
ERYTHROCYTE [SEDIMENTATION RATE] IN BLOOD BY WESTERGREN METHOD: 8 MM/H (ref 0–30)
GFR SERPL CREATININE-BSD FRML MDRD: 60 ML/MIN/1.7M2
GLUCOSE SERPL-MCNC: 108 MG/DL (ref 70–99)
GLUCOSE UR STRIP-MCNC: NEGATIVE MG/DL
HCT VFR BLD AUTO: 42.1 % (ref 35–47)
HGB BLD-MCNC: 14.5 G/DL (ref 11.7–15.7)
HGB UR QL STRIP: NEGATIVE
IMM GRANULOCYTES # BLD: 0 10E9/L (ref 0–0.4)
IMM GRANULOCYTES NFR BLD: 0.2 %
KETONES UR STRIP-MCNC: NEGATIVE MG/DL
LEUKOCYTE ESTERASE UR QL STRIP: NEGATIVE
LYMPHOCYTES # BLD AUTO: 3 10E9/L (ref 0.8–5.3)
LYMPHOCYTES NFR BLD AUTO: 35.5 %
MAGNESIUM SERPL-MCNC: 2.3 MG/DL (ref 1.6–2.3)
MCH RBC QN AUTO: 30.9 PG (ref 26.5–33)
MCHC RBC AUTO-ENTMCNC: 34.4 G/DL (ref 31.5–36.5)
MCV RBC AUTO: 90 FL (ref 78–100)
MONOCYTES # BLD AUTO: 0.7 10E9/L (ref 0–1.3)
MONOCYTES NFR BLD AUTO: 8.4 %
NEUTROPHILS # BLD AUTO: 4.5 10E9/L (ref 1.6–8.3)
NEUTROPHILS NFR BLD AUTO: 52.5 %
NITRATE UR QL: NEGATIVE
NRBC # BLD AUTO: 0 10*3/UL
NRBC BLD AUTO-RTO: 0 /100
PH UR STRIP: 5.5 PH (ref 5–7)
PHOSPHATE SERPL-MCNC: 3.6 MG/DL (ref 2.5–4.5)
PLATELET # BLD AUTO: 204 10E9/L (ref 150–450)
POTASSIUM SERPL-SCNC: 4.5 MMOL/L (ref 3.4–5.3)
PROT SERPL-MCNC: 7.8 G/DL (ref 6.8–8.8)
RBC # BLD AUTO: 4.7 10E12/L (ref 3.8–5.2)
SODIUM SERPL-SCNC: 137 MMOL/L (ref 133–144)
SOURCE: NORMAL
SP GR UR STRIP: 1 (ref 1–1.03)
UROBILINOGEN UR STRIP-MCNC: NORMAL MG/DL (ref 0–2)
WBC # BLD AUTO: 8.9 10E9/L (ref 4–11)

## 2018-06-22 PROCEDURE — 99285 EMERGENCY DEPT VISIT HI MDM: CPT | Mod: 25

## 2018-06-22 PROCEDURE — 82550 ASSAY OF CK (CPK): CPT | Performed by: EMERGENCY MEDICINE

## 2018-06-22 PROCEDURE — 85025 COMPLETE CBC W/AUTO DIFF WBC: CPT | Performed by: EMERGENCY MEDICINE

## 2018-06-22 PROCEDURE — 80053 COMPREHEN METABOLIC PANEL: CPT | Performed by: EMERGENCY MEDICINE

## 2018-06-22 PROCEDURE — 86140 C-REACTIVE PROTEIN: CPT | Performed by: EMERGENCY MEDICINE

## 2018-06-22 PROCEDURE — 85652 RBC SED RATE AUTOMATED: CPT | Performed by: EMERGENCY MEDICINE

## 2018-06-22 PROCEDURE — 81003 URINALYSIS AUTO W/O SCOPE: CPT | Performed by: EMERGENCY MEDICINE

## 2018-06-22 PROCEDURE — 25000132 ZZH RX MED GY IP 250 OP 250 PS 637: Performed by: EMERGENCY MEDICINE

## 2018-06-22 PROCEDURE — 72146 MRI CHEST SPINE W/O DYE: CPT

## 2018-06-22 PROCEDURE — 70450 CT HEAD/BRAIN W/O DYE: CPT

## 2018-06-22 PROCEDURE — 70553 MRI BRAIN STEM W/O & W/DYE: CPT

## 2018-06-22 PROCEDURE — 96360 HYDRATION IV INFUSION INIT: CPT

## 2018-06-22 PROCEDURE — 96361 HYDRATE IV INFUSION ADD-ON: CPT

## 2018-06-22 PROCEDURE — 25000128 H RX IP 250 OP 636: Performed by: EMERGENCY MEDICINE

## 2018-06-22 PROCEDURE — 83735 ASSAY OF MAGNESIUM: CPT | Performed by: EMERGENCY MEDICINE

## 2018-06-22 PROCEDURE — 72141 MRI NECK SPINE W/O DYE: CPT

## 2018-06-22 PROCEDURE — 84100 ASSAY OF PHOSPHORUS: CPT | Performed by: EMERGENCY MEDICINE

## 2018-06-22 PROCEDURE — 72148 MRI LUMBAR SPINE W/O DYE: CPT

## 2018-06-22 RX ORDER — DIAZEPAM 5 MG
5 TABLET ORAL ONCE
Status: COMPLETED | OUTPATIENT
Start: 2018-06-22 | End: 2018-06-22

## 2018-06-22 RX ADMIN — SODIUM CHLORIDE 1000 ML: 9 INJECTION, SOLUTION INTRAVENOUS at 19:15

## 2018-06-22 RX ADMIN — DIAZEPAM 5 MG: 5 TABLET ORAL at 23:47

## 2018-06-22 RX ADMIN — DIAZEPAM 5 MG: 5 TABLET ORAL at 21:38

## 2018-06-22 ASSESSMENT — ENCOUNTER SYMPTOMS
WEAKNESS: 0
FEVER: 0
NAUSEA: 1
LIGHT-HEADEDNESS: 0
TREMORS: 1
VOMITING: 0
NUMBNESS: 0

## 2018-06-22 NOTE — ED AVS SNAPSHOT
Emergency Department    [address] [place] [address]    Phone:  [phone]    Fax:  [phone]                                       Sadaf Rubi   MRN: [id number]    Department:   Emergency Department   Date of Visit:  6/22/2018           After Visit Summary Signature Page     I have received my discharge instructions, and my questions have been answered. I have discussed any challenges I see with this plan with the nurse or doctor.    ..........................................................................................................................................  Patient/Patient Representative Signature      ..........................................................................................................................................  Patient Representative Print Name and Relationship to Patient    ..................................................               ................................................  Date                                            Time    ..........................................................................................................................................  Reviewed by Signature/Title    ...................................................              ..............................................  Date                                                            Time

## 2018-06-22 NOTE — ED PROVIDER NOTES
"  History     Chief Complaint:  Tremors     HPI   Sadaf Rubi is a 72 year old female who presents with her  to the ED for evaluation of tremors. The patient reports she developed a left hand tremor a few months ago which have increased in frequency. The patient notes she recently developed feet tremors. The tremors would come approximately every 5-6 minutes. She states that her left hand and left leg will \"jump around\" uncontrollably.  She has difficulty walking due to the tremors. She also feels nauseous. The patient report she was recently prescribed Bactrim for a UTI and has taken 3 doses. It seems her tremors have worsened. Her urinary symptoms have improved however she notes that she is now having difficulty emptying her bladder fully. The patient denies any fever, lightheadedness, vision changes, vomiting, numbness, tingling, or weakness. The  denies any speech changes. She notes that her legs do not feel weak. She denies other numbness, tingling, or weakness in the body. She has never seen a neurologist.     Allergies:  Ciprofloxacin  Influenza live vaccine  Penicillins      Medications:    Elavil  Norvasc   Aspirin  81mg  Atenolol  Vitamin D  Vitamin B12  Valium  Lexapro   Levothyroxine   Miralax/Glycolax   Pravastatin   Bactrim 800-160mg     Past Medical History:    Depression  Insomnia  PVC  Spider veins  Hypothyroidism   HTN  Dysthymic disorder  Osteopenia  Migraines   Anxiety  Anemia  Pelvic somatic dysfunction  Chronic back pain     Past Surgical History:    SAMANTHA-BSO  Bilateral knee meniscus surgery  Bilateral TKA  Breast lumpectomy  Urethra surgery     Family History:    Breast cancer  CAD  Factor V Leiden     Social History:  Smoking status: Never smoker   Alcohol use: Yes   Presents to ED with     Marital Status:   [2]     Review of Systems   Constitutional: Negative for fever.   Eyes: Negative for visual disturbance.   Gastrointestinal: Positive for nausea. " "Negative for vomiting.   Musculoskeletal: Positive for gait problem.   Neurological: Positive for tremors. Negative for weakness, light-headedness and numbness.   All other systems reviewed and are negative.    Physical Exam     Patient Vitals for the past 24 hrs:   BP Temp Temp src Pulse Resp SpO2 Height Weight   06/22/18 2137 - - - - - 92 % - -   06/22/18 2136 125/76 - - - - - - -   06/22/18 1618 151/82 98.7  F (37.1  C) Oral 72 22 97 % 1.6 m (5' 3\") 76.2 kg (168 lb)     Physical Exam  General: Well appearing, nontoxic. Resting comfortably  Head:  Scalp, face, and head appear normal  Eyes:  Pupils are equal, round, and reactive to light. EOMI, no nystagmus     Conjunctivae non-injected and sclerae white  ENT:    The external nose is normal    Pinnae are normal    The oropharynx is normal, mucous membranes moist    Posterior pharynx clear without swelling, exudates or erythema     Uvula is in the midline  Neck:  Normal range of motion    There is no rigidity noted    Trachea is in the midline  CV:  Regular rate and rhythm     Normal S1/S2, no S3/S4    No murmur or rub. Radial pulses 2+ bilaterally DP pulses 2+ and intact bilaterally.   Resp:  Lungs are clear and equal bilaterally    There is no tachypnea    No increased work of breathing    No rales, wheezing, or rhonchi  GI:  Abdomen is soft, no rigidity or guarding    No distension, or mass    No tenderness or rebound tenderness   MS:  Normal muscular tone    Symmetric motor strength    No lower extremity edema  Skin:  No rash or acute skin lesions noted  Neuro: A&Ox3, GCS 15    CN II - XII intact    Speech clear, fluent, and normal    Strength 5/5 and symmetric in bilateral upper and lower extremities.    No pronator drift. No leg drift. SILT throughout.    patellar reflexes 3+ and symmetric, bilateral ankle clonus and significant tremor elicited in the large muscle groups of the bilateral lower extremities with ROM or reflex testing.    FTN testing normal. No " "significant resting tremor in the bilateral upper extremities.     No meningismus   Psych:  Normal affect.  Appropriate interactions.     Emergency Department Course     Imaging:  Radiographic findings were communicated with the patient and family who voiced understanding of the findings.    CT Head w/o Contrast  IMPRESSION:     1. No evidence of acute intracranial hemorrhage, mass, or herniation.  2. Nonspecific white matter changes likely due to chronic microvessel  ischemic disease.  As read by Radiology.     MR Brain w/o & w Contrast  Pending.     Cervical Spine MRI w/o Contrast  Pending.     Thoracic Spine MRI w/o Contrast  Pending.     Lumbar Spine MRI w/o Contrast  Pending.     Laboratory:  UA: Negative     CK total: 97  CRP inflammation: <2.9  Erythrocyte sed rate auto: 8    Magnesium: 2.3  Phosphorus: 3.6    CBC: o/w WNL (WBC 8.9, HGB 14.5, )  CMP: Glucose 108(H), GFR estimate 60(L), o/w WNL (Creatinine 0.92)     Interventions:  1915: NS 1L Bolus IV  2138: Valium 5mg PO     Emergency Department Course:  Past medical records, nursing notes, and vitals reviewed.  1858: I performed an exam of the patient and obtained history, as documented above.    IV inserted and blood drawn.    The patient was sent for a head CT while in the emergency department, findings above.    2113: I rechecked the patient. Explained findings to patient and .    2233: I rechecked the patient. Explained plan to patient and .    The patient was sent for a brain MRI, cervical spine MRI, thoracic spine MRI, and lumbar spine MRI while in the emergency department, findings above.    Patient signed out to my ED physician partner, Dr. Garcia     Impression & Plan      Medical Decision Making:  Sadaf Rubi is a 72 year old female who presents with concern for left sided \"muscle jumpiness\" difficulty with her gait, increasing urinary retention and sensory changes to her left upper and lower extremities.  On " examination, patient has hyperreflexia on the left side as well as left-sided ankle clonus and intermittent jerking tremors which are likely consistent with mild clonus concerning for possible upper motor neuron signs.  Workup in the emergency department including CMP, CBC, and UA was otherwise unremarkable.  Patient does have a long history of chronic back pain and degenerative disc disease and has recently had worsening of her back pain for which she has been trying physical therapy.  Differential diagnosis for her symptoms is broad and includes spinal cord compression, central nervous system lesion, demyelinating disease, or other peripheral motor neuron disease.  Patient has a history of recently treated UTI and is currently on Bactrim which appears to be controlling the infection as her UA today appears normal.  CT scan of the head did not reveal any significant findings.  Given concern for possible spinal lesion or demyelinating disease, MRI of the entire spine as well as brain was ordered.  Patient was signed out to my partner Dr. Garcia at 2230 pending this MRI.  Patient was given p.o. Valium to facilitate the MRI. Patient was signed out in stable condition.  If the MRI's do not reveal any acute process, I anticipate the patient will be discharged to home with close PCP and neurology follow-up.  If a central nervous system lesion is found, the patient will likely require admission with neurology/neurosurgical consultation.    Diagnosis:    ICD-10-CM    1. Myoclonus G25.3    2. Tremor R25.1        Disposition: Signed out to Dr. Garcia pending MRI.    Magda Clay  6/22/2018    EMERGENCY DEPARTMENT    Scribe Disclosure:  I, Magda Clay, am serving as a scribe at 6:58 PM on 6/22/2018 to document services personally performed by Rory Pena MD based on my observations and the provider's statements to me.          Rory Pena MD  06/23/18 0048

## 2018-06-22 NOTE — TELEPHONE ENCOUNTER
"Patient walked in clinic reporting weakness and shaking all over. Reports she normally has tremor on left hand but today she is\" shaking all over\" and admits sudden weakness of left leg. BP was checked 143/80, pulse 62 and Oxygen 97%. States she is unclear if symptoms are related to new antibiotic for UTI. Advised she seek care at ED. Her best friend is with her and agreed to take her to [facility] ED for further evaluation.  "

## 2018-06-22 NOTE — ED AVS SNAPSHOT
Emergency Department    [address] [facility] [address]    Phone:  [phone]    Fax:  [phone]                                       Sadaf Rubi   MRN: [id number]    Department:   Emergency Department   Date of Visit:  6/22/2018           Patient Information     Date Of Birth          1945        Your diagnoses for this visit were:     Myoclonus     Tremor        You were seen by Rory Pena MD and Rebeca Garcia MD.      Follow-up Information     Schedule an appointment as soon as possible for a visit with Neurology, [facility].    Contact information:    [address]  [facility] [id number] [phone]          Follow up with  Emergency Department.    Specialty:  EMERGENCY MEDICINE    Why:  If symptoms worsen    Contact information:    [address] [facility] [id number] [phone]        Schedule an appointment as soon as possible for a visit with Sean Velasquez MD.    Specialty:  Internal Medicine    Contact information:    [address]  [facility] [id number] [phone]          Discharge Instructions       Make appointment with neurology and primary care provider for follow-up        Your next 10 appointments already scheduled     Jun 25, 2018  5:10 PM CDT   [facility] Spine with Mago Galo PT   [place] [facility] ([facility] [place]  )    [address] [place], [place] [id number]  [facility] [address] [phone]            Jul 02, 2018 10:40 AM CDT   JUST[facility] Spine with Mago Galo PT   [facility] ([facility] [place]  )    [address] [place], [place] [address]  [facility] [address] [phone]            Jul 09, 2018 11:20 AM CDT   JUSTIN Spine with Katiana Peterson PT   [facility] ([facility] [place]  )    [address] [place], [place] [address]  [facility] [address] [phone]            Jul 16, 2018 10:40 AM CDT   JUSTIN  Spine with Mago Galo, PT   [place] [facility] ([facility] [place]  )    [address] [facility] [address]  [place] [address]   [phone]            Jul 23, 2018 10:40 AM CDT   JUSTIN Spine with Mago Galo, PT   [facility] [facility] ([facility] [place]  )    [address] [place], [place] [id number]  [place] [address]   [phone]            Jul 30, 2018 10:40 AM CDT   JUSTIN Spine with Mago Galo, PT   [place] [facility] ([facility] [place]  )    [address] [place], [place] [id number]  [place] [address]   [phone]            Aug 06, 2018 10:40 AM CDT   JUSTIN Spine with Mago Galo, PT   [place] [facility] ([facility] [place]  )    [address] [place], [place] [address]  [place] [address]   [phone]            Aug 13, 2018 10:40 AM CDT   JUSTIN Spine with Mago Galo, PT   [facility] ([facility] [place]  )    [address] [place], [place] [address]  [place] [address]   [phone]              24 Hour Appointment Hotline       To make an appointment at any [facility], call [phone] ([phone]). If you don't have a family doctor or clinic, we will help you find one. [facility] are conveniently located to serve the needs of you and your family.             Review of your medicines      Our records show that you are taking the medicines listed below. If these are incorrect, please call your family doctor or clinic.        Dose / Directions Last dose taken    acetaminophen-codeine 300-30 MG per tablet   Commonly known as:  TYLENOL #3   Quantity:  30 tablet        TAKE ONE TABLET BY MOUTH TWICE A DAY AS NEEDED   Refills:  3        amitriptyline 25 MG tablet   Commonly known as:  ELAVIL   Dose:  25 mg   Quantity:  90 tablet        Take 1 tablet (25 mg) by mouth At Bedtime   Refills:  3        amLODIPine 5 MG tablet   Commonly known as:  NORVASC   Dose:  5 mg    Quantity:  90 tablet        Take 1 tablet (5 mg) by mouth daily   Refills:  3        aspirin 81 MG tablet   Dose:  81 mg   Quantity:  120 tablet        Take 1 tablet (81 mg) by mouth daily   Refills:  0        atenolol 50 MG tablet   Commonly known as:  TENORMIN   Dose:  50 mg   Quantity:  90 tablet        Take 1 tablet (50 mg) by mouth daily   Refills:  4        cyanocolbalamin 500 MCG tablet   Commonly known as:  vitamin  B-12   Dose:  500 mcg        Take 1 tablet by mouth daily.   Refills:  0        diazepam 5 MG tablet   Commonly known as:  VALIUM   Quantity:  30 tablet        TAKE ONE TABLET BY MOUTH EVERY DAY AS NEEDED ANXIETY   Refills:  2        escitalopram 10 MG tablet   Commonly known as:  LEXAPRO   Quantity:  90 tablet        TAKE 1 TABLET (10 MG) BY MOUTH DAILY   Refills:  2        fluticasone 50 MCG/ACT spray   Commonly known as:  FLONASE   Dose:  1-2 spray   Quantity:  1 Bottle        Spray 1-2 sprays into both nostrils daily   Refills:  11        levothyroxine 75 MCG tablet   Commonly known as:  SYNTHROID/LEVOTHROID   Quantity:  90 tablet        TAKE 1 TABLET (75 MCG) BY MOUTH DAILY   Refills:  3        polyethylene glycol powder   Commonly known as:  MIRALAX/GLYCOLAX   Quantity:  527 g        TAKE 17 G BY MOUTH 2     TIMES DAILY   Refills:  11        pravastatin 40 MG tablet   Commonly known as:  PRAVACHOL   Quantity:  90 tablet        TAKE 1 TABLET (40 MG) BY MOUTH DAILY   Refills:  3        sulfamethoxazole-trimethoprim 800-160 MG per tablet   Commonly known as:  BACTRIM DS   Dose:  1 tablet   Quantity:  20 tablet        Take 1 tablet by mouth 2 times daily for 10 days   Refills:  0        vitamin D 2000 units tablet   Dose:  2000 Units   Quantity:  100 tablet        Take 2,000 Units by mouth daily   Refills:  3                Procedures and tests performed during your visit     CBC with platelets differential    CK total    CRP inflammation    CT Head w/o Contrast    Cervical spine MRI w/o  contrast    Comprehensive metabolic panel    Erythrocyte sedimentation rate auto    Lumbar spine MRI w/o contrast    MR Brain w/o & w Contrast    Magnesium    Peripheral IV catheter    Phosphorus    Thoracic spine MRI w/o contrast    UA reflex to Microscopic and Culture      Orders Needing Specimen Collection     None      Pending Results     Date and Time Order Name Status Description    6/22/2018 2233 MR Brain w/o & w Contrast In process     6/22/2018 2122 Lumbar spine MRI w/o contrast In process     6/22/2018 2122 Thoracic spine MRI w/o contrast In process     6/22/2018 2122 Cervical spine MRI w/o contrast In process             Pending Culture Results     No orders found for last 3 day(s).            Pending Results Instructions     If you had any lab results that were not finalized at the time of your Discharge, you can call the ED Lab Result RN at [phone]. You will be contacted by this team for any positive Lab results or changes in treatment. The nurses are available 7 days a week from 10A to 6:30P.  You can leave a message 24 hours per day and they will return your call.        Test Results From Your Hospital Stay        6/22/2018  8:17 PM      Component Results     Component Value Ref Range & Units Status    WBC 8.9 4.0 - 11.0 10e9/L Final    RBC Count 4.70 3.8 - 5.2 10e12/L Final    Hemoglobin 14.5 11.7 - 15.7 g/dL Final    Hematocrit 42.1 35.0 - 47.0 % Final    MCV 90 78 - 100 fl Final    MCH 30.9 26.5 - 33.0 pg Final    MCHC 34.4 31.5 - 36.5 g/dL Final    RDW 13.4 10.0 - 15.0 % Final    Platelet Count 204 150 - 450 10e9/L Final    Diff Method Automated Method  Final    % Neutrophils 52.5 % Final    % Lymphocytes 35.5 % Final    % Monocytes 8.4 % Final    % Eosinophils 2.7 % Final    % Basophils 0.7 % Final    % Immature Granulocytes 0.2 % Final    Nucleated RBCs 0 0 /100 Final    Absolute Neutrophil 4.5 1.6 - 8.3 10e9/L Final    Absolute Lymphocytes 3.0 0.8 - 5.3 10e9/L Final    Absolute Monocytes  0.7 0.0 - 1.3 10e9/L Final    Absolute Eosinophils 0.2 0.0 - 0.7 10e9/L Final    Absolute Basophils 0.1 0.0 - 0.2 10e9/L Final    Abs Immature Granulocytes 0.0 0 - 0.4 10e9/L Final    Absolute Nucleated RBC 0.0  Final         6/22/2018  8:05 PM      Component Results     Component Value Ref Range & Units Status    Sodium 137 133 - 144 mmol/L Final    Potassium 4.5 3.4 - 5.3 mmol/L Final    Chloride 105 94 - 109 mmol/L Final    Carbon Dioxide 23 20 - 32 mmol/L Final    Anion Gap 9 3 - 14 mmol/L Final    Glucose 108 (H) 70 - 99 mg/dL Final    Urea Nitrogen 16 7 - 30 mg/dL Final    Creatinine 0.92 0.52 - 1.04 mg/dL Final    GFR Estimate 60 (L) >60 mL/min/1.7m2 Final    Non  GFR Calc    GFR Estimate If Black 72 >60 mL/min/1.7m2 Final    African American GFR Calc    Calcium 9.6 8.5 - 10.1 mg/dL Final    Bilirubin Total 0.3 0.2 - 1.3 mg/dL Final    Albumin 4.3 3.4 - 5.0 g/dL Final    Protein Total 7.8 6.8 - 8.8 g/dL Final    Alkaline Phosphatase 69 40 - 150 U/L Final    ALT 25 0 - 50 U/L Final    AST 19 0 - 45 U/L Final         6/22/2018  8:03 PM      Component Results     Component Value Ref Range & Units Status    Color Urine Straw  Final    Appearance Urine Clear  Final    Glucose Urine Negative NEG^Negative mg/dL Final    Bilirubin Urine Negative NEG^Negative Final    Ketones Urine Negative NEG^Negative mg/dL Final    Specific Gravity Urine 1.005 1.003 - 1.035 Final    Blood Urine Negative NEG^Negative Final    pH Urine 5.5 5.0 - 7.0 pH Final    Protein Albumin Urine Negative NEG^Negative mg/dL Final    Urobilinogen mg/dL Normal 0.0 - 2.0 mg/dL Final    Nitrite Urine Negative NEG^Negative Final    Leukocyte Esterase Urine Negative NEG^Negative Final    Source Midstream Urine  Final         6/22/2018  8:01 PM      Component Results     Component Value Ref Range & Units Status    Magnesium 2.3 1.6 - 2.3 mg/dL Final         6/22/2018  8:05 PM      Component Results     Component Value Ref Range & Units  Status    Phosphorus 3.6 2.5 - 4.5 mg/dL Final         6/22/2018  9:16 PM      Narrative     CT SCAN OF THE HEAD WITHOUT CONTRAST   6/22/2018 8:52 PM     HISTORY: Involuntary arm and leg shaking, tremor, gait disturbance.     TECHNIQUE:  Axial images of the head and coronal reformations without  IV contrast material. Radiation dose for this scan was reduced using  automated exposure control, adjustment of the mA and/or kV according  to patient size, or iterative reconstruction technique.    COMPARISON: None.    FINDINGS: There is no evidence of intracranial hemorrhage, mass, acute  infarct or anomaly. The ventricles are normal in size, shape and  configuration. Patchy periventricular white matter hypodensities which  are nonspecific, but likely related to chronic microvascular ischemic  disease.    The visualized portions of the sinuses and mastoids appear normal. The  bony calvarium and bones of the skull base appear intact.         Impression     IMPRESSION:     1. No evidence of acute intracranial hemorrhage, mass, or herniation.  2. Nonspecific white matter changes likely due to chronic microvessel  ischemic disease.      GAIL JACK MD         6/22/2018  8:05 PM      Component Results     Component Value Ref Range & Units Status    CK Total 97 30 - 225 U/L Final         6/22/2018  8:05 PM      Component Results     Component Value Ref Range & Units Status    CRP Inflammation <2.9 0.0 - 8.0 mg/L Final         6/22/2018  8:15 PM      Component Results     Component Value Ref Range & Units Status    Sed Rate 8 0 - 30 mm/h Final         6/23/2018  1:02 AM      Result not yet available     Exam Ended         6/23/2018 12:17 AM      Result not yet available     Exam Ended         6/22/2018 11:52 PM      Result not yet available     Exam Ended         6/23/2018  1:47 AM      Result not yet available     Exam Ended                Clinical Quality Measure: Blood Pressure Screening     Your blood pressure was checked  while you were in the emergency department today. The last reading we obtained was  BP: (!) 144/102 . Please read the guidelines below about what these numbers mean and what you should do about them.  If your systolic blood pressure (the top number) is less than 120 and your diastolic blood pressure (the bottom number) is less than 80, then your blood pressure is normal. There is nothing more that you need to do about it.  If your systolic blood pressure (the top number) is 120-139 or your diastolic blood pressure (the bottom number) is 80-89, your blood pressure may be higher than it should be. You should have your blood pressure rechecked within a year by a primary care provider.  If your systolic blood pressure (the top number) is 140 or greater or your diastolic blood pressure (the bottom number) is 90 or greater, you may have high blood pressure. High blood pressure is treatable, but if left untreated over time it can put you at risk for heart attack, stroke, or kidney failure. You should have your blood pressure rechecked by a primary care provider within the next 4 weeks.  If your provider in the emergency department today gave you specific instructions to follow-up with your doctor or provider even sooner than that, you should follow that instruction and not wait for up to 4 weeks for your follow-up visit.        Thank you for choosing [facility]       Thank you for choosing [facility] for your care. Our goal is always to provide you with excellent care. Hearing back from our patients is one way we can continue to improve our services. Please take a few minutes to complete the written survey that you may receive in the mail after you visit with us. Thank you!        Care EveryWhere ID     This is your Care EveryWhere ID. This could be used by other organizations to access your [facility] medical records  [id number]        Equal Access to Services     JESSICA MCCULLOUGH AH: molly Cruz,  isha medrano ah. So [facility] [phone].    ATENCIÓN: Si habla buckañol, tiene a agrawal disposición servicios gratuitos de asistencia lingüística. Llame al [phone].    We comply with applicable federal civil rights laws and [place] laws. We do not discriminate on the basis of race, color, national origin, age, disability, sex, sexual orientation, or gender identity.            After Visit Summary       This is your record. Keep this with you and show to your community pharmacist(s) and doctor(s) at your next visit.

## 2018-06-23 VITALS
SYSTOLIC BLOOD PRESSURE: 142 MMHG | BODY MASS INDEX: 29.77 KG/M2 | DIASTOLIC BLOOD PRESSURE: 78 MMHG | WEIGHT: 168 LBS | OXYGEN SATURATION: 97 % | HEIGHT: 63 IN | TEMPERATURE: 98.7 F | HEART RATE: 69 BPM | RESPIRATION RATE: 20 BRPM

## 2018-06-23 PROCEDURE — 25000128 H RX IP 250 OP 636: Performed by: EMERGENCY MEDICINE

## 2018-06-23 PROCEDURE — A9585 GADOBUTROL INJECTION: HCPCS | Performed by: EMERGENCY MEDICINE

## 2018-06-23 PROCEDURE — 72148 MRI LUMBAR SPINE W/O DYE: CPT

## 2018-06-23 PROCEDURE — 72141 MRI NECK SPINE W/O DYE: CPT

## 2018-06-23 PROCEDURE — 70553 MRI BRAIN STEM W/O & W/DYE: CPT

## 2018-06-23 PROCEDURE — 72146 MRI CHEST SPINE W/O DYE: CPT

## 2018-06-23 RX ORDER — GADOBUTROL 604.72 MG/ML
7 INJECTION INTRAVENOUS ONCE
Status: COMPLETED | OUTPATIENT
Start: 2018-06-23 | End: 2018-06-23

## 2018-06-23 RX ADMIN — GADOBUTROL 7 ML: 604.72 INJECTION INTRAVENOUS at 02:11

## 2018-06-23 NOTE — ED PROVIDER NOTES
Patient signed out to me by Dr. Pena pending MRI results. If negative, home with neurology follow-up    Lumbar Spine MRI  No urgent findings. Degenerative changes. Disc bulging at multiple levels most prominent at L4/5 and L3/4 - greater on the right. Mild central canal narrowing at these levels. Final report to follow.  Reading per radiology.     Thoracic Spine MRI  Nothing urgent. Degenerative changes and mild disc bulging at some levels. No significant spinal stenosis. Final report to follow.   Reading per radiology.     Cervical Spine MRI  Nothing urgent. Degenerative changes. Posterior ridging/disc bulging and mild central canal narrowing at several levels. Final report to follow.   Reading per radiology.     Head MRI without and with IV Contrast  Conclusion:  1. No acute infarct, mass lesions or hemorrhage.   2. Age-related changes as detailed above.   3. Moderate bilateral mastoid effusions.   Reading per radiology.     No cause of symptoms found on MRI brain/spine. Discussed with patient. Recommended f/u with PCP and neurology as recommended by Dr. Pena's plan.     Rebeca Garcia MD  06/23/18 4585

## 2018-06-24 DIAGNOSIS — E78.5 HYPERLIPIDEMIA WITH TARGET LDL LESS THAN 100: ICD-10-CM

## 2018-06-25 NOTE — TELEPHONE ENCOUNTER
"Requested Prescriptions   Pending Prescriptions Disp Refills     pravastatin (PRAVACHOL) 40 MG tablet [Pharmacy Med Name: PRAVASTATIN 40MG TAB 40 TAB]  Last Written Prescription Date:  6/5/2017  Last Fill Quantity: 90 tablet,  # refills: 3   Last Office Visit  6/14/2018        with  [facility], [facility] or [facility] prescribing provider:     Future Office Visit:    Next 5 appointments (look out 90 days)     Jul 02, 2018  2:00 PM CDT   Office Visit with Sean Velasquez MD   [facility] ([facility]    [address] [place] [address] [phone]                90 tablet 3     Sig: TAKE 1 TABLET (40 MG) BY MOUTH DAILY    Statins Protocol Passed    6/24/2018  1:05 PM       Passed - LDL on file in past 12 months    Recent Labs   Lab Test  05/02/18   1200   LDL  104*          Passed - No abnormal creatine kinase in past 12 months    Recent Labs   Lab Test  06/22/18   1930   CKT  97           Passed - Recent (12 mo) or future (30 days) visit within the authorizing provider's specialty    Patient had office visit in the last 12 months or has a visit in the next 30 days with authorizing provider or within the authorizing provider's specialty.  See \"Patient Info\" tab in inbasket, or \"Choose Columns\" in Meds & Orders section of the refill encounter.           Passed - Patient is age 18 or older       Passed - No active pregnancy on record       Passed - No positive pregnancy test in past 12 months          "

## 2018-06-27 RX ORDER — PRAVASTATIN SODIUM 40 MG
TABLET ORAL
Qty: 90 TABLET | Refills: 0 | Status: SHIPPED | OUTPATIENT
Start: 2018-06-27 | End: 2018-09-22

## 2018-07-02 ENCOUNTER — OFFICE VISIT (OUTPATIENT)
Dept: FAMILY MEDICINE | Facility: CLINIC | Age: 73
End: 2018-07-02
Payer: COMMERCIAL

## 2018-07-02 ENCOUNTER — THERAPY VISIT (OUTPATIENT)
Dept: PHYSICAL THERAPY | Facility: CLINIC | Age: 73
End: 2018-07-02
Payer: COMMERCIAL

## 2018-07-02 VITALS
DIASTOLIC BLOOD PRESSURE: 60 MMHG | RESPIRATION RATE: 20 BRPM | TEMPERATURE: 97.8 F | SYSTOLIC BLOOD PRESSURE: 122 MMHG | HEIGHT: 63 IN | OXYGEN SATURATION: 98 % | HEART RATE: 61 BPM | WEIGHT: 167 LBS | BODY MASS INDEX: 29.59 KG/M2

## 2018-07-02 DIAGNOSIS — R29.818 FINE MOTOR IMPAIRMENT: ICD-10-CM

## 2018-07-02 DIAGNOSIS — R26.89 BALANCE PROBLEMS: ICD-10-CM

## 2018-07-02 DIAGNOSIS — M54.9 UPPER BACK PAIN: ICD-10-CM

## 2018-07-02 DIAGNOSIS — M54.50 LEFT-SIDED LOW BACK PAIN WITHOUT SCIATICA: ICD-10-CM

## 2018-07-02 DIAGNOSIS — F41.1 GAD (GENERALIZED ANXIETY DISORDER): ICD-10-CM

## 2018-07-02 DIAGNOSIS — R25.1 TREMOR: Primary | ICD-10-CM

## 2018-07-02 DIAGNOSIS — R29.898 FINE MOTOR IMPAIRMENT: ICD-10-CM

## 2018-07-02 PROCEDURE — 99213 OFFICE O/P EST LOW 20 MIN: CPT | Performed by: INTERNAL MEDICINE

## 2018-07-02 PROCEDURE — 97112 NEUROMUSCULAR REEDUCATION: CPT | Mod: GP | Performed by: PHYSICAL THERAPIST

## 2018-07-02 PROCEDURE — 97110 THERAPEUTIC EXERCISES: CPT | Mod: GP | Performed by: PHYSICAL THERAPIST

## 2018-07-02 PROCEDURE — 97140 MANUAL THERAPY 1/> REGIONS: CPT | Mod: GP | Performed by: PHYSICAL THERAPIST

## 2018-07-02 NOTE — MR AVS SNAPSHOT
After Visit Summary   7/2/2018    Sadaf Rubi    MRN: [id number]           Patient Information     Date Of Birth          1945        Visit Information        Provider Department      7/2/2018 2:00 PM Sean Velasquez MD [facility]        Today's Diagnoses     Tremor    -  1    Balance problems        Fine motor impairment        SHAWNEE (generalized anxiety disorder)          Care Instructions    Let's plan a neurology consult.                          Follow-ups after your visit        Additional Services     NEUROLOGY ADULT REFERRAL       Your provider has referred you for the following:   [place] [facility] [phone]   [email or web address]  [facility] [phone]   [email or web address]                                                       PLEASE SEE THIS PATIENT FOR A CONSULT.            SHE HAS SEVERAL NEUROLOGIC SYMPTOMS, INCLUDING TREMOR,ETC.               Please be aware that coverage of these services is subject to the terms and limitations of your health insurance plan.  Call member services at your health plan with any benefit or coverage questions.      Please bring the following with you to your appointment:    (1) Any X-Rays, CTs or MRIs which have been performed.  Contact the facility where they were done to arrange for  prior to your scheduled appointment.    (2) List of current medications  (3) This referral request   (4) Any documents/labs given to you for this referral                  Your next 10 appointments already scheduled     Jul 09, 2018 11:20 AM CDT   [facility] with Katiana Peterson PT   [place] [facility] ([facility]  )    [address] [place] [address] [address]  [facility] [address]   [phone]            Jul 16, 2018 10:40 AM CDT   [facility] with Mago  Clover, PT   [place] [facility] ([facility] [place]  )    [address]  [place] [address] [phone]            Jul 23, 2018 10:40 AM CDT   [facility] Spine with Mago Galo, PT   [place] [facility] ([place]  )    [address] [place] [address] [address]  [place] [address] [phone]            Jul 30, 2018 10:40 AM CDT   [facility] Spine with Mago Galo, PT   [facility] ([place]  )    [address] [place] [address] [address]  [place] [address] [phone]            Aug 06, 2018 10:40 AM CDT   [facility] Spine with Mago Galo, PT   [facility] ([place]  )    [address] [place] [address] [address]  [place] [address] [phone]            Aug 13, 2018 10:40 AM CDT   [facility] Spine with Mago Galo, PT   [facility] ([place]  )    [address] [place] [address] [address]  [place] [address] [phone]              Who to contact     If you have questions or need follow up information about today's clinic visit or your schedule please contact [facility] directly at [phone].  Normal or non-critical lab and imaging results will be communicated to you by MyChart, letter or phone within 4 business days after the clinic has received the results. If you do not hear from us within 7 days, please contact the clinic through MyChart or phone. If you have a critical or abnormal lab result, we will notify you by phone as soon as possible.  Submit refill requests through [employer] or call your pharmacy and they will forward the refill request to us. Please allow 3 business days for your refill to be completed.          Additional Information About Your Visit        Care EveryWhere ID     This is your Care EveryWhere ID. This could be used by other organizations to access your [facility]  "records  [id number]        Your Vitals Were     Pulse Temperature Respirations Height Pulse Oximetry Breastfeeding?    61 97.8  F (36.6  C) 20 5' 3\" (1.6 m) 98% No    BMI (Body Mass Index)                   29.58 kg/m2            Blood Pressure from Last 3 Encounters:   07/02/18 122/60   06/23/18 142/78   06/14/18 110/72    Weight from Last 3 Encounters:   07/02/18 167 lb (75.8 kg)   06/22/18 168 lb (76.2 kg)   06/14/18 169 lb (76.7 kg)              We Performed the Following     NEUROLOGY ADULT REFERRAL          Today's Medication Changes          These changes are accurate as of 7/2/18  2:38 PM.  If you have any questions, ask your nurse or doctor.               These medicines have changed or have updated prescriptions.        Dose/Directions    escitalopram 10 MG tablet   Commonly known as:  LEXAPRO   This may have changed:  See the new instructions.   Used for:  SHAWNEE (generalized anxiety disorder)        TAKE 1 TABLET (10 MG) BY MOUTH DAILY   Quantity:  90 tablet   Refills:  2                Primary Care Provider Office Phone # Fax #    Sean Velasquez [phone] [address] [facility] [address]        Equal Access to Services     JESSICA MCCULLOUGH AH: Hadii joellen ku hadasho Soomaali, waaxda luqadaha, qaybta kaalmada adeegyada, waxay kayden hayjosen teresita ritchie. So [facility] [phone].    ATENCIÓN: Si habla español, tiene a agrawal disposición servicios gratuitos de asistencia lingüística. Llame al [phone].    We comply with applicable federal civil rights laws and [place] laws. We do not discriminate on the basis of race, color, national origin, age, disability, sex, sexual orientation, or gender identity.            Thank you!     Thank you for choosing [facility]  for your care. Our goal is always to provide you with excellent care. Hearing back from our patients is one way we can continue to improve our services. Please take a few " minutes to complete the written survey that you may receive in the mail after your visit with us. Thank you!             Your Updated Medication List - Protect others around you: Learn how to safely use, store and throw away your medicines at www.disposemymeds.org.          This list is accurate as of 7/2/18  2:38 PM.  Always use your most recent med list.                   Brand Name Dispense Instructions for use Diagnosis    acetaminophen-codeine 300-30 MG per tablet    TYLENOL #3    30 tablet    TAKE ONE TABLET BY MOUTH TWICE A DAY AS NEEDED    Episodic tension-type headache, not intractable       amitriptyline 25 MG tablet    ELAVIL    90 tablet    Take 1 tablet (25 mg) by mouth At Bedtime    Insomnia, unspecified type, Depressive disorder       amLODIPine 5 MG tablet    NORVASC    90 tablet    Take 1 tablet (5 mg) by mouth daily    Hypertension goal BP (blood pressure) < 140/80       aspirin 81 MG tablet     120 tablet    Take 1 tablet (81 mg) by mouth daily        atenolol 50 MG tablet    TENORMIN    90 tablet    Take 1 tablet (50 mg) by mouth daily    Hypertension goal BP (blood pressure) < 140/80       cyanocolbalamin 500 MCG tablet    vitamin  B-12     Take 1 tablet by mouth daily.        diazepam 5 MG tablet    VALIUM    30 tablet    TAKE ONE TABLET BY MOUTH EVERY DAY AS NEEDED ANXIETY    SHAWNEE (generalized anxiety disorder)       escitalopram 10 MG tablet    LEXAPRO    90 tablet    TAKE 1 TABLET (10 MG) BY MOUTH DAILY    SHAWNEE (generalized anxiety disorder)       fluticasone 50 MCG/ACT spray    FLONASE    1 Bottle    Spray 1-2 sprays into both nostrils daily    Chronic vasomotor rhinitis       levothyroxine 75 MCG tablet    SYNTHROID/LEVOTHROID    90 tablet    TAKE 1 TABLET (75 MCG) BY MOUTH DAILY    Hypothyroidism, unspecified type       polyethylene glycol powder    MIRALAX/GLYCOLAX    527 g    TAKE 17 G BY MOUTH 2     TIMES DAILY    Slow transit constipation       pravastatin 40 MG tablet    PRAVACHOL     90 tablet    TAKE 1 TABLET (40 MG) BY MOUTH DAILY    Hyperlipidemia with target LDL less than 100       vitamin D 2000 units tablet     100 tablet    Take 2,000 Units by mouth daily

## 2018-07-02 NOTE — PROGRESS NOTES
"Subjective:  HPI                    Objective:  System    Physical Exam    General     ROS    Assessment/Plan:    PROGRESS  REPORT    Progress reporting period is from 6/4/2018 to 7/2/2018.   Danny has been seen in PT 4 times for treatment of upper and lower back pain.  Over the course of treatment mild gait abnormalities were noted, generally slow and shuffling with ability to correct with cueing.  Patient was seen in ER 6/22 with tremor / myoclonus per below.  CT and MRI results in epic.    SUBJECTIVE  Subjective: Patient reports spending 11 hours in ER on 6/22 after increased tremor including jaw and \"twitching, jumping\" leg, and difficulty walking.  Regarding her back pain - chief complaint is bilateral mid back pain at bra line which limits walking tolerance.    Current pain level is 4/10  .      Initial Pain level:  (3-8/10).   Changes in function:  See goal flow sheet  Adverse reaction to treatment or activity: None    OBJECTIVE  Changes noted in objective findings:    Objective: Trunk (lumbar and thoracic) movement testing is WNL and without reproduction of back symptoms.  Increase muscle holding noted right low thoracic and lumbar paraspinals with tenderness.    Romberg test - moderate sway with eyes closed, arm crossed at chest.  Clonus (1 beat) noted bilat ankles and left wrist.  Gait is slow and tentative with decreased arm swing bilat; patient holds hem of blouse with left hand.  Mild deficit with bilat LE coordination testing today.  Able to maintain SLS 3 sec bilat.  LE strength generally 4+/5 and symetrical.  UE strength same though resisted left shoulder elevation through range is weaker than right.     ASSESSMENT/PLAN  Updated problem list and treatment plan: Diagnosis 1:  Upper and lower back pain  Pain -  manual therapy, self management, education and home program  Decreased strength - therapeutic exercise and therapeutic activities  Decreased function - therapeutic activities  Impaired posture - " neuro re-education  STG/LTGs have been met or progress has been made towards goals:  Yes (See Goal flow sheet completed today.)  Assessment of Progress: The patient's progress has plateaued.  Self Management Plans:  Patient is independent in a home treatment program.  I have re-evaluated this patient and find that the nature, scope, duration and intensity of the therapy is appropriate for the medical condition of the patient.  Sadaf continues to require the following intervention to meet STG and LTG's:  PT    Recommendations:  This patient would benefit from continued therapy for back symptoms.   She was referred to a neurologist through ER visit.  Frequency:  1 X week, once daily  Duration:  for 6 weeks        Please refer to the daily flowsheet for treatment today, total treatment time and time spent performing 1:1 timed codes.

## 2018-07-02 NOTE — LETTER
"[facility] [place] [facility] [place] [facility]  [address]  [facility] [address]  [phone]    2018    Re: Sadaf Rubi   :   1945  MRN:  [id number]   REFERRING PHYSICIAN:   Sean Velasquez    [facility] [place] [facility] [place] [facility]    Date of Initial Evaluation: 2018   Visits:  Rxs Used: 4  Reason for Referral:     Upper back pain  Left-sided low back pain without sciatica    EVALUATION SUMMARY  Assessment/Plan:    PROGRESS  REPORT  Progress reporting period is from 2018 to 2018.   Danny has been seen in PT 4 times for treatment of upper and lower back pain.  Over the course of treatment mild gait abnormalities were noted, generally slow and shuffling with ability to correct with cueing. Patient was seen in ER  with tremor / myoclonus per below. CT and MRI results in epic.    SUBJECTIVE  Subjective: Patient reports spending 11 hours in ER on  after increased tremor including jaw and \"twitching, jumping\" leg, and difficulty walking.  Regarding her back pain - chief complaint is bilateral mid back pain at bra line which limits walking tolerance.    Current pain level is 4/10  .      Initial Pain level:  (3-8/10).   Changes in function:  See goal flow sheet  Adverse reaction to treatment or activity: None    OBJECTIVE  Changes noted in objective findings:    Objective: Trunk (lumbar and thoracic) movement testing is WNL and without reproduction of back symptoms.  Increase muscle holding noted right low thoracic and lumbar paraspinals with tenderness.    Romberg test - moderate sway with eyes closed, arm crossed at chest.  Clonus (1 beat) noted bilat ankles and left wrist.  Gait is slow and tentative with decreased arm swing bilat; patient holds hem of blouse with left hand.  Mild deficit with bilat LE coordination testing today.  Able to maintain SLS 3 sec bilat.  LE strength generally 4+/5 and symetrical.  UE strength same though " resisted left shoulder elevation through range is weaker than right.     ASSESSMENT/PLAN  Updated problem list and treatment plan: Diagnosis 1:  Upper and lower back pain  Pain -  manual therapy, self management, education and home program  Decreased strength - therapeutic exercise and therapeutic activities  Decreased function - therapeutic activities  Re: Sadaf Rubi   :   1945    Impaired posture - neuro re-education  STG/LTGs have been met or progress has been made towards goals:  Yes (See Goal flow sheet completed today.)  Assessment of Progress: The patient's progress has plateaued.  Self Management Plans:  Patient is independent in a home treatment program.  I have re-evaluated this patient and find that the nature, scope, duration and intensity of the therapy is appropriate for the medical condition of the patient.  Sadaf continues to require the following intervention to meet STG and LTG's:  PT    Recommendations:  This patient would benefit from continued therapy for back symptoms.   She was referred to a neurologist through ER visit.  Frequency:  1 X week, once daily  Duration:  for 6 weeks    Thank you for your referral.      INQUIRIES  Therapist: Mago Galo PT, \[facility]\""  [facility] [place] [facility]  [address] [place] [address] [address] [place] [address]  Phone: [phone]  Fax: [phone]

## 2018-07-02 NOTE — PROGRESS NOTES
SUBJECTIVE:   Sadaf Rubi is a 72 year old female who presents to clinic today for the following health issues:      ED/UC Followup:    Facility:  [facility]  Date of visit: 6/23/2018  Reason for visit: tremors in extremities  Current Status: stable, but still bothersome, buck. Left foot              The main new sx is periodic jerking of her L leg.                Has some problems with handwriting,balance,hand tremor,some sense of generalized weakness,etc.          Has not driven since ER visit.           Problem list and histories reviewed & adjusted, as indicated.      Current Outpatient Prescriptions   Medication Sig Dispense Refill     acetaminophen-codeine (TYLENOL #3) 300-30 MG per tablet TAKE ONE TABLET BY MOUTH TWICE A DAY AS NEEDED 30 tablet 3     amitriptyline (ELAVIL) 25 MG tablet Take 1 tablet (25 mg) by mouth At Bedtime 90 tablet 3     amLODIPine (NORVASC) 5 MG tablet Take 1 tablet (5 mg) by mouth daily 90 tablet 3     aspirin 81 MG tablet Take 1 tablet (81 mg) by mouth daily 120 tablet      atenolol (TENORMIN) 50 MG tablet Take 1 tablet (50 mg) by mouth daily 90 tablet 4     Cholecalciferol (VITAMIN D) 2000 UNITS tablet Take 2,000 Units by mouth daily 100 tablet 3     cyanocolbalamin (VITAMIN  B-12) 500 MCG tablet Take 1 tablet by mouth daily.       diazepam (VALIUM) 5 MG tablet TAKE ONE TABLET BY MOUTH EVERY DAY AS NEEDED ANXIETY 30 tablet 2     escitalopram (LEXAPRO) 10 MG tablet TAKE 1 TABLET (10 MG) BY MOUTH DAILY (Patient taking differently: TAKing 1/2 tablet every day) 90 tablet 2     fluticasone (FLONASE) 50 MCG/ACT spray Spray 1-2 sprays into both nostrils daily 1 Bottle 11     levothyroxine (SYNTHROID/LEVOTHROID) 75 MCG tablet TAKE 1 TABLET (75 MCG) BY MOUTH DAILY 90 tablet 3     polyethylene glycol (MIRALAX/GLYCOLAX) powder TAKE 17 G BY MOUTH 2     TIMES DAILY 527 g 11     pravastatin (PRAVACHOL) 40 MG tablet TAKE 1 TABLET (40 MG) BY MOUTH DAILY 90 tablet 0     BP Readings from Last 3  "Encounters:   07/02/18 122/60   06/23/18 142/78   06/14/18 110/72    Wt Readings from Last 3 Encounters:   07/02/18 167 lb (75.8 kg)   06/22/18 168 lb (76.2 kg)   06/14/18 169 lb (76.7 kg)                    Reviewed and updated as needed this visit by clinical staff       Reviewed and updated as needed this visit by Provider         ROS:  CONSTITUTIONAL:NEGATIVE for fever, chills, change in weight  RESP:NEGATIVE for significant cough or SOB  CV: NEGATIVE for chest pain, palpitations or peripheral edema    OBJECTIVE:                                                    /60 (BP Location: Left arm, Patient Position: Chair, Cuff Size: Adult Large)  Pulse 61  Temp 97.8  F (36.6  C)  Resp 20  Ht 5' 3\" (1.6 m)  Wt 167 lb (75.8 kg)  SpO2 98%  Breastfeeding? No  BMI 29.58 kg/m2  Body mass index is 29.58 kg/(m^2).  GENERAL APPEARANCE: alert and fatigued  CV: regular rates and rhythm, normal S1 S2, no S3 or S4 and no murmur, click or rub  NEURO: mentation intact, gait abnormal ; a bit slow, tremor L hand; slight and I don't detect clonus; other examiners have.   PSYCH: affect flat and anxious    Diagnostic test results:  Results for orders placed or performed during the hospital encounter of 06/22/18   CT Head w/o Contrast    Narrative    CT SCAN OF THE HEAD WITHOUT CONTRAST   6/22/2018 8:52 PM     HISTORY: Involuntary arm and leg shaking, tremor, gait disturbance.     TECHNIQUE:  Axial images of the head and coronal reformations without  IV contrast material. Radiation dose for this scan was reduced using  automated exposure control, adjustment of the mA and/or kV according  to patient size, or iterative reconstruction technique.    COMPARISON: None.    FINDINGS: There is no evidence of intracranial hemorrhage, mass, acute  infarct or anomaly. The ventricles are normal in size, shape and  configuration. Patchy periventricular white matter hypodensities which  are nonspecific, but likely related to chronic " microvascular ischemic  disease.    The visualized portions of the sinuses and mastoids appear normal. The  bony calvarium and bones of the skull base appear intact.       Impression    IMPRESSION:     1. No evidence of acute intracranial hemorrhage, mass, or herniation.  2. Nonspecific white matter changes likely due to chronic microvessel  ischemic disease.      GAIL JACK MD   Cervical spine MRI w/o contrast    Narrative    MRI CERVICAL SPINE WITHOUT CONTRAST 6/23/2018 1:46 AM     HISTORY: LUE tremor, hyperreflexia;     TECHNIQUE: Multiplanar, multisequence MRI of the cervical spine  without contrast.     COMPARISON: None.    FINDINGS: The cervical spinal cord and visualized portions of the  thoracic spinal cord appear normal.  The visualized portions of the  brainstem and cerebellum appear normal.  Alignment is normal.  The  paraspinal soft tissues appear normal. The bone marrow has normal  signal intensity.     Craniocervical Junction and C1-C2:  Normal.    C2-C3:  Degeneration of the disc. Mild annular disc bulge. Central  canal and neural foramen are patent.    C3-C4:  Degeneration of the disc. Mild annular disc bulge with  associated posterior osteophytes. Central canal is at the lower limits  of normal. Moderate left foraminal stenosis due to an uncinate spur.    C4-C5:  Degeneration of the disc. Loss of disc space height.  Broad-based disc osteophyte complex leading to mild central spinal  stenosis and mild flattening of the cervical cord. Moderate bilateral  foraminal stenosis due to loss of disc space height and uncinate  spurs.    C5-C6:  Degeneration of the disc. Loss of disc space height.  Broad-based disc osteophyte complex causing mass effect on the dural  sac. This is leading to mild central spinal stenosis. Moderate  bilateral foraminal stenosis due to uncinate spurs.    C6-C7:  Degeneration of the disc. Loss of disc space height. Mild  annular disc bulge. Central canal and neural foramen are  patent.    C7-T1: Normal disc, facet joints, spinal canal and neural foramina.    T1-T2:  Normal disc, facet joints, spinal canal and neural foramina.       Impression    IMPRESSION:    1. Multilevel degenerative changes as described above.  2. The degenerative changes are leading to mild central spinal  stenosis at C4-5 and C5-6.    KAELA ROSENBERG MD   Thoracic spine MRI w/o contrast    Narrative    MRI THORACIC SPINE WITHOUT CONTRAST  6/23/2018 1:00 AM     HISTORY: Left lower extremity tremor, hyperreflexia, gait instability,  history of back pain.    TECHNIQUE: Multiplanar multisequence MRI of the thoracic spine without  contrast.    COMPARISON: None.    FINDINGS:  The thoracic cord is normal in size and it has normal  signal intensity. No intradural or extradural mass lesions are seen.  No thoracic compression fractures are identified. There is loss of  disc space height throughout the thoracic spine.    At the T6-T7 level there is a mild annular disc bulge.    At the T7-T8 level there is a small left central disc protrusion  causing slight mass effect on the dural sac.    At the T8-T9 level there is a mild annular disc bulge and a small  left-sided disc protrusion causing mild mass effect on the dural sac.    At the T9-T10 level there is a small central disc protrusion causing  slight mass effect on the dural sac.    At the T10-T11 level there is a small right central disc protrusion  causing mild mass effect on the dural sac.       Impression    IMPRESSION:    1. No acute pathology. No fractures are identified.  2. Multilevel degenerative change.    KAELA ROSENBERG MD   Lumbar spine MRI w/o contrast    Narrative    MRI LUMBAR SPINE WITHOUT CONTRAST   6/23/2018 12:33 AM     HISTORY: Urinary retention, LLE tremor, hyperreflexia, myoclonus;     TECHNIQUE: Multiplanar multisequence MRI of the lumbar spine without  contrast.    COMPARISON: None.    FINDINGS: The report is dictated assuming five lumbar-type  vertebral  bodies, and radiographic correlation may be necessary.  The distal  spinal cord and cauda equina appear normal. [The bone marrow appears  normal.]  [The paraspinal soft tissues appear normal.]    T12-L1:  Degeneration of the disc. Central canal and neural foramen  are patent.     L1-L2:  Degeneration of the disc. Loss of disc space height. Mild  annular disc bulge. Central canal and neural foramen are patent.    L2-L3:  Degeneration of the disc. Loss of disc space height. Mild  annular disc bulge. Central canal and neural foramen are patent.    L3-L4:  Degeneration of the disc. Mild annular disc bulge. Central  canal and neural foramen are patent.    L4-L5:  Mild symmetric annular disc bulge. Central canal is mildly  narrowed.. Mild right and moderate left foraminal stenosis due to the  bulging disc and degenerative change off the facet joints.    L5-S1:  Degeneration of the disc. Mild annular disc bulge. Mild  degenerative change in the facet joints. Mild-moderate right and left  foraminal stenosis due to loss of disc space height and the bulging  disc.      Impression    IMPRESSION:    1. No acute pathology.  2. Multilevel degenerative change as described above.    KAELA ROSENBERG MD   MR Brain w/o & w Contrast    Narrative    MRI BRAIN WITHOUT AND WITH CONTRAST  6/23/2018 2:10 AM    HISTORY:  Left hyperreflexia, myoclonus,  sensory changes;      TECHNIQUE:  Multiplanar, multisequence MRI of the brain without and  with 7 mL Gadavist    COMPARISON: None.    FINDINGS:  There is generalized atrophy of the brain.  White matter  changes are present in the cerebral hemispheres that are consistent  with small vessel ischemic disease in this age patient. There is no  evidence of hemorrhage, mass, acute infarct, or anomaly.  There are no  gadolinium enhancing lesions.   T2 hyperintense material is seen  within both mastoids. This is consistent with fluid or inflammatory  debris. This is usually an incidental finding  The arteries at the base  of the brain and the dural venous sinuses appear patent.       Impression    IMPRESSION: No acute pathology. No bleed, mass, or infarcts are seen.    I agree with the preliminary report that was communicated to the  referring physician.    KAELA ROSENBERG MD   CBC with platelets differential   Result Value Ref Range    WBC 8.9 4.0 - 11.0 10e9/L    RBC Count 4.70 3.8 - 5.2 10e12/L    Hemoglobin 14.5 11.7 - 15.7 g/dL    Hematocrit 42.1 35.0 - 47.0 %    MCV 90 78 - 100 fl    MCH 30.9 26.5 - 33.0 pg    MCHC 34.4 31.5 - 36.5 g/dL    RDW 13.4 10.0 - 15.0 %    Platelet Count 204 150 - 450 10e9/L    Diff Method Automated Method     % Neutrophils 52.5 %    % Lymphocytes 35.5 %    % Monocytes 8.4 %    % Eosinophils 2.7 %    % Basophils 0.7 %    % Immature Granulocytes 0.2 %    Nucleated RBCs 0 0 /100    Absolute Neutrophil 4.5 1.6 - 8.3 10e9/L    Absolute Lymphocytes 3.0 0.8 - 5.3 10e9/L    Absolute Monocytes 0.7 0.0 - 1.3 10e9/L    Absolute Eosinophils 0.2 0.0 - 0.7 10e9/L    Absolute Basophils 0.1 0.0 - 0.2 10e9/L    Abs Immature Granulocytes 0.0 0 - 0.4 10e9/L    Absolute Nucleated RBC 0.0    Comprehensive metabolic panel   Result Value Ref Range    Sodium 137 133 - 144 mmol/L    Potassium 4.5 3.4 - 5.3 mmol/L    Chloride 105 94 - 109 mmol/L    Carbon Dioxide 23 20 - 32 mmol/L    Anion Gap 9 3 - 14 mmol/L    Glucose 108 (H) 70 - 99 mg/dL    Urea Nitrogen 16 7 - 30 mg/dL    Creatinine 0.92 0.52 - 1.04 mg/dL    GFR Estimate 60 (L) >60 mL/min/1.7m2    GFR Estimate If Black 72 >60 mL/min/1.7m2    Calcium 9.6 8.5 - 10.1 mg/dL    Bilirubin Total 0.3 0.2 - 1.3 mg/dL    Albumin 4.3 3.4 - 5.0 g/dL    Protein Total 7.8 6.8 - 8.8 g/dL    Alkaline Phosphatase 69 40 - 150 U/L    ALT 25 0 - 50 U/L    AST 19 0 - 45 U/L   UA reflex to Microscopic and Culture   Result Value Ref Range    Color Urine Straw     Appearance Urine Clear     Glucose Urine Negative NEG^Negative mg/dL    Bilirubin Urine Negative NEG^Negative     Ketones Urine Negative NEG^Negative mg/dL    Specific Gravity Urine 1.005 1.003 - 1.035    Blood Urine Negative NEG^Negative    pH Urine 5.5 5.0 - 7.0 pH    Protein Albumin Urine Negative NEG^Negative mg/dL    Urobilinogen mg/dL Normal 0.0 - 2.0 mg/dL    Nitrite Urine Negative NEG^Negative    Leukocyte Esterase Urine Negative NEG^Negative    Source Midstream Urine    Magnesium   Result Value Ref Range    Magnesium 2.3 1.6 - 2.3 mg/dL   Phosphorus   Result Value Ref Range    Phosphorus 3.6 2.5 - 4.5 mg/dL   CK total   Result Value Ref Range    CK Total 97 30 - 225 U/L   CRP inflammation   Result Value Ref Range    CRP Inflammation <2.9 0.0 - 8.0 mg/L   Erythrocyte sedimentation rate auto   Result Value Ref Range    Sed Rate 8 0 - 30 mm/h        ASSESSMENT/PLAN:                                                        ICD-10-CM    1. Tremor R25.1 NEUROLOGY ADULT REFERRAL   2. Balance problems R26.89 NEUROLOGY ADULT REFERRAL   3. Fine motor impairment R29.818 NEUROLOGY ADULT REFERRAL    R29.898    4. SHAWNEE (generalized anxiety disorder) F41.1 NEUROLOGY ADULT REFERRAL       Summary and implications:  Etiology of neuro sx unclear.        Patient Instructions   Let's plan a neurology consult.              Call for an appointment.                                 Sean Velasquez MD  [employer]

## 2018-07-07 DIAGNOSIS — F41.1 GAD (GENERALIZED ANXIETY DISORDER): ICD-10-CM

## 2018-07-08 NOTE — TELEPHONE ENCOUNTER
"Requested Prescriptions   Pending Prescriptions Disp Refills     escitalopram (LEXAPRO) 10 MG tablet [Pharmacy Med Name: ESCITALOPRAM 10 MG TABLET 10 TAB]  Last Written Prescription Date:  05/23/2017  Last Fill Quantity: 90,  # refills: 2   Last office visit: 7/2/2018 with prescribing provider:  JERRELL   Future Office Visit:     90 tablet 2     Sig: TAKE 1 TABLET (10 MG) BY MOUTH DAILY    SSRIs Protocol Passed  PHQ-9 SCORE 9/27/2016 2/9/2017 6/5/2017   Total Score - - -   Total Score 1 2 0     SHAWNEE-7 SCORE 4/20/2016 6/9/2016 6/5/2017   Total Score 0 0 0           7/7/2018 10:53 AM       Passed - Recent (12 mo) or future (30 days) visit within the authorizing provider's specialty    Patient had office visit in the last 12 months or has a visit in the next 30 days with authorizing provider or within the authorizing provider's specialty.  See \"Patient Info\" tab in inbasket, or \"Choose Columns\" in Meds & Orders section of the refill encounter.           Passed - Patient is age 18 or older       Passed - No active pregnancy on record       Passed - No positive pregnancy test in last 12 months          "

## 2018-07-09 ENCOUNTER — THERAPY VISIT (OUTPATIENT)
Dept: PHYSICAL THERAPY | Facility: CLINIC | Age: 73
End: 2018-07-09
Payer: COMMERCIAL

## 2018-07-09 DIAGNOSIS — M54.9 UPPER BACK PAIN: ICD-10-CM

## 2018-07-09 DIAGNOSIS — M54.50 LEFT-SIDED LOW BACK PAIN WITHOUT SCIATICA: ICD-10-CM

## 2018-07-09 PROCEDURE — 97140 MANUAL THERAPY 1/> REGIONS: CPT | Mod: GP | Performed by: PHYSICAL THERAPIST

## 2018-07-09 PROCEDURE — 97112 NEUROMUSCULAR REEDUCATION: CPT | Mod: GP | Performed by: PHYSICAL THERAPIST

## 2018-07-10 RX ORDER — ESCITALOPRAM OXALATE 10 MG/1
TABLET ORAL
Qty: 90 TABLET | Refills: 3 | Status: SHIPPED | OUTPATIENT
Start: 2018-07-10 | End: 2018-12-31

## 2018-07-16 ENCOUNTER — THERAPY VISIT (OUTPATIENT)
Dept: PHYSICAL THERAPY | Facility: CLINIC | Age: 73
End: 2018-07-16
Payer: COMMERCIAL

## 2018-07-16 DIAGNOSIS — M54.50 LEFT-SIDED LOW BACK PAIN WITHOUT SCIATICA: ICD-10-CM

## 2018-07-16 DIAGNOSIS — M54.9 UPPER BACK PAIN: ICD-10-CM

## 2018-07-16 PROCEDURE — 97140 MANUAL THERAPY 1/> REGIONS: CPT | Mod: GP | Performed by: PHYSICAL THERAPIST

## 2018-07-16 PROCEDURE — 97112 NEUROMUSCULAR REEDUCATION: CPT | Mod: GP | Performed by: PHYSICAL THERAPIST

## 2018-07-16 PROCEDURE — 97110 THERAPEUTIC EXERCISES: CPT | Mod: GP | Performed by: PHYSICAL THERAPIST

## 2018-07-23 ENCOUNTER — THERAPY VISIT (OUTPATIENT)
Dept: PHYSICAL THERAPY | Facility: CLINIC | Age: 73
End: 2018-07-23
Payer: COMMERCIAL

## 2018-07-23 DIAGNOSIS — M54.50 LEFT-SIDED LOW BACK PAIN WITHOUT SCIATICA: ICD-10-CM

## 2018-07-23 DIAGNOSIS — M54.9 UPPER BACK PAIN: ICD-10-CM

## 2018-07-23 PROCEDURE — 97112 NEUROMUSCULAR REEDUCATION: CPT | Mod: GP | Performed by: PHYSICAL THERAPIST

## 2018-07-23 PROCEDURE — 97110 THERAPEUTIC EXERCISES: CPT | Mod: GP | Performed by: PHYSICAL THERAPIST

## 2018-07-23 PROCEDURE — 97140 MANUAL THERAPY 1/> REGIONS: CPT | Mod: GP | Performed by: PHYSICAL THERAPIST

## 2018-07-25 ENCOUNTER — TRANSFERRED RECORDS (OUTPATIENT)
Dept: HEALTH INFORMATION MANAGEMENT | Facility: CLINIC | Age: 73
End: 2018-07-25

## 2018-07-26 DIAGNOSIS — G44.219 EPISODIC TENSION-TYPE HEADACHE, NOT INTRACTABLE: ICD-10-CM

## 2018-07-26 NOTE — TELEPHONE ENCOUNTER
acetaminophen-codeine (TYLENOL #3) 300-30 MG per tablet     Last Written Prescription Date:  02/08/18  Last Fill Quantity: 30,   # refills: 3  Last Office Visit: 07/02/18  Future Office visit:       Routing refill request to provider for review/approval because:  Drug not on the [facility] or [facility] refill protocol or controlled substance  Requested Prescriptions   Pending Prescriptions Disp Refills     acetaminophen-codeine (TYLENOL #3) 300-30 MG per tablet 30 tablet 3     Sig: TAKE ONE TABLET BY MOUTH TWICE A DAY AS NEEDED    There is no refill protocol information for this order

## 2018-07-27 NOTE — TELEPHONE ENCOUNTER
Controlled Substance Refill Request for acetaminophen-codeine (TYLENOL #3) 300-30 MG per tablet  Problem List Complete:  No     PROVIDER TO CONSIDER COMPLETION OF PROBLEM LIST AND OVERVIEW/CONTROLLED SUBSTANCE AGREEMENT  Controlled substance agreement on file: No.   [place] reviewed:  reviewed- 07/27/2018 no concerns  Rx was last filled 06/23/2018 #30 tablets.

## 2018-07-30 ENCOUNTER — THERAPY VISIT (OUTPATIENT)
Dept: PHYSICAL THERAPY | Facility: CLINIC | Age: 73
End: 2018-07-30
Payer: COMMERCIAL

## 2018-07-30 DIAGNOSIS — M54.50 LEFT-SIDED LOW BACK PAIN WITHOUT SCIATICA: ICD-10-CM

## 2018-07-30 DIAGNOSIS — M54.9 UPPER BACK PAIN: ICD-10-CM

## 2018-07-30 PROCEDURE — 97112 NEUROMUSCULAR REEDUCATION: CPT | Mod: GP | Performed by: PHYSICAL THERAPIST

## 2018-07-30 PROCEDURE — 97140 MANUAL THERAPY 1/> REGIONS: CPT | Mod: GP | Performed by: PHYSICAL THERAPIST

## 2018-07-30 PROCEDURE — 97110 THERAPEUTIC EXERCISES: CPT | Mod: GP | Performed by: PHYSICAL THERAPIST

## 2018-08-13 ENCOUNTER — THERAPY VISIT (OUTPATIENT)
Dept: PHYSICAL THERAPY | Facility: CLINIC | Age: 73
End: 2018-08-13
Payer: COMMERCIAL

## 2018-08-13 DIAGNOSIS — M54.50 LEFT-SIDED LOW BACK PAIN WITHOUT SCIATICA: ICD-10-CM

## 2018-08-13 DIAGNOSIS — M54.9 UPPER BACK PAIN: ICD-10-CM

## 2018-08-13 PROCEDURE — 97140 MANUAL THERAPY 1/> REGIONS: CPT | Mod: GP | Performed by: PHYSICAL THERAPIST

## 2018-08-13 PROCEDURE — 97110 THERAPEUTIC EXERCISES: CPT | Mod: GP | Performed by: PHYSICAL THERAPIST

## 2018-08-13 PROCEDURE — 97112 NEUROMUSCULAR REEDUCATION: CPT | Mod: GP | Performed by: PHYSICAL THERAPIST

## 2018-08-16 DIAGNOSIS — F41.1 GAD (GENERALIZED ANXIETY DISORDER): ICD-10-CM

## 2018-08-16 NOTE — TELEPHONE ENCOUNTER
diazepam (VALIUM) 5 MG tablet      Last Written Prescription Date:  5-17-18  Last Fill Quantity: 30tab,   # refills: 2  Last Office Visit: 7-2-18  Future Office visit:    Next 5 appointments (look out 90 days)     Aug 20, 2018  1:00 PM CDT   [facility]    [address] [place] [id number] [phone]                   Routing refill request to provider for review/approval because:  Drug not on the [facility], [facility] or [facility] refill protocol or controlled substance

## 2018-08-20 RX ORDER — DIAZEPAM 5 MG
TABLET ORAL
Qty: 30 TABLET | Refills: 2 | Status: SHIPPED | OUTPATIENT
Start: 2018-08-20 | End: 2018-11-08

## 2018-08-20 NOTE — TELEPHONE ENCOUNTER
Controlled Substance Refill Request for diazepam (VALIUM) 5 MG tablet  Problem List Complete:  Yes  Patient is followed by Sean Velasquez MD for ongoing prescription of anxiolytic medication.  All refills should be approved by this provider, or covering partner.    Medication(s): diazepam 5 mg.   Maximum quantity per month: 30  Clinic visit frequency required: Q 6  months     Controlled substance agreement:  Encounter-Level CSA:     There are no encounter-level csa.        Pain Clinic evaluation in the past: No    DIRE Total Score(s):  No flowsheet data found.    Last [facility] website verification:  done on ?   [email or web address]    Onset 1980's or earlier                    Chronic buspar; uses valium also, 5 mg per day or less.                   In 3/16, stopped buspar and started lexapro with good results.     Last  check 02/15/2018-no concerns.  No CSA on file                checked in past 3 months?  Yes Yes, 8/20/18

## 2018-08-20 NOTE — TELEPHONE ENCOUNTER
She cancelled her appt at the last minute today.                       I approved the Rx. Please phone it in or fax it to the pharmacy and let the patient know.

## 2018-09-04 ENCOUNTER — OFFICE VISIT (OUTPATIENT)
Dept: FAMILY MEDICINE | Facility: CLINIC | Age: 73
End: 2018-09-04
Payer: COMMERCIAL

## 2018-09-04 VITALS
SYSTOLIC BLOOD PRESSURE: 128 MMHG | WEIGHT: 171 LBS | BODY MASS INDEX: 30.3 KG/M2 | OXYGEN SATURATION: 98 % | HEIGHT: 63 IN | DIASTOLIC BLOOD PRESSURE: 66 MMHG | TEMPERATURE: 97.9 F | HEART RATE: 53 BPM | RESPIRATION RATE: 20 BRPM

## 2018-09-04 DIAGNOSIS — G89.29 CHRONIC MIDLINE LOW BACK PAIN WITHOUT SCIATICA: Primary | ICD-10-CM

## 2018-09-04 DIAGNOSIS — G20.A1 PARKINSON'S SYNDROME (H): ICD-10-CM

## 2018-09-04 DIAGNOSIS — Z23 NEED FOR PROPHYLACTIC VACCINATION AND INOCULATION AGAINST INFLUENZA: ICD-10-CM

## 2018-09-04 DIAGNOSIS — G47.00 INSOMNIA, UNSPECIFIED TYPE: ICD-10-CM

## 2018-09-04 DIAGNOSIS — M54.50 CHRONIC MIDLINE LOW BACK PAIN WITHOUT SCIATICA: Primary | ICD-10-CM

## 2018-09-04 PROCEDURE — 90662 IIV NO PRSV INCREASED AG IM: CPT | Performed by: INTERNAL MEDICINE

## 2018-09-04 PROCEDURE — 99213 OFFICE O/P EST LOW 20 MIN: CPT | Mod: 25 | Performed by: INTERNAL MEDICINE

## 2018-09-04 PROCEDURE — G0008 ADMIN INFLUENZA VIRUS VAC: HCPCS | Performed by: INTERNAL MEDICINE

## 2018-09-04 RX ORDER — CARBIDOPA AND LEVODOPA 25; 100 MG/1; MG/1
TABLET ORAL
COMMUNITY
Start: 2018-08-23 | End: 2019-02-18

## 2018-09-04 NOTE — PATIENT INSTRUCTIONS
You are planning a consult with one of the spine specialists at [facility].             You are also planning to taper and stop the amitriptyline.

## 2018-09-04 NOTE — PROGRESS NOTES

## 2018-09-04 NOTE — MR AVS SNAPSHOT
After Visit Summary   9/4/2018    Sadaf Rubi    MRN: [id number]           Patient Information     Date Of Birth          1945        Visit Information        Provider Department      9/4/2018 4:00 PM Sean Velasquez MD [facility]        Today's Diagnoses     Parkinson's syndrome (H)    -  1    Chronic midline low back pain without sciatica        Insomnia, unspecified type          Care Instructions    You are planning a consult with one of the spine specialists at [facility].             You are also planning to taper and stop the amitriptyline.                        Follow-ups after your visit        Additional Services     ORTHOPEDICS ADULT REFERRAL       Your provider has referred you to: [facility] [place] [phone]   [email or web address]            This patient would like a spine consult; Adriano Gibson or one of your spine MD's.          She has multilevel degenerative facet disease.  See the lumbar MRI 6/18  Please be aware that coverage of these services is subject to the terms and limitations of your health insurance plan.  Call member services at your health plan with any benefit or coverage questions.      Please bring the following to your appointment:    >>   Any x-rays, CTs or MRIs which have been performed.  Contact the facility where they were done to arrange for  prior to your scheduled appointment.    >>   List of current medications   >>   This referral request   >>   Any documents/labs given to you for this referral                  Who to contact     If you have questions or need follow up information about today's clinic visit or your schedule please contact [facility] directly at [phone].  Normal or non-critical lab and imaging results will be communicated to you by MyChart, letter or phone within 4 business days after the clinic has received the  "results. If you do not hear from us within 7 days, please contact the clinic through [email or web address] or phone. If you have a critical or abnormal lab result, we will notify you by phone as soon as possible.  Submit refill requests through [email or web address] or call your pharmacy and they will forward the refill request to us. Please allow 3 business days for your refill to be completed.          Additional Information About Your Visit        Care EveryWhere ID     This is your Care EveryWhere ID. This could be used by other organizations to access your [place] medical records  [id number]        Your Vitals Were     Pulse Temperature Respirations Height Pulse Oximetry Breastfeeding?    53 97.9  F (36.6  C) 20 5' 3\" (1.6 m) 98% No    BMI (Body Mass Index)                   30.29 kg/m2            Blood Pressure from Last 3 Encounters:   09/04/18 128/66   07/02/18 122/60   06/23/18 142/78    Weight from Last 3 Encounters:   09/04/18 171 lb (77.6 kg)   07/02/18 167 lb (75.8 kg)   06/22/18 168 lb (76.2 kg)              We Performed the Following     ORTHOPEDICS ADULT REFERRAL          Today's Medication Changes          These changes are accurate as of 9/4/18  4:29 PM.  If you have any questions, ask your nurse or doctor.               These medicines have changed or have updated prescriptions.        Dose/Directions    amitriptyline 25 MG tablet   Commonly known as:  ELAVIL   This may have changed:  See the new instructions.   Used for:  Insomnia, unspecified type   Changed by:  Sean Velasquez MD        TAKE 1 TABLETS BY MOUTH NIGHTLY AS NEEDED FOR SLEEP   Quantity:  180 tablet   Refills:  2                Primary Care Provider Office Phone # Fax #    Sean Velasquez [phone] [address] [facility] [address]        Equal Access to Services     JESSICA MCCULLOUGH : Logan Marinelli, zackda mariella, qaybta kaalshonna castellanos, isha ritchie. So wa " [phone].    ATENCIÓN: Si bee winston, tiene a agrawal disposición servicios gratuitos de asistencia lingüística. Asa davison [phone].    We comply with applicable federal civil rights laws and [place] laws. We do not discriminate on the basis of race, color, national origin, age, disability, sex, sexual orientation, or gender identity.            Thank you!     Thank you for choosing [facility]  for your care. Our goal is always to provide you with excellent care. Hearing back from our patients is one way we can continue to improve our services. Please take a few minutes to complete the written survey that you may receive in the mail after your visit with us. Thank you!             Your Updated Medication List - Protect others around you: Learn how to safely use, store and throw away your medicines at www.disposemymeds.org.          This list is accurate as of 9/4/18  4:29 PM.  Always use your most recent med list.                   Brand Name Dispense Instructions for use Diagnosis    acetaminophen-codeine 300-30 MG per tablet    TYLENOL #3    30 tablet    TAKE ONE TABLET BY MOUTH TWICE A DAY AS NEEDED    Episodic tension-type headache, not intractable       amitriptyline 25 MG tablet    ELAVIL    180 tablet    TAKE 1 TABLETS BY MOUTH NIGHTLY AS NEEDED FOR SLEEP    Insomnia, unspecified type       amLODIPine 5 MG tablet    NORVASC    90 tablet    Take 1 tablet (5 mg) by mouth daily    Hypertension goal BP (blood pressure) < 140/80       aspirin 81 MG tablet     120 tablet    Take 1 tablet (81 mg) by mouth daily        atenolol 50 MG tablet    TENORMIN    90 tablet    Take 1 tablet (50 mg) by mouth daily    Hypertension goal BP (blood pressure) < 140/80       carbidopa-levodopa  MG per tablet    SINEMET          cyanocolbalamin 500 MCG tablet    vitamin  B-12     Take 1 tablet by mouth daily.        diazepam 5 MG tablet    VALIUM    30 tablet    TAKE ONE TABLET BY MOUTH  EVERY DAY AS NEEDED ANXIETY    SHAWNEE (generalized anxiety disorder)       escitalopram 10 MG tablet    LEXAPRO    90 tablet    TAKE 1 TABLET (10 MG) BY MOUTH DAILY    SHAWNEE (generalized anxiety disorder)       fluticasone 50 MCG/ACT spray    FLONASE    1 Bottle    Spray 1-2 sprays into both nostrils daily    Chronic vasomotor rhinitis       levothyroxine 75 MCG tablet    SYNTHROID/LEVOTHROID    90 tablet    TAKE 1 TABLET (75 MCG) BY MOUTH DAILY    Hypothyroidism, unspecified type       polyethylene glycol powder    MIRALAX/GLYCOLAX    527 g    TAKE 17 G BY MOUTH 2     TIMES DAILY    Slow transit constipation       pravastatin 40 MG tablet    PRAVACHOL    90 tablet    TAKE 1 TABLET (40 MG) BY MOUTH DAILY    Hyperlipidemia with target LDL less than 100       vitamin D 2000 units tablet     100 tablet    Take 2,000 Units by mouth daily

## 2018-09-04 NOTE — PROGRESS NOTES
SUBJECTIVE:   Sadaf Rubi is a 72 year old female who presents to clinic today for the following health issues:      Follow up on back pain- the lumbar region, and T-spine area and has been doing P.T. Recently, and patient not sure if effective?    Also follow up on Elavil- wants to discontinue?                                   We reviewed her diagnosis of Parkinson's disease, and the described treatment.                      She has ongoing low back pain.  She wonders about injection therapy.  We reviewed her lumbar MRI from June.                                                                     She also feels that she can stop taking amitriptyline.  She has used this over the years to treat insomnia.  It may have been ordered also as a headache preventive therapy.   Problem list and histories reviewed & adjusted, as indicated.  Additional history: as documented    Current Outpatient Prescriptions   Medication Sig Dispense Refill     acetaminophen-codeine (TYLENOL #3) 300-30 MG per tablet TAKE ONE TABLET BY MOUTH TWICE A DAY AS NEEDED 30 tablet 3     amitriptyline (ELAVIL) 25 MG tablet TAKE 1 TABLETS BY MOUTH NIGHTLY AS NEEDED FOR SLEEP 180 tablet 2     amLODIPine (NORVASC) 5 MG tablet Take 1 tablet (5 mg) by mouth daily 90 tablet 3     aspirin 81 MG tablet Take 1 tablet (81 mg) by mouth daily 120 tablet      atenolol (TENORMIN) 50 MG tablet Take 1 tablet (50 mg) by mouth daily 90 tablet 4     carbidopa-levodopa (SINEMET)  MG per tablet        Cholecalciferol (VITAMIN D) 2000 UNITS tablet Take 2,000 Units by mouth daily 100 tablet 3     cyanocolbalamin (VITAMIN  B-12) 500 MCG tablet Take 1 tablet by mouth daily.       diazepam (VALIUM) 5 MG tablet TAKE ONE TABLET BY MOUTH EVERY DAY AS NEEDED ANXIETY 30 tablet 2     escitalopram (LEXAPRO) 10 MG tablet TAKE 1 TABLET (10 MG) BY MOUTH DAILY 90 tablet 3     fluticasone (FLONASE) 50 MCG/ACT spray Spray 1-2 sprays into both nostrils daily 1 Bottle 11      "levothyroxine (SYNTHROID/LEVOTHROID) 75 MCG tablet TAKE 1 TABLET (75 MCG) BY MOUTH DAILY 90 tablet 3     polyethylene glycol (MIRALAX/GLYCOLAX) powder TAKE 17 G BY MOUTH 2     TIMES DAILY 527 g 11     pravastatin (PRAVACHOL) 40 MG tablet TAKE 1 TABLET (40 MG) BY MOUTH DAILY 90 tablet 0                     Allergies   Allergen Reactions     Bees      Ciprofloxacin      Influenza Vaccine Live      Other [Seasonal Allergies]      msg and mushrooms     Penicillins      Wasp Venom Protein Hives     BP Readings from Last 3 Encounters:   09/04/18 128/66   07/02/18 122/60   06/23/18 142/78    Wt Readings from Last 3 Encounters:   09/04/18 171 lb (77.6 kg)   07/02/18 167 lb (75.8 kg)   06/22/18 168 lb (76.2 kg)                    Reviewed and updated as needed this visit by clinical staff       Reviewed and updated as needed this visit by Provider         ROS:  CONSTITUTIONAL:NEGATIVE for fever, chills, change in weight and POSITIVE  for fatigue  MUSCULOSKELETAL: NEGATIVE for radicular pain   NEURO: POSITIVE for gait disturbance and tremor     OBJECTIVE:                                                    /66 (BP Location: Left arm, Patient Position: Chair, Cuff Size: Adult Large)  Pulse 53  Temp 97.9  F (36.6  C)  Resp 20  Ht 5' 3\" (1.6 m)  Wt 171 lb (77.6 kg)  SpO2 98%  Breastfeeding? No  BMI 30.29 kg/m2  Body mass index is 30.29 kg/(m^2).  GENERAL APPEARANCE: alert, no distress and fatigued  CV: regular rates and rhythm, normal S1 S2, no S3 or S4 and no murmur, click or rub  MS: decreased range of motion lumbar spine  NEURO: Masked facies    Diagnostic test results:  none      ASSESSMENT/PLAN:                                                        ICD-10-CM    1. Chronic midline low back pain without sciatica M54.5 ORTHOPEDICS ADULT REFERRAL    G89.29    2. Parkinson's syndrome (H) G20     Dx summer 2018   3. Insomnia, unspecified type G47.00 DISCONTINUED: amitriptyline (ELAVIL) 25 MG tablet   4. Need for " prophylactic vaccination and inoculation against influenza Z23 FLU VACCINE, INCREASED ANTIGEN, PRESV FREE, AGE 65+ [73246]     ADMIN INFLUENZA (For MEDICARE Patients ONLY) []       Summary and implications:  We reviewed her situation at length.     I do not know if injection therapy could be helpful with respect to her chronic back pain.  Patient Instructions   You are planning a consult with one of the spine specialists at [facility].             You are also planning to taper and stop the amitriptyline.                    Sean Velasquez MD  [facility]

## 2018-09-04 NOTE — NURSING NOTE
Prior to injection verified patient identity using patient's name and date of birth.  Due to injection administration, patient instructed to remain in clinic for 15 minutes  afterwards, and to report any adverse reaction to me immediately.  Rafaela Leahy LPN

## 2018-09-22 DIAGNOSIS — E78.5 HYPERLIPIDEMIA WITH TARGET LDL LESS THAN 100: ICD-10-CM

## 2018-09-22 NOTE — TELEPHONE ENCOUNTER
"Requested Prescriptions   Pending Prescriptions Disp Refills     pravastatin (PRAVACHOL) 40 MG tablet [Pharmacy Med Name: PRAVASTATIN NA 40 MG TAB 40 TAB]  Last Written Prescription Date:  06/27/2018  Last Fill Quantity: 90,  # refills: 0   Last office visit: 9/4/2018 with prescribing provider:  JERRELL   Future Office Visit:     90 tablet 0     Sig: TAKE 1 TABLET (40 MG) BY MOUTH DAILY    Statins Protocol Passed    9/22/2018 10:43 AM       Passed - LDL on file in past 12 months    Recent Labs   Lab Test  05/02/18   1200   LDL  104*            Passed - No abnormal creatine kinase in past 12 months    Recent Labs   Lab Test  06/22/18   1930   CKT  97               Passed - Recent (12 mo) or future (30 days) visit within the authorizing provider's specialty    Patient had office visit in the last 12 months or has a visit in the next 30 days with authorizing provider or within the authorizing provider's specialty.  See \"Patient Info\" tab in inbasket, or \"Choose Columns\" in Meds & Orders section of the refill encounter.           Passed - Patient is age 18 or older       Passed - No active pregnancy on record       Passed - No positive pregnancy test in past 12 months          "

## 2018-09-24 RX ORDER — PRAVASTATIN SODIUM 40 MG
TABLET ORAL
Qty: 90 TABLET | Refills: 1 | Status: SHIPPED | OUTPATIENT
Start: 2018-09-24 | End: 2019-03-30

## 2018-09-24 NOTE — TELEPHONE ENCOUNTER
Per chart review, provider advice pt keep taking same medication on lab letter dated 05/03/2018. Prescription approved per [facility] Refill Protocol.

## 2018-10-01 ENCOUNTER — TRANSFERRED RECORDS (OUTPATIENT)
Dept: HEALTH INFORMATION MANAGEMENT | Facility: CLINIC | Age: 73
End: 2018-10-01

## 2018-10-03 ENCOUNTER — TELEPHONE (OUTPATIENT)
Dept: FAMILY MEDICINE | Facility: CLINIC | Age: 73
End: 2018-10-03

## 2018-10-03 DIAGNOSIS — G47.00 INSOMNIA, UNSPECIFIED TYPE: ICD-10-CM

## 2018-10-03 RX ORDER — AMITRIPTYLINE HYDROCHLORIDE 10 MG/1
TABLET ORAL
Qty: 60 TABLET | Refills: 11 | Status: SHIPPED | OUTPATIENT
Start: 2018-10-03 | End: 2018-11-26

## 2018-10-03 NOTE — TELEPHONE ENCOUNTER
Reason for Call:  Medication or medication refill:    Do you use a [facility]?  Name of the pharmacy and phone number for the current request:  [facility] - [place] - [address]    Name of the medication requested: Amitriptyline     Other request: Patient had discussed with Dr. Velasquez weaning off the amitriptyline, patient states she now has a very hard time sleeping without it and would like to go back on it. Wants to know if an RX can be re sent to the pharmacy and if she should take 1 tablet or 1/2 a tablet.     Can we leave a detailed message on this number? YES    Phone number patient can be reached at: Home number on file [phone] (home)    Best Time: anytime     Call taken on 10/3/2018 at 2:33 PM by Nadine Powell

## 2018-10-03 NOTE — TELEPHONE ENCOUNTER
I have sent an Rx to her pharmacy for 10 mg tablets; she can take 10 or if necessary 20 mg at hs.                  Please let her know.

## 2018-10-03 NOTE — TELEPHONE ENCOUNTER
Please advice if on amitriptyline refill.    Routing refill request to provider for review/approval because:  Drug not active on patient's medication list  Medication is reported/historical

## 2018-10-04 NOTE — TELEPHONE ENCOUNTER
Left a detailed message with patients . Asked him to have pt call triage back if further questions.

## 2018-10-22 ENCOUNTER — OFFICE VISIT (OUTPATIENT)
Dept: FAMILY MEDICINE | Facility: CLINIC | Age: 73
End: 2018-10-22
Payer: COMMERCIAL

## 2018-10-22 VITALS
HEART RATE: 50 BPM | DIASTOLIC BLOOD PRESSURE: 70 MMHG | HEIGHT: 63 IN | BODY MASS INDEX: 29.95 KG/M2 | RESPIRATION RATE: 20 BRPM | TEMPERATURE: 97.5 F | OXYGEN SATURATION: 96 % | SYSTOLIC BLOOD PRESSURE: 111 MMHG | WEIGHT: 169 LBS

## 2018-10-22 DIAGNOSIS — R00.1 BRADYCARDIA: ICD-10-CM

## 2018-10-22 DIAGNOSIS — G20.A1 PARKINSON'S SYNDROME (H): ICD-10-CM

## 2018-10-22 DIAGNOSIS — I95.9 HYPOTENSION, UNSPECIFIED HYPOTENSION TYPE: ICD-10-CM

## 2018-10-22 DIAGNOSIS — I10 HYPERTENSION GOAL BP (BLOOD PRESSURE) < 140/80: Primary | ICD-10-CM

## 2018-10-22 PROCEDURE — 99213 OFFICE O/P EST LOW 20 MIN: CPT | Performed by: INTERNAL MEDICINE

## 2018-10-22 RX ORDER — ATENOLOL 50 MG/1
25 TABLET ORAL DAILY
Qty: 45 TABLET | Refills: 4 | COMMUNITY
Start: 2018-10-22 | End: 2019-01-01

## 2018-10-22 NOTE — MR AVS SNAPSHOT
"              After Visit Summary   10/22/2018    Sadaf Rubi    MRN: [id number]           Patient Information     Date Of Birth          1945        Visit Information        Provider Department      10/22/2018 1:00 PM Sean Velasquez MD [facility]        Today's Diagnoses     Hypertension goal BP (blood pressure) < 140/80    -  1    Parkinson's syndrome (H)        Hypotension, unspecified hypotension type        Bradycardia          Care Instructions    Go ahead and stop taking the amlodipine.                     Also cut back the atenolol to 25 mg or 1/2 tablet per day.                     You are planning to call neurology regarding a higher dose of carbidopa/levodopa.           Follow-ups after your visit        Follow-up notes from your care team     Return in about 4 weeks (around 11/19/2018) for BP Recheck.      Who to contact     If you have questions or need follow up information about today's clinic visit or your schedule please contact [facility] directly at [phone].  Normal or non-critical lab and imaging results will be communicated to you by Maginehart, letter or phone within 4 business days after the clinic has received the results. If you do not hear from us within 7 days, please contact the clinic through Profit Pointt or phone. If you have a critical or abnormal lab result, we will notify you by phone as soon as possible.  Submit refill requests through Aivo or call your pharmacy and they will forward the refill request to us. Please allow 3 business days for your refill to be completed.          Additional Information About Your Visit        MyChart Information     Aivo lets you send messages to your doctor, view your test results, renew your prescriptions, schedule appointments and more. To sign up, go to [email or web address] . Click on \"Log in\" on the left side of the screen, which will take you to the " "Welcome page. Then click on \"Sign up Now\" on the right side of the page.     You will be asked to enter the access code listed below, as well as some personal information. Please follow the directions to create your username and password.     Your access code is: [email or web address]  Expires: 2019  1:22 PM     Your access code will  in 90 days. If you need help or a new code, please call your [place] clinic or [phone].        Care EveryWhere ID     This is your Care EveryWhere ID. This could be used by other organizations to access your [place] medical records  [id number]        Your Vitals Were     Pulse Temperature Respirations Height Pulse Oximetry Breastfeeding?    50 97.5  F (36.4  C) 20 5' 3\" (1.6 m) 96% No    BMI (Body Mass Index)                   29.94 kg/m2            Blood Pressure from Last 3 Encounters:   10/22/18 111/70   18 128/66   18 122/60    Weight from Last 3 Encounters:   10/22/18 169 lb (76.7 kg)   18 171 lb (77.6 kg)   18 167 lb (75.8 kg)              Today, you had the following     No orders found for display         Today's Medication Changes          These changes are accurate as of 10/22/18  1:22 PM.  If you have any questions, ask your nurse or doctor.               These medicines have changed or have updated prescriptions.        Dose/Directions    atenolol 50 MG tablet   Commonly known as:  TENORMIN   This may have changed:  how much to take   Used for:  Hypertension goal BP (blood pressure) < 140/80   Changed by:  Sean Velasquez MD        Dose:  25 mg   Take 0.5 tablets (25 mg) by mouth daily   Quantity:  45 tablet   Refills:  4         Stop taking these medicines if you haven't already. Please contact your care team if you have questions.     amLODIPine 5 MG tablet   Commonly known as:  NORVASC   Stopped by:  Sean Velasquez MD                    Primary Care Provider Office Phone # Fax #    Sean Velasquez [phone]       " [address]  [facility] [address]        Equal Access to Services     ALBINOGARRICK JAMEL : Hadii joellen mcintyre hadmargareto Soomaali, waaxda luqadaha, qaybta kaalmada adediannisrraelda, isha monique patriciagrant mcdowelldiann basiliagrantvik ritchie. So [facility] [phone].    ATENCIÓN: Si habla español, tiene a agrawal disposición servicios gratuitos de asistencia lingüística. Davidame al [phone].    We comply with applicable federal civil rights laws and [place] laws. We do not discriminate on the basis of race, color, national origin, age, disability, sex, sexual orientation, or gender identity.            Thank you!     Thank you for choosing [facility]  for your care. Our goal is always to provide you with excellent care. Hearing back from our patients is one way we can continue to improve our services. Please take a few minutes to complete the written survey that you may receive in the mail after your visit with us. Thank you!             Your Updated Medication List - Protect others around you: Learn how to safely use, store and throw away your medicines at www.disposemymeds.org.          This list is accurate as of 10/22/18  1:22 PM.  Always use your most recent med list.                   Brand Name Dispense Instructions for use Diagnosis    acetaminophen-codeine 300-30 MG per tablet    TYLENOL #3    30 tablet    TAKE ONE TABLET BY MOUTH TWICE A DAY AS NEEDED    Episodic tension-type headache, not intractable       amitriptyline 10 MG tablet    ELAVIL    60 tablet    TAKE 1-2 TABLETS BY MOUTH NIGHTLY AS NEEDED FOR SLEEP    Insomnia, unspecified type       aspirin 81 MG tablet     120 tablet    Take 1 tablet (81 mg) by mouth daily        atenolol 50 MG tablet    TENORMIN    45 tablet    Take 0.5 tablets (25 mg) by mouth daily    Hypertension goal BP (blood pressure) < 140/80       carbidopa-levodopa  MG per tablet    SINEMET          cyanocolbalamin 500 MCG tablet    vitamin  B-12     Take 1 tablet by mouth  daily.        diazepam 5 MG tablet    VALIUM    30 tablet    TAKE ONE TABLET BY MOUTH EVERY DAY AS NEEDED ANXIETY    SHAWNEE (generalized anxiety disorder)       escitalopram 10 MG tablet    LEXAPRO    90 tablet    TAKE 1 TABLET (10 MG) BY MOUTH DAILY    SHAWNEE (generalized anxiety disorder)       fluticasone 50 MCG/ACT spray    FLONASE    1 Bottle    Spray 1-2 sprays into both nostrils daily    Chronic vasomotor rhinitis       levothyroxine 75 MCG tablet    SYNTHROID/LEVOTHROID    90 tablet    TAKE 1 TABLET (75 MCG) BY MOUTH DAILY    Hypothyroidism, unspecified type       polyethylene glycol powder    MIRALAX/GLYCOLAX    527 g    TAKE 17 G BY MOUTH 2     TIMES DAILY    Slow transit constipation       pravastatin 40 MG tablet    PRAVACHOL    90 tablet    TAKE 1 TABLET (40 MG) BY MOUTH DAILY    Hyperlipidemia with target LDL less than 100       vitamin D 2000 units tablet     100 tablet    Take 2,000 Units by mouth daily

## 2018-10-22 NOTE — PROGRESS NOTES
SUBJECTIVE:   Sadaf Rubi is a 73 year old female who presents to clinic today for the following health issues:      Hypertension Follow-up      Outpatient blood pressures are  being checked at home    Low Salt Diet: no added salt      Amount of exercise or physical activity: None    Problems taking medications regularly: No    Medication side effects: none    Diet: low salt and low fat/cholesterol                          BP has been low, and she has been bradycardic.                             Sinemet 25/100 TID.                                           Hand tremor is controlled; but not in her L foot.                   Slower response to conversation; weaker voice.                                        Problem list and histories reviewed & adjusted, as indicated.      Current Outpatient Prescriptions   Medication Sig Dispense Refill     acetaminophen-codeine (TYLENOL #3) 300-30 MG per tablet TAKE ONE TABLET BY MOUTH TWICE A DAY AS NEEDED 30 tablet 3     amitriptyline (ELAVIL) 10 MG tablet TAKE 1-2 TABLETS BY MOUTH NIGHTLY AS NEEDED FOR SLEEP 60 tablet 11     amLODIPine (NORVASC) 5 MG tablet Take 1 tablet (5 mg) by mouth daily 90 tablet 3     aspirin 81 MG tablet Take 1 tablet (81 mg) by mouth daily 120 tablet      atenolol (TENORMIN) 50 MG tablet Take 1 tablet (50 mg) by mouth daily 90 tablet 4     carbidopa-levodopa (SINEMET)  MG per tablet        Cholecalciferol (VITAMIN D) 2000 UNITS tablet Take 2,000 Units by mouth daily 100 tablet 3     cyanocolbalamin (VITAMIN  B-12) 500 MCG tablet Take 1 tablet by mouth daily.       diazepam (VALIUM) 5 MG tablet TAKE ONE TABLET BY MOUTH EVERY DAY AS NEEDED ANXIETY 30 tablet 2     escitalopram (LEXAPRO) 10 MG tablet TAKE 1 TABLET (10 MG) BY MOUTH DAILY 90 tablet 3     fluticasone (FLONASE) 50 MCG/ACT spray Spray 1-2 sprays into both nostrils daily 1 Bottle 11     levothyroxine (SYNTHROID/LEVOTHROID) 75 MCG tablet TAKE 1 TABLET (75 MCG) BY MOUTH DAILY 90  "tablet 3     polyethylene glycol (MIRALAX/GLYCOLAX) powder TAKE 17 G BY MOUTH 2     TIMES DAILY 527 g 11     pravastatin (PRAVACHOL) 40 MG tablet TAKE 1 TABLET (40 MG) BY MOUTH DAILY 90 tablet 1     Allergies   Allergen Reactions     Bees      Ciprofloxacin      Influenza Vaccine Live      Other [Seasonal Allergies]      msg and mushrooms     Penicillins      Wasp Venom Protein Hives     BP Readings from Last 3 Encounters:   10/22/18 116/58   09/04/18 128/66   07/02/18 122/60    Wt Readings from Last 3 Encounters:   10/22/18 169 lb (76.7 kg)   09/04/18 171 lb (77.6 kg)   07/02/18 167 lb (75.8 kg)                    Reviewed and updated as needed this visit by clinical staff       Reviewed and updated as needed this visit by Provider         ROS:  CONSTITUTIONAL:NEGATIVE for fever, chills, change in weight  CV: NEGATIVE for chest pain, palpitations or peripheral edema  NEURO: some lightheadedness    OBJECTIVE:                                                    /70  Pulse 50  Temp 97.5  F (36.4  C)  Resp 20  Ht 5' 3\" (1.6 m)  Wt 169 lb (76.7 kg)  SpO2 96%  Breastfeeding? No  BMI 29.94 kg/m2  Body mass index is 29.94 kg/(m^2).  GENERAL APPEARANCE: alert and no distress  CV: regular rates and rhythm, normal S1 S2, no S3 or S4, no murmur, click or rub and bradycardia  PSYCH: weak voice    Diagnostic test results:  none      ASSESSMENT/PLAN:                                                        ICD-10-CM    1. Hypertension goal BP (blood pressure) < 140/80 I10 atenolol (TENORMIN) 50 MG tablet   2. Parkinson's syndrome (H) G20    3. Hypotension, unspecified hypotension type I95.9    4. Bradycardia R00.1        BP has been lower since Parkinson's Rx was started.    Follow up with Provider - 4 wks.     Patient Instructions   Go ahead and stop taking the amlodipine.                     Also cut back the atenolol to 25 mg or 1/2 tablet per day.                     You are planning to call neurology regarding a " higher dose of carbidopa/levodopa.       Sean Velasquez MD  [facility]

## 2018-10-22 NOTE — PATIENT INSTRUCTIONS
Go ahead and stop taking the amlodipine.                     Also cut back the atenolol to 25 mg or 1/2 tablet per day.                     You are planning to call neurology regarding a higher dose of carbidopa/levodopa.

## 2018-11-05 ENCOUNTER — TRANSFERRED RECORDS (OUTPATIENT)
Dept: HEALTH INFORMATION MANAGEMENT | Facility: CLINIC | Age: 73
End: 2018-11-05

## 2018-11-08 DIAGNOSIS — F41.1 GAD (GENERALIZED ANXIETY DISORDER): ICD-10-CM

## 2018-11-08 RX ORDER — DIAZEPAM 5 MG
TABLET ORAL
Qty: 30 TABLET | Refills: 2 | Status: SHIPPED | OUTPATIENT
Start: 2018-11-08 | End: 2019-01-23

## 2018-11-08 NOTE — TELEPHONE ENCOUNTER
Requested Prescriptions   Pending Prescriptions Disp Refills     diazepam (VALIUM) 5 MG tablet [Pharmacy Med Name: DIAZEPAM 5MG TAB 5 TAB]      Last Written Prescription Date:  8/20/18  Last Fill Quantity: 30,   # refills: 2  Last Office Visit: 10/22/18 Eva  Future Office visit:       Routing refill request to provider for review/approval because:  Drug not on the [facility], [facility] or [facility] refill protocol or controlled substance   30 tablet 2     Sig: TAKE ONE TABLET BY MOUTH EVERY DAY AS NEEDED FOR ANXIETY    There is no refill protocol information for this order

## 2018-11-23 DIAGNOSIS — G44.219 EPISODIC TENSION-TYPE HEADACHE, NOT INTRACTABLE: ICD-10-CM

## 2018-11-23 NOTE — TELEPHONE ENCOUNTER
Requested Prescriptions   Pending Prescriptions Disp Refills     acetaminophen-codeine (TYLENOL #3) 300-30 MG tablet [Pharmacy Med Name: APAP/CODEINE 300/30MG -30 TAB] 30 tablet 3     Sig: TAKE ONE TABLET BY MOUTH TWICE A DAY AS NEEDED    There is no refill protocol information for this order        Last Written Prescription Date:  7/27/18  Last Fill Quantity: 30,   # refills: 3  Last Office Visit: 10/22/18   Future Office visit:       Routing refill request to provider for review/approval because:  Drug not on the [facility], [facility] or [facility] refill protocol or controlled substance

## 2018-11-26 ENCOUNTER — OFFICE VISIT (OUTPATIENT)
Dept: FAMILY MEDICINE | Facility: CLINIC | Age: 73
End: 2018-11-26
Payer: COMMERCIAL

## 2018-11-26 VITALS
OXYGEN SATURATION: 99 % | BODY MASS INDEX: 30.29 KG/M2 | HEART RATE: 64 BPM | SYSTOLIC BLOOD PRESSURE: 114 MMHG | TEMPERATURE: 98.3 F | DIASTOLIC BLOOD PRESSURE: 66 MMHG | RESPIRATION RATE: 14 BRPM | WEIGHT: 171 LBS

## 2018-11-26 DIAGNOSIS — M94.0 COSTOCHONDRITIS: Primary | ICD-10-CM

## 2018-11-26 DIAGNOSIS — M54.50 CHRONIC MIDLINE LOW BACK PAIN WITHOUT SCIATICA: ICD-10-CM

## 2018-11-26 DIAGNOSIS — G89.29 CHRONIC MIDLINE LOW BACK PAIN WITHOUT SCIATICA: ICD-10-CM

## 2018-11-26 PROCEDURE — 99214 OFFICE O/P EST MOD 30 MIN: CPT | Performed by: PHYSICIAN ASSISTANT

## 2018-11-26 ASSESSMENT — PATIENT HEALTH QUESTIONNAIRE - PHQ9: SUM OF ALL RESPONSES TO PHQ QUESTIONS 1-9: 4

## 2018-11-26 NOTE — MR AVS SNAPSHOT
After Visit Summary   11/26/2018    Sadaf Rubi    MRN: [id number]           Patient Information     Date Of Birth          1945        Visit Information        Provider Department      11/26/2018 9:50 AM Leesa Bellamy PA-C [facility]        Today's Diagnoses     Costochondritis    -  1    Chronic midline low back pain without sciatica          Care Instructions      Costochondritis    Costochondritis is inflammation of a rib or the cartilage that connects a rib to your breastbone (sternum). It causes tenderness, and sometimes chest pain may be sharp or aching, or it may feel like pressure. Pain may get worse with deep breathing, movement, or exercise. In some cases, the pain is mistaken for a heart attack. Despite this, the condition is not serious. Read on to learn more about the condition and how it can be treated.  What causes costochondritis?  The cause of costochondritis is not completely clear, but it may happen after a chest injury, chest infection, or coughing episode. Some physical activities can sometimes lead to costochondritis. Large-breasted women may be more likely to have the condition. Often, the reason for the inflammation is unknown.  Diagnosing costochondritis  There is no test for costochondritis. The condition is diagnosed by the symptoms you have. Your healthcare provider will perform a physical exam. He or she will ask you about your symptoms and examine your chest for tenderness. In some cases, tests are done to rule out more serious problems. These tests may include imaging tests such as chest X-ray, CT scan, or an ECG.  Treating costochondritis  If an underlying cause is found, treatment for that will likely relieve the problem. Costochondritis often goes away on its own. The course of the condition varies from person to person. It usually lasts from weeks to months. In some cases, mild symptoms continue for months to  years. To ease symptoms:    Take medicine as directed. These relieve pain and swelling. Ibuprofen or other NSAIDs are often recommended. In some cases, you may be given prescription medicine, such as muscle relaxants.    Avoid activities that put stress on the chest or spine.    Apply a heating pad (set to warm, not too high, heat) to the breastbone several times a day.    Perform stretching exercises as directed.  Call the healthcare provider right away if you have any of the following:    Pain that is not relieved by medicine    Shortness of breath    Lightheadedness, dizziness, or fainting    Feeling of irregular heartbeat or fast pulse  Anyone with chest pain should see a healthcare provider, especially those who are older and may be at risk for heart disease.   Date Last Reviewed: 10/1/2016    6453-9616 [employer]. [address] [place] [address], [place] [address]. All rights reserved. This information is not intended as a substitute for professional medical care. Always follow your healthcare professional's instructions.                Follow-ups after your visit        Follow-up notes from your care team     Return in about 2 weeks (around 12/10/2018) for Recheck if not improving.      Who to contact     If you have questions or need follow up information about today's clinic visit or your schedule please contact [facility] directly at [phone].  Normal or non-critical lab and imaging results will be communicated to you by MyChart, letter or phone within 4 business days after the clinic has received the results. If you do not hear from us within 7 days, please contact the clinic through MyChart or phone. If you have a critical or abnormal lab result, we will notify you by phone as soon as possible.  Submit refill requests through [facility] or call your pharmacy and they will forward the refill request to us. Please allow 3 business days for your refill to be completed.           Additional Information About Your Visit        Care EveryWhere ID     This is your Care EveryWhere ID. This could be used by other organizations to access your [facility] medical records  [id number]        Your Vitals Were     Pulse Temperature Respirations Pulse Oximetry BMI (Body Mass Index)       64 98.3  F (36.8  C) (Tympanic) 14 99% 30.29 kg/m2        Blood Pressure from Last 3 Encounters:   11/26/18 114/66   10/22/18 111/70   09/04/18 128/66    Weight from Last 3 Encounters:   11/26/18 171 lb (77.6 kg)   10/22/18 169 lb (76.7 kg)   09/04/18 171 lb (77.6 kg)              Today, you had the following     No orders found for display         Today's Medication Changes          These changes are accurate as of 11/26/18  2:16 PM.  If you have any questions, ask your nurse or doctor.               Stop taking these medicines if you haven't already. Please contact your care team if you have questions.     amitriptyline 10 MG tablet   Commonly known as:  ELAVIL   Stopped by:  Leesa Bellamy PA-C                    Primary Care Provider Office Phone # Fax #    Sean Velasquez [phone]       [address] [facility] [id number]        Equal Access to Services     JESSICA MCCULLOUGH AH: Hadii joellen ku hadasho Soomaali, waaxda luqadaha, qaybta kaalmada adeegyada, isha monique hayjosen teresita ritchie. So [facility] [phone].    ATENCIÓN: Si habla español, tiene a agrwaal disposición servicios gratuitos de asistencia lingüística. Llame al [phone].    We comply with applicable federal civil rights laws and [place] laws. We do not discriminate on the basis of race, color, national origin, age, disability, sex, sexual orientation, or gender identity.            Thank you!     Thank you for choosing [facility]  for your care. Our goal is always to provide you with excellent care. Hearing back from our patients is one way we can continue to improve our  services. Please take a few minutes to complete the written survey that you may receive in the mail after your visit with us. Thank you!             Your Updated Medication List - Protect others around you: Learn how to safely use, store and throw away your medicines at www.disposemymeds.org.          This list is accurate as of 11/26/18  2:16 PM.  Always use your most recent med list.                   Brand Name Dispense Instructions for use Diagnosis    acetaminophen-codeine 300-30 MG tablet    TYLENOL #3    30 tablet    TAKE ONE TABLET BY MOUTH TWICE A DAY AS NEEDED    Episodic tension-type headache, not intractable       aspirin 81 MG tablet    ASA    120 tablet    Take 1 tablet (81 mg) by mouth daily        atenolol 50 MG tablet    TENORMIN    45 tablet    Take 0.5 tablets (25 mg) by mouth daily    Hypertension goal BP (blood pressure) < 140/80       carbidopa-levodopa  MG per tablet    SINEMET          cyanocolbalamin 500 MCG tablet    vitamin  B-12     Take 1 tablet by mouth daily.        diazepam 5 MG tablet    VALIUM    30 tablet    TAKE ONE TABLET BY MOUTH EVERY DAY AS NEEDED FOR ANXIETY    SHAWNEE (generalized anxiety disorder)       escitalopram 10 MG tablet    LEXAPRO    90 tablet    TAKE 1 TABLET (10 MG) BY MOUTH DAILY    SHAWNEE (generalized anxiety disorder)       fluticasone 50 MCG/ACT spray    FLONASE    1 Bottle    Spray 1-2 sprays into both nostrils daily    Chronic vasomotor rhinitis       levothyroxine 75 MCG tablet    SYNTHROID/LEVOTHROID    90 tablet    TAKE 1 TABLET (75 MCG) BY MOUTH DAILY    Hypothyroidism, unspecified type       polyethylene glycol powder    MIRALAX/GLYCOLAX    527 g    TAKE 17 G BY MOUTH 2     TIMES DAILY    Slow transit constipation       pravastatin 40 MG tablet    PRAVACHOL    90 tablet    TAKE 1 TABLET (40 MG) BY MOUTH DAILY    Hyperlipidemia with target LDL less than 100       vitamin D3 2000 units tablet    CHOLECALCIFEROL    100 tablet    Take 2,000 Units by mouth  daily

## 2018-11-26 NOTE — PATIENT INSTRUCTIONS
Costochondritis    Costochondritis is inflammation of a rib or the cartilage that connects a rib to your breastbone (sternum). It causes tenderness, and sometimes chest pain may be sharp or aching, or it may feel like pressure. Pain may get worse with deep breathing, movement, or exercise. In some cases, the pain is mistaken for a heart attack. Despite this, the condition is not serious. Read on to learn more about the condition and how it can be treated.  What causes costochondritis?  The cause of costochondritis is not completely clear, but it may happen after a chest injury, chest infection, or coughing episode. Some physical activities can sometimes lead to costochondritis. Large-breasted women may be more likely to have the condition. Often, the reason for the inflammation is unknown.  Diagnosing costochondritis  There is no test for costochondritis. The condition is diagnosed by the symptoms you have. Your healthcare provider will perform a physical exam. He or she will ask you about your symptoms and examine your chest for tenderness. In some cases, tests are done to rule out more serious problems. These tests may include imaging tests such as chest X-ray, CT scan, or an ECG.  Treating costochondritis  If an underlying cause is found, treatment for that will likely relieve the problem. Costochondritis often goes away on its own. The course of the condition varies from person to person. It usually lasts from weeks to months. In some cases, mild symptoms continue for months to years. To ease symptoms:    Take medicine as directed. These relieve pain and swelling. Ibuprofen or other NSAIDs are often recommended. In some cases, you may be given prescription medicine, such as muscle relaxants.    Avoid activities that put stress on the chest or spine.    Apply a heating pad (set to warm, not too high, heat) to the breastbone several times a day.    Perform stretching exercises as directed.  Call the healthcare  provider right away if you have any of the following:    Pain that is not relieved by medicine    Shortness of breath    Lightheadedness, dizziness, or fainting    Feeling of irregular heartbeat or fast pulse  Anyone with chest pain should see a healthcare provider, especially those who are older and may be at risk for heart disease.   Date Last Reviewed: 10/1/2016    7088-8340 [employer]. [address] [place] [address]. All rights reserved. This information is not intended as a substitute for professional medical care. Always follow your healthcare professional's instructions.

## 2018-11-26 NOTE — TELEPHONE ENCOUNTER
Faxed prescription to [facility] to #[phone] today, and patient aware.  Rafaela Leahy LPN

## 2018-11-26 NOTE — PROGRESS NOTES
SUBJECTIVE:   Sadaf Rubi is a 73 year old female who presents to clinic today for the following health issues:    Musculoskeletal problem/pain      Duration: 3 months    Description  Location: patient states that some times she gets a sharp twinge of pain in the middle of her chest/ribs, not a deep pain, does not last for very long    Intensity:  mild, moderate    Accompanying signs and symptoms: none    History  Previous similar problem: no   Previous evaluation:  none    Precipitating or alleviating factors:  Trauma or overuse: no   Aggravating factors include: none    Therapies tried and outcome: nothing  Reviewed and updated as needed this visit by clinical staff  Tobacco  Allergies  Meds  Problems  Med Hx  Surg Hx  Fam Hx  Soc Hx        Reviewed and updated as needed this visit by Provider  Tobacco  Allergies  Meds  Problems  Med Hx  Surg Hx  Fam Hx  Soc Hx        Additional complaints: None    HPI additional notes: Danny presents today with   Chief Complaint   Patient presents with     Rib Pain   Pain occurs every few days.  Only lasts for a few seconds.  Does not occur at sleep. Does not seem to be related to sleep, exercise or food.  Would not notice if she was not at rest.  It does not bother her enough to have heat or ice applied.  Does not feel short of breath or palpitations when occurs.  No history of gerd or cough.  Is not occurring in more frequency.          ROS:  C: Negative for fever, chills, recent change in weight  Skin: Negative for worrisome rashes or lesions  Resp: Negative for significant cough or SOB  CV: Negative for chest pain or peripheral edema  GI: Negative for nausea, abdominal pain, heartburn, or change in bowel habits  MS: Positive for bilateral lower back, midline chest wall  pain  NEURO: NEGATIVE for numbness, tingling, or radicular pain.  P: Negative for changes in mood or affect  ROS otherwise negative.    Chart Review:  History   Smoking Status     Never  Smoker   Smokeless Tobacco     Never Used       Recent Labs   Lab Test  06/22/18   1930  05/02/18   1200  06/05/17   1620  08/01/16   1515  07/27/15   0919   07/02/14   0808   12/07/12   0809   LDL   --   104*   --   105*  156*   --   152*   --   133*   HDL   --    --    --    --   75   --   87   --   64   TRIG   --    --    --    --   71   --   66   --   64   ALT  25  24   --   25  29   < >  110*   < >  21   CR  0.92  0.66   --   0.86  0.70   --   0.80   < >  0.80   GFRESTIMATED  60*  88   --   65  83   --   71   < >  71   GFRESTBLACK  72  >90   --   79  >90   --   86   < >  86   POTASSIUM  4.5  4.4   --   4.5  3.9   --   4.3   < >  3.8   TSH   --   0.71  1.14  2.32  1.44   --   3.22   < >  2.24    < > = values in this interval not displayed.      BP Readings from Last 3 Encounters:   11/26/18 114/66   10/22/18 111/70   09/04/18 128/66    Wt Readings from Last 3 Encounters:   11/26/18 171 lb (77.6 kg)   10/22/18 169 lb (76.7 kg)   09/04/18 171 lb (77.6 kg)                  Patient Active Problem List   Diagnosis     Dysthymic disorder     Palpitation     Shortness of breath     Osteopenia     Preventive measure     Family history of coagulation disorder     Vitamin D deficiency disease     Hypertension goal BP (blood pressure) < 140/80     Hyperlipidemia with target LDL less than 100     Dizziness     Balance problems     Abnormal liver enzymes     ACP (advance care planning)     Episodic tension-type headache, not intractable     Pain in unspecified knee     Slow transit constipation     Persistent insomnia     SHAWNEE (generalized anxiety disorder)     Hypothyroidism, unspecified type     Urinary hesitancy     Other iron deficiency anemia     Pelvic somatic dysfunction     Mixed incontinence urge and stress (male)(female)     Essential tremor     Chronic midline thoracic back pain     Chronic upper back pain     Chronic midline low back pain without sciatica     Dysphagia, unspecified type     Left-sided low back  pain without sciatica     Fine motor impairment     Parkinson's syndrome (H)     Past Surgical History:   Procedure Laterality Date     GYN SURGERY  1990's    hysterectomy SAMANTHA & BSO     KNEE SURGERY  1/2006    meniscus left     KNEE SURGERY  2007    right meniscus     KNEE SURGERY  09/10/2012    right knee replacement ; L TKA 3/13     LUMPECTOMY BREAST  1/2001    benign     URETHRA SURGERY  1977     Problem list, Medication list, Allergies, Medical/Social/Surg hx reviewed in [facility], updated as appropriate.   OBJECTIVE:                                                    /66  Pulse 64  Temp 98.3  F (36.8  C) (Tympanic)  Resp 14  Wt 171 lb (77.6 kg)  SpO2 99%  BMI 30.29 kg/m2  Body mass index is 30.29 kg/(m^2).  GENERAL: healthy, alert, in no acute distress  HENT: Mucous mebranes moist.  CV: regular rate and rhythm, no murmur, rub   MS: extremities normal- no gross deformities noted, tenderness to palpation of bilateral paralumbar spine muscles, tenderness without deformity to palpation of sternum.  No upper abdominal pain.  No pain with deep inspiration.  SKIN: no suspicious lesions, no rashes  PSYCH: Alert and oriented times 3;  Able to articulate logical thoughts. Affect is normal.    Diagnostic test results: none      ASSESSMENT/PLAN:                                                          ICD-10-CM    1. Costochondritis M94.0    2. Chronic midline low back pain without sciatica M54.5     G89.29      Discussed nature of costochondritis, no sign of cardiac or GI cause.  As periods are brief and not distressing, no further work up is warranted at this time.  She does not have worsening of symptoms with food intake, exertion, position changes, deep breaths.  She does not have chest pain, palpitations, diaphoresis, nausea or vomiting.  Pain is reproducible to palpation.    Discussed low back pain which has been progressively worsening.  Pt has been taking medications and doing her PT exercises which do not  seem to be helping.  Have outfitted her for a lower back brace today which she will use when she is active to see if helps with symptoms.  She already feels more support from it after trying it on in the office.    phq-9 updated today.  Pt notes score is mainly due to her recent diagnosis of parkinson's.    Please see patient instructions for treatment details.    Return in about 2 weeks (around 12/10/2018) for Recheck if not improving.    Leesa Bellamy PA-C  [facility]

## 2018-12-15 PROBLEM — G25.0 ESSENTIAL TREMOR: Status: RESOLVED | Noted: 2018-02-26 | Resolved: 2018-12-15

## 2018-12-17 ENCOUNTER — OFFICE VISIT (OUTPATIENT)
Dept: FAMILY MEDICINE | Facility: CLINIC | Age: 73
End: 2018-12-17
Payer: COMMERCIAL

## 2018-12-17 VITALS
RESPIRATION RATE: 16 BRPM | SYSTOLIC BLOOD PRESSURE: 124 MMHG | BODY MASS INDEX: 30.11 KG/M2 | OXYGEN SATURATION: 94 % | HEART RATE: 68 BPM | DIASTOLIC BLOOD PRESSURE: 76 MMHG | WEIGHT: 170 LBS | TEMPERATURE: 97.7 F

## 2018-12-17 DIAGNOSIS — F41.1 GAD (GENERALIZED ANXIETY DISORDER): ICD-10-CM

## 2018-12-17 DIAGNOSIS — E03.9 HYPOTHYROIDISM, UNSPECIFIED TYPE: Primary | ICD-10-CM

## 2018-12-17 DIAGNOSIS — J01.00 ACUTE NON-RECURRENT MAXILLARY SINUSITIS: ICD-10-CM

## 2018-12-17 DIAGNOSIS — G20.A1 PARKINSON'S SYNDROME (H): ICD-10-CM

## 2018-12-17 PROCEDURE — 84443 ASSAY THYROID STIM HORMONE: CPT | Performed by: PHYSICIAN ASSISTANT

## 2018-12-17 PROCEDURE — 99214 OFFICE O/P EST MOD 30 MIN: CPT | Performed by: PHYSICIAN ASSISTANT

## 2018-12-17 PROCEDURE — 36415 COLL VENOUS BLD VENIPUNCTURE: CPT | Performed by: PHYSICIAN ASSISTANT

## 2018-12-17 RX ORDER — DOXYCYCLINE HYCLATE 100 MG
100 TABLET ORAL 2 TIMES DAILY
Qty: 14 TABLET | Refills: 0 | Status: SHIPPED | OUTPATIENT
Start: 2018-12-17 | End: 2019-02-18

## 2018-12-17 NOTE — PROGRESS NOTES
SUBJECTIVE:   Sadaf Rubi is a 73 year old female who presents to clinic today for the following health issues:    Hypertension Follow-up      Outpatient blood pressures are being checked at home.  Results are around average.    Low Salt Diet: no added salt    Anxiety Follow-Up    Status since last visit: No change    Other associated symptoms:None    Complicating factors:   Significant life event: No   Current substance abuse: None  Depression symptoms: No  SHAWNEE-7 SCORE 4/20/2016 6/9/2016 6/5/2017   Total Score 0 0 0       SHAWNEE-7  Hypothyroidism Follow-up      Since last visit, patient describes the following symptoms: Weight stable, no hair loss, no skin changes, no constipation, no loose stools      Amount of exercise or physical activity: None    Problems taking medications regularly: No    Medication side effects: none    Diet: regular (no restrictions)    -neurologist thinks that her thyroid dose is too much, and would like this rechecked as it is could be effecting the carbidopa medication that she takes for parkinson's       RESPIRATORY SYMPTOMS    Duration: saturday    Description  nasal congestion, rhinorrhea, facial pain/pressure, fever, fatigue/malaise and hoarse voice, had a fever of 100 last night.      Severity: moderate    Accompanying signs and symptoms: None    History (predisposing factors):  none    Precipitating or alleviating factors: None    Therapies tried and outcome:  rest and fluids, benadryl     Reviewed and updated as needed this visit by clinical staff  Tobacco  Allergies  Meds  Problems  Med Hx  Surg Hx  Fam Hx  Soc Hx        Reviewed and updated as needed this visit by Provider  Tobacco  Allergies  Meds  Problems  Med Hx  Surg Hx  Fam Hx  Soc Hx        Additional complaints: None    HPI additional notes: Danny presents today with   Chief Complaint   Patient presents with     URI          ROS:  C: POSITIVE for fever and chills.  Skin: Negative for worrisome rashes or  lesions  ENT/MOUTH:POSITIVE for congestion, runny nose, sore throat, sinus pressure and swollen glands.  Negative for ear pain and ear pressure.  Resp: Negative for significant cough or SOB  MS: POSITIVE for generalized fatigue and malaise.  NEURO: POSITIVE for headache, sinus  P: Negative for changes in mood or affect  ROS otherwise negative.    Chart Review:  History   Smoking Status     Never Smoker   Smokeless Tobacco     Never Used     Patient Active Problem List   Diagnosis     Dysthymic disorder     Palpitation     Shortness of breath     Osteopenia     Preventive measure     Family history of coagulation disorder     Vitamin D deficiency disease     Hypertension goal BP (blood pressure) < 140/80     Hyperlipidemia with target LDL less than 100     Dizziness     Balance problems     Abnormal liver enzymes     ACP (advance care planning)     Episodic tension-type headache, not intractable     Pain in unspecified knee     Slow transit constipation     Persistent insomnia     SHAWNEE (generalized anxiety disorder)     Hypothyroidism, unspecified type     Urinary hesitancy     Other iron deficiency anemia     Pelvic somatic dysfunction     Mixed incontinence urge and stress (male)(female)     Chronic midline thoracic back pain     Chronic upper back pain     Chronic midline low back pain without sciatica     Dysphagia, unspecified type     Left-sided low back pain without sciatica     Fine motor impairment     Parkinson's syndrome (H)     Past Surgical History:   Procedure Laterality Date     GYN SURGERY  1990's    hysterectomy SAMANTHA & BSO     KNEE SURGERY  1/2006    meniscus left     KNEE SURGERY  2007    right meniscus     KNEE SURGERY  09/10/2012    right knee replacement ; L TKA 3/13     LUMPECTOMY BREAST  1/2001    benign     URETHRA SURGERY  1977     Problem list, Medication list, Allergies, Medical/Social/Surg hx reviewed in [facility], updated as appropriate.   OBJECTIVE:                                                     /76   Pulse 68   Temp 97.7  F (36.5  C) (Tympanic)   Resp 16   Wt 77.1 kg (170 lb)   SpO2 94%   BMI 30.11 kg/m    Body mass index is 30.11 kg/m .  GENERAL: healthy, alert, in no acute distress  EYES: Grossly normal to inspection, EOMI, PERRL  HENT: Ear canals normal bilaterally. TM dull gray  bulging with serous effusion bilaterally.  Nasal mucosa mildly edematous with purulent rhinorrhea.  No septal deviation.  Mouth- mucous membranes moist, no lesions or ulcerations. Pharynx pink. and No tonsillary hypertrophy. Uvula midline, no  post-nasal drainage.  Maxillary and frontal sinuses tender to palpation bilaterally  NECK:tender and 1+ posterior cervical LAD bilaterally  RESP: lungs clear to auscultation - no rales, no rhonchi, no wheezes  CV: regular rate and rhythm, normal S1 S2.  No peripheral edema.  SKIN: no suspicious lesions, no rashes  PSYCH: Alert and oriented times 3;  Able to articulate logical thoughts. Affect is normal.    Diagnostic test results: none      ASSESSMENT/PLAN:                                                          ICD-10-CM    1. Hypothyroidism, unspecified type E03.9 TSH with free T4 reflex   2. SHAWNEE (generalized anxiety disorder) F41.1    3. Parkinson's syndrome (H) G20    4. Acute non-recurrent maxillary sinusitis J01.00 doxycycline hyclate (VIBRA-TABS) 100 MG tablet     Will see if symptoms improve in the next two days, if not pt will fill prescription that was printed for her today for doxycycline.    Medications Prescribed Today:  Doxycycline may cause upset stomach and sensitivty to sunlight.  Wear sunscreen if outside while taking.  Do not take within 2 hours of any calcium or magnesium supplements.    Will let her know when thyroid labs return.    Will discontinue and call clinic if any side effects are noted.   Please see patient instructions for treatment details.    Return in about 1 week (around 12/24/2018) for if not improving.    Leesa Bellamy,  MARY  [facility]

## 2018-12-18 ENCOUNTER — TELEPHONE (OUTPATIENT)
Dept: FAMILY MEDICINE | Facility: CLINIC | Age: 73
End: 2018-12-18

## 2018-12-18 LAB — TSH SERPL DL<=0.005 MIU/L-ACNC: 0.6 MU/L (ref 0.4–4)

## 2018-12-18 NOTE — TELEPHONE ENCOUNTER
Patient returned call. Informed her of the following message below. Patient said her neurologist is the one who ordered the lab test and she would follow up with him regarding adjusting her medications. Requests that we fax over the results.     Neurologist Patrizia Jensen MD: [phone] (Fax)

## 2018-12-18 NOTE — TELEPHONE ENCOUNTER
Patient Contact    Attempt # 1    Was call answered?  No.  Left message on voicemail with information to call triage back.    Note from Stephanie: Pts TSH is on the low end of normal.  We could try decreasing her medication dose and see if her symptoms improve.  Let me know if she would like to do that.

## 2018-12-19 ENCOUNTER — TELEPHONE (OUTPATIENT)
Dept: FAMILY MEDICINE | Facility: CLINIC | Age: 73
End: 2018-12-19

## 2018-12-19 DIAGNOSIS — E03.9 HYPOTHYROIDISM, UNSPECIFIED TYPE: ICD-10-CM

## 2018-12-19 NOTE — TELEPHONE ENCOUNTER
Leesa Bellamy PA-C  [facility] Triage             Please call pt and inform of the following:     Pts TSH is on the low end of normal.  We could try decreasing her medication dose and see if her symptoms improve.  Let me know if she would like to do that.      Call made back to the patient, she is going to check in with her neurologist and let us know.     She may need to adjust her carbidopa/levodopa.    Advised to contact his office to let him know the TSH results are in. The patient will call back if any further action is needed on our part.

## 2018-12-21 NOTE — TELEPHONE ENCOUNTER
Danny called back and said that she would like any adjustments necessary be made to her medication.

## 2018-12-24 RX ORDER — LEVOTHYROXINE SODIUM 75 UG/1
TABLET ORAL
Qty: 90 TABLET | Refills: 3 | COMMUNITY
Start: 2018-12-24 | End: 2019-02-19

## 2018-12-24 NOTE — TELEPHONE ENCOUNTER
I'd like her to skip her medication on Sat and take it the other days of the week.  We'll recheck labs in 6-8 weeks.

## 2018-12-24 NOTE — TELEPHONE ENCOUNTER
levothyroxine (SYNTHROID/LEVOTHROID) 75 MCG tablet 90 tablet 3 12/24/2018  No   Sig: Take 1 tab PO before breakfast Sunday- Friday.   Class: Historical   Order: [id number]     Call made back to the patient. Let her know the message.

## 2018-12-31 ENCOUNTER — OFFICE VISIT (OUTPATIENT)
Dept: FAMILY MEDICINE | Facility: CLINIC | Age: 73
End: 2018-12-31
Payer: COMMERCIAL

## 2018-12-31 VITALS
WEIGHT: 165 LBS | SYSTOLIC BLOOD PRESSURE: 120 MMHG | HEART RATE: 57 BPM | OXYGEN SATURATION: 97 % | DIASTOLIC BLOOD PRESSURE: 66 MMHG | BODY MASS INDEX: 29.23 KG/M2 | RESPIRATION RATE: 20 BRPM | TEMPERATURE: 97.8 F | HEIGHT: 63 IN

## 2018-12-31 DIAGNOSIS — G20.A1 PARKINSON'S SYNDROME (H): ICD-10-CM

## 2018-12-31 DIAGNOSIS — E03.9 HYPOTHYROIDISM, UNSPECIFIED TYPE: ICD-10-CM

## 2018-12-31 DIAGNOSIS — F41.1 GAD (GENERALIZED ANXIETY DISORDER): Primary | ICD-10-CM

## 2018-12-31 PROCEDURE — 99213 OFFICE O/P EST LOW 20 MIN: CPT | Performed by: INTERNAL MEDICINE

## 2018-12-31 RX ORDER — ESCITALOPRAM OXALATE 10 MG/1
15 TABLET ORAL DAILY
Qty: 135 TABLET | Refills: 3 | Status: SHIPPED | OUTPATIENT
Start: 2018-12-31 | End: 2020-01-01

## 2018-12-31 ASSESSMENT — ANXIETY QUESTIONNAIRES
IF YOU CHECKED OFF ANY PROBLEMS ON THIS QUESTIONNAIRE, HOW DIFFICULT HAVE THESE PROBLEMS MADE IT FOR YOU TO DO YOUR WORK, TAKE CARE OF THINGS AT HOME, OR GET ALONG WITH OTHER PEOPLE: VERY DIFFICULT
1. FEELING NERVOUS, ANXIOUS, OR ON EDGE: NEARLY EVERY DAY
3. WORRYING TOO MUCH ABOUT DIFFERENT THINGS: NEARLY EVERY DAY
7. FEELING AFRAID AS IF SOMETHING AWFUL MIGHT HAPPEN: NOT AT ALL
6. BECOMING EASILY ANNOYED OR IRRITABLE: NOT AT ALL
2. NOT BEING ABLE TO STOP OR CONTROL WORRYING: NEARLY EVERY DAY
GAD7 TOTAL SCORE: 12
5. BEING SO RESTLESS THAT IT IS HARD TO SIT STILL: NOT AT ALL

## 2018-12-31 ASSESSMENT — PATIENT HEALTH QUESTIONNAIRE - PHQ9: 5. POOR APPETITE OR OVEREATING: NEARLY EVERY DAY

## 2018-12-31 ASSESSMENT — MIFFLIN-ST. JEOR: SCORE: 1217.57

## 2018-12-31 NOTE — PATIENT INSTRUCTIONS
Let's do this:           Add another 5 mg of escitalopram in the afternoon.                     Keep taking the 10 mg in the morning.                             We should recheck your thyroid tests in 3 months.

## 2018-12-31 NOTE — PROGRESS NOTES
SUBJECTIVE:   Sadaf Rubi is a 73 year old female who presents to clinic today for the following health issues:      Hyperlipidemia Follow-Up      Rate your low fat/cholesterol diet?: good    Taking statin?  Yes, no muscle aches from statin    Other lipid medications/supplements?:  none    Hypothyroidism Follow-up      Since last visit, patient describes the following symptoms: Weight stable, no hair loss, no skin changes, no constipation, no loose stools      Amount of exercise or physical activity: None    Problems taking medications regularly: No    Medication side effects: none    Diet: low salt and low fat/cholesterol      Medication Followup of other health issues    Taking Medication as prescribed: yes    Side Effects:  None    Medication Helping Symptoms:  Has concerns             Very anxious.             Now tolerating 10 mg lexapro per day.                      A bit of nausea due to sinemet.                   Also takes diazepam 5 mg per day.                                                 Problem list and histories reviewed & adjusted, as indicated.      Current Outpatient Medications   Medication Sig Dispense Refill     acetaminophen-codeine (TYLENOL #3) 300-30 MG tablet TAKE ONE TABLET BY MOUTH TWICE A DAY AS NEEDED 30 tablet 1     aspirin 81 MG tablet Take 1 tablet (81 mg) by mouth daily 120 tablet      atenolol (TENORMIN) 50 MG tablet Take 0.5 tablets (25 mg) by mouth daily 45 tablet 4     carbidopa-levodopa (SINEMET)  MG per tablet Taking 1 at 7:00a.m. And 1 at Noon, and then 1/2 tab at 5:00p.m.       Cholecalciferol (VITAMIN D) 2000 UNITS tablet Take 2,000 Units by mouth daily 100 tablet 3     cyanocolbalamin (VITAMIN  B-12) 500 MCG tablet Take 1 tablet by mouth daily.       diazepam (VALIUM) 5 MG tablet TAKE ONE TABLET BY MOUTH EVERY DAY AS NEEDED FOR ANXIETY 30 tablet 2     escitalopram (LEXAPRO) 10 MG tablet TAKE 1 TABLET (10 MG) BY MOUTH DAILY 90 tablet 3     levothyroxine  "(SYNTHROID/LEVOTHROID) 75 MCG tablet Take 1 tab PO before breakfast Sunday- Friday. 90 tablet 3     polyethylene glycol (MIRALAX/GLYCOLAX) powder TAKE 17 G BY MOUTH 2     TIMES DAILY 527 g 11     pravastatin (PRAVACHOL) 40 MG tablet TAKE 1 TABLET (40 MG) BY MOUTH DAILY 90 tablet 1     fluticasone (FLONASE) 50 MCG/ACT spray Spray 1-2 sprays into both nostrils daily (Patient not taking: Reported on 12/31/2018) 1 Bottle 11     Allergies   Allergen Reactions     Mushroom Hives     Bees      Influenza Vaccine Live      Other [Seasonal Allergies]      msg and mushrooms     Penicillins      Wasp Venom Protein Hives     Ciprofloxacin      BP Readings from Last 3 Encounters:   12/31/18 120/66   12/17/18 124/76   11/26/18 114/66    Wt Readings from Last 3 Encounters:   12/31/18 74.8 kg (165 lb)   12/17/18 77.1 kg (170 lb)   11/26/18 77.6 kg (171 lb)                    Reviewed and updated as needed this visit by clinical staff  Allergies       Reviewed and updated as needed this visit by Provider         ROS:  CONSTITUTIONAL:NEGATIVE for fever, chills, change in weight  RESP:NEGATIVE for significant cough or SOB  CV: NEGATIVE for chest pain, palpitations or peripheral edema  PSYCHIATRIC: NEGATIVE foralcohol abuse and drug usage    OBJECTIVE:                                                    /66 (BP Location: Left arm, Patient Position: Chair, Cuff Size: Adult Large)   Pulse 57   Temp 97.8  F (36.6  C)   Resp 20   Ht 1.6 m (5' 3\")   Wt 74.8 kg (165 lb)   SpO2 97%   Breastfeeding? No   BMI 29.23 kg/m    Body mass index is 29.23 kg/m .  GENERAL APPEARANCE: alert and no distress  CV: regular rates and rhythm, normal S1 S2, no S3 or S4 and no murmur, click or rub  NEURO: normal strength throughout  PSYCH: anxious    Diagnostic test results:  Results for orders placed or performed in visit on 12/17/18   TSH with free T4 reflex   Result Value Ref Range    TSH 0.60 0.40 - 4.00 mU/L        ASSESSMENT/PLAN:              "                                           ICD-10-CM    1. SHAWNEE (generalized anxiety disorder) F41.1 escitalopram (LEXAPRO) 10 MG tablet   2. Hypothyroidism, unspecified type E03.9    3. Parkinson's syndrome (H) G20        D/w pt; ok to add 5 mg of lexapro in the PM.               No increase in diazepam dose advised.           Patient Instructions   Let's do this:           Add another 5 mg of escitalopram in the afternoon.                     Keep taking the 10 mg in the morning.                             We should recheck your thyroid tests in 3 months.              Return in about 3 months (around 3/31/2019) for thyroid, BP Recheck, labs will be needed.      eSan Velasquez MD  [facility]

## 2019-01-01 ENCOUNTER — OFFICE VISIT (OUTPATIENT)
Dept: FAMILY MEDICINE | Facility: CLINIC | Age: 74
End: 2019-01-01
Payer: COMMERCIAL

## 2019-01-01 ENCOUNTER — TELEPHONE (OUTPATIENT)
Dept: FAMILY MEDICINE | Facility: CLINIC | Age: 74
End: 2019-01-01

## 2019-01-01 ENCOUNTER — TRANSFERRED RECORDS (OUTPATIENT)
Dept: MULTI SPECIALTY CLINIC | Facility: CLINIC | Age: 74
End: 2019-01-01

## 2019-01-01 ENCOUNTER — ALLIED HEALTH/NURSE VISIT (OUTPATIENT)
Dept: NURSING | Facility: CLINIC | Age: 74
End: 2019-01-01
Payer: COMMERCIAL

## 2019-01-01 VITALS
DIASTOLIC BLOOD PRESSURE: 76 MMHG | HEART RATE: 74 BPM | SYSTOLIC BLOOD PRESSURE: 112 MMHG | BODY MASS INDEX: 27.16 KG/M2 | TEMPERATURE: 99.3 F | OXYGEN SATURATION: 95 % | WEIGHT: 148.5 LBS

## 2019-01-01 VITALS
TEMPERATURE: 99.4 F | DIASTOLIC BLOOD PRESSURE: 70 MMHG | SYSTOLIC BLOOD PRESSURE: 132 MMHG | RESPIRATION RATE: 12 BRPM | WEIGHT: 140 LBS | BODY MASS INDEX: 25.61 KG/M2 | HEART RATE: 82 BPM

## 2019-01-01 VITALS
RESPIRATION RATE: 14 BRPM | TEMPERATURE: 98.3 F | DIASTOLIC BLOOD PRESSURE: 74 MMHG | BODY MASS INDEX: 27.98 KG/M2 | SYSTOLIC BLOOD PRESSURE: 122 MMHG | HEART RATE: 74 BPM | OXYGEN SATURATION: 93 % | WEIGHT: 153 LBS

## 2019-01-01 VITALS
HEART RATE: 63 BPM | HEIGHT: 62 IN | BODY MASS INDEX: 26.87 KG/M2 | SYSTOLIC BLOOD PRESSURE: 132 MMHG | RESPIRATION RATE: 20 BRPM | OXYGEN SATURATION: 100 % | DIASTOLIC BLOOD PRESSURE: 80 MMHG | WEIGHT: 146 LBS

## 2019-01-01 VITALS
HEIGHT: 62 IN | TEMPERATURE: 98.5 F | DIASTOLIC BLOOD PRESSURE: 62 MMHG | HEART RATE: 64 BPM | WEIGHT: 153 LBS | SYSTOLIC BLOOD PRESSURE: 120 MMHG | BODY MASS INDEX: 28.16 KG/M2 | RESPIRATION RATE: 20 BRPM | OXYGEN SATURATION: 97 %

## 2019-01-01 VITALS
OXYGEN SATURATION: 100 % | DIASTOLIC BLOOD PRESSURE: 68 MMHG | SYSTOLIC BLOOD PRESSURE: 112 MMHG | WEIGHT: 143 LBS | TEMPERATURE: 97.8 F | HEART RATE: 57 BPM | RESPIRATION RATE: 14 BRPM | BODY MASS INDEX: 26.16 KG/M2

## 2019-01-01 DIAGNOSIS — I95.9 HYPOTENSION, UNSPECIFIED HYPOTENSION TYPE: ICD-10-CM

## 2019-01-01 DIAGNOSIS — G47.00 PERSISTENT INSOMNIA: ICD-10-CM

## 2019-01-01 DIAGNOSIS — G89.29 CHRONIC MIDLINE LOW BACK PAIN WITHOUT SCIATICA: ICD-10-CM

## 2019-01-01 DIAGNOSIS — F41.1 GAD (GENERALIZED ANXIETY DISORDER): ICD-10-CM

## 2019-01-01 DIAGNOSIS — M54.50 CHRONIC MIDLINE LOW BACK PAIN WITHOUT SCIATICA: ICD-10-CM

## 2019-01-01 DIAGNOSIS — M25.569 PAIN IN UNSPECIFIED KNEE: ICD-10-CM

## 2019-01-01 DIAGNOSIS — E78.5 HYPERLIPIDEMIA WITH TARGET LDL LESS THAN 100: ICD-10-CM

## 2019-01-01 DIAGNOSIS — R26.89 BALANCE PROBLEMS: ICD-10-CM

## 2019-01-01 DIAGNOSIS — E03.9 HYPOTHYROIDISM, UNSPECIFIED TYPE: ICD-10-CM

## 2019-01-01 DIAGNOSIS — R09.89 LABILE BLOOD PRESSURE: Primary | ICD-10-CM

## 2019-01-01 DIAGNOSIS — G44.219 EPISODIC TENSION-TYPE HEADACHE, NOT INTRACTABLE: ICD-10-CM
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
## 2019-01-01 DIAGNOSIS — R53.82 CHRONIC FATIGUE: ICD-10-CM

## 2019-01-01 DIAGNOSIS — G47.00 INSOMNIA, UNSPECIFIED TYPE: ICD-10-CM

## 2019-01-01 DIAGNOSIS — R53.83 FATIGUE, UNSPECIFIED TYPE: ICD-10-CM

## 2019-01-01 DIAGNOSIS — K59.01 SLOW TRANSIT CONSTIPATION: ICD-10-CM

## 2019-01-01 DIAGNOSIS — G20.A1 PARKINSON'S SYNDROME (H): Primary | ICD-10-CM

## 2019-01-01 DIAGNOSIS — M62.81 GENERALIZED MUSCLE WEAKNESS: ICD-10-CM

## 2019-01-01 DIAGNOSIS — F41.1 GAD (GENERALIZED ANXIETY DISORDER): Primary | ICD-10-CM

## 2019-01-01 DIAGNOSIS — R42 DIZZINESS: ICD-10-CM

## 2019-01-01 DIAGNOSIS — R06.02 SHORTNESS OF BREATH: ICD-10-CM

## 2019-01-01 DIAGNOSIS — R26.89 BALANCE PROBLEMS: Primary | ICD-10-CM

## 2019-01-01 DIAGNOSIS — E78.5 HYPERLIPIDEMIA WITH TARGET LDL LESS THAN 100: Primary | ICD-10-CM

## 2019-01-01 DIAGNOSIS — G20.A1 PARKINSON'S SYNDROME (H): ICD-10-CM

## 2019-01-01 DIAGNOSIS — R30.0 DYSURIA: ICD-10-CM

## 2019-01-01 DIAGNOSIS — Z23 NEED FOR PROPHYLACTIC VACCINATION AND INOCULATION AGAINST INFLUENZA: Primary | ICD-10-CM

## 2019-01-01 LAB
ALBUMIN SERPL-MCNC: 4.2 G/DL (ref 3.4–5)
ALBUMIN UR-MCNC: NEGATIVE MG/DL
ALBUMIN UR-MCNC: NEGATIVE MG/DL
ALP SERPL-CCNC: 50 U/L (ref 40–150)
ALT SERPL W P-5'-P-CCNC: 10 U/L (ref 0–50)
ANION GAP SERPL CALCULATED.3IONS-SCNC: 3 MMOL/L (ref 3–14)
ANION GAP SERPL CALCULATED.3IONS-SCNC: 8 MMOL/L (ref 3–14)
APPEARANCE UR: CLEAR
APPEARANCE UR: CLEAR
AST SERPL W P-5'-P-CCNC: 24 U/L (ref 0–45)
BASOPHILS # BLD AUTO: 0 10E9/L (ref 0–0.2)
BASOPHILS # BLD AUTO: 0 10E9/L (ref 0–0.2)
BASOPHILS NFR BLD AUTO: 0.2 %
BASOPHILS NFR BLD AUTO: 0.3 %
BILIRUB SERPL-MCNC: 0.4 MG/DL (ref 0.2–1.3)
BILIRUB UR QL STRIP: NEGATIVE
BILIRUB UR QL STRIP: NEGATIVE
BUN SERPL-MCNC: 12 MG/DL (ref 7–30)
BUN SERPL-MCNC: 17 MG/DL (ref 7–30)
CALCIUM SERPL-MCNC: 9.4 MG/DL (ref 8.5–10.1)
CALCIUM SERPL-MCNC: 9.7 MG/DL (ref 8.5–10.1)
CHLORIDE SERPL-SCNC: 106 MMOL/L (ref 94–109)
CHLORIDE SERPL-SCNC: 107 MMOL/L (ref 94–109)
CO2 SERPL-SCNC: 22 MMOL/L (ref 20–32)
CO2 SERPL-SCNC: 27 MMOL/L (ref 20–32)
COLOR UR AUTO: YELLOW
COLOR UR AUTO: YELLOW
CREAT SERPL-MCNC: 0.69 MG/DL (ref 0.52–1.04)
CREAT SERPL-MCNC: 0.74 MG/DL (ref 0.52–1.04)
DIFFERENTIAL METHOD BLD: NORMAL
DIFFERENTIAL METHOD BLD: NORMAL
EOSINOPHIL # BLD AUTO: 0.1 10E9/L (ref 0–0.7)
EOSINOPHIL # BLD AUTO: 0.1 10E9/L (ref 0–0.7)
EOSINOPHIL NFR BLD AUTO: 1.4 %
EOSINOPHIL NFR BLD AUTO: 1.5 %
ERYTHROCYTE [DISTWIDTH] IN BLOOD BY AUTOMATED COUNT: 13.2 % (ref 10–15)
ERYTHROCYTE [DISTWIDTH] IN BLOOD BY AUTOMATED COUNT: 13.2 % (ref 10–15)
ERYTHROCYTE [DISTWIDTH] IN BLOOD BY AUTOMATED COUNT: 13.6 % (ref 10–15)
GFR SERPL CREATININE-BSD FRML MDRD: 79 ML/MIN/{1.73_M2}
GFR SERPL CREATININE-BSD FRML MDRD: 85 ML/MIN/{1.73_M2}
GLUCOSE SERPL-MCNC: 91 MG/DL (ref 70–99)
GLUCOSE SERPL-MCNC: 94 MG/DL (ref 70–99)
GLUCOSE UR STRIP-MCNC: NEGATIVE MG/DL
GLUCOSE UR STRIP-MCNC: NEGATIVE MG/DL
HCT VFR BLD AUTO: 41.4 % (ref 35–47)
HCT VFR BLD AUTO: 42.8 % (ref 35–47)
HCT VFR BLD AUTO: 43.3 % (ref 35–47)
HGB BLD-MCNC: 13.8 G/DL (ref 11.7–15.7)
HGB BLD-MCNC: 14.2 G/DL (ref 11.7–15.7)
HGB BLD-MCNC: 14.3 G/DL (ref 11.7–15.7)
HGB UR QL STRIP: NEGATIVE
HGB UR QL STRIP: NEGATIVE
KETONES UR STRIP-MCNC: NEGATIVE MG/DL
KETONES UR STRIP-MCNC: NEGATIVE MG/DL
LDLC SERPL DIRECT ASSAY-MCNC: 111 MG/DL
LEUKOCYTE ESTERASE UR QL STRIP: NEGATIVE
LEUKOCYTE ESTERASE UR QL STRIP: NEGATIVE
LYMPHOCYTES # BLD AUTO: 1.8 10E9/L (ref 0.8–5.3)
LYMPHOCYTES # BLD AUTO: 2.2 10E9/L (ref 0.8–5.3)
LYMPHOCYTES NFR BLD AUTO: 25.9 %
LYMPHOCYTES NFR BLD AUTO: 27.2 %
MCH RBC QN AUTO: 30.5 PG (ref 26.5–33)
MCH RBC QN AUTO: 30.7 PG (ref 26.5–33)
MCH RBC QN AUTO: 31.4 PG (ref 26.5–33)
MCHC RBC AUTO-ENTMCNC: 32.8 G/DL (ref 31.5–36.5)
MCHC RBC AUTO-ENTMCNC: 33.3 G/DL (ref 31.5–36.5)
MCHC RBC AUTO-ENTMCNC: 33.4 G/DL (ref 31.5–36.5)
MCV RBC AUTO: 92 FL (ref 78–100)
MCV RBC AUTO: 93 FL (ref 78–100)
MCV RBC AUTO: 94 FL (ref 78–100)
MONOCYTES # BLD AUTO: 0.5 10E9/L (ref 0–1.3)
MONOCYTES # BLD AUTO: 0.5 10E9/L (ref 0–1.3)
MONOCYTES NFR BLD AUTO: 6.2 %
MONOCYTES NFR BLD AUTO: 6.9 %
NEUTROPHILS # BLD AUTO: 4.2 10E9/L (ref 1.6–8.3)
NEUTROPHILS # BLD AUTO: 5.5 10E9/L (ref 1.6–8.3)
NEUTROPHILS NFR BLD AUTO: 64.1 %
NEUTROPHILS NFR BLD AUTO: 66.3 %
NITRATE UR QL: NEGATIVE
NITRATE UR QL: NEGATIVE
NT-PROBNP SERPL-MCNC: 154 PG/ML (ref 0–450)
PH UR STRIP: 6 PH (ref 5–7)
PH UR STRIP: 6.5 PH (ref 5–7)
PLATELET # BLD AUTO: 187 10E9/L (ref 150–450)
PLATELET # BLD AUTO: 193 10E9/L (ref 150–450)
PLATELET # BLD AUTO: 214 10E9/L (ref 150–450)
POTASSIUM SERPL-SCNC: 4.2 MMOL/L (ref 3.4–5.3)
POTASSIUM SERPL-SCNC: 4.4 MMOL/L (ref 3.4–5.3)
PROT SERPL-MCNC: 7.2 G/DL (ref 6.8–8.8)
RBC # BLD AUTO: 4.39 10E12/L (ref 3.8–5.2)
RBC # BLD AUTO: 4.66 10E12/L (ref 3.8–5.2)
RBC # BLD AUTO: 4.66 10E12/L (ref 3.8–5.2)
RBC #/AREA URNS AUTO: NORMAL /HPF
SODIUM SERPL-SCNC: 136 MMOL/L (ref 133–144)
SODIUM SERPL-SCNC: 137 MMOL/L (ref 133–144)
SOURCE: NORMAL
SOURCE: NORMAL
SP GR UR STRIP: 1.01 (ref 1–1.03)
SP GR UR STRIP: 1.01 (ref 1–1.03)
TSH SERPL DL<=0.005 MIU/L-ACNC: 0.91 MU/L (ref 0.4–4)
UROBILINOGEN UR STRIP-ACNC: 0.2 EU/DL (ref 0.2–1)
UROBILINOGEN UR STRIP-ACNC: 1 EU/DL (ref 0.2–1)
WBC # BLD AUTO: 6.6 10E9/L (ref 4–11)
WBC # BLD AUTO: 6.7 10E9/L (ref 4–11)
WBC # BLD AUTO: 8.4 10E9/L (ref 4–11)
WBC #/AREA URNS AUTO: NORMAL /HPF

## 2019-01-01 PROCEDURE — 36415 COLL VENOUS BLD VENIPUNCTURE: CPT | Performed by: INTERNAL MEDICINE

## 2019-01-01 PROCEDURE — 80053 COMPREHEN METABOLIC PANEL: CPT | Performed by: PHYSICIAN ASSISTANT

## 2019-01-01 PROCEDURE — 99214 OFFICE O/P EST MOD 30 MIN: CPT | Performed by: INTERNAL MEDICINE

## 2019-01-01 PROCEDURE — 36415 COLL VENOUS BLD VENIPUNCTURE: CPT | Performed by: PHYSICIAN ASSISTANT

## 2019-01-01 PROCEDURE — 80048 BASIC METABOLIC PNL TOTAL CA: CPT | Performed by: PHYSICIAN ASSISTANT

## 2019-01-01 PROCEDURE — 83721 ASSAY OF BLOOD LIPOPROTEIN: CPT | Performed by: INTERNAL MEDICINE

## 2019-01-01 PROCEDURE — 81001 URINALYSIS AUTO W/SCOPE: CPT | Performed by: PHYSICIAN ASSISTANT

## 2019-01-01 PROCEDURE — 99213 OFFICE O/P EST LOW 20 MIN: CPT | Performed by: INTERNAL MEDICINE

## 2019-01-01 PROCEDURE — 84443 ASSAY THYROID STIM HORMONE: CPT | Performed by: PHYSICIAN ASSISTANT

## 2019-01-01 PROCEDURE — 85025 COMPLETE CBC W/AUTO DIFF WBC: CPT | Performed by: PHYSICIAN ASSISTANT

## 2019-01-01 PROCEDURE — 83880 ASSAY OF NATRIURETIC PEPTIDE: CPT | Performed by: PHYSICIAN ASSISTANT

## 2019-01-01 PROCEDURE — 99207 ZZC NO CHARGE LOS: CPT

## 2019-01-01 PROCEDURE — 99214 OFFICE O/P EST MOD 30 MIN: CPT | Performed by: PHYSICIAN ASSISTANT

## 2019-01-01 PROCEDURE — 99215 OFFICE O/P EST HI 40 MIN: CPT | Performed by: PHYSICIAN ASSISTANT

## 2019-01-01 PROCEDURE — 85027 COMPLETE CBC AUTOMATED: CPT | Performed by: PHYSICIAN ASSISTANT

## 2019-01-01 PROCEDURE — G0008 ADMIN INFLUENZA VIRUS VAC: HCPCS

## 2019-01-01 PROCEDURE — 80050 GENERAL HEALTH PANEL: CPT | Performed by: PHYSICIAN ASSISTANT

## 2019-01-01 PROCEDURE — 90662 IIV NO PRSV INCREASED AG IM: CPT

## 2019-01-01 PROCEDURE — 81003 URINALYSIS AUTO W/O SCOPE: CPT | Performed by: PHYSICIAN ASSISTANT

## 2019-01-01 RX ORDER — DIAZEPAM 5 MG
TABLET ORAL
Qty: 30 TABLET | Refills: 3 | Status: SHIPPED | OUTPATIENT
Start: 2019-01-01 | End: 2019-01-01

## 2019-01-01 RX ORDER — DIAZEPAM 5 MG
TABLET ORAL
Qty: 30 TABLET | Refills: 0 | Status: SHIPPED | OUTPATIENT
Start: 2019-01-01 | End: 2019-01-01

## 2019-01-01 RX ORDER — TRAZODONE HYDROCHLORIDE 50 MG/1
TABLET, FILM COATED ORAL
Qty: 30 TABLET | Refills: 5 | Status: ON HOLD | COMMUNITY
Start: 2019-01-01 | End: 2020-01-01

## 2019-01-01 RX ORDER — TRAZODONE HYDROCHLORIDE 50 MG/1
TABLET, FILM COATED ORAL
Qty: 30 TABLET | Refills: 5 | Status: SHIPPED | OUTPATIENT
Start: 2019-01-01 | End: 2019-01-01

## 2019-01-01 RX ORDER — DIAZEPAM 5 MG
5 TABLET ORAL DAILY PRN
Qty: 30 TABLET | Refills: 1 | Status: SHIPPED | OUTPATIENT
Start: 2019-01-01 | End: 2019-01-01

## 2019-01-01 RX ORDER — DIAZEPAM 5 MG
TABLET ORAL
Qty: 30 TABLET | Refills: 1 | Status: SHIPPED | OUTPATIENT
Start: 2019-01-01 | End: 2019-01-01

## 2019-01-01 RX ORDER — PRAVASTATIN SODIUM 40 MG
TABLET ORAL
Qty: 90 TABLET | Refills: 2 | Status: SHIPPED | OUTPATIENT
Start: 2019-01-01

## 2019-01-01 RX ORDER — DIAZEPAM 5 MG
TABLET ORAL
Qty: 6 TABLET | Refills: 0 | Status: ON HOLD | OUTPATIENT
Start: 2019-01-01 | End: 2020-01-01

## 2019-01-01 ASSESSMENT — MIFFLIN-ST. JEOR
SCORE: 1147.25
SCORE: 1110.5

## 2019-01-01 ASSESSMENT — ANXIETY QUESTIONNAIRES
7. FEELING AFRAID AS IF SOMETHING AWFUL MIGHT HAPPEN: NOT AT ALL
GAD7 TOTAL SCORE: 4
GAD7 TOTAL SCORE: 4
4. TROUBLE RELAXING: SEVERAL DAYS
6. BECOMING EASILY ANNOYED OR IRRITABLE: NOT AT ALL
GAD7 TOTAL SCORE: 12
7. FEELING AFRAID AS IF SOMETHING AWFUL MIGHT HAPPEN: NOT AT ALL
2. NOT BEING ABLE TO STOP OR CONTROL WORRYING: SEVERAL DAYS
3. WORRYING TOO MUCH ABOUT DIFFERENT THINGS: SEVERAL DAYS
GAD7 TOTAL SCORE: 4
5. BEING SO RESTLESS THAT IT IS HARD TO SIT STILL: NOT AT ALL
GAD7 TOTAL SCORE: 4
1. FEELING NERVOUS, ANXIOUS, OR ON EDGE: SEVERAL DAYS

## 2019-01-01 ASSESSMENT — ENCOUNTER SYMPTOMS
EYES NEGATIVE: 1
ACTIVITY CHANGE: 1
FATIGUE: 1
EYES NEGATIVE: 1
MUSCULOSKELETAL NEGATIVE: 1
GASTROINTESTINAL NEGATIVE: 1
APPETITE CHANGE: 1
LIGHT-HEADEDNESS: 1
GASTROINTESTINAL NEGATIVE: 1
RESPIRATORY NEGATIVE: 1
MUSCULOSKELETAL NEGATIVE: 1
PSYCHIATRIC NEGATIVE: 1
WEAKNESS: 1
ENDOCRINE NEGATIVE: 1
DIZZINESS: 1
RESPIRATORY NEGATIVE: 1
CARDIOVASCULAR NEGATIVE: 1
CONSTITUTIONAL NEGATIVE: 1
PSYCHIATRIC NEGATIVE: 1
CARDIOVASCULAR NEGATIVE: 1

## 2019-01-01 ASSESSMENT — PATIENT HEALTH QUESTIONNAIRE - PHQ9
SUM OF ALL RESPONSES TO PHQ QUESTIONS 1-9: 3
SUM OF ALL RESPONSES TO PHQ QUESTIONS 1-9: 3
10. IF YOU CHECKED OFF ANY PROBLEMS, HOW DIFFICULT HAVE THESE PROBLEMS MADE IT FOR YOU TO DO YOUR WORK, TAKE CARE OF THINGS AT HOME, OR GET ALONG WITH OTHER PEOPLE: SOMEWHAT DIFFICULT

## 2019-01-18 ENCOUNTER — ANCILLARY PROCEDURE (OUTPATIENT)
Dept: MAMMOGRAPHY | Facility: CLINIC | Age: 74
End: 2019-01-18
Payer: COMMERCIAL

## 2019-01-18 DIAGNOSIS — Z12.31 VISIT FOR SCREENING MAMMOGRAM: ICD-10-CM

## 2019-01-18 PROCEDURE — 77067 SCR MAMMO BI INCL CAD: CPT | Mod: TC

## 2019-01-22 ENCOUNTER — TELEPHONE (OUTPATIENT)
Dept: FAMILY MEDICINE | Facility: CLINIC | Age: 74
End: 2019-01-22

## 2019-01-22 NOTE — TELEPHONE ENCOUNTER
Prior Authorization Retail Medication Request    Medication/Dose: escitalopram (LEXAPRO) 10 MG tablet  ICD code (if different than what is on RX):     Previously Tried and Failed:     Rationale:       Insurance Name:   [employer]   Insurance ID:  [id number]      Pharmacy Information (if different than what is on RX)  Name:  [facility]  Phone:  [phone]

## 2019-01-23 DIAGNOSIS — F41.1 GAD (GENERALIZED ANXIETY DISORDER): ICD-10-CM

## 2019-01-23 DIAGNOSIS — M25.569 PAIN IN UNSPECIFIED KNEE: ICD-10-CM

## 2019-01-23 DIAGNOSIS — G44.219 EPISODIC TENSION-TYPE HEADACHE, NOT INTRACTABLE: ICD-10-CM

## 2019-01-23 NOTE — TELEPHONE ENCOUNTER
Requested Prescriptions   Pending Prescriptions Disp Refills     Controlled Substance Refill Request for diazepam (VALIUM) 5 MG tablet  Problem List Complete:  Yes    Patient is followed by Sean Velasquez MD for ongoing prescription of anxiolytic medication.  All refills should be approved by this provider, or covering partner.     Medication(s): diazepam 5 mg.   Maximum quantity per month: 30  Clinic visit frequency required: Q 6  months      Controlled substance agreement:      Encounter-Level CSA:      There are no encounter-level csa.          Pain Clinic evaluation in the past: No     DIRE Total Score(s):  No flowsheet data found.     Last [facility] website verification:  done on ?   [email or web address]     Onset 1980's or earlier                    Chronic buspar; uses valium also, 5 mg per day or less.                   In 3/16, stopped buspar and started lexapro with good results.     Last  check 8/20/18 no concernsNo CSA on file                Last Written Prescription Date:  11/8/2018  Last Fill Quantity: 30 tablet,  # refills: 2   Last office visit: 12/31/2018 with prescribing provider:  Eva   Future Office Visit:         Controlled substance agreement: [unfilled]    Last Urine Drug Screen: No results found for: CDAUT, No results found for: COMDAT, No results found for: THC13, PCP13, COC13, MAMP13, OPI13, AMP13, BZO13, TCA13, MTD13, BAR13, OXY13, PPX13, BUP13     Processing:  Fax Rx to [address]    [email or web address]   checked in past 3 months?  No, route to RN                         Controlled Substance Refill Request for acetaminophen-codeine (TYLENOL #3) 300-30 MG tablet  Problem List Complete:  Yes    Uses occas Tyl #3 for this            Migraines per pt; triggers include lack of sleep, change in barometric pressure.                   Has seen neuro in the past.      check 7-11-16  No CSA    Last Written  Prescription Date:  11/26/2018  Last Fill Quantity: 30 tablet,  # refills: 1   Last office visit: 12/31/2018 with prescribing provider:  Eva   Future Office Visit:         Controlled substance agreement: [unfilled]    Last Urine Drug Screen: No results found for: CDAUT, No results found for: COMDAT, No results found for: THC13, PCP13, COC13, MAMP13, OPI13, AMP13, BZO13, TCA13, MTD13, BAR13, OXY13, PPX13, BUP13     Processing:  Patient will  in clinic    [email or web address]   checked in past 3 months?  No, route to RN

## 2019-01-24 RX ORDER — DIAZEPAM 5 MG
TABLET ORAL
Qty: 30 TABLET | Refills: 0 | Status: SHIPPED | OUTPATIENT
Start: 2019-01-24 | End: 2019-02-26

## 2019-01-24 NOTE — TELEPHONE ENCOUNTER
[place] reviewed:  reviewed 1/24/19- no concerns    [place]  [email or web address]  Routing refill request to provider for review/approval because:  Drug not on the [facility] refill protocol

## 2019-01-28 NOTE — TELEPHONE ENCOUNTER
Central Prior Authorization Team   Phone: [phone]    PA Initiation    Medication: escitalopram (LEXAPRO) 10 MG tablet  Insurance Company:    Pharmacy Filling the Rx: [facility] [place] - [facility] [address] [place] [address]  Filling Pharmacy Phone: [phone]  Filling Pharmacy Fax: [phone]  Start Date: 1/28/2019

## 2019-01-28 NOTE — TELEPHONE ENCOUNTER
Prior Authorization Approval    Authorization Effective Date: 12/29/2018  Authorization Expiration Date: 1/27/2020  Medication: escitalopram (LEXAPRO) 10 MG tablet  Approved Dose/Quantity:    Reference #: [id number]   Insurance Company: [employer] - Phone [phone] Fax [phone]  Expected CoPay:       CoPay Card Available:      Foundation Assistance Needed:    Which Pharmacy is filling the prescription (Not needed for infusion/clinic administered): [facility] - [place], [place] - [address] [place] [address]  Pharmacy Notified: Yes  Patient Notified: Yes

## 2019-01-29 ENCOUNTER — APPOINTMENT (OUTPATIENT)
Dept: MRI IMAGING | Facility: CLINIC | Age: 74
End: 2019-01-29
Payer: COMMERCIAL

## 2019-01-29 ENCOUNTER — TELEPHONE (OUTPATIENT)
Dept: FAMILY MEDICINE | Facility: CLINIC | Age: 74
End: 2019-01-29

## 2019-01-29 ENCOUNTER — HOSPITAL ENCOUNTER (EMERGENCY)
Facility: CLINIC | Age: 74
Discharge: HOME | End: 2019-01-29
Attending: EMERGENCY MEDICINE | Admitting: EMERGENCY MEDICINE
Payer: COMMERCIAL

## 2019-01-29 VITALS
WEIGHT: 160 LBS | BODY MASS INDEX: 29.44 KG/M2 | OXYGEN SATURATION: 95 % | HEIGHT: 62 IN | DIASTOLIC BLOOD PRESSURE: 86 MMHG | RESPIRATION RATE: 16 BRPM | TEMPERATURE: 98 F | SYSTOLIC BLOOD PRESSURE: 141 MMHG

## 2019-01-29 DIAGNOSIS — I67.1 CEREBRAL ANEURYSM, NONRUPTURED: ICD-10-CM

## 2019-01-29 DIAGNOSIS — R42 LIGHTHEADEDNESS: ICD-10-CM

## 2019-01-29 DIAGNOSIS — R42 DIZZINESS: ICD-10-CM

## 2019-01-29 LAB
ANION GAP SERPL CALCULATED.3IONS-SCNC: 7 MMOL/L (ref 3–14)
BASOPHILS # BLD AUTO: 0 10E9/L (ref 0–0.2)
BASOPHILS NFR BLD AUTO: 0.3 %
BUN SERPL-MCNC: 16 MG/DL (ref 7–30)
CALCIUM SERPL-MCNC: 9.3 MG/DL (ref 8.5–10.1)
CHLORIDE SERPL-SCNC: 108 MMOL/L (ref 94–109)
CO2 SERPL-SCNC: 24 MMOL/L (ref 20–32)
CREAT SERPL-MCNC: 0.68 MG/DL (ref 0.52–1.04)
DIFFERENTIAL METHOD BLD: NORMAL
EOSINOPHIL # BLD AUTO: 0.1 10E9/L (ref 0–0.7)
EOSINOPHIL NFR BLD AUTO: 2 %
ERYTHROCYTE [DISTWIDTH] IN BLOOD BY AUTOMATED COUNT: 13.3 % (ref 10–15)
GFR SERPL CREATININE-BSD FRML MDRD: 86 ML/MIN/{1.73_M2}
GLUCOSE SERPL-MCNC: 100 MG/DL (ref 70–99)
HCT VFR BLD AUTO: 41.9 % (ref 35–47)
HGB BLD-MCNC: 14.1 G/DL (ref 11.7–15.7)
IMM GRANULOCYTES # BLD: 0 10E9/L (ref 0–0.4)
IMM GRANULOCYTES NFR BLD: 0.1 %
INTERPRETATION ECG - MUSE: NORMAL
LYMPHOCYTES # BLD AUTO: 1.8 10E9/L (ref 0.8–5.3)
LYMPHOCYTES NFR BLD AUTO: 26.3 %
MCH RBC QN AUTO: 30.3 PG (ref 26.5–33)
MCHC RBC AUTO-ENTMCNC: 33.7 G/DL (ref 31.5–36.5)
MCV RBC AUTO: 90 FL (ref 78–100)
MONOCYTES # BLD AUTO: 0.5 10E9/L (ref 0–1.3)
MONOCYTES NFR BLD AUTO: 7.7 %
NEUTROPHILS # BLD AUTO: 4.4 10E9/L (ref 1.6–8.3)
NEUTROPHILS NFR BLD AUTO: 63.6 %
NRBC # BLD AUTO: 0 10*3/UL
NRBC BLD AUTO-RTO: 0 /100
PLATELET # BLD AUTO: 211 10E9/L (ref 150–450)
POTASSIUM SERPL-SCNC: 4.1 MMOL/L (ref 3.4–5.3)
RBC # BLD AUTO: 4.66 10E12/L (ref 3.8–5.2)
SODIUM SERPL-SCNC: 139 MMOL/L (ref 133–144)
TROPONIN I SERPL-MCNC: <0.015 UG/L (ref 0–0.04)
WBC # BLD AUTO: 7 10E9/L (ref 4–11)

## 2019-01-29 PROCEDURE — 85025 COMPLETE CBC W/AUTO DIFF WBC: CPT | Performed by: EMERGENCY MEDICINE

## 2019-01-29 PROCEDURE — 96360 HYDRATION IV INFUSION INIT: CPT | Mod: 59

## 2019-01-29 PROCEDURE — 70553 MRI BRAIN STEM W/O & W/DYE: CPT

## 2019-01-29 PROCEDURE — 70544 MR ANGIOGRAPHY HEAD W/O DYE: CPT | Mod: XS

## 2019-01-29 PROCEDURE — A9585 GADOBUTROL INJECTION: HCPCS | Performed by: EMERGENCY MEDICINE

## 2019-01-29 PROCEDURE — 84484 ASSAY OF TROPONIN QUANT: CPT | Performed by: EMERGENCY MEDICINE

## 2019-01-29 PROCEDURE — 96361 HYDRATE IV INFUSION ADD-ON: CPT

## 2019-01-29 PROCEDURE — 25000128 H RX IP 250 OP 636: Performed by: EMERGENCY MEDICINE

## 2019-01-29 PROCEDURE — 99285 EMERGENCY DEPT VISIT HI MDM: CPT | Mod: 25

## 2019-01-29 PROCEDURE — 80048 BASIC METABOLIC PNL TOTAL CA: CPT | Performed by: EMERGENCY MEDICINE

## 2019-01-29 PROCEDURE — 25500064 ZZH RX 255 OP 636: Performed by: EMERGENCY MEDICINE

## 2019-01-29 PROCEDURE — 70549 MR ANGIOGRAPH NECK W/O&W/DYE: CPT

## 2019-01-29 PROCEDURE — 93005 ELECTROCARDIOGRAM TRACING: CPT

## 2019-01-29 RX ORDER — SODIUM CHLORIDE 9 MG/ML
1000 INJECTION, SOLUTION INTRAVENOUS CONTINUOUS
Status: DISCONTINUED | OUTPATIENT
Start: 2019-01-29 | End: 2019-01-29 | Stop reason: HOSPADM

## 2019-01-29 RX ORDER — GADOBUTROL 604.72 MG/ML
10 INJECTION INTRAVENOUS ONCE
Status: COMPLETED | OUTPATIENT
Start: 2019-01-29 | End: 2019-01-29

## 2019-01-29 RX ADMIN — SODIUM CHLORIDE 1000 ML: 9 INJECTION, SOLUTION INTRAVENOUS at 11:58

## 2019-01-29 RX ADMIN — GADOBUTROL 10 ML: 604.72 INJECTION INTRAVENOUS at 15:51

## 2019-01-29 ASSESSMENT — ENCOUNTER SYMPTOMS
VOMITING: 0
LIGHT-HEADEDNESS: 1
FEVER: 0
DIARRHEA: 0
PALPITATIONS: 0
DIZZINESS: 1
HEADACHES: 0
NUMBNESS: 0
WEAKNESS: 0

## 2019-01-29 ASSESSMENT — MIFFLIN-ST. JEOR: SCORE: 1179.01

## 2019-01-29 NOTE — ED NOTES
MRI checklist done.  Pt is now refusing the MRI at this time.  Will think about it and go later if she changes her mind.

## 2019-01-29 NOTE — ED AVS SNAPSHOT
Emergency Department  [address] [place] [address]  Phone:  [phone]  Fax:  [phone]                                    Sadaf Rubi   MRN: [id number]    Department:   Emergency Department   Date of Visit:  1/29/2019           After Visit Summary Signature Page    I have received my discharge instructions, and my questions have been answered. I have discussed any challenges I see with this plan with the nurse or doctor.    ..........................................................................................................................................  Patient/Patient Representative Signature      ..........................................................................................................................................  Patient Representative Print Name and Relationship to Patient    ..................................................               ................................................  Date                                   Time    ..........................................................................................................................................  Reviewed by Signature/Title    ...................................................              ..............................................  Date                                               Time          22EPIC Rev 08/18

## 2019-01-29 NOTE — TELEPHONE ENCOUNTER
Patient called the clinic reporting BP 77/56 and HR 64 today, yesterday BP was 60/__ and HR 34, it has been low for the last 3 days. She is feeling very weak and dizzy when she gets up, so she has been staying low. Pt recently dx with Parkinson's and taking Sinimet. She has held her atenolol for 2-3 days. Denies chest pain and SOB. Eating and drinking well.     NURSING PLAN: Huddle with provider, plan includes ER for IV fluids and assessment    RECOMMENDED DISPOSITION:  To ED, another person to drive - now   Will comply with recommendation: Yes - Pt will find a ride or call ambulance.   If further questions/concerns or if symptoms do not improve, worsen or new symptoms develop, call your PCP or [facility] as soon as possible.      Guideline used:  Telephone Triage Protocols for Nurses, Fifth Edition, Maggie Bernal RN

## 2019-01-29 NOTE — ED PROVIDER NOTES
History     Chief Complaint:  Lightheadedness    HPI   Sadaf Rubi is a 73 year old female with history of Parkinson's syndrome who presents with lightheadedness.  The patient reports that she was recently diagnosed with Parkinson's back in December, was started on carbidoba-levodopa, and has had some intermittent dizziness since then. She was also told by her Parkinson's doctor that she may have some lightheadedness/dizziness with this. Over the past few days, she has been having gradually increasing lightheadedness and dizziness. This morning, she noted her blood pressure to be 78/45, so she called her PCP who referred her to the ED for evaluation. Her lightheadedness is exacerbated with moving from a sitting to standing position. She has no change in her symptoms when she would move her head from side-to-side. She does mention some intermittent sternal pain, though she was seen by her PCP for this in the past and was diagnosed with costochondritis. She otherwise denies any chest pain. The patient is not currently taking any blood pressure medications.  She denies experiencing any weakness, numbness, palpitations, shortness of breath, vomiting, diarrhea, headaches, vision changes, or fevers. Of note, the patient denies any history of a stroke.      Allergies:  Influenza vaccine live  Penicillins  Ciprofloxacin    Medications:    Atenolol  Carbidopa-levodopa  Diazepam  Escitalopram  Fluticasone  Levothyroxine  Polyethylene glycol  Pravastatin     Past Medical History:    Parkinson's syndrome  Dysthymic disorder  Osteopenia   HLD  Balance issues  Vitamin D deficiency  Anxiety  Anemia  Depression  HTN  Insomnia  Knee pain  PVC  Spider veins  Hypothyroidism    Past Surgical History:    Hysterectomy  Knee surgery x3  Benign breast lumpectomy  Urethra surgery    Family History:    Breast cancer (mother)  CAD (father)    Social History:  Smoking status: Never smoker  Alcohol use: Yes (1 glass of wine with  "dinner)  Marital Status:   [2]     Review of Systems   Constitutional: Negative for fever.   Eyes: Negative for visual disturbance.   Cardiovascular: Negative for palpitations.   Gastrointestinal: Negative for diarrhea and vomiting.   Neurological: Positive for dizziness and light-headedness. Negative for weakness, numbness and headaches.   All other systems reviewed and are negative.      Physical Exam     Patient Vitals for the past 24 hrs:   BP Temp Temp src Heart Rate Resp SpO2 Height Weight   01/29/19 1600 141/86 -- -- -- -- 96 % -- --   01/29/19 1143 147/80 98  F (36.7  C) Oral 89 16 97 % 1.575 m (5' 2\") 72.6 kg (160 lb)     Physical Exam  VS: Reviewed per above  HENT: Mucous membranes moist  EYES: sclera anicteric  CV: Rate as noted, regular rhythm.   RESP: Effort normal. Breath sounds are normal bilaterally.  GI: not tenderness, not distended.  NEURO: Alert, moving all extremities with 5 out of 5 strength, sensation is intact to light touch in all 4 extremities, cranial nerves II through XII are intact  MSK: No deformity of the extremities  SKIN: Warm and dry    Emergency Department Course   ECG:  Indication: Chest pain  Time: 1202  Vent. Rate 78 bpm. ID interval 138. QRS duration 86. QT/QTc 384/437. P-R-T axis 2 60 50.  Normal sinus rhythm. Normal ECG.   Read time: 1204    Imaging:  Radiographic findings were communicated with the patient who voiced understanding of the findings.  MR Neck w/o & w Contrast Angiogram   Final Result   IMPRESSION:  Normal MR angiogram of the neck.      BARBY DELEON MD      MR Brain w/o & w Contrast   Final Result   IMPRESSION: Diffuse cerebral volume loss and cerebral white matter   changes consistent with chronic small vessel ischemic disease. No   evidence for acute intracranial pathology. No definite change from the   recent comparison study.      BARBY DELEON MD      MR Head w/o Contrast Angiogram   Final Result   IMPRESSION:    1. 3.6 x 2.9 mm saccular aneurysm " arising from the supraclinoid distal   right internal carotid artery. Continued MRI surveillance recommended.   2. Otherwise, normal MR angiogram of the head.        BARBY DELEON MD        Laboratory:  CBC: WBC: 7.0, HGB: 14.1, PLT: 211  BMP: Glucose 100 (H), o/w WNL (Creatinine: 0.68)    1154 Troponin I: <0.015    Interventions:  1158 NS 1L IV  Please see MAR for full list of medications administered in the ED.    Emergency Department Course:  Nursing notes and vitals reviewed. (1143) I performed an exam of the patient as documented above.     IV inserted. Medicine administered as documented above. Blood drawn. This was sent to the lab for further testing, results above.    EKG obtained in the ED, see results above.    The patient was sent for a MR of the neck, head, and brain while in the emergency department, findings above.     (1220) I consulted with Dr. Lake, Parkinson's neurology, regarding the patient's history and presentation here in the emergency department.    (1230) I rechecked the patient and discussed the results of her workup thus far.     (1311) I rechecked the patient and discussed the results of her workup thus far.     (1603) The patient was signed out to the oncoming provider, Dr. Sanz, for further cares pending MRI results.    Impression & Plan      Medical Decision Making:  Sadaf Rubi is a 73 year old female who presents the emergency department with complaints of dizziness.  Initial vital signs are within normal limits.  Exam is notable for no focal neurological deficits.  Patient had me call her Parkinson's neurologist to discuss further.  After discussion with him it sounds as though she has had ongoing dizziness for at least a month and is unclear if this is secondary to her carbidopa levodopa for Parkinson's variant progression or other etiology.  Given age and possibility for occult stroke, MRI/MRA of the head and neck was obtained.  These did not reveal evidence of  stroke although there was an incidental intracranial aneurysm.  Plan to have her follow-up with neurosurgery for for further monitoring.  EKG did not reveal specific signs of ischemia or dysrhythmia and a single troponin was negative.  There is no electrolyte disturbance or acute kidney injury or leukocytosis to suggest occult infectious process.  Furthermore patient does not have any infectious symptoms.  She remained hemodynamically stable here with normal blood pressure.  Plan for follow-up in the clinic setting within the week.  Close return precautions were discussed.    Diagnosis:     ICD-10-CM    1. Dizziness R42    2. Lightheadedness R42    3. Cerebral aneurysm, nonruptured I67.1        Disposition:  Discharge    Luke Hoffman  1/29/2019    EMERGENCY DEPARTMENT    Scribe Disclosure:  Luke LOZA, am serving as a scribe on 1/29/2019 at 11:43 AM to personally document services performed by Jesse Bustillo MD based on my observations and the provider's statements to me.            Jesse Bustillo MD  01/29/19 [id number]

## 2019-01-29 NOTE — DISCHARGE INSTRUCTIONS
Discharge Instructions  Dizziness (Lightheaded)  Today you were seen for dizziness.  Dizziness can be caused by many things and it can be very difficult to determine the cause of dizziness.  At this time, your provider has found no signs that your dizziness is due to a serious or life-threatening condition. However, sometimes there is a serious problem that does not show up right away, and it is important for you to follow up with your regular provider as instructed.  Generally, every Emergency Department visit should have a follow-up clinic visit with either a primary or a specialty clinic/provider. Please follow-up as instructed by your emergency provider today.      Return to the Emergency Department if:    You pass out (fainting or falling out), especially during exercise.    You develop chest pain, chest pressure or difficulty breathing.  Your feel an irregular heartbeat.  You have excessive vaginal bleeding, or blood in your stool or vomit (throw up).  You have a high fever.  Your symptoms get worse or more frequent.    If when you begin to feel dizzy or lightheaded, it is important to sit down or lay down immediately to prevent injury from falling.  If you were given a prescription for medicine here today, be sure to read all of the information (including the package insert) that comes with your prescription.  This will include important information about the medicine, its side effects, and any warnings that you need to know about.  The pharmacist who fills the prescription can provide more information and answer questions you may have about the medicine.  If you have questions or concerns that the pharmacist cannot address, please call or return to the Emergency Department.   Remember that you can always come back to the Emergency Department if you are not able to see your regular provider in the amount of time listed above, if you get any new symptoms, or if there is anything that worries you.

## 2019-02-05 ENCOUNTER — TELEPHONE (OUTPATIENT)
Dept: FAMILY MEDICINE | Facility: CLINIC | Age: 74
End: 2019-02-05

## 2019-02-05 NOTE — TELEPHONE ENCOUNTER
Returned patient call. She said she has been lying in bed until 4-5:30 in the morning, unable to sleep all night. Stated her daughter takes melatonin and wanted to know if that was okay for her to take. I advised that she start with a low dose, and call us back in a few days if she was still not sleeping.

## 2019-02-05 NOTE — TELEPHONE ENCOUNTER
Reason for Call:  Other call back    Detailed comments: Danny called to say she is not able to get much sleep at night.  She knows she has an appointment with Dr. Velasquez on 2/13/2019, but wants to know what to do in the mean time.    Phone Number Patient can be reached at: Home number on file [phone] (home)    Best Time: any    Can we leave a detailed message on this number? YES    Call taken on 2/5/2019 at 12:10 PM by Linda Rees

## 2019-02-18 ENCOUNTER — OFFICE VISIT (OUTPATIENT)
Dept: FAMILY MEDICINE | Facility: CLINIC | Age: 74
End: 2019-02-18
Payer: COMMERCIAL

## 2019-02-18 VITALS
TEMPERATURE: 98.5 F | BODY MASS INDEX: 29.44 KG/M2 | RESPIRATION RATE: 20 BRPM | SYSTOLIC BLOOD PRESSURE: 138 MMHG | HEIGHT: 62 IN | WEIGHT: 160 LBS | OXYGEN SATURATION: 95 % | DIASTOLIC BLOOD PRESSURE: 64 MMHG | HEART RATE: 65 BPM

## 2019-02-18 DIAGNOSIS — E61.1 IRON DEFICIENCY: ICD-10-CM

## 2019-02-18 DIAGNOSIS — F41.1 GAD (GENERALIZED ANXIETY DISORDER): ICD-10-CM

## 2019-02-18 DIAGNOSIS — H69.91 DYSFUNCTION OF RIGHT EUSTACHIAN TUBE: ICD-10-CM

## 2019-02-18 DIAGNOSIS — G20.A1 PARKINSON'S SYNDROME (H): ICD-10-CM

## 2019-02-18 DIAGNOSIS — G44.219 EPISODIC TENSION-TYPE HEADACHE, NOT INTRACTABLE: ICD-10-CM

## 2019-02-18 DIAGNOSIS — D50.8 OTHER IRON DEFICIENCY ANEMIA: ICD-10-CM

## 2019-02-18 DIAGNOSIS — R53.82 CHRONIC FATIGUE: Primary | ICD-10-CM

## 2019-02-18 DIAGNOSIS — E03.9 HYPOTHYROIDISM, UNSPECIFIED TYPE: ICD-10-CM

## 2019-02-18 PROCEDURE — 84443 ASSAY THYROID STIM HORMONE: CPT | Performed by: INTERNAL MEDICINE

## 2019-02-18 PROCEDURE — 82728 ASSAY OF FERRITIN: CPT | Performed by: INTERNAL MEDICINE

## 2019-02-18 PROCEDURE — 84439 ASSAY OF FREE THYROXINE: CPT | Performed by: INTERNAL MEDICINE

## 2019-02-18 PROCEDURE — 36415 COLL VENOUS BLD VENIPUNCTURE: CPT | Performed by: INTERNAL MEDICINE

## 2019-02-18 PROCEDURE — 99213 OFFICE O/P EST LOW 20 MIN: CPT | Performed by: INTERNAL MEDICINE

## 2019-02-18 RX ORDER — FLUTICASONE PROPIONATE 50 MCG
2 SPRAY, SUSPENSION (ML) NASAL DAILY
Qty: 1 BOTTLE | Refills: 1 | Status: SHIPPED | OUTPATIENT
Start: 2019-02-18 | End: 2019-01-01

## 2019-02-18 RX ORDER — CARBIDOPA AND LEVODOPA 25; 100 MG/1; MG/1
TABLET ORAL
Status: ON HOLD | COMMUNITY
Start: 2019-02-18 | End: 2020-01-01

## 2019-02-18 ASSESSMENT — ANXIETY QUESTIONNAIRES
3. WORRYING TOO MUCH ABOUT DIFFERENT THINGS: NOT AT ALL
5. BEING SO RESTLESS THAT IT IS HARD TO SIT STILL: NOT AT ALL
GAD7 TOTAL SCORE: 1
GAD7 TOTAL SCORE: 1
6. BECOMING EASILY ANNOYED OR IRRITABLE: NOT AT ALL
GAD7 TOTAL SCORE: 1
4. TROUBLE RELAXING: NOT AT ALL
2. NOT BEING ABLE TO STOP OR CONTROL WORRYING: SEVERAL DAYS
7. FEELING AFRAID AS IF SOMETHING AWFUL MIGHT HAPPEN: NOT AT ALL
7. FEELING AFRAID AS IF SOMETHING AWFUL MIGHT HAPPEN: NOT AT ALL
1. FEELING NERVOUS, ANXIOUS, OR ON EDGE: NOT AT ALL

## 2019-02-18 ASSESSMENT — PATIENT HEALTH QUESTIONNAIRE - PHQ9
10. IF YOU CHECKED OFF ANY PROBLEMS, HOW DIFFICULT HAVE THESE PROBLEMS MADE IT FOR YOU TO DO YOUR WORK, TAKE CARE OF THINGS AT HOME, OR GET ALONG WITH OTHER PEOPLE: SOMEWHAT DIFFICULT
SUM OF ALL RESPONSES TO PHQ QUESTIONS 1-9: 4
SUM OF ALL RESPONSES TO PHQ QUESTIONS 1-9: 4

## 2019-02-18 ASSESSMENT — MIFFLIN-ST. JEOR: SCORE: 1179.01

## 2019-02-18 NOTE — LETTER
February 19, 2019      Sadaf ARNETT Greyson  [address]  [facility] [address]        Dear ,    We are writing to inform you of your test results.              Your thyroid levels are normal.  You can continue to take your thyroid medicine every day.              Your iron level (ferritin) is satisfactory.     Results for orders placed or performed in visit on 02/18/19   T4, free   Result Value Ref Range    T4 Free 1.26 0.76 - 1.46 ng/dL   TSH   Result Value Ref Range    TSH 1.09 0.40 - 4.00 mU/L   Ferritin   Result Value Ref Range    Ferritin 42 8 - 252 ng/mL         If you have any questions or concerns, please call the clinic at the number listed above.       Sincerely,        Sean Velasquez MD

## 2019-02-18 NOTE — PROGRESS NOTES
"Answers for HPI/ROS submitted by the patient on 2/18/2019   If you checked off any problems, how difficult have these problems made it for you to do your work, take care of things at home, or get along with other people?: Somewhat difficult  PHQ9 TOTAL SCORE: 4  SHAWNEE 7 TOTAL SCORE: 1    SUBJECTIVE:   Sadaf Rubi is a 73 year old female who presents to clinic today for the following health issues:      Discuss multiple health concerns today, including Parkinson's and Thyroid issues. C/o \"a lot\" of weakness and severe fatigue.    See the ER visit, and the subsequent neuro visit.                       C/o extreme fatigue.                                                Concerns re: poor hearing after a URI; R sided.                 Rhinorrhea.             Constipated.          poor sleep; added melatonin.                               BP was very low prompting the ER visit on 1/29.                                                    Taking her thyroid med every day; not 6/7 days.                    Problem list and histories reviewed & adjusted, as indicated.      Current Outpatient Medications   Medication Sig Dispense Refill     acetaminophen-codeine (TYLENOL #3) 300-30 MG tablet TAKE ONE TABLET BY MOUTH TWICE A DAY AS NEEDED 30 tablet 0     aspirin 81 MG tablet Take 1 tablet (81 mg) by mouth daily 120 tablet      atenolol (TENORMIN) 50 MG tablet Take 0.5 tablets (25 mg) by mouth daily 45 tablet 4     carbidopa-levodopa (SINEMET)  MG per tablet Taking 1 at 7:00a.m. And 1 at Noon, and then 1/2 tab at 5:00p.m.       Cholecalciferol (VITAMIN D) 2000 UNITS tablet Take 2,000 Units by mouth daily 100 tablet 3     cyanocolbalamin (VITAMIN  B-12) 500 MCG tablet Take 1 tablet by mouth daily.       diazepam (VALIUM) 5 MG tablet TAKE ONE TABLET BY MOUTH EVERY DAY AS NEEDED FOR ANXIETY 30 tablet 0     escitalopram (LEXAPRO) 10 MG tablet Take 1.5 tablets (15 mg) by mouth daily 135 tablet 3     levothyroxine " "(SYNTHROID/LEVOTHROID) 75 MCG tablet Take 1 tab PO before breakfast Sunday- Friday. 90 tablet 3     polyethylene glycol (MIRALAX/GLYCOLAX) powder TAKE 17 G BY MOUTH 2     TIMES DAILY 527 g 11     pravastatin (PRAVACHOL) 40 MG tablet TAKE 1 TABLET (40 MG) BY MOUTH DAILY 90 tablet 1     fluticasone (FLONASE) 50 MCG/ACT spray Spray 1-2 sprays into both nostrils daily (Patient not taking: Reported on 12/31/2018) 1 Bottle 11     BP Readings from Last 3 Encounters:   02/18/19 138/64   01/29/19 141/86   12/31/18 120/66    Wt Readings from Last 3 Encounters:   02/18/19 72.6 kg (160 lb)   01/29/19 72.6 kg (160 lb)   12/31/18 74.8 kg (165 lb)                    Reviewed and updated as needed this visit by clinical staff  Tobacco  Allergies  Meds  Med Hx  Surg Hx  Fam Hx  Soc Hx      Reviewed and updated as needed this visit by Provider         ROS:  CONSTITUTIONAL:NEGATIVE for fever, chills, change in weight and fatigue  ENT/MOUTH: NEGATIVE for ear pain bilateral  RESP:NEGATIVE for significant cough or SOB  CV: NEGATIVE for chest pain, palpitations or peripheral edema    OBJECTIVE:                                                    /64 (BP Location: Left arm, Patient Position: Chair, Cuff Size: Adult Regular)   Pulse 65   Temp 98.5  F (36.9  C)   Resp 20   Ht 1.575 m (5' 2\")   Wt 72.6 kg (160 lb)   SpO2 95%   Breastfeeding? No   BMI 29.26 kg/m    Body mass index is 29.26 kg/m .  GENERAL APPEARANCE: alert, no distress and fatigued  HENT: ear canals and TM's normal and nose and mouth without ulcers or lesions  NEURO: tremor   PSYCH: fatigued    Diagnostic test results:  pending     ASSESSMENT/PLAN:                                                        ICD-10-CM    1. Chronic fatigue R53.82    2. Hypothyroidism, unspecified type E03.9 T4, free     TSH   3. SHAWNEE (generalized anxiety disorder) F41.1    4. Parkinson's syndrome (H) G20    5. Iron deficiency E61.1 Ferritin       Fatigue likely due to Parkinson's.  "             Check labs and adjust medications as indicated.    Patient Instructions   I will let you know your lab results.                                Use the Flonase every day for a month.         If your ear problems continue, then see ENT.                           Return in about 6 months (around 8/18/2019) for thyroid, medication review and refills.      Sean Velasquez MD  [facility]            Results for orders placed or performed in visit on 02/18/19   T4, free   Result Value Ref Range    T4 Free 1.26 0.76 - 1.46 ng/dL   TSH   Result Value Ref Range    TSH 1.09 0.40 - 4.00 mU/L   Ferritin   Result Value Ref Range    Ferritin 42 8 - 252 ng/mL          Letter sent.                  Your thyroid levels are normal.  You can continue to take your thyroid medicine every day.              Your iron level (ferritin) is satisfactory.

## 2019-02-18 NOTE — PATIENT INSTRUCTIONS
I will let you know your lab results.                                Use the Flonase every day for a month.         If your ear problems continue, then see ENT.

## 2019-02-19 LAB
FERRITIN SERPL-MCNC: 42 NG/ML (ref 8–252)
T4 FREE SERPL-MCNC: 1.26 NG/DL (ref 0.76–1.46)
TSH SERPL DL<=0.005 MIU/L-ACNC: 1.09 MU/L (ref 0.4–4)

## 2019-02-19 RX ORDER — LEVOTHYROXINE SODIUM 75 UG/1
75 TABLET ORAL DAILY
Qty: 90 TABLET | Refills: 3 | COMMUNITY
Start: 2019-02-19 | End: 2019-01-01

## 2019-02-19 ASSESSMENT — ANXIETY QUESTIONNAIRES: GAD7 TOTAL SCORE: 1

## 2019-02-19 NOTE — TELEPHONE ENCOUNTER
Controlled Substance Refill Request for acetaminophen-codeine (TYLENOL #3) 300-30 MG tablet  Problem List Complete:  Yes    Uses occas Tyl #3 for this            Migraines per pt; triggers include lack of sleep, change in barometric pressure.                   Has seen neuro in the past.      check 1/24/19 no concerns  No CSA    Last Written Prescription Date:  1/24/2019  Last Fill Quantity: 30 tablet,  # refills: 0   Last office visit: 2/18/2019 with prescribing provider:  Eva   Future Office Visit:        Controlled substance agreement:   Encounter-Level CSA:    There are no encounter-level csa.     Patient-Level CSA:    There are no patient-level csa.         Last Urine Drug Screen: No results found for: CDAUT, No results found for: COMDAT, No results found for: THC13, PCP13, COC13, MAMP13, OPI13, AMP13, BZO13, TCA13, MTD13, BAR13, OXY13, PPX13, BUP13     Processing:  Patient will  in clinic    [email or web address]   checked in past 3 months?  Yes 01/24/2019

## 2019-02-26 DIAGNOSIS — F41.1 GAD (GENERALIZED ANXIETY DISORDER): ICD-10-CM

## 2019-02-27 RX ORDER — DIAZEPAM 5 MG
TABLET ORAL
Qty: 30 TABLET | Refills: 0 | Status: SHIPPED | OUTPATIENT
Start: 2019-02-27 | End: 2019-04-05

## 2019-02-27 NOTE — TELEPHONE ENCOUNTER
DIAZEPAM 5MG TAB 5 TAB      Last Written Prescription Date:  01/24/19  Last Fill Quantity: 30,   # refills: 0  Last Office Visit: 02/18/19  Future Office visit:       Routing refill request to provider for review/approval because:  Drug not on the [facility], [facility] or [facility] refill protocol or controlled substance  Requested Prescriptions   Pending Prescriptions Disp Refills     diazepam (VALIUM) 5 MG tablet [Pharmacy Med Name: DIAZEPAM 5MG TAB 5 TAB] 30 tablet 0     Sig: TAKE ONE TABLET BY MOUTH EVERY DAY AS NEEDED FOR ANXIETY    There is no refill protocol information for this order

## 2019-02-27 NOTE — TELEPHONE ENCOUNTER
Controlled Substance Refill Request for diazepam (VALIUM) 5 MG tablet  Problem List Complete:  Yes  Patient is followed by Sean Velasquez MD for ongoing prescription of anxiolytic medication.  All refills should be approved by this provider, or covering partner.    Medication(s): diazepam 5 mg.   Maximum quantity per month: 30  Clinic visit frequency required: Q 6  months     Controlled substance agreement:  Encounter-Level CSA:     There are no encounter-level csa.        Pain Clinic evaluation in the past: No    DIRE Total Score(s):  No flowsheet data found.    Last [facility] website verification:  done on ?   [email or web address]    Onset 1980's or earlier                    Chronic buspar; uses valium also, 5 mg per day or less.                   In 3/16, stopped buspar and started lexapro with good results.     Last  check 8/20/18 no concernsNo CSA on file                checked in past 3 months?  Yes 2/27/19 no concerns

## 2019-03-06 ENCOUNTER — TRANSFERRED RECORDS (OUTPATIENT)
Dept: HEALTH INFORMATION MANAGEMENT | Facility: CLINIC | Age: 74
End: 2019-03-06

## 2019-03-19 ENCOUNTER — TELEPHONE (OUTPATIENT)
Dept: FAMILY MEDICINE | Facility: CLINIC | Age: 74
End: 2019-03-19

## 2019-03-19 DIAGNOSIS — G44.219 EPISODIC TENSION-TYPE HEADACHE, NOT INTRACTABLE: ICD-10-CM

## 2019-03-19 DIAGNOSIS — G47.00 INSOMNIA, UNSPECIFIED TYPE: Primary | ICD-10-CM

## 2019-03-19 PROBLEM — I67.1 INTERNAL CAROTID ANEURYSM: Status: ACTIVE | Noted: 2019-03-19

## 2019-03-19 NOTE — TELEPHONE ENCOUNTER
Reason for Call:  Other prescription    Detailed comments: Danny is calling stating that she is having trouble sleeping due to her parkinson's. Has tried melatonin and is not helping. Would like a suggestion on what to try next.     Phone Number Patient can be reached at: Home number on file [phone] (home)    Best Time: anytime     Can we leave a detailed message on this number? YES    Call taken on 3/19/2019 at 12:30 PM by Nadine Powell

## 2019-03-19 NOTE — TELEPHONE ENCOUNTER
Sadaf Rubi is a 73 year old female who calls with insomnia.    NURSING ASSESSMENT:  Description:  Unable to get to sleep when she goes to bed. Will sometime stay awake until 4 AM.  Onset/duration:  In the past year. She was diagnosed with Parkinsons and it can be a symptom of that  Precip. factors:  See abpve  Associated symptoms:  tiredness  Improves/worsens symptoms:  She has tried all the common remedies including benadryl melatonin, warm showers, soft music, reading, milk, dark room, no caffine and they are not working  Pain scale (0-10)   0/10  LMP/preg/breast feeding:  NA  Last exam/Treatment: 2/18/2019  Allergies:   Allergies   Allergen Reactions     Mushroom Hives     Bees      Influenza Vaccine Live      Other [Seasonal Allergies]      msg and mushrooms     Penicillins      Wasp Venom Protein Hives     Ciprofloxacin        MEDICATIONS:   Taking medication(s) as prescribed? Yes  Taking over the counter medication(s?) Yes  Any medication side effects? No significant side effects    Any barriers to taking medication(s) as prescribed?  No  Medication(s) improving/managing symptoms?  No  Medication reconciliation completed: No      NURSING PLAN: Huddle with provider, plan includes . and Nursing advice to patient Danny is asking if Dr Velasquez has any advice.    RECOMMENDED DISPOSITION:  Home care advice -She wanted to have it sent to Dr Velasquez  Will comply with recommendation: N/A  If further questions/concerns or if symptoms do not improve, worsen or new symptoms develop, call your PCP or [facility] as soon as possible.      Guideline used:  Telephone Triage Protocols for Nurses, Fifth Edition, Maggie Cordero RN

## 2019-03-19 NOTE — TELEPHONE ENCOUNTER
Patient Contact    Attempt # 1    Was call answered?  No.  Left message on voicemail with information to call tirage [phone] back.

## 2019-03-19 NOTE — TELEPHONE ENCOUNTER
Controlled Substance Refill Request for acetaminophen-codeine (TYLENOL #3) 300-30 MG tablet  Problem List Complete:  Yes    Uses occas Tyl #3 for this            Migraines per pt; triggers include lack of sleep, change in barometric pressure.                   Has seen neuro in the past.      check 1/24/19 no concerns  No CSA    Last Written Prescription Date:  2/20/2019  Last Fill Quantity: 30 tablet,  # refills: 0   Last office visit: 2/18/2019 with prescribing provider:  Eva   Future Office Visit:         Controlled substance agreement:   Encounter-Level CSA:    There are no encounter-level csa.     Patient-Level CSA:    There are no patient-level csa.         Last Urine Drug Screen: No results found for: CDAUT, No results found for: COMDAT, No results found for: THC13, PCP13, COC13, MAMP13, OPI13, AMP13, BZO13, TCA13, MTD13, BAR13, OXY13, PPX13, BUP13     Processing:  Fax Rx to [facility] [address] pharmacy    [email or web address]   checked in past 3 months?  Yes 1/24/2019

## 2019-03-19 NOTE — TELEPHONE ENCOUNTER
She can try trazodone.                  I have sent an Rx to her pharmacy for this med.          Please let her know.

## 2019-03-20 RX ORDER — TRAZODONE HYDROCHLORIDE 50 MG/1
TABLET, FILM COATED ORAL
Qty: 30 TABLET | Refills: 5 | Status: SHIPPED | OUTPATIENT
Start: 2019-03-20 | End: 2019-01-01

## 2019-03-20 RX ORDER — TRAZODONE HYDROCHLORIDE 50 MG/1
TABLET, FILM COATED ORAL
Qty: 30 TABLET | Refills: 5 | Status: SHIPPED | OUTPATIENT
Start: 2019-03-20 | End: 2019-03-20

## 2019-03-20 NOTE — TELEPHONE ENCOUNTER
Pt was infomed Rx for trazodone will be sent by PCP. She verbalized agreement with plan. Routing message to provider to add Dx and sign Rx.

## 2019-03-21 ENCOUNTER — TELEPHONE (OUTPATIENT)
Dept: FAMILY MEDICINE | Facility: CLINIC | Age: 74
End: 2019-03-21

## 2019-03-21 NOTE — TELEPHONE ENCOUNTER
Called the pharmacy to clarify orders.     Triage already called and had this clarified yesterday. Nothing further needed.

## 2019-03-21 NOTE — TELEPHONE ENCOUNTER
2nd time I have responded to this question.        As the Rx states, she will try 50 mg and she can increase to 75 or 100 mg if needed.

## 2019-03-30 DIAGNOSIS — E78.5 HYPERLIPIDEMIA WITH TARGET LDL LESS THAN 100: ICD-10-CM

## 2019-04-01 RX ORDER — PRAVASTATIN SODIUM 40 MG
TABLET ORAL
Qty: 90 TABLET | Refills: 0 | Status: SHIPPED | OUTPATIENT
Start: 2019-04-01 | End: 2019-01-01

## 2019-04-01 NOTE — TELEPHONE ENCOUNTER
"Last OV 02/18/2019.    Requested Prescriptions   Pending Prescriptions Disp Refills     pravastatin (PRAVACHOL) 40 MG tablet [Pharmacy Med Name: PRAVASTATIN NA 40 MG TAB 40 TAB] 90 tablet 1     Sig: TAKE 1 TABLET (40 MG) BY MOUTH DAILY    Statins Protocol Passed - 3/30/2019  9:42 AM       Passed - LDL on file in past 12 months    Recent Labs   Lab Test 05/02/18  1200   *            Passed - No abnormal creatine kinase in past 12 months    Recent Labs   Lab Test 06/22/18  1930   CKT 97               Passed - Recent (12 mo) or future (30 days) visit within the authorizing provider's specialty    Patient had office visit in the last 12 months or has a visit in the next 30 days with authorizing provider or within the authorizing provider's specialty.  See \"Patient Info\" tab in inbasket, or \"Choose Columns\" in Meds & Orders section of the refill encounter.             Passed - Medication is active on med list       Passed - Patient is age 18 or older       Passed - No active pregnancy on record       Passed - No positive pregnancy test in past 12 months          "

## 2019-04-05 DIAGNOSIS — F41.1 GAD (GENERALIZED ANXIETY DISORDER): ICD-10-CM

## 2019-04-05 NOTE — TELEPHONE ENCOUNTER
DIAZEPAM 5MG TAB 5 TAB      Last Written Prescription Date:  02/27/2019  Last Fill Quantity: 30,   # refills: 0  Last Office Visit: 02/18/2019  Future Office visit:       Routing refill request to provider for review/approval because:  Drug not on the [facility], [facility] or [facility] refill protocol or controlled substance  Requested Prescriptions   Pending Prescriptions Disp Refills     diazepam (VALIUM) 5 MG tablet [Pharmacy Med Name: DIAZEPAM 5MG TAB 5 TAB] 30 tablet 0     Sig: TAKE ONE TABLET BY MOUTH EVERY DAY AS NEEDED FOR ANXIETY    There is no refill protocol information for this order

## 2019-04-09 DIAGNOSIS — G44.219 EPISODIC TENSION-TYPE HEADACHE, NOT INTRACTABLE: ICD-10-CM

## 2019-04-09 RX ORDER — DIAZEPAM 5 MG
TABLET ORAL
Qty: 30 TABLET | Refills: 0 | Status: SHIPPED | OUTPATIENT
Start: 2019-04-09 | End: 2019-01-01

## 2019-04-09 NOTE — TELEPHONE ENCOUNTER
Controlled Substance Refill Request for diazepam (VALIUM)   Problem List Complete:  Yes   Patient is followed by Sean Velasquez MD for ongoing prescription of anxiolytic medication.  All refills should be approved by this provider, or covering partner.    Medication(s): diazepam 5 mg.   Maximum quantity per month: 30  Clinic visit frequency required: Q 6  months     Controlled substance agreement:  Encounter-Level CSA:     There are no encounter-level csa.        Pain Clinic evaluation in the past: No    DIRE Total Score(s):  No flowsheet data found.    Last [facility] website verification:  done on ?   [email or web address]    Onset 1980's or earlier                    Chronic buspar; uses valium also, 5 mg per day or less.                   In 3/16, stopped buspar and started lexapro with good results.     Last  check 2/27/19  no concerns  No CSA on file            checked in past 3 months?  Yes 2/27/19  Routing refill request to provider for review/approval because:  Drug not on the [facility] refill protocol

## 2019-04-09 NOTE — TELEPHONE ENCOUNTER
Controlled Substance Refill Request for acetaminophen-codeine (TYLENOL #3) 300-30 MG tablet  Problem List Complete:  Yes    Uses occas Tyl #3 for this            Migraines per pt; triggers include lack of sleep, change in barometric pressure.                   Has seen neuro in the past.      check 03/20/2019 no concerns  No CSA    Last Written Prescription Date:  3/20/2019  Last Fill Quantity: 30 tablet,  # refills: 0   Last office visit: 2/18/2019 with prescribing provider:  Eva   Future Office Visit:        Controlled substance agreement:   Encounter-Level CSA:    There are no encounter-level csa.     Patient-Level CSA:    There are no patient-level csa.         Last Urine Drug Screen: No results found for: CDAUT, No results found for: COMDAT, No results found for: THC13, PCP13, COC13, MAMP13, OPI13, AMP13, BZO13, TCA13, MTD13, BAR13, OXY13, PPX13, BUP13     Processing:  Fax Rx to [facility] [place], [place] - [address] [place] [address] pharmacy    [email or web address]   checked in past 3 months?  Yes 3/20/2019

## 2019-04-10 NOTE — TELEPHONE ENCOUNTER
Pt talked to Radha requesting status of Rx. Writer informed reception Rx was sent to provider pool for approval.

## 2019-04-10 NOTE — TELEPHONE ENCOUNTER
RX [place] reviewed:  not reviewed/not due - last done on 3/20/19    [place]  [email or web address]  Routing refill request to provider for review/approval because:  Drug not on the [facility] refill protocol

## 2019-04-22 ENCOUNTER — TRANSFERRED RECORDS (OUTPATIENT)
Dept: HEALTH INFORMATION MANAGEMENT | Facility: CLINIC | Age: 74
End: 2019-04-22

## 2019-04-29 NOTE — TELEPHONE ENCOUNTER
Patient was called. She went in for a cortisone spine shot last week and was without water for several hours. She slowly got constipated and gave herself and enema on Saturday. Due to parkinson's it was not very effective and made her bottom sore. After this she had 24 hours of diarrhea and lose stools every couple of hours and dribbling since Saturday. No fever, no nausea or vomiting, no abdominal pain, no rectal bleeding. She still has some diarrhea now and then, but not as bad. Her regular med is regular senna OTC (4 a day). She stopped this for now. She started eating and drinking as usual. She is asking if she can taking Senna - S with Stimulant, fells like regular senna not working well.

## 2019-04-29 NOTE — TELEPHONE ENCOUNTER
Reason for Call:  Other     Detailed comments: constipation for 3 to 4 days, never really goes away.      Phone Number Patient can be reached at: Home number on file [phone] (home)    Best Time: today    Can we leave a detailed message on this number? YES    Call taken on 4/29/2019 at 10:14 AM by GABRIEL VELASQUEZ

## 2019-05-06 PROBLEM — G47.00 INSOMNIA, UNSPECIFIED TYPE: Status: ACTIVE | Noted: 2019-01-01

## 2019-05-06 NOTE — PROGRESS NOTES
"  SUBJECTIVE:   Sadaf Rubi is a 73 year old female who presents to clinic today for the following   health issues:      Hyperlipidemia Follow-Up      Rate your low fat/cholesterol diet?: good    Taking statin?  Yes, no muscle aches from statin    Other lipid medications/supplements?:  none    Hypertension Follow-up      Outpatient blood pressures are being checked at home.    Low Salt Diet: no added salt    Hypothyroidism Follow-up      Since last visit, patient describes the following symptoms: Weight stable, no hair loss, no skin changes, no constipation, no loose stools      Amount of exercise or physical activity: None    Problems taking medications regularly: No    Medication side effects: none    Diet: low salt and low fat/cholesterol             C/o insomnia. She does not recall if she got a trazodone Rx in 3/19; it was ordered.                     Wants a diazepam refill.    Helpful for Rx anxiety.      Multiple issues regarding Parkinson's.        Sees neuro tomorrow.                             C/o constipation; \"I forgot about Miralax\".                    Had a lumber BISI; helped a bit initially.           Reviewed  and updated as needed this visit by clinical staff  Tobacco  Allergies  Meds  Med Hx  Surg Hx  Fam Hx  Soc Hx        Reviewed and updated as needed this visit by Provider         Current Outpatient Medications   Medication Sig Dispense Refill     acetaminophen-codeine (TYLENOL #3) 300-30 MG tablet TAKE ONE TABLET BY MOUTH TWICE A DAY AS NEEDED 30 tablet 0     aspirin 81 MG tablet Take 1 tablet (81 mg) by mouth daily 120 tablet      atenolol (TENORMIN) 50 MG tablet Take 0.5 tablets (25 mg) by mouth daily 45 tablet 4     carbidopa-levodopa (SINEMET)  MG tablet 1/2 tablet TID as of 2/15/19       Cholecalciferol (VITAMIN D) 2000 UNITS tablet Take 2,000 Units by mouth daily 100 tablet 3     cyanocolbalamin (VITAMIN  B-12) 500 MCG tablet Take 1 tablet by mouth daily.       " "diazepam (VALIUM) 5 MG tablet TAKE ONE TABLET BY MOUTH EVERY DAY AS NEEDED FOR ANXIETY 30 tablet 0     escitalopram (LEXAPRO) 10 MG tablet Take 1.5 tablets (15 mg) by mouth daily 135 tablet 3     fluticasone (FLONASE) 50 MCG/ACT nasal spray Spray 2 sprays into both nostrils daily 1 Bottle 1     levothyroxine (SYNTHROID/LEVOTHROID) 75 MCG tablet Take 1 tablet (75 mcg) by mouth daily 90 tablet 3     polyethylene glycol (MIRALAX/GLYCOLAX) powder TAKE 17 G BY MOUTH 2     TIMES DAILY 527 g 11     pravastatin (PRAVACHOL) 40 MG tablet TAKE 1 TABLET (40 MG) BY MOUTH DAILY 90 tablet 0     traZODone (DESYREL) 50 MG tablet 50- mg hs prn 30 tablet 5     Allergies   Allergen Reactions     Mushroom Hives     Bees      Influenza Vaccine Live      Other [Seasonal Allergies]      msg and mushrooms     Penicillins      Wasp Venom Protein Hives     Ciprofloxacin      BP Readings from Last 3 Encounters:   05/06/19 120/62   02/18/19 138/64   01/29/19 141/86    Wt Readings from Last 3 Encounters:   05/06/19 69.4 kg (153 lb)   02/18/19 72.6 kg (160 lb)   01/29/19 72.6 kg (160 lb)                    ROS:  CONSTITUTIONAL:NEGATIVE  for chills and fever   RESP:NEGATIVE for significant cough or SOB  CV: NEGATIVE for chest pain, palpitations or peripheral edema    OBJECTIVE:                                                    /62 (BP Location: Left arm, Patient Position: Chair, Cuff Size: Adult Regular)   Pulse 64   Temp 98.5  F (36.9  C)   Resp 20   Ht 1.575 m (5' 2\")   Wt 69.4 kg (153 lb)   SpO2 97%   Breastfeeding? No   BMI 27.98 kg/m    Body mass index is 27.98 kg/m .  GENERAL APPEARANCE: alert, no distress and fatigued  RESP: no rales or rhonchi  CV: regular rates and rhythm, normal S1 S2, no S3 or S4 and no murmur, click or rub  NEURO: minimal facial expression  PSYCH: affect flat    Diagnostic test results:  none      ASSESSMENT/PLAN:                                                        ICD-10-CM    1. Parkinson's " syndrome (H) G20    2. Slow transit constipation K59.01    3. Persistent insomnia G47.00    4. Insomnia, unspecified type G47.00 traZODone (DESYREL) 50 MG tablet   5. SHAWNEE (generalized anxiety disorder) F41.1 diazepam (VALIUM) 5 MG tablet   6. Chronic midline low back pain without sciatica M54.5     G89.29        Summary and implications:  We reviewed multiple issues.           We reviewed all of the issues on the diagnoses list.                     Ok for diazepam refill.                      Try trazodone.             Typed Rx given to pt.                 Resume miralax.               Patient Instructions   Try the trazodone to help with sleep.                    Start taking Miralax again; you can take this once or twice per day.              Return in about 3 months (around 8/6/2019) for medication review and refills.      Sean Velasquez MD  [facility]

## 2019-05-06 NOTE — PATIENT INSTRUCTIONS
Try the trazodone to help with sleep.                    Start taking Miralax again; you can take this once or twice per day.

## 2019-05-17 NOTE — TELEPHONE ENCOUNTER
Return call to patient.     States she takes Miralax usually once a day, but sometimes twice a day.     Miralax, Milk of Mag. As first steps, Mag Citrate and suppositories and next steps if there is not relief for 1st steps.     She was given OTC options to try. She will call the clinic back if there is no relief or if there are any changes that should be discussed with provider.

## 2019-05-17 NOTE — TELEPHONE ENCOUNTER
Sadaf Rubi is a 73 year old female who calls with constipation.    NURSING ASSESSMENT:  Description:  States that she has noticed she has not had a BM in 3 days. No pain, discomfort to accompany this symptom. Not any other symptoms noted at this time.   Onset/duration:  Tuesday 14th    Patient does have Parkinson's Disease     Negative for: Severe pain, N/V, cramping, ABD distension, ABD hardness    Allergies:   Allergies   Allergen Reactions     Mushroom Hives     Bees      Influenza Vaccine Live      Other [Seasonal Allergies]      msg and mushrooms     Penicillins      Wasp Venom Protein Hives     Ciprofloxacin        MEDICATIONS:   Taking medication(s) as prescribed? Yes  Taking over the counter medication(s?) Yes    Requests not to use a fleets enema, issues with the last one that was prescribed to her.   Most days she takes her Miralax  Senna OTC: 4pills last night and 4pills the night before with no relief  Carbidopa-levodopa was stopped by neurologist (not taking)    NURSING PLAN: Routed to provider Yes     Requesting medication for assist in bowel movement so constipation does not continue.     Guideline used:  Telephone Triage Protocols for Nurses, Fifth Edition, Maggie Porras RN

## 2019-05-17 NOTE — TELEPHONE ENCOUNTER
Reason for call:  Patient reporting a symptom  Symptom or request: constipation  Duration (how long have symptoms been present): 3-4 days  Have you been treated for this before? No  Additional comments: please call patient  Phone Number patient can be reached at:  Home number on file [phone] (home)  Best Time:  any  Can we leave a detailed message on this number:  YES  Call taken on 5/17/2019 at 12:11 PM by REBECA SHETH

## 2019-06-03 NOTE — PROGRESS NOTES
Subjective     Sadaf Rubi is a 73 year old female who presents to clinic today for the following health issues:    HPI   URINARY TRACT SYMPTOMS  Onset:  Several weeks of weakness and fatigue.    Description:   Painful urination (Dysuria): no   Blood in urine (Hematuria): no   Delay in urine (Hesitency): no     Intensity: moderate    Progression of Symptoms:  same    Accompanying Signs & Symptoms:  Fever/chills: no   Flank pain no   Nausea and vomiting: no   Any vaginal symptoms: none  Abdominal/Pelvic Pain: no     History:   History of frequent UTI's: no   History of kidney stones: no   Sexually Active: no   Possibility of pregnancy: No    Precipitating factors:     -patient complains of weakness. She states that she has no other sxs of UTI but states that she feels weak and fatigue.    Therapies Tried and outcome:   Recent Labs   Lab Test 02/18/19  1123 01/29/19  1154 12/17/18  1458 06/22/18  1930 05/02/18  1200  08/01/16  1515 07/27/15  0919  07/02/14  0808  12/07/12  0809   LDL  --   --   --   --  104*  --  105* 156*  --  152*  --  133*   HDL  --   --   --   --   --   --   --  75  --  87  --  64   TRIG  --   --   --   --   --   --   --  71  --  66  --  64   ALT  --   --   --  25 24  --  25 29   < > 110*   < > 21   CR  --  0.68  --  0.92 0.66  --  0.86 0.70  --  0.80   < > 0.80   GFRESTIMATED  --  86  --  60* 88  --  65 83  --  71   < > 71   GFRESTBLACK  --  >90  --  72 >90  --  79 >90  --  86   < > 86   POTASSIUM  --  4.1  --  4.5 4.4  --  4.5 3.9  --  4.3   < > 3.8   TSH 1.09  --  0.60  --  0.71   < > 2.32 1.44  --  3.22   < > 2.24    < > = values in this interval not displayed.      BP Readings from Last 3 Encounters:   06/03/19 122/74   05/06/19 120/62   02/18/19 138/64    Wt Readings from Last 3 Encounters:   06/03/19 69.4 kg (153 lb)   05/06/19 69.4 kg (153 lb)   02/18/19 72.6 kg (160 lb)            Reviewed and updated as needed this visit by Provider  Tobacco  Allergies  Meds  Problems  Med Hx   Surg Hx  Fam Hx  Soc Hx        Additional complaints: None    HPI additional notes: Danny presents today with   Chief Complaint   Patient presents with     UTI   Was on carbidopa but wasn't sure if it was helping so nuerology had her go off it and then had her go back on.  She went off this in early April.  Restarted in a few weeks, restarted last Wed and has felt better since that time.   notes that she seems to have a little more energy form that time.  Not eating well.    Hasn't tried the trazone yet because she is afraid of the side effects.      Doing well drinking lots of fluids to prevent constipation.      Wt Readings from Last 5 Encounters:   06/03/19 69.4 kg (153 lb)   05/06/19 69.4 kg (153 lb)   02/18/19 72.6 kg (160 lb)   01/29/19 72.6 kg (160 lb)   12/31/18 74.8 kg (165 lb)              Review of Systems   C: Negative for fever, chills. Positive for weight loss  CV: Negative for chest pain or peripheral edema  GI: Positive for anorexia  : Negative  for frequency, urgency, and dysuria.  MS: Positive for generalized weakness and malaise  Neuro: Positive for history of Parkinson's  P: Positive for stress and depressed mood, fatigue, insomnia  ROS otherwise negative.          Objective    /74   Pulse 74   Temp 98.3  F (36.8  C) (Tympanic)   Resp 14   Wt 69.4 kg (153 lb)   SpO2 93%   BMI 27.98 kg/m    Body mass index is 27.98 kg/m .  Physical Exam   GENERAL: healthy, alert, in no acute distress  HENT: Mucous mebranes moist.  RESP: lungs clear to auscultation - no rales, no rhonchi, no wheezes  CV: regular rate and rhythm, normal S1 S2.  No peripheral edema.  MS: no gross deformities noted.  No CVA tenderness.  SKIN: no suspicious lesions, no rashes  NEURO: speech normal with occasional slurring, tremor, slowed gross motor movements with some cogwheeling  PSYCH:Mental Status Exam  Behavior: cooperative, pleasant and guarded  Speech: slowed  Mood: anxious  Affect:  Anxious/Nervous  Thought Processes: Logical and Goal directed  Thought Content: no evidence of suicidal or homicidal ideation and no overt psychosis  Insight: Good  Judgment: Good      Diagnostic Test Results:  Results for orders placed or performed in visit on 06/03/19 (from the past 24 hour(s))   UA reflex to Microscopic and Culture   Result Value Ref Range    Color Urine Yellow     Appearance Urine Clear     Glucose Urine Negative NEG^Negative mg/dL    Bilirubin Urine Negative NEG^Negative    Ketones Urine Negative NEG^Negative mg/dL    Specific Gravity Urine 1.010 1.003 - 1.035    Blood Urine Negative NEG^Negative    pH Urine 6.0 5.0 - 7.0 pH    Protein Albumin Urine Negative NEG^Negative mg/dL    Urobilinogen Urine 0.2 0.2 - 1.0 EU/dL    Nitrite Urine Negative NEG^Negative    Leukocyte Esterase Urine Negative NEG^Negative    Source Midstream Urine            Assessment & Plan   Problem List Items Addressed This Visit        Medium    Hypothyroidism, unspecified type    Relevant Orders    TSH with free T4 reflex (Completed)    Parkinson's syndrome (H) - Primary      Other Visit Diagnoses     Dysuria        Relevant Orders    UA reflex to Microscopic and Culture (Completed)    Generalized muscle weakness        Relevant Orders    Comprehensive metabolic panel (Completed)    CBC with platelets (Completed)    Fatigue, unspecified type        Relevant Orders    TSH with free T4 reflex (Completed)         Discussed increasing PO intake, maybe adding in Boost or Ensure.  Will trial trazodone, starting with a half a tab to see if that gets her more sleep.  This was prescribed a month ago but she has not taken it.    Will see if this helps increase her energy.  She is going to a support group from Parkinson's which is helping her cope with her diagnosis.  Continue carbidopa.      Will check labs today for other causes of fatigue and weakness.  Will call pt if anything returns positive.        See Patient  Instructions    Return in about 2 days (around 6/5/2019) for Recheck if not improving.    Leesa Bellamy PA-C  [facility]

## 2019-06-03 NOTE — PATIENT INSTRUCTIONS
Patient Education     Parkinson Disease: Managing Day to Day    As Parkinson disease progresses, you may need to make some changes in your daily routine. For best results, plan activities for times you ll feel your best. Leave plenty of time to complete tasks. And take breaks when you need them. If more help is needed, your healthcare provider may refer you to an occupational therapist. This is a professional who can help you practice tasks of daily living.  Dressing  To make dressing easier:    Sit down to dress. This helps prevent falls.    Choose clothes that are easy to put on and take off. Elastic waistbands and clothes that close in the front are good choices.    Add paper clips to zipper pulls. This makes them easier to grasp.    Choose shoes that are easy to wear. Wear shoes with hook and loop straps. Women should not wear high heels.  Bathing and grooming  Loss of muscle control can make bathing and grooming a challenge. Try these tips:    Install safety items in the bathroom, such as grab bars, nonslip bathmats, and raised toilet seats.    Sit down to brush your teeth, shave, or dry your hair. This helps reduce the risk of falls.    Use liquid soap with a pump. Bar soap can be hard to hold.    Wear an absorbent robe to dry off if using towels is hard.  Eating and drinking  Try these tips at mealtime:    Choose foods that are easy to prepare and eat. Fresh fruits and vegetables make great snacks.    Try large-handled forks, spoons, and knives if it s hard to  utensils. Spill-proof cups can help with drinking.    Tell your healthcare provider if you have any problems swallowing.    Take small bites and small sips to prevent breathing food into your lungs.   Constipation  Constipation is a very common problem. The tips below can help:    Drink plenty of water.    Eat foods that are high in fiber, such as fruits, vegetables, and whole grains. A fiber supplement can also help.    Get regular exercise.     Talk with your healthcare provider about laxatives or stool softeners.  Communicating  Over time, your voice may grow softer and less distinct. Your handwriting is also likely to become small and cramped. These tips can help you cope:    Breathe deeply before starting your sentences. Focus on speaking slowly and loudly. If needed, your healthcare provider may refer you to a speech therapist.    Add a voice amplifier to your phone. This helps you be heard.    Try typing instead of writing. If this is a problem, consider using voice-activated software for computers.    Use foam  on pens and pencils. These can make them easier to hold.  Sleeping  Many people with Parkinson have trouble sleeping. They may also move in their sleep and strike their partners. Tell your healthcare provider if you re having sleep problems or recurrent nightmares. Medicine can often help you sleep better. Check with your healthcare provider before using over-the-counter medicines to help you sleep.   Getting out of bed  You may feel very stiff and slow in the morning. It often helps to take medicines before you get out of bed. Ask your healthcare provider if you can use dissolvable pills or chew pills with water. This can help medicine work faster. Above all, remember to be patient and take your time.  Having sex  Having Parkinson doesn t mean you can t have a good sex life. Plan sex for the times of day when you ll feel your best. Talk with your partner about your feelings. Your healthcare provider can also find ways to help if you re having problems with sexual function.  If you have any of the following, call your healthcare provider:    Your symptoms suddenly get worse.    You have severe constipation.    You have trouble sleeping.    You have problems chewing or swallowing.    You often  freeze  (are unable to move your feet) or begin having falls.  Date Last Reviewed: 3/1/2018    0833-9238 [employer]. [address] [place] [address]  [place] [address]. All rights reserved. This information is not intended as a substitute for professional medical care. Always follow your healthcare professional's instructions.           Patient Education     Common Symptoms of Parkinson Disease    You have Parkinson disease. This disease is caused by a loss of a chemical in your brain needed to help control movement and balance. For reasons that are not clear, cells that make this brain chemical stop working. This causes symptoms. This sheet tells you more about symptoms of Parkinson disease.  How symptoms may affect you  Parkinson disease symptoms vary for each person. You may have many severe symptoms. Or you may have only a few mild ones. Your symptoms may involve only one side of your body. Or they may involve both sides of your body. Also, your symptoms may change over time. And you may have different symptoms at different stages. Your symptoms may also get worse as your disease progresses.  Symptoms that affect movement and balance  These can include:    Tremor (shaking). This is a very common symptom. Most often, a hand or arm shakes on one or both sides of the body. Tremor may also affect other areas of the body, such as a leg, a foot, or the chin. Shaking may lessen when the affected part is used. It may worsen when at rest.    Rigidity. This refers to having stiff or tight muscles. This happens because the muscles don t get the signal to relax. Rigidity may cause muscle pain and cramping. It may also cause a stooped posture.    Problems with balance. This can affect how well you stand and move. This can also increase your risk of falls.  Other symptoms  Other symptoms of Parkinson disease include speaking too softly and in a monotone, writing that gets shaky and smaller across the page, and trouble swallowing. They also include constipation, oily skin, and changes in blood pressure. Memory loss and other problems with thinking can occur later  "in the disease progression. Bradykinesia or slow movement can also occur. This can cause problems with actions such as getting out of chairs and beds. Walking may be limited to short, shuffling steps. You may feel \"frozen,\" or unable to move. Blinking, facial expressions, swinging of your arms when walking, and other \"unconscious movements\" are also slowed down. Some people may have problems with their urination and others may also feel depressed.  Date Last Reviewed: 2/1/2018 2000-2018 [employer]. [address] [place] [address]. All rights reserved. This information is not intended as a substitute for professional medical care. Always follow your healthcare professional's instructions.           "

## 2019-06-03 NOTE — LETTER
June 4, 2019      Sadaf C Greyson  [address]  [facility] [address]        Dear ,    We are writing to inform you of your test results.    Your test results fall within the expected range(s) or remain unchanged from previous results.  Please continue with current treatment plan.    Resulted Orders   UA reflex to Microscopic and Culture   Result Value Ref Range    Color Urine Yellow     Appearance Urine Clear     Glucose Urine Negative NEG^Negative mg/dL    Bilirubin Urine Negative NEG^Negative    Ketones Urine Negative NEG^Negative mg/dL    Specific Gravity Urine 1.010 1.003 - 1.035    Blood Urine Negative NEG^Negative    pH Urine 6.0 5.0 - 7.0 pH    Protein Albumin Urine Negative NEG^Negative mg/dL    Urobilinogen Urine 0.2 0.2 - 1.0 EU/dL    Nitrite Urine Negative NEG^Negative    Leukocyte Esterase Urine Negative NEG^Negative    Source Midstream Urine    TSH with free T4 reflex   Result Value Ref Range    TSH 0.91 0.40 - 4.00 mU/L   Comprehensive metabolic panel   Result Value Ref Range    Sodium 137 133 - 144 mmol/L    Potassium 4.4 3.4 - 5.3 mmol/L    Chloride 107 94 - 109 mmol/L    Carbon Dioxide 22 20 - 32 mmol/L    Anion Gap 8 3 - 14 mmol/L    Glucose 94 70 - 99 mg/dL    Urea Nitrogen 12 7 - 30 mg/dL    Creatinine 0.69 0.52 - 1.04 mg/dL    GFR Estimate 85 >60 mL/min/[1.73_m2]      Comment:      Non  GFR Calc  Starting 12/18/2018, serum creatinine based estimated GFR (eGFR) will be   calculated using the Chronic Kidney Disease Epidemiology Collaboration   (CKD-EPI) equation.      GFR Estimate If Black >90 >60 mL/min/[1.73_m2]      Comment:       GFR Calc  Starting 12/18/2018, serum creatinine based estimated GFR (eGFR) will be   calculated using the Chronic Kidney Disease Epidemiology Collaboration   (CKD-EPI) equation.      Calcium 9.4 8.5 - 10.1 mg/dL    Bilirubin Total 0.4 0.2 - 1.3 mg/dL    Albumin 4.2 3.4 - 5.0 g/dL    Protein Total 7.2 6.8 -  8.8 g/dL    Alkaline Phosphatase 50 40 - 150 U/L    ALT 10 0 - 50 U/L    AST 24 0 - 45 U/L   CBC with platelets   Result Value Ref Range    WBC 6.7 4.0 - 11.0 10e9/L    RBC Count 4.39 3.8 - 5.2 10e12/L    Hemoglobin 13.8 11.7 - 15.7 g/dL    Hematocrit 41.4 35.0 - 47.0 %    MCV 94 78 - 100 fl    MCH 31.4 26.5 - 33.0 pg    MCHC 33.3 31.5 - 36.5 g/dL    RDW 13.6 10.0 - 15.0 %    Platelet Count 214 150 - 450 10e9/L       If you have any questions or concerns, please call the clinic at the number listed above.       Sincerely,        Leesa Bellamy PA-C

## 2019-06-04 NOTE — TELEPHONE ENCOUNTER
Patient Contact    Attempt # 1    Was call answered?  No.  Left message on voicemail with information to call triage back.            Please call pt and inform of the following:     Blood work was normal.  Should continue with plan as we discussed at her visit of trying trazodone and boost/ensure.     Will also send hard copy in the mail.

## 2019-06-04 NOTE — TELEPHONE ENCOUNTER
Patient returned call to clinic and provider message was relayed. No further questions or concerns at this time.

## 2019-06-05 NOTE — TELEPHONE ENCOUNTER
diazepam (VALIUM)       Last Written Prescription Date:  5-6-19  Last Fill Quantity: 30tab,   # refills: 0  Last Office Visit: 6-3-19  Future Office visit:       Routing refill request to provider for review/approval because:  Drug not on the [facility], [facility] or [facility] refill protocol or controlled substance

## 2019-06-05 NOTE — TELEPHONE ENCOUNTER
Controlled Substance Refill Request for acetaminophen-codeine (TYLENOL #3) 300-30 MG tablet  Problem List Complete:  Yes    Uses occas Tyl #3 for this            Migraines per pt; triggers include lack of sleep, change in barometric pressure.                   Has seen neuro in the past.      check 03/20/2019 no concerns  No CSA    Last Written Prescription Date:  4/10/2019  Last Fill Quantity: 30 tablet,  # refills: 0   Last office visit: 6/3/2019 with prescribing provider:  Josesito Houston Office Visit:        Controlled substance agreement:   Encounter-Level CSA:    There are no encounter-level csa.     Patient-Level CSA:    There are no patient-level csa.         Last Urine Drug Screen: No results found for: CDAUT, No results found for: COMDAT, No results found for: THC13, PCP13, COC13, MAMP13, OPI13, AMP13, BZO13, TCA13, MTD13, BAR13, OXY13, PPX13, BUP13     Processing:  Fax Rx to [facility] - [address] pharmacy    [email or web address]   checked in past 3 months?  Yes 3/20/2019

## 2019-07-01 NOTE — TELEPHONE ENCOUNTER
RX [place] ([place]) reviewed:  reviewed- 7/1/19 no concerns    [place]:  [email or web address]    Routing refill request to provider for review/approval because:  Drug not on the [facility] refill protocol

## 2019-07-01 NOTE — TELEPHONE ENCOUNTER
Controlled Substance Refill Request for diazepam (VALIUM) 5 MG tablet  Problem List Complete:  Yes    Patient is followed by Sean Velasquez MD for ongoing prescription of anxiolytic medication.  All refills should be approved by this provider, or covering partner.     Medication(s): diazepam 5 mg.   Maximum quantity per month: 30  Clinic visit frequency required: Q 6  months      Controlled substance agreement:      Encounter-Level CSA:      There are no encounter-level csa.          Pain Clinic evaluation in the past: No     DIRE Total Score(s):  No flowsheet data found.     Last [facility] website verification:  done on ?   [email or web address]     Onset 1980's or earlier                    Chronic buspar; uses valium also, 5 mg per day or less.                   In 3/16, stopped buspar and started lexapro with good results.     Last  check 2/27/19  no concerns  No CSA on file             Last Written Prescription Date:  6/5/2019  Last Fill Quantity: 30 tablet,  # refills: 0   Last office visit: 6/3/2019 with prescribing provider:  Josesito Houston Office Visit:        Controlled substance agreement:   Encounter-Level CSA:    There are no encounter-level csa.     Patient-Level CSA:    There are no patient-level csa.         Last Urine Drug Screen: No results found for: CDAUT, No results found for: COMDAT, No results found for: THC13, PCP13, COC13, MAMP13, OPI13, AMP13, BZO13, TCA13, MTD13, BAR13, OXY13, PPX13, BUP13     Processing:  Fax Rx to [place] - [address] [place] [address] pharmacy    [email or web address]   checked in past 3 months?  No, route to RN

## 2019-07-08 NOTE — TELEPHONE ENCOUNTER
RX [place] program ([place]) reviewed:  reviewed- no concerns    [place]:  [email or web address]  Routing refill request to provider for review/approval because:  Drug not on the [facility] refill protocol

## 2019-07-08 NOTE — TELEPHONE ENCOUNTER
Controlled Substance Refill Request for acetaminophen-codeine (TYLENOL #3) 300-30 MG tablet  Problem List Complete:  Yes    Uses occas Tyl #3 for this            Migraines per pt; triggers include lack of sleep, change in barometric pressure.                   Has seen neuro in the past.      check 03/20/2019 no concerns  No CSA    Last Written Prescription Date:  6/6/2019  Last Fill Quantity: 30 tablet,  # refills: 0   Last office visit: 6/3/2019 with prescribing provider:  Josesito Houston Office Visit:        Controlled substance agreement:   Encounter-Level CSA:    There are no encounter-level csa.     Patient-Level CSA:    There are no patient-level csa.         Last Urine Drug Screen: No results found for: CDAUT, No results found for: COMDAT, No results found for: THC13, PCP13, COC13, MAMP13, OPI13, AMP13, BZO13, TCA13, MTD13, BAR13, OXY13, PPX13, BUP13     Processing:  Fax Rx to  [facility] - [address] pharmacy    [email or web address]   checked in past 3 months?  No, route to RN

## 2019-07-23 NOTE — TELEPHONE ENCOUNTER
Controlled Substance Refill Request for diazepam (VALIUM) 5 MG tablet  Problem List Complete:  Yes    Patient is followed by Sean Velasquez MD for ongoing prescription of anxiolytic medication.  All refills should be approved by this provider, or covering partner.     Medication(s): diazepam 5 mg.   Maximum quantity per month: 30  Clinic visit frequency required: Q 6  months      Controlled substance agreement:      Encounter-Level CSA:      There are no encounter-level csa.          Pain Clinic evaluation in the past: No     DIRE Total Score(s):  No flowsheet data found.     Last [facility] website verification:  done on ?   [email or web address]     Onset 1980's or earlier                    Chronic buspar; uses valium also, 5 mg per day or less.                   In 3/16, stopped buspar and started lexapro with good results.     Last  check 7/1/19  no concerns  No CSA on file             Last Written Prescription Date:  7/1/2019  Last Fill Quantity: 30 tablet,  # refills: 0   Last office visit: 6/3/2019 with prescribing provider:  Josesito Houston Office Visit:        Controlled substance agreement:   Encounter-Level CSA:    There are no encounter-level csa.     Patient-Level CSA:    There are no patient-level csa.         Last Urine Drug Screen: No results found for: CDAUT, No results found for: COMDAT, No results found for: THC13, PCP13, COC13, MAMP13, OPI13, AMP13, BZO13, TCA13, MTD13, BAR13, OXY13, PPX13, BUP13     Processing:  Fax Rx to pended pharmacy    [email or web address]   checked in past 3 months?  Yes 7/1/2019

## 2019-07-25 NOTE — TELEPHONE ENCOUNTER
Routing refill request to provider for review/approval because:  Not on [facility] refill protocol.     Requesting this be filled today. Completely out of medication.

## 2019-08-02 NOTE — TELEPHONE ENCOUNTER
"Requested Prescriptions   Pending Prescriptions Disp Refills     pravastatin (PRAVACHOL) 40 MG tablet [Pharmacy Med Name: PRAVASTATIN 40MG TAB 40 TAB]  Last Written Prescription Date:  7/8/2019  Last Fill Quantity: 30 tablet,  # refills: 0   Last office visit: 6/3/2019 with prescribing provider:  Josesito   Future Office Visit:     30 tablet 0     Sig: TAKE 1 TABLET (40 MG) BY MOUTH DAILY       Statins Protocol Passed - 8/2/2019 10:06 AM        Passed - LDL on file in past 12 months     Recent Labs   Lab Test 07/08/19  1002   *             Passed - No abnormal creatine kinase in past 12 months     Recent Labs   Lab Test 06/22/18  1930   CKT 97                Passed - Recent (12 mo) or future (30 days) visit within the authorizing provider's specialty     Patient had office visit in the last 12 months or has a visit in the next 30 days with authorizing provider or within the authorizing provider's specialty.  See \"Patient Info\" tab in inbasket, or \"Choose Columns\" in Meds & Orders section of the refill encounter.              Passed - Medication is active on med list        Passed - Patient is age 18 or older        Passed - No active pregnancy on record        Passed - No positive pregnancy test in past 12 months           "

## 2019-08-07 NOTE — PROGRESS NOTES
"Subjective     Sadaf Rubi is a 73 year old female who presents to clinic today for the following health issues:    HPI   Anxiety Follow-Up    How are you doing with your anxiety since your last visit? Worsened     Are you having other symptoms that might be associated with anxiety? Yes:  family stressors with pt parkinsons    Have you had a significant life event? Health Concerns , parkinsons    Are you feeling depressed? Yes:  feels confined to house only 1 car     Do you have any concerns with your use of alcohol or other drugs? No    Social History     Tobacco Use     Smoking status: Never Smoker     Smokeless tobacco: Never Used   Substance Use Topics     Alcohol use: Yes     Comment: glass of wine with dinner     Drug use: No     SHAWNEE-7 SCORE 12/31/2018 2/18/2019 8/7/2019   Total Score - 1 (minimal anxiety) 4 (minimal anxiety)   Total Score 12 1 4     PHQ 11/26/2018 2/18/2019 8/7/2019   PHQ-9 Total Score 4 4 3   Q9: Thoughts of better off dead/self-harm past 2 weeks Not at all Not at all Not at all     No flowsheet data found.  No flowsheet data found.      Amount of exercise or physical activity: going to physical therapy 5 days 1 hour    Problems taking medications regularly: No    Medication side effects: none    Diet: regular (no restrictions)       ongoing anxiety; worse lately.                 Multiple concerns.                She has ongoing concerns regarding her Parkinson's.              She has great difficulty sleeping. Currently, she is using Benadryl, and melatonin.    She tried trazodone, but she was too sedated in the morning when she took 50 mg of trazodone. She did not try a lower dose.                          She has ongoing back and foot pain. Epidural steroid injections did not help.                                               also wants refills; Tylenol # 3; occas basis.         Valium; has taken \"more lately\"; sometimes an extra dose in the evening.          Ongoing anxiety; dtr " "from [place] is coming to visit; for 4 days.                   The dtr is concerned about this pt's health,finances, etc.              A local son calls her weekly; but she has not seen him for 3.5 years.   Another son lives in [place].           Support group at [facility] was not \"supportive\".                                       Current Outpatient Medications   Medication Sig Dispense Refill     acetaminophen-codeine (TYLENOL #3) 300-30 MG tablet TAKE 1 TABLET BY MOUTH TWICE A DAY AS NEEDED 30 tablet 0     aspirin 81 MG tablet Take 1 tablet (81 mg) by mouth daily 120 tablet      atenolol (TENORMIN) 50 MG tablet TAKE 1 TABLET (50 MG) BY MOUTH DAILY (Patient taking differently: Take 25 mg by mouth daily ) 90 tablet 3     carbidopa-levodopa (SINEMET)  MG tablet 1/2 tablet TID as of 2/15/19       Cholecalciferol (VITAMIN D) 2000 UNITS tablet Take 2,000 Units by mouth daily 100 tablet 3     cyanocolbalamin (VITAMIN  B-12) 500 MCG tablet Take 1 tablet by mouth daily.       diazepam (VALIUM) 5 MG tablet TAKE 1 TABLET BY MOUTH EVERY DAY AS NEEDED FOR ANXIETY 30 tablet 0     escitalopram (LEXAPRO) 10 MG tablet Take 1.5 tablets (15 mg) by mouth daily 135 tablet 3     levothyroxine (SYNTHROID/LEVOTHROID) 75 MCG tablet Take 1 tablet (75 mcg) by mouth daily 90 tablet 3     polyethylene glycol (MIRALAX/GLYCOLAX) powder TAKE 17 G BY MOUTH 2 TIMES DAILY 527 g 11     pravastatin (PRAVACHOL) 40 MG tablet TAKE 1 TABLET (40 MG) BY MOUTH DAILY 90 tablet 2     atenolol (TENORMIN) 50 MG tablet Take 0.5 tablets (25 mg) by mouth daily 45 tablet 4     traZODone (DESYREL) 50 MG tablet Try 50 mg one hour before bedtime.        If this is not helpful,you can try 75 or 100 mg (Patient not taking: Reported on 8/7/2019) 30 tablet 5     Allergies   Allergen Reactions     Mushroom Hives     Bees      Influenza Vaccine Live      Other [Seasonal Allergies]      msg and mushrooms     Penicillins      Wasp Venom Protein Hives     " Ciprofloxacin      BP Readings from Last 3 Encounters:   08/07/19 112/76   06/03/19 122/74   05/06/19 120/62    Wt Readings from Last 3 Encounters:   08/07/19 67.4 kg (148 lb 8 oz)   06/03/19 69.4 kg (153 lb)   05/06/19 69.4 kg (153 lb)                      Reviewed and updated as needed this visit by Provider         Review of Systems   ROS COMP: CONSTITUTIONAL:NEGATIVE for fever, chills, change in weight and POSITIVE  for fatigue  CV: NEGATIVE for chest pain/chest pressure and lower extremity edema  NEURO: POSITIVE for tremor       Objective    /76 (Cuff Size: Adult Regular)   Pulse 74   Temp 99.3  F (37.4  C) (Tympanic)   Wt 67.4 kg (148 lb 8 oz)   SpO2 95%   BMI 27.16 kg/m    Body mass index is 27.16 kg/m .  Physical Exam   GENERAL APPEARANCE: alert and mild distress  CV: regular rates and rhythm  NEURO: tremor   PSYCH: anxious    Diagnostic Test Results:        Recent Results (from the past 2500 hour(s))   UA reflex to Microscopic and Culture    Collection Time: 06/03/19  1:19 PM   Result Value Ref Range    Color Urine Yellow     Appearance Urine Clear     Glucose Urine Negative NEG^Negative mg/dL    Bilirubin Urine Negative NEG^Negative    Ketones Urine Negative NEG^Negative mg/dL    Specific Gravity Urine 1.010 1.003 - 1.035    Blood Urine Negative NEG^Negative    pH Urine 6.0 5.0 - 7.0 pH    Protein Albumin Urine Negative NEG^Negative mg/dL    Urobilinogen Urine 0.2 0.2 - 1.0 EU/dL    Nitrite Urine Negative NEG^Negative    Leukocyte Esterase Urine Negative NEG^Negative    Source Midstream Urine    TSH with free T4 reflex    Collection Time: 06/03/19  1:41 PM   Result Value Ref Range    TSH 0.91 0.40 - 4.00 mU/L   Comprehensive metabolic panel    Collection Time: 06/03/19  1:41 PM   Result Value Ref Range    Sodium 137 133 - 144 mmol/L    Potassium 4.4 3.4 - 5.3 mmol/L    Chloride 107 94 - 109 mmol/L    Carbon Dioxide 22 20 - 32 mmol/L    Anion Gap 8 3 - 14 mmol/L    Glucose 94 70 - 99 mg/dL     "Urea Nitrogen 12 7 - 30 mg/dL    Creatinine 0.69 0.52 - 1.04 mg/dL    GFR Estimate 85 >60 mL/min/[1.73_m2]    GFR Estimate If Black >90 >60 mL/min/[1.73_m2]    Calcium 9.4 8.5 - 10.1 mg/dL    Bilirubin Total 0.4 0.2 - 1.3 mg/dL    Albumin 4.2 3.4 - 5.0 g/dL    Protein Total 7.2 6.8 - 8.8 g/dL    Alkaline Phosphatase 50 40 - 150 U/L    ALT 10 0 - 50 U/L    AST 24 0 - 45 U/L   CBC with platelets    Collection Time: 06/03/19  1:41 PM   Result Value Ref Range    WBC 6.7 4.0 - 11.0 10e9/L    RBC Count 4.39 3.8 - 5.2 10e12/L    Hemoglobin 13.8 11.7 - 15.7 g/dL    Hematocrit 41.4 35.0 - 47.0 %    MCV 94 78 - 100 fl    MCH 31.4 26.5 - 33.0 pg    MCHC 33.3 31.5 - 36.5 g/dL    RDW 13.6 10.0 - 15.0 %    Platelet Count 214 150 - 450 10e9/L   LDL cholesterol direct    Collection Time: 07/08/19 10:02 AM   Result Value Ref Range    LDL Cholesterol Direct 111 (H) <100 mg/dL         Assessment & Plan     Sadaf was seen today for anxiety.    Diagnoses and all orders for this visit:    SHAWNEE (generalized anxiety disorder)    Persistent insomnia    Parkinson's syndrome (H)         BMI:   Estimated body mass index is 27.16 kg/m  as calculated from the following:    Height as of 5/6/19: 1.575 m (5' 2\").    Weight as of this encounter: 67.4 kg (148 lb 8 oz).           Summary and implications:  We reviewed multiple issues.           We reviewed all of the issues on the diagnoses list.             Much of this was a counseling/listening session on my part..                I suggested she try a lower dose of trazodone and she agreed.         I refilled the diazepam  Patient Instructions   You are planning to try taking 25 mg of trazodone at bedtime.                          Try taking this instead of Benadryl.                                        Keep taking the lexapro (escitalopram).                       Return in about 3 months (around 11/7/2019) for medication review and refills.    Sean Velasquez MD  [facility] " [place]        Answers for HPI/ROS submitted by the patient on 8/7/2019   If you checked off any problems, how difficult have these problems made it for you to do your work, take care of things at home, or get along with other people?: Somewhat difficult  PHQ9 TOTAL SCORE: 3  SHAWNEE 7 TOTAL SCORE: 4

## 2019-08-07 NOTE — Clinical Note
Colonoscopy thru [facility], [place]- all negative and normal. Repeat prn.

## 2019-08-07 NOTE — PATIENT INSTRUCTIONS
You are planning to try taking 25 mg of trazodone at bedtime.                          Try taking this instead of Benadryl.                                        Keep taking the lexapro (escitalopram).

## 2019-08-26 NOTE — TELEPHONE ENCOUNTER
Last OV 08/07/2019.    Requested Prescriptions   Pending Prescriptions Disp Refills     acetaminophen-codeine (TYLENOL #3) 300-30 MG tablet [Pharmacy Med Name: APAP/CODEINE 300/30MG -30 TAB] 30 tablet 0     Sig: TAKE 1 TABLET BY MOUTH TWICE A DAY AS NEEDED       There is no refill protocol information for this order        acetaminophen-codeine (TYLENOL #3) 300-30 MG tablet 30 tablet 0 7/8/2019      check 08/26/2019 no concerns

## 2019-10-12 NOTE — TELEPHONE ENCOUNTER
Controlled Substance Refill Request for DIAZEPAM 5MG TAB 5 TAB    Problem List Complete:  Yes    Last  check 7/1/19  no concerns    Last Written Prescription Date:  08/23/2019  Last Fill Quantity: 30 tablet,  # refills: 1   Last office visit: 8/7/2019 with prescribing provider:  Eva   Future Office Visit:        Controlled substance agreement:   Encounter-Level CSA:    There are no encounter-level csa.     Patient-Level CSA:    There are no patient-level csa.         Last Urine Drug Screen: No results found for: CDAUT, No results found for: COMDAT, No results found for: THC13, PCP13, COC13, MAMP13, OPI13, AMP13, BZO13, TCA13, MTD13, BAR13, OXY13, PPX13, BUP13     Processing:  Fax Rx to pended pharmacy    [email or web address]   checked in past 3 months?  No, route to RN

## 2019-10-15 NOTE — TELEPHONE ENCOUNTER
Controlled Substance Refill Request for acetaminophen-codeine (TYLENOL #3) 300-30 MG tablet  Problem List Complete:  Yes    Uses occas Tyl #3 for this            Migraines per pt; triggers include lack of sleep, change in barometric pressure.                   Has seen neuro in the past.      check 7/8/2019 no concerns  No CSA    Last Written Prescription Date:  8/26/2019  Last Fill Quantity: 30 tablet,  # refills: 0   Last office visit: 8/7/2019 with prescribing provider:  vEa   Future Office Visit:        Controlled substance agreement:   Encounter-Level CSA:    There are no encounter-level csa.     Patient-Level CSA:    There are no patient-level csa.         Last Urine Drug Screen: No results found for: CDAUT, No results found for: COMDAT, No results found for: THC13, PCP13, COC13, MAMP13, OPI13, AMP13, BZO13, TCA13, MTD13, BAR13, OXY13, PPX13, BUP13         [email or web address]   checked in past 3 months?  No, route to RN

## 2019-10-15 NOTE — TELEPHONE ENCOUNTER
Please local print, e-prescribe is not working 10/15.    [place] reviewed:  reviewed- no concerns 10/15/2019    [place]  [email or web address]  Routing refill request to provider for review/approval because:  Drug not on the [facility] refill protocol

## 2019-10-18 NOTE — TELEPHONE ENCOUNTER
Left voicemail to notify that rx was sent to pharmacy. Instructed to call back with questions. No further action needed.

## 2019-10-18 NOTE — TELEPHONE ENCOUNTER
[place] reviewed:  reviewed- no concerns  Checked 8/26/2019    [place]:  [email or web address]    Routing refill request to provider for review/approval because:  Drug not on the [facility] refill protocol

## 2019-11-11 PROBLEM — R09.89 LABILE BLOOD PRESSURE: Status: ACTIVE | Noted: 2019-01-01

## 2019-11-11 NOTE — PATIENT INSTRUCTIONS
Let's do this:          Try taking the arthritis Tylenol, a time release form of Tylenol, and take 1 or 2 of these,twice per day.              Try this instead of Advil.                              Also try some Aspercream with lidocaine rub or patches.                           Stop taking the atenolol also.                            Consider getting the shingles vaccine (Shingrix) at your pharmacy.

## 2019-11-11 NOTE — PROGRESS NOTES
Subjective     Sadaf Rubi is a 74 year old female who presents to clinic today for the following health issues:    HPI   Hypertension Follow-up      Do you check your blood pressure regularly outside of the clinic? Yes     Are you following a low salt diet? Yes    Are your blood pressures ever more than 140 on the top number (systolic) OR more   than 90 on the bottom number (diastolic), for example 140/90? No      How many servings of fruits and vegetables do you eat daily?  2-3    On average, how many sweetened beverages do you drink each day (soda, juice, sweet tea, etc)?   0    How many days per week do you miss taking your medication? 0     BP was low at the Neurology office.                  Told to consume more sodium.              Is atenolol needed?                                   C/o extreme exhaustion.                      She estimates that Parkinson's sx started 5 yrs ago approx.                            Has hammer toes; Botox advised.                         Wants a valium refill.      Takes this daily.        Wants advice re: treating back pain.           Takes advil.          Not very helpful.                   Also takes occas Tyl # 3 for HA or back pain.          Also wants a disability parking certificate; does not drive.     Poor balance; poor endurance; sometimes uses a cane.                 Current Outpatient Medications   Medication Sig Dispense Refill     acetaminophen-codeine (TYLENOL #3) 300-30 MG tablet Take 1 tab up to twice daily as needed 30 tablet 3     aspirin 81 MG tablet Take 1 tablet (81 mg) by mouth daily 120 tablet      carbidopa-levodopa (SINEMET)  MG tablet 1/2 tablet TID as of 2/15/19       Cholecalciferol (VITAMIN D) 2000 UNITS tablet Take 2,000 Units by mouth daily 100 tablet 3     cyanocolbalamin (VITAMIN  B-12) 500 MCG tablet Take 1 tablet by mouth daily.       diazepam (VALIUM) 5 MG tablet TAKE 1 TABLET BY MOUTH EVERY DAY AS NEEDED FOR ANXIETY 30 tablet 3  "    escitalopram (LEXAPRO) 10 MG tablet Take 1.5 tablets (15 mg) by mouth daily 135 tablet 3     levothyroxine (SYNTHROID/LEVOTHROID) 75 MCG tablet TAKE 1 TABLET (75 MCG) BY MOUTH DAILY 90 tablet 1     polyethylene glycol (MIRALAX/GLYCOLAX) powder TAKE 17 G BY MOUTH 2 TIMES DAILY 527 g 11     pravastatin (PRAVACHOL) 40 MG tablet TAKE 1 TABLET (40 MG) BY MOUTH DAILY 90 tablet 2     traZODone (DESYREL) 50 MG tablet Try 25 mg one hour before bedtime. 30 tablet 5     BP Readings from Last 3 Encounters:   11/11/19 132/80   08/07/19 112/76   06/03/19 122/74    Wt Readings from Last 3 Encounters:   11/11/19 66.2 kg (146 lb)   08/07/19 67.4 kg (148 lb 8 oz)   06/03/19 69.4 kg (153 lb)                      Reviewed and updated as needed this visit by Provider         Review of Systems   ROS COMP: CONSTITUTIONAL:POSITIVE  for fatigue and NEGATIVE  for chills and fever   RESP:NEGATIVE for significant cough or SOB  CV: NEGATIVE for chest pain, palpitations or peripheral edema      Objective    /80   Pulse 63   Resp 20   Ht 1.575 m (5' 2\")   Wt 66.2 kg (146 lb)   SpO2 100%   Breastfeeding? No   BMI 26.70 kg/m    Body mass index is 26.7 kg/m .  Physical Exam   GENERAL APPEARANCE: alert and fatigued  CV: regular rates and rhythm, normal S1 S2, no S3 or S4 and no murmur, click or rub  NEURO: gait abnormal  and soft voice  PSYCH: anxious and fatigued    Diagnostic Test Results:  none         Assessment & Plan     Sadaf was seen today for hypertension.    Diagnoses and all orders for this visit:    Labile blood pressure    Parkinson's syndrome (H)    SHAWNEE (generalized anxiety disorder)  -     diazepam (VALIUM) 5 MG tablet; TAKE 1 TABLET BY MOUTH EVERY DAY AS NEEDED FOR ANXIETY    Episodic tension-type headache, not intractable  -     acetaminophen-codeine (TYLENOL #3) 300-30 MG tablet; Take 1 tab up to twice daily as needed    Chronic midline low back pain without sciatica    Chronic fatigue         BMI:   Estimated body " "mass index is 26.7 kg/m  as calculated from the following:    Height as of this encounter: 1.575 m (5' 2\").    Weight as of this encounter: 66.2 kg (146 lb).           Summary and implications:  We reviewed multiple issues.           We reviewed all of the issues on the diagnoses list.               BP is labile.          Hypotensive range at times.                 Ok for diazepam refill.                Go ahead with Botox.                    Patient Instructions   Let's do this:          Try taking the arthritis Tylenol, a time release form of Tylenol, and take 1 or 2 of these,twice per day.              Try this instead of Advil.                              Also try some Aspercream with lidocaine rub or patches.                           Stop taking the atenolol also.                            Consider getting the shingles vaccine (Shingrix) at your pharmacy.         Return in about 3 months (around 2/11/2020) for follow up of several issues.    Sean Velasquez MD  [facility]      "

## 2019-11-21 NOTE — TELEPHONE ENCOUNTER
"Patient calling stating she has parkinson's and got out of bed this morning and was off balance while walking through the door way patient stated, \"I fainted and fell on the floor\" with unknown LOC.  Patient called  home from work who came and checked on patient and then returned to work.  Patient laid down this afternoon and her head like her head was spinning.  Patient now feels lightheaded and shaky    Denies changes in vision, nausea or vomiting.    Advised patient to be seen at .  Patient states her  does not get home from work for another hour but will go to  when he returns home.  Advised patient if there are any acute changes between now and when her  returns home to call 911.  Patient stated understanding and was agreeable with plan.    HARVEY McintyreN, RN  [employer] [facility]    "

## 2019-11-25 NOTE — PATIENT INSTRUCTIONS
Tylenol with codeine and Valium are both medications that can contribute to falls.  Please try to wean from these medications.   Tylenol arthritis would be a better option - as recommended by Dr. Velasquez.   Try splitting the Valium in two, and see if taking half a tablet has the same effect.     Also consider another trial of the trazodone again.     Use the walker at home, especially when you are alone.  Also especially right when you get up from lying down.     Stay well hydrated.

## 2019-11-25 NOTE — LETTER
December 2, 2019      Sadaf MELQUIADES Greyson  [address]  [place] [address]        Dear ,    We are writing to inform you of your test results.    Your test results fall within the expected range(s) or remain unchanged from previous results.  Please continue with current treatment plan.    Resulted Orders   Basic metabolic panel   Result Value Ref Range    Sodium 136 133 - 144 mmol/L    Potassium 4.2 3.4 - 5.3 mmol/L    Chloride 106 94 - 109 mmol/L    Carbon Dioxide 27 20 - 32 mmol/L    Anion Gap 3 3 - 14 mmol/L    Glucose 91 70 - 99 mg/dL    Urea Nitrogen 17 7 - 30 mg/dL    Creatinine 0.74 0.52 - 1.04 mg/dL    GFR Estimate 79 >60 mL/min/[1.73_m2]      Comment:      Non  GFR Calc  Starting 12/18/2018, serum creatinine based estimated GFR (eGFR) will be   calculated using the Chronic Kidney Disease Epidemiology Collaboration   (CKD-EPI) equation.      GFR Estimate If Black >90 >60 mL/min/[1.73_m2]      Comment:       GFR Calc  Starting 12/18/2018, serum creatinine based estimated GFR (eGFR) will be   calculated using the Chronic Kidney Disease Epidemiology Collaboration   (CKD-EPI) equation.      Calcium 9.7 8.5 - 10.1 mg/dL   CBC with platelets and differential   Result Value Ref Range    WBC 8.4 4.0 - 11.0 10e9/L    RBC Count 4.66 3.8 - 5.2 10e12/L    Hemoglobin 14.2 11.7 - 15.7 g/dL    Hematocrit 43.3 35.0 - 47.0 %    MCV 93 78 - 100 fl    MCH 30.5 26.5 - 33.0 pg    MCHC 32.8 31.5 - 36.5 g/dL    RDW 13.2 10.0 - 15.0 %    Platelet Count 187 150 - 450 10e9/L    % Neutrophils 66.3 %    % Lymphocytes 25.9 %    % Monocytes 6.2 %    % Eosinophils 1.4 %    % Basophils 0.2 %    Absolute Neutrophil 5.5 1.6 - 8.3 10e9/L    Absolute Lymphocytes 2.2 0.8 - 5.3 10e9/L    Absolute Monocytes 0.5 0.0 - 1.3 10e9/L    Absolute Eosinophils 0.1 0.0 - 0.7 10e9/L    Absolute Basophils 0.0 0.0 - 0.2 10e9/L    Diff Method Automated Method        If you have any questions or  concerns, please call the clinic at the number listed above.       Sincerely,        Kelly Putnam PA-C

## 2019-11-25 NOTE — PROGRESS NOTES
Subjective     Sadaf Rubi is a 74 year old female who presents to clinic today for the following health issues:    HPI   Dizziness/Syncope      Duration: Fainting episode on 11/21/19, Dizziness started on Friday night    Description   Feeling faint:  YES  Feeling like the surroundings are moving: YES  Loss of consciousness or falls: YES    Intensity:  moderate    Accompanying signs and symptoms:   Nausea/vomitting: no   Palpitations: no   Weakness in arms or legs: no   Vision or speech changes: no   Ringing in ears (Tinnitus): no   Hearing loss related to dizziness: no   Other (fevers/chills/sweating/dyspnea): YES- balance was off that day, got up quickly out of bed    History (similar episodes/head trauma/previous evaluation/recent bleeding): Parkinson's    Precipitating or alleviating factors (new meds/chemicals): None  Worse with activity/head movement: yes    Therapies tried and outcome: None    She got up very quickly and felt off balance  Fell down very quickly  Unsure if she lost consciousness  She landed on the right side  No residual pain  Hit the right side of her head = no swelling, pain or bruising   No headaches - just a twinge of pain intermittently  No bruises or scratches  Minimal muscle soreness    She has a walker and a cane in the house        Patient Active Problem List   Diagnosis     Dysthymic disorder     Palpitation     Shortness of breath     Osteopenia     Preventive measure     Family history of coagulation disorder     Vitamin D deficiency disease     Hypertension goal BP (blood pressure) < 140/80     Hyperlipidemia with target LDL less than 100     Dizziness     Balance problems     Abnormal liver enzymes     ACP (advance care planning)     Episodic tension-type headache, not intractable     Pain in unspecified knee     Slow transit constipation     Persistent insomnia     SHAWNEE (generalized anxiety disorder)     Hypothyroidism, unspecified type     Urinary hesitancy     Other iron  deficiency anemia     Pelvic somatic dysfunction     Mixed incontinence urge and stress (male)(female)     Chronic midline thoracic back pain     Chronic upper back pain     Chronic midline low back pain without sciatica     Dysphagia, unspecified type     Left-sided low back pain without sciatica     Fine motor impairment     Parkinson's syndrome (H)     Intracranial aneurysm     Chronic fatigue     Iron deficiency     Dysfunction of right eustachian tube     Internal carotid aneurysm     Insomnia, unspecified type     Labile blood pressure     Past Surgical History:   Procedure Laterality Date     GYN SURGERY      hysterectomy SAMANTHA & BSO     KNEE SURGERY  2006    meniscus left     KNEE SURGERY      right meniscus     KNEE SURGERY  09/10/2012    right knee replacement ; L TKA 3/13     LUMPECTOMY BREAST  2001    benign     URETHRA SURGERY         Social History     Tobacco Use     Smoking status: Never Smoker     Smokeless tobacco: Never Used   Substance Use Topics     Alcohol use: Yes     Comment: glass of wine with dinner     Family History   Problem Relation Age of Onset     Breast Cancer Mother          age 50     C.A.D. Father          age 71         Current Outpatient Medications   Medication Sig Dispense Refill     acetaminophen-codeine (TYLENOL #3) 300-30 MG tablet Take 1 tab up to twice daily as needed 30 tablet 3     aspirin 81 MG tablet Take 1 tablet (81 mg) by mouth daily 120 tablet      carbidopa-levodopa (SINEMET)  MG tablet 1/2 tablet TID as of 2/15/19       Cholecalciferol (VITAMIN D) 2000 UNITS tablet Take 2,000 Units by mouth daily 100 tablet 3     cyanocolbalamin (VITAMIN  B-12) 500 MCG tablet Take 1 tablet by mouth daily.       diazepam (VALIUM) 5 MG tablet TAKE 1 TABLET BY MOUTH EVERY DAY AS NEEDED FOR ANXIETY 30 tablet 3     escitalopram (LEXAPRO) 10 MG tablet Take 1.5 tablets (15 mg) by mouth daily 135 tablet 3     levothyroxine (SYNTHROID/LEVOTHROID) 75 MCG  tablet TAKE 1 TABLET (75 MCG) BY MOUTH DAILY 90 tablet 1     polyethylene glycol (MIRALAX/GLYCOLAX) powder TAKE 17 G BY MOUTH 2 TIMES DAILY 527 g 11     pravastatin (PRAVACHOL) 40 MG tablet TAKE 1 TABLET (40 MG) BY MOUTH DAILY 90 tablet 2     traZODone (DESYREL) 50 MG tablet Try 25 mg one hour before bedtime. 30 tablet 5     Allergies   Allergen Reactions     Mushroom Hives     Bees      Influenza Vaccine Live      Other [Seasonal Allergies]      msg and mushrooms     Penicillins      Wasp Venom Protein Hives     Ciprofloxacin        Reviewed and updated as needed this visit by Provider         Review of Systems   Constitutional: Negative.    HENT: Negative.    Eyes: Negative.    Respiratory: Negative.    Cardiovascular: Negative.    Gastrointestinal: Negative.    Endocrine: Negative.    Genitourinary: Negative.    Musculoskeletal: Negative.    Skin: Negative.    Neurological:        As in HPI   Psychiatric/Behavioral: Negative.          Objective    /68   Pulse 57   Temp 97.8  F (36.6  C) (Tympanic)   Resp 14   Wt 64.9 kg (143 lb)   SpO2 100%   BMI 26.16 kg/m    Physical Exam  Constitutional:       General: She is not in acute distress.     Appearance: She is well-developed. She is not diaphoretic.   HENT:      Head: Normocephalic and atraumatic. No raccoon eyes, Mai's sign, contusion, right periorbital erythema, left periorbital erythema or laceration.      Right Ear: External ear normal.      Left Ear: External ear normal.      Nose: Nose normal.   Eyes:      Conjunctiva/sclera: Conjunctivae normal.   Neck:      Musculoskeletal: Normal range of motion.   Cardiovascular:      Rate and Rhythm: Normal rate and regular rhythm.      Heart sounds: Normal heart sounds.   Pulmonary:      Effort: Pulmonary effort is normal.      Breath sounds: Normal breath sounds.   Skin:     General: Skin is warm and dry.      Findings: No bruising, erythema or lesion.   Neurological:      Mental Status: She is alert and  oriented to person, place, and time.      Comments: Cranial Nerves:  EYES: Normal, No nystagmus, EOM, PERRL  Normal face sensation  Normal corneal reflex  Normal masseter / temporalis  Normal face strength and symmetry  Normal hearing  Normal swallowing  Uvula midline  Full trapezius / sternocleidomastoid strength  Normal tongue protrusion  Neurologic Exam:  Gait: Using a cane for balance, shuffle steps  Strength: 5/5   Visual fields intact: bilateral  Pronator drift: negative  Sensation intact toes and shoulders   Psychiatric:         Attention and Perception: Attention normal.         Mood and Affect: Mood normal.         Speech: Speech normal.         Cognition and Memory: Cognition and memory normal.         Judgment: Judgment normal.         Diagnostic Test Results:  Results for orders placed or performed in visit on 11/25/19 (from the past 24 hour(s))   CBC with platelets and differential   Result Value Ref Range    WBC 8.4 4.0 - 11.0 10e9/L    RBC Count 4.66 3.8 - 5.2 10e12/L    Hemoglobin 14.2 11.7 - 15.7 g/dL    Hematocrit 43.3 35.0 - 47.0 %    MCV 93 78 - 100 fl    MCH 30.5 26.5 - 33.0 pg    MCHC 32.8 31.5 - 36.5 g/dL    RDW 13.2 10.0 - 15.0 %    Platelet Count 187 150 - 450 10e9/L    % Neutrophils 66.3 %    % Lymphocytes 25.9 %    % Monocytes 6.2 %    % Eosinophils 1.4 %    % Basophils 0.2 %    Absolute Neutrophil 5.5 1.6 - 8.3 10e9/L    Absolute Lymphocytes 2.2 0.8 - 5.3 10e9/L    Absolute Monocytes 0.5 0.0 - 1.3 10e9/L    Absolute Eosinophils 0.1 0.0 - 0.7 10e9/L    Absolute Basophils 0.0 0.0 - 0.2 10e9/L    Diff Method Automated Method            Assessment & Plan   Problem List Items Addressed This Visit        Other    Balance problems    Relevant Orders    PHYSICAL THERAPY REFERRAL      Other Visit Diagnoses     Fall, initial encounter    -  Primary    Relevant Orders    UA with Microscopic reflex to Culture (Completed)    Basic metabolic panel (Completed)    CBC with platelets and differential  "(Completed)    PHYSICAL THERAPY REFERRAL    Hypotension, unspecified hypotension type        Relevant Orders    PHYSICAL THERAPY REFERRAL         - CN exam normal, no evidence of head trauma, patient remembers the fall - no need for imaging  - symptoms have been ongoing and are well documented  - this fall was consistent with her typical symptoms of feeling dizzy upon standing  - we discussed medications that may contribute to these symptoms - including diazepam, codeine, and diphenhydramine.  She also takes levodopa-carbadopa, however I do not want her to change this medication because it is helping her Parkinson's symptoms.  - referral was placed to [facility] for eval and possible PT  - she will make sure to gradually get up from a lying or resting position.  - encouraged use of the walker with a seat, so she can sit down at any time if she becomes symptomatic  - labs ordered to rule out other causes of worsening lightheadedness or fall    BMI:   Estimated body mass index is 26.16 kg/m  as calculated from the following:    Height as of 11/11/19: 1.575 m (5' 2\").    Weight as of this encounter: 64.9 kg (143 lb).           Patient Instructions   Tylenol with codeine and Valium are both medications that can contribute to falls.  Please try to wean from these medications.   Tylenol arthritis would be a better option - as recommended by Dr. Velasquez.   Try splitting the Valium in two, and see if taking half a tablet has the same effect.     Also consider another trial of the trazodone again.     Use the walker at home, especially when you are alone.  Also especially right when you get up from lying down.     Stay well hydrated.       Return in about 2 weeks (around 12/9/2019) for PT.    Kelly Putnam PA-C  [facility]      "

## 2019-12-12 NOTE — TELEPHONE ENCOUNTER
Call from patient.     States she does have a diagnosis for Parkinson's disease. But main concern is her new onset weakness.     States she over the last couple of days she in only able to take a few steps before she needs to sit down. Unsure why this all of a sudden started happening.     Not eating well, but still getting her meals in each day. Fluid intake has decreased some, but not much. Blood pressures have been normal. States it is a little high today, but feels it may be due to the stress from the new weakness she has been having.     156/94, 75     OV scheduled for tomorrow with provider for further assessment and follow up.

## 2019-12-31 NOTE — LETTER
January 2, 2020      Sadaf ARNETT Rubi  [address]  [facility] [place] [id number]        Dear ,    We are writing to inform you of your test results.    Your lab results are all normal.   Please bring up the symptoms with your neurologist to see if they could be related to Parkinson's.  I will also look for updates on the head and neck MRI and MRAs.     Resulted Orders   Comprehensive metabolic panel (BMP + Alb, Alk Phos, ALT, AST, Total. Bili, TP)   Result Value Ref Range    Sodium 137 133 - 144 mmol/L    Potassium 4.3 3.4 - 5.3 mmol/L    Chloride 105 94 - 109 mmol/L    Carbon Dioxide 21 20 - 32 mmol/L    Anion Gap 11 3 - 14 mmol/L    Glucose 87 70 - 99 mg/dL    Urea Nitrogen 16 7 - 30 mg/dL    Creatinine 0.67 0.52 - 1.04 mg/dL    GFR Estimate 86 >60 mL/min/[1.73_m2]      Comment:      Non  GFR Calc  Starting 12/18/2018, serum creatinine based estimated GFR (eGFR) will be   calculated using the Chronic Kidney Disease Epidemiology Collaboration   (CKD-EPI) equation.      GFR Estimate If Black >90 >60 mL/min/[1.73_m2]      Comment:       GFR Calc  Starting 12/18/2018, serum creatinine based estimated GFR (eGFR) will be   calculated using the Chronic Kidney Disease Epidemiology Collaboration   (CKD-EPI) equation.      Calcium 9.8 8.5 - 10.1 mg/dL    Bilirubin Total 0.4 0.2 - 1.3 mg/dL    Albumin 4.1 3.4 - 5.0 g/dL    Protein Total 6.9 6.8 - 8.8 g/dL    Alkaline Phosphatase 50 40 - 150 U/L    ALT 16 0 - 50 U/L    AST 15 0 - 45 U/L   CBC with platelets and differential   Result Value Ref Range    WBC 6.6 4.0 - 11.0 10e9/L    RBC Count 4.66 3.8 - 5.2 10e12/L    Hemoglobin 14.3 11.7 - 15.7 g/dL    Hematocrit 42.8 35.0 - 47.0 %    MCV 92 78 - 100 fl    MCH 30.7 26.5 - 33.0 pg    MCHC 33.4 31.5 - 36.5 g/dL    RDW 13.2 10.0 - 15.0 %    Platelet Count 193 150 - 450 10e9/L    % Neutrophils 64.1 %    % Lymphocytes 27.2 %    % Monocytes 6.9 %    % Eosinophils 1.5 %    % Basophils  0.3 %    Absolute Neutrophil 4.2 1.6 - 8.3 10e9/L    Absolute Lymphocytes 1.8 0.8 - 5.3 10e9/L    Absolute Monocytes 0.5 0.0 - 1.3 10e9/L    Absolute Eosinophils 0.1 0.0 - 0.7 10e9/L    Absolute Basophils 0.0 0.0 - 0.2 10e9/L    Diff Method Automated Method    TSH with free T4 reflex   Result Value Ref Range    TSH 2.62 0.40 - 4.00 mU/L   BNP-N terminal pro   Result Value Ref Range    N-Terminal Pro Bnp 154 0 - 450 pg/mL      Comment:         Reference range shown and results flagged as abnormal are for the outpatient,   non acute settings. Establishing a baseline value for each individual patient   is useful for follow-up.  Suggested inpatient cut points for confirming diagnosis of CHF in an acute   setting are:   >450 pg/mL (age 18 to less than 50)   >900 pg/mL (age 50 to less than 75)   >1800 pg/mL (75 yrs and older)  An inpatient or emergency department NT-proPBNP <300 pg/mL effectively rules   out acute CHF, with 99% negative predictive value.          If you have any questions or concerns, please call the clinic at the number listed above.       Sincerely,        Kelly Putnam PA-C

## 2019-12-31 NOTE — PROGRESS NOTES
Subjective     Sadaf Rubi is a 74 year old female who presents to clinic today for the following health issues:    HPI   Dizziness      Duration: started Dec 11th    Description   Feeling faint:  YES  Feeling like the surroundings are moving: YES- 2 episodes  Loss of consciousness or falls: YES- falls    Intensity:  severe    Accompanying signs and symptoms:   Nausea/vomitting: YES- nausea  Palpitations: no   Weakness in arms or legs: no   Vision or speech changes: no   Ringing in ears (Tinnitus): no   Hearing loss related to dizziness: no   Other (fevers/chills/sweating/dyspnea): no     History (similar episodes/head trauma/previous evaluation/recent bleeding): None    Precipitating or alleviating factors (new meds/chemicals): None  Worse with activity/head movement: YES    Therapies tried and outcome: None    Her dizziness has been ongoing for over a month, and weakness seems to be getting worse  She has fallen 2 times  She gets exhausted very easily  Denies blood in the stool  She has Parkinson's - taking levodopa/carbadopa (has decreased to 12.5/50 TID to 12.5/50 BID or daily), she is unsure if the dose correlates with her symptoms  She is treated at [facility] with some improvement intermittently, however overall she is declining  Unintentional weight loss of 30 pounds in the past year  She is not hungry  She continues to find benefit from Valium, as her anxiety has gotten much worse - she takes this 1-2 times a day, it is prescribed for once daily.     She is up to date on colon cancer screening (colonoscopy negative on 9/30/2019) and breast cancer screening (mammo negative 1/18/2019).   Annual brain MRI, brain MRA, Neck MRA scheduled each January - she plans to call and schedule soon        Patient Active Problem List   Diagnosis     Dysthymic disorder     Palpitation     Shortness of breath     Osteopenia     Preventive measure     Family history of coagulation disorder     Vitamin D deficiency disease      Hypertension goal BP (blood pressure) < 140/80     Hyperlipidemia with target LDL less than 100     Dizziness     Balance problems     Abnormal liver enzymes     ACP (advance care planning)     Episodic tension-type headache, not intractable     Pain in unspecified knee     Slow transit constipation     Persistent insomnia     SHAWNEE (generalized anxiety disorder)     Hypothyroidism, unspecified type     Urinary hesitancy     Other iron deficiency anemia     Pelvic somatic dysfunction     Mixed incontinence urge and stress (male)(female)     Chronic midline thoracic back pain     Chronic upper back pain     Chronic midline low back pain without sciatica     Dysphagia, unspecified type     Left-sided low back pain without sciatica     Fine motor impairment     Parkinson's syndrome (H)     Intracranial aneurysm     Chronic fatigue     Iron deficiency     Dysfunction of right eustachian tube     Internal carotid aneurysm     Insomnia, unspecified type     Labile blood pressure     Past Surgical History:   Procedure Laterality Date     GYN SURGERY      hysterectomy SAMANTHA & BSO     KNEE SURGERY  2006    meniscus left     KNEE SURGERY      right meniscus     KNEE SURGERY  09/10/2012    right knee replacement ; L TKA 3/13     LUMPECTOMY BREAST  2001    benign     URETHRA SURGERY         Social History     Tobacco Use     Smoking status: Never Smoker     Smokeless tobacco: Never Used   Substance Use Topics     Alcohol use: Yes     Comment: glass of wine with dinner     Family History   Problem Relation Age of Onset     Breast Cancer Mother          age 50     C.A.D. Father          age 71         Current Outpatient Medications   Medication Sig Dispense Refill     acetaminophen-codeine (TYLENOL #3) 300-30 MG tablet Take 1 tab up to twice daily as needed 30 tablet 3     aspirin 81 MG tablet Take 1 tablet (81 mg) by mouth daily 120 tablet      carbidopa-levodopa (SINEMET)  MG tablet 1/2 tablet TID as  of 2/15/19       Cholecalciferol (VITAMIN D) 2000 UNITS tablet Take 2,000 Units by mouth daily 100 tablet 3     cyanocolbalamin (VITAMIN  B-12) 500 MCG tablet Take 1 tablet by mouth daily.       diazepam (VALIUM) 5 MG tablet TAKE 1 TABLET BY MOUTH EVERY DAY AS NEEDED FOR ANXIETY 6 tablet 0     escitalopram (LEXAPRO) 10 MG tablet Take 1.5 tablets (15 mg) by mouth daily 135 tablet 3     levothyroxine (SYNTHROID/LEVOTHROID) 75 MCG tablet TAKE 1 TABLET (75 MCG) BY MOUTH DAILY 90 tablet 1     polyethylene glycol (MIRALAX/GLYCOLAX) powder TAKE 17 G BY MOUTH 2 TIMES DAILY 527 g 11     pravastatin (PRAVACHOL) 40 MG tablet TAKE 1 TABLET (40 MG) BY MOUTH DAILY 90 tablet 2     traZODone (DESYREL) 50 MG tablet Try 25 mg one hour before bedtime. 30 tablet 5     Allergies   Allergen Reactions     Mushroom Hives     Bees      Influenza Vaccine Live      Other [Seasonal Allergies]      msg and mushrooms     Penicillins      Wasp Venom Protein Hives     Ciprofloxacin        Reviewed and updated as needed this visit by Provider         Review of Systems   Constitutional: Positive for activity change, appetite change and fatigue.   HENT: Negative.    Eyes: Negative.    Respiratory: Negative.    Cardiovascular: Negative.    Gastrointestinal: Negative.    Genitourinary: Negative.    Musculoskeletal: Negative.    Skin: Negative.    Neurological: Positive for dizziness, weakness and light-headedness. Negative for syncope.   Psychiatric/Behavioral: Negative.          Objective    /70   Pulse 82   Temp 99.4  F (37.4  C) (Tympanic)   Resp 12   Wt 63.5 kg (140 lb)   BMI 25.61 kg/m    Physical Exam  Constitutional:       General: She is not in acute distress.     Appearance: She is well-developed. She is not diaphoretic.   HENT:      Head: Normocephalic.      Right Ear: External ear normal.      Left Ear: External ear normal.      Nose: Nose normal.   Eyes:      Conjunctiva/sclera: Conjunctivae normal.   Neck:      Musculoskeletal:  Normal range of motion.   Pulmonary:      Effort: Pulmonary effort is normal.   Skin:     General: Skin is warm and dry.   Neurological:      Mental Status: She is alert and oriented to person, place, and time.      Motor: Tremor present. No weakness (normal bilateral  strength) or pronator drift.      Comments: Cranial Nerves:  EYES: Normal, No nystagmus, EOM  Normal face sensation  Normal masseter / temporalis  Normal face strength and symmetry  Normal hearing  Uvula midline  Full trapezius / sternocleidomastoid strength  Normal tongue protrusion   Psychiatric:         Judgment: Judgment normal.         Diagnostic Test Results:  No results found for this or any previous visit (from the past 24 hour(s)).        Assessment & Plan   Problem List Items Addressed This Visit        Respiratory    Shortness of breath    Relevant Orders    BNP-N terminal pro (Completed)       Endocrine    Hypothyroidism, unspecified type    Relevant Orders    TSH with free T4 reflex (Completed)       Behavioral    SHAWNEE (generalized anxiety disorder)    Relevant Medications    diazepam (VALIUM) 5 MG tablet       Other    Balance problems - Primary    Relevant Orders    Comprehensive metabolic panel (BMP + Alb, Alk Phos, ALT, AST, Total. Bili, TP) (Completed)    CBC with platelets and differential (Completed)    Dizziness    Relevant Medications    diazepam (VALIUM) 5 MG tablet    Other Relevant Orders    Comprehensive metabolic panel (BMP + Alb, Alk Phos, ALT, AST, Total. Bili, TP) (Completed)    CBC with platelets and differential (Completed)      Other Visit Diagnoses     Hypotension, unspecified hypotension type        Generalized muscle weakness             Workup for weakness/dizziness/weight loss:  - labs as above  - patient to schedule brain MRI, brain MRA, neck MRI as ordered by neurology  - she will keep track of trends that correlate with worsening or improving symptoms  - cut back on Valium, Tylenol #3 (she has already cut  down) and Benadryl   - cancer screenings are up to date  - if anemic - we will order iron, B12, ferritin, FIT tests  - Suspect thyroid may be off d/t 13 pound weight loss since last checked in June    - continue PT at [facility]    45 minutes were spent with the patient, greater than 50% of our time was spent in counseling.      Patient Instructions   [place] instant breakfast or ensure for calories.       Return in about 2 weeks (around 1/14/2020).    Kelly Putnam PA-C  [facility]

## 2020-01-01 ENCOUNTER — NURSING HOME VISIT (OUTPATIENT)
Dept: GERIATRICS | Facility: CLINIC | Age: 75
End: 2020-01-01
Payer: COMMERCIAL

## 2020-01-01 ENCOUNTER — APPOINTMENT (OUTPATIENT)
Dept: PHYSICAL THERAPY | Facility: CLINIC | Age: 75
End: 2020-01-01
Payer: COMMERCIAL

## 2020-01-01 ENCOUNTER — TELEPHONE (OUTPATIENT)
Dept: GERIATRICS | Facility: CLINIC | Age: 75
End: 2020-01-01

## 2020-01-01 ENCOUNTER — TRANSFERRED RECORDS (OUTPATIENT)
Dept: HEALTH INFORMATION MANAGEMENT | Facility: CLINIC | Age: 75
End: 2020-01-01

## 2020-01-01 ENCOUNTER — VIRTUAL VISIT (OUTPATIENT)
Dept: GERIATRICS | Facility: CLINIC | Age: 75
End: 2020-01-01
Payer: COMMERCIAL

## 2020-01-01 ENCOUNTER — NURSING HOME VISIT (OUTPATIENT)
Dept: GERIATRICS | Facility: CLINIC | Age: 75
End: 2020-01-01

## 2020-01-01 ENCOUNTER — TELEPHONE (OUTPATIENT)
Dept: FAMILY MEDICINE | Facility: CLINIC | Age: 75
End: 2020-01-01

## 2020-01-01 ENCOUNTER — APPOINTMENT (OUTPATIENT)
Dept: PHYSICAL THERAPY | Facility: CLINIC | Age: 75
End: 2020-01-01
Attending: HOSPITALIST
Payer: COMMERCIAL

## 2020-01-01 ENCOUNTER — HOSPITAL ENCOUNTER (OUTPATIENT)
Dept: MRI IMAGING | Facility: CLINIC | Age: 75
End: 2020-01-08
Payer: COMMERCIAL

## 2020-01-01 ENCOUNTER — APPOINTMENT (OUTPATIENT)
Dept: SPEECH THERAPY | Facility: CLINIC | Age: 75
End: 2020-01-01
Attending: PSYCHIATRY & NEUROLOGY
Payer: COMMERCIAL

## 2020-01-01 ENCOUNTER — HOSPITAL ENCOUNTER (OUTPATIENT)
Dept: MRI IMAGING | Facility: CLINIC | Age: 75
Discharge: HOME | End: 2020-01-08
Admitting: RADIOLOGY
Payer: COMMERCIAL

## 2020-01-01 ENCOUNTER — HOSPITAL ENCOUNTER (EMERGENCY)
Facility: CLINIC | Age: 75
End: 2020-01-01
Payer: COMMERCIAL

## 2020-01-01 ENCOUNTER — PATIENT OUTREACH (OUTPATIENT)
Dept: CARE COORDINATION | Facility: CLINIC | Age: 75
End: 2020-01-01

## 2020-01-01 ENCOUNTER — HOSPITAL ENCOUNTER (INPATIENT)
Facility: CLINIC | Age: 75
LOS: 6 days | Discharge: NURSING, REHAB OR LONG-TERM CARE | End: 2020-01-22
Attending: EMERGENCY MEDICINE | Admitting: HOSPITALIST
Payer: COMMERCIAL

## 2020-01-01 ENCOUNTER — APPOINTMENT (OUTPATIENT)
Dept: SPEECH THERAPY | Facility: CLINIC | Age: 75
End: 2020-01-01
Payer: COMMERCIAL

## 2020-01-01 VITALS
HEIGHT: 63 IN | DIASTOLIC BLOOD PRESSURE: 86 MMHG | HEART RATE: 97 BPM | TEMPERATURE: 97.2 F | RESPIRATION RATE: 16 BRPM | WEIGHT: 127 LBS | SYSTOLIC BLOOD PRESSURE: 131 MMHG | BODY MASS INDEX: 22.5 KG/M2 | OXYGEN SATURATION: 96 %

## 2020-01-01 VITALS
HEART RATE: 97 BPM | DIASTOLIC BLOOD PRESSURE: 70 MMHG | SYSTOLIC BLOOD PRESSURE: 124 MMHG | WEIGHT: 129 LBS | RESPIRATION RATE: 16 BRPM | OXYGEN SATURATION: 98 % | TEMPERATURE: 97 F | HEIGHT: 63 IN | BODY MASS INDEX: 22.86 KG/M2

## 2020-01-01 VITALS
TEMPERATURE: 98.6 F | OXYGEN SATURATION: 96 % | SYSTOLIC BLOOD PRESSURE: 130 MMHG | HEART RATE: 98 BPM | RESPIRATION RATE: 18 BRPM | HEIGHT: 63 IN | BODY MASS INDEX: 23.21 KG/M2 | WEIGHT: 131 LBS | DIASTOLIC BLOOD PRESSURE: 91 MMHG

## 2020-01-01 VITALS
OXYGEN SATURATION: 96 % | HEART RATE: 85 BPM | RESPIRATION RATE: 18 BRPM | HEIGHT: 63 IN | BODY MASS INDEX: 24.45 KG/M2 | WEIGHT: 138 LBS | TEMPERATURE: 97.4 F | DIASTOLIC BLOOD PRESSURE: 78 MMHG | SYSTOLIC BLOOD PRESSURE: 145 MMHG

## 2020-01-01 VITALS
WEIGHT: 118.8 LBS | HEIGHT: 63 IN | HEART RATE: 71 BPM | BODY MASS INDEX: 21.05 KG/M2 | DIASTOLIC BLOOD PRESSURE: 68 MMHG | TEMPERATURE: 96.8 F | SYSTOLIC BLOOD PRESSURE: 118 MMHG | RESPIRATION RATE: 18 BRPM | OXYGEN SATURATION: 97 %

## 2020-01-01 VITALS
DIASTOLIC BLOOD PRESSURE: 83 MMHG | RESPIRATION RATE: 18 BRPM | BODY MASS INDEX: 25.76 KG/M2 | SYSTOLIC BLOOD PRESSURE: 141 MMHG | WEIGHT: 140 LBS | TEMPERATURE: 97.3 F | HEART RATE: 107 BPM | OXYGEN SATURATION: 96 % | HEIGHT: 62 IN

## 2020-01-01 VITALS
SYSTOLIC BLOOD PRESSURE: 109 MMHG | TEMPERATURE: 97.6 F | BODY MASS INDEX: 22.68 KG/M2 | DIASTOLIC BLOOD PRESSURE: 78 MMHG | OXYGEN SATURATION: 97 % | RESPIRATION RATE: 18 BRPM | WEIGHT: 126 LBS

## 2020-01-01 VITALS
OXYGEN SATURATION: 97 % | RESPIRATION RATE: 18 BRPM | WEIGHT: 121 LBS | TEMPERATURE: 98 F | DIASTOLIC BLOOD PRESSURE: 70 MMHG | HEART RATE: 71 BPM | HEIGHT: 62 IN | SYSTOLIC BLOOD PRESSURE: 107 MMHG | BODY MASS INDEX: 22.26 KG/M2

## 2020-01-01 VITALS
HEART RATE: 92 BPM | WEIGHT: 132.7 LBS | DIASTOLIC BLOOD PRESSURE: 80 MMHG | OXYGEN SATURATION: 96 % | SYSTOLIC BLOOD PRESSURE: 122 MMHG | RESPIRATION RATE: 16 BRPM | HEIGHT: 63 IN | TEMPERATURE: 96.7 F | BODY MASS INDEX: 23.51 KG/M2

## 2020-01-01 VITALS
OXYGEN SATURATION: 94 % | BODY MASS INDEX: 25.2 KG/M2 | SYSTOLIC BLOOD PRESSURE: 135 MMHG | RESPIRATION RATE: 16 BRPM | DIASTOLIC BLOOD PRESSURE: 84 MMHG | TEMPERATURE: 97 F | HEART RATE: 75 BPM | HEIGHT: 63 IN

## 2020-01-01 DIAGNOSIS — I10 HYPERTENSION GOAL BP (BLOOD PRESSURE) < 140/80: ICD-10-CM

## 2020-01-01 DIAGNOSIS — F41.1 GAD (GENERALIZED ANXIETY DISORDER): ICD-10-CM

## 2020-01-01 DIAGNOSIS — K59.01 SLOW TRANSIT CONSTIPATION: Primary | ICD-10-CM

## 2020-01-01 DIAGNOSIS — F43.23 ADJUSTMENT DISORDER WITH MIXED ANXIETY AND DEPRESSED MOOD: ICD-10-CM

## 2020-01-01 DIAGNOSIS — R26.89 BALANCE PROBLEMS: Primary | ICD-10-CM

## 2020-01-01 DIAGNOSIS — R11.0 NAUSEA: ICD-10-CM

## 2020-01-01 DIAGNOSIS — R13.10 DYSPHAGIA, UNSPECIFIED TYPE: ICD-10-CM

## 2020-01-01 DIAGNOSIS — E53.8 VITAMIN B12 DEFICIENCY (NON ANEMIC): ICD-10-CM

## 2020-01-01 DIAGNOSIS — R26.89 BALANCE PROBLEMS: ICD-10-CM

## 2020-01-01 DIAGNOSIS — H81.12 BENIGN PAROXYSMAL POSITIONAL VERTIGO OF LEFT EAR: ICD-10-CM

## 2020-01-01 DIAGNOSIS — G20.A1 PARKINSON'S DISEASE (H): Primary | ICD-10-CM

## 2020-01-01 DIAGNOSIS — G20.A1 PARKINSON'S SYNDROME (H): ICD-10-CM

## 2020-01-01 DIAGNOSIS — F41.9 ANXIETY: ICD-10-CM

## 2020-01-01 DIAGNOSIS — I95.1 ORTHOSTATIC HYPOTENSION: ICD-10-CM

## 2020-01-01 DIAGNOSIS — G44.219 EPISODIC TENSION-TYPE HEADACHE, NOT INTRACTABLE: ICD-10-CM

## 2020-01-01 DIAGNOSIS — F41.9 ANXIETY: Primary | ICD-10-CM

## 2020-01-01 DIAGNOSIS — Z53.9 DIAGNOSIS NOT YET DEFINED: Primary | ICD-10-CM

## 2020-01-01 DIAGNOSIS — R53.81 PHYSICAL DECONDITIONING: ICD-10-CM

## 2020-01-01 DIAGNOSIS — R35.0 URINARY FREQUENCY: ICD-10-CM

## 2020-01-01 DIAGNOSIS — I95.9 HYPOTENSION, UNSPECIFIED HYPOTENSION TYPE: ICD-10-CM

## 2020-01-01 DIAGNOSIS — G47.00 INSOMNIA, UNSPECIFIED TYPE: ICD-10-CM

## 2020-01-01 DIAGNOSIS — G43.919 INTRACTABLE MIGRAINE WITHOUT STATUS MIGRAINOSUS, UNSPECIFIED MIGRAINE TYPE: ICD-10-CM

## 2020-01-01 DIAGNOSIS — K59.01 SLOW TRANSIT CONSTIPATION: ICD-10-CM

## 2020-01-01 DIAGNOSIS — G89.29 CHRONIC MIDLINE LOW BACK PAIN WITHOUT SCIATICA: Primary | ICD-10-CM

## 2020-01-01 DIAGNOSIS — G20.A1 PARKINSON'S SYNDROME (H): Primary | ICD-10-CM

## 2020-01-01 DIAGNOSIS — I67.1 BRAIN ANEURYSM: ICD-10-CM

## 2020-01-01 DIAGNOSIS — M54.50 CHRONIC MIDLINE LOW BACK PAIN WITHOUT SCIATICA: Primary | ICD-10-CM

## 2020-01-01 DIAGNOSIS — Z53.9 ERRONEOUS ENCOUNTER--DISREGARD: Primary | ICD-10-CM

## 2020-01-01 DIAGNOSIS — R42 ORTHOSTATIC LIGHTHEADEDNESS: Primary | ICD-10-CM

## 2020-01-01 DIAGNOSIS — G20.A1 PARKINSON'S DISEASE (H): ICD-10-CM

## 2020-01-01 DIAGNOSIS — Z71.89 ADVANCE CARE PLANNING: ICD-10-CM

## 2020-01-01 DIAGNOSIS — R42 DIZZINESS: ICD-10-CM

## 2020-01-01 DIAGNOSIS — G90.9 AUTONOMIC DYSFUNCTION: ICD-10-CM

## 2020-01-01 DIAGNOSIS — M62.81 GENERALIZED MUSCLE WEAKNESS: ICD-10-CM

## 2020-01-01 DIAGNOSIS — R19.7 DIARRHEA, UNSPECIFIED TYPE: ICD-10-CM

## 2020-01-01 LAB
ALBUMIN SERPL-MCNC: 4.1 G/DL (ref 3.4–5)
ALBUMIN UR-MCNC: NEGATIVE MG/DL
ALP SERPL-CCNC: 50 U/L (ref 40–150)
ALT SERPL W P-5'-P-CCNC: 16 U/L (ref 0–50)
ANION GAP SERPL CALCULATED.3IONS-SCNC: 11 MMOL/L (ref 3–14)
ANION GAP SERPL CALCULATED.3IONS-SCNC: 5 MMOL/L (ref 3–14)
APPEARANCE UR: CLEAR
AST SERPL W P-5'-P-CCNC: 15 U/L (ref 0–45)
BASOPHILS # BLD AUTO: 0 10E9/L (ref 0–0.2)
BASOPHILS NFR BLD AUTO: 0.3 %
BILIRUB SERPL-MCNC: 0.4 MG/DL (ref 0.2–1.3)
BILIRUB UR QL STRIP: NEGATIVE
BUN SERPL-MCNC: 12 MG/DL (ref 7–30)
BUN SERPL-MCNC: 16 MG/DL (ref 7–30)
CALCIUM SERPL-MCNC: 10.1 MG/DL (ref 8.5–10.1)
CALCIUM SERPL-MCNC: 9.8 MG/DL (ref 8.5–10.1)
CHLORIDE SERPL-SCNC: 105 MMOL/L (ref 94–109)
CHLORIDE SERPL-SCNC: 105 MMOL/L (ref 94–109)
CO2 SERPL-SCNC: 21 MMOL/L (ref 20–32)
CO2 SERPL-SCNC: 24 MMOL/L (ref 20–32)
COLOR UR AUTO: YELLOW
CREAT SERPL-MCNC: 0.6 MG/DL (ref 0.52–1.04)
CREAT SERPL-MCNC: 0.67 MG/DL (ref 0.52–1.04)
DIFFERENTIAL METHOD BLD: NORMAL
EOSINOPHIL # BLD AUTO: 0.1 10E9/L (ref 0–0.7)
EOSINOPHIL NFR BLD AUTO: 1.4 %
ERYTHROCYTE [DISTWIDTH] IN BLOOD BY AUTOMATED COUNT: 13.1 % (ref 10–15)
GFR SERPL CREATININE-BSD FRML MDRD: 86 ML/MIN/{1.73_M2}
GFR SERPL CREATININE-BSD FRML MDRD: 89 ML/MIN/{1.73_M2}
GLUCOSE SERPL-MCNC: 87 MG/DL (ref 70–99)
GLUCOSE SERPL-MCNC: 96 MG/DL (ref 70–99)
GLUCOSE UR STRIP-MCNC: NEGATIVE MG/DL
HCT VFR BLD AUTO: 41.4 % (ref 35–47)
HGB BLD-MCNC: 13.9 G/DL (ref 11.7–15.7)
HGB UR QL STRIP: NEGATIVE
IMM GRANULOCYTES # BLD: 0 10E9/L (ref 0–0.4)
IMM GRANULOCYTES NFR BLD: 0.3 %
INTERPRETATION ECG - MUSE: NORMAL
INTERPRETATION ECG - MUSE: NORMAL
KETONES UR STRIP-MCNC: NEGATIVE MG/DL
LEUKOCYTE ESTERASE UR QL STRIP: NEGATIVE
LYMPHOCYTES # BLD AUTO: 2.1 10E9/L (ref 0.8–5.3)
LYMPHOCYTES NFR BLD AUTO: 29.8 %
MCH RBC QN AUTO: 30.5 PG (ref 26.5–33)
MCHC RBC AUTO-ENTMCNC: 33.6 G/DL (ref 31.5–36.5)
MCV RBC AUTO: 91 FL (ref 78–100)
MONOCYTES # BLD AUTO: 0.5 10E9/L (ref 0–1.3)
MONOCYTES NFR BLD AUTO: 6.7 %
MUCOUS THREADS #/AREA URNS LPF: PRESENT /LPF
NEUTROPHILS # BLD AUTO: 4.3 10E9/L (ref 1.6–8.3)
NEUTROPHILS NFR BLD AUTO: 61.5 %
NITRATE UR QL: NEGATIVE
NRBC # BLD AUTO: 0 10*3/UL
NRBC BLD AUTO-RTO: 0 /100
PH UR STRIP: 6 PH (ref 5–7)
PLATELET # BLD AUTO: 209 10E9/L (ref 150–450)
POTASSIUM SERPL-SCNC: 4.3 MMOL/L (ref 3.4–5.3)
POTASSIUM SERPL-SCNC: 4.6 MMOL/L (ref 3.4–5.3)
PROT SERPL-MCNC: 6.9 G/DL (ref 6.8–8.8)
RBC # BLD AUTO: 4.56 10E12/L (ref 3.8–5.2)
RBC #/AREA URNS AUTO: <1 /HPF (ref 0–2)
SODIUM SERPL-SCNC: 134 MMOL/L (ref 133–144)
SODIUM SERPL-SCNC: 137 MMOL/L (ref 133–144)
SOURCE: ABNORMAL
SP GR UR STRIP: 1.01 (ref 1–1.03)
TSH SERPL DL<=0.005 MIU/L-ACNC: 2.62 MU/L (ref 0.4–4)
UROBILINOGEN UR STRIP-MCNC: NORMAL MG/DL (ref 0–2)
VIT B12 SERPL-MCNC: 1295 PG/ML
VITAMIN D 25: 78
WBC # BLD AUTO: 7 10E9/L (ref 4–11)
WBC #/AREA URNS AUTO: <1 /HPF (ref 0–5)

## 2020-01-01 PROCEDURE — 25000132 ZZH RX MED GY IP 250 OP 250 PS 637: Performed by: PSYCHIATRY & NEUROLOGY

## 2020-01-01 PROCEDURE — 25800030 ZZH RX IP 258 OP 636: Performed by: INTERNAL MEDICINE

## 2020-01-01 PROCEDURE — 25000132 ZZH RX MED GY IP 250 OP 250 PS 637: Performed by: INTERNAL MEDICINE

## 2020-01-01 PROCEDURE — 96361 HYDRATE IV INFUSION ADD-ON: CPT

## 2020-01-01 PROCEDURE — 70544 MR ANGIOGRAPHY HEAD W/O DYE: CPT

## 2020-01-01 PROCEDURE — 97530 THERAPEUTIC ACTIVITIES: CPT | Mod: GP | Performed by: PHYSICAL THERAPIST

## 2020-01-01 PROCEDURE — 12000000 ZZH R&B MED SURG/OB

## 2020-01-01 PROCEDURE — 25000128 H RX IP 250 OP 636: Performed by: HOSPITALIST

## 2020-01-01 PROCEDURE — 25000132 ZZH RX MED GY IP 250 OP 250 PS 637: Performed by: HOSPITALIST

## 2020-01-01 PROCEDURE — G0378 HOSPITAL OBSERVATION PER HR: HCPCS

## 2020-01-01 PROCEDURE — 99232 SBSQ HOSP IP/OBS MODERATE 35: CPT | Performed by: HOSPITALIST

## 2020-01-01 PROCEDURE — 99309 SBSQ NF CARE MODERATE MDM 30: CPT | Mod: GT | Performed by: NURSE PRACTITIONER

## 2020-01-01 PROCEDURE — 99239 HOSP IP/OBS DSCHRG MGMT >30: CPT | Performed by: INTERNAL MEDICINE

## 2020-01-01 PROCEDURE — 85025 COMPLETE CBC W/AUTO DIFF WBC: CPT | Performed by: EMERGENCY MEDICINE

## 2020-01-01 PROCEDURE — 81001 URINALYSIS AUTO W/SCOPE: CPT | Performed by: EMERGENCY MEDICINE

## 2020-01-01 PROCEDURE — 40000894 ZZH STATISTIC OT IP EVAL DEFER

## 2020-01-01 PROCEDURE — G0180 MD CERTIFICATION HHA PATIENT: HCPCS | Performed by: INTERNAL MEDICINE

## 2020-01-01 PROCEDURE — 92610 EVALUATE SWALLOWING FUNCTION: CPT | Mod: GN | Performed by: REHABILITATION PRACTITIONER

## 2020-01-01 PROCEDURE — 97162 PT EVAL MOD COMPLEX 30 MIN: CPT | Mod: GP

## 2020-01-01 PROCEDURE — 99231 SBSQ HOSP IP/OBS SF/LOW 25: CPT | Performed by: INTERNAL MEDICINE

## 2020-01-01 PROCEDURE — 25000132 ZZH RX MED GY IP 250 OP 250 PS 637: Performed by: PHYSICIAN ASSISTANT

## 2020-01-01 PROCEDURE — 25800030 ZZH RX IP 258 OP 636: Performed by: HOSPITALIST

## 2020-01-01 PROCEDURE — 99232 SBSQ HOSP IP/OBS MODERATE 35: CPT | Performed by: PHYSICIAN ASSISTANT

## 2020-01-01 PROCEDURE — 92526 ORAL FUNCTION THERAPY: CPT | Mod: GN | Performed by: REHABILITATION PRACTITIONER

## 2020-01-01 PROCEDURE — 99309 SBSQ NF CARE MODERATE MDM 30: CPT | Performed by: NURSE PRACTITIONER

## 2020-01-01 PROCEDURE — 25800030 ZZH RX IP 258 OP 636: Performed by: EMERGENCY MEDICINE

## 2020-01-01 PROCEDURE — 97110 THERAPEUTIC EXERCISES: CPT | Mod: GP

## 2020-01-01 PROCEDURE — 97116 GAIT TRAINING THERAPY: CPT | Mod: GP | Performed by: PHYSICAL THERAPIST

## 2020-01-01 PROCEDURE — 97530 THERAPEUTIC ACTIVITIES: CPT | Mod: GP

## 2020-01-01 PROCEDURE — 99220 ZZC INITIAL OBSERVATION CARE,LEVL III: CPT | Performed by: HOSPITALIST

## 2020-01-01 PROCEDURE — 97110 THERAPEUTIC EXERCISES: CPT | Mod: GP | Performed by: PHYSICAL THERAPIST

## 2020-01-01 PROCEDURE — 70551 MRI BRAIN STEM W/O DYE: CPT

## 2020-01-01 PROCEDURE — 99232 SBSQ HOSP IP/OBS MODERATE 35: CPT | Performed by: INTERNAL MEDICINE

## 2020-01-01 PROCEDURE — 99207 ZZC CDG-MDM COMPONENT: MEETS LOW - DOWN CODED: CPT | Performed by: NURSE PRACTITIONER

## 2020-01-01 PROCEDURE — 99310 SBSQ NF CARE HIGH MDM 45: CPT | Performed by: NURSE PRACTITIONER

## 2020-01-01 PROCEDURE — 92526 ORAL FUNCTION THERAPY: CPT | Mod: GN

## 2020-01-01 PROCEDURE — 80048 BASIC METABOLIC PNL TOTAL CA: CPT | Performed by: EMERGENCY MEDICINE

## 2020-01-01 PROCEDURE — 93005 ELECTROCARDIOGRAM TRACING: CPT

## 2020-01-01 PROCEDURE — 99207 ZZC CDG-CODE CATEGORY CHANGED: CPT | Performed by: HOSPITALIST

## 2020-01-01 PROCEDURE — 99285 EMERGENCY DEPT VISIT HI MDM: CPT | Mod: 25

## 2020-01-01 PROCEDURE — 99221 1ST HOSP IP/OBS SF/LOW 40: CPT | Performed by: PSYCHIATRY & NEUROLOGY

## 2020-01-01 PROCEDURE — 99305 1ST NF CARE MODERATE MDM 35: CPT | Performed by: INTERNAL MEDICINE

## 2020-01-01 PROCEDURE — 96360 HYDRATION IV INFUSION INIT: CPT

## 2020-01-01 RX ORDER — ACETAMINOPHEN 325 MG/1
650 TABLET ORAL EVERY 4 HOURS PRN
Status: DISCONTINUED | OUTPATIENT
Start: 2020-01-01 | End: 2020-01-01 | Stop reason: HOSPADM

## 2020-01-01 RX ORDER — POTASSIUM CL/LIDO/0.9 % NACL 10MEQ/0.1L
10 INTRAVENOUS SOLUTION, PIGGYBACK (ML) INTRAVENOUS
Status: DISCONTINUED | OUTPATIENT
Start: 2020-01-01 | End: 2020-01-01 | Stop reason: HOSPADM

## 2020-01-01 RX ORDER — ONDANSETRON 2 MG/ML
4 INJECTION INTRAMUSCULAR; INTRAVENOUS EVERY 6 HOURS PRN
Status: DISCONTINUED | OUTPATIENT
Start: 2020-01-01 | End: 2020-01-01 | Stop reason: HOSPADM

## 2020-01-01 RX ORDER — ACETAMINOPHEN AND CODEINE PHOSPHATE 300; 30 MG/1; MG/1
1 TABLET ORAL EVERY 8 HOURS PRN
COMMUNITY
End: 2020-01-01

## 2020-01-01 RX ORDER — SENNA AND DOCUSATE SODIUM 50; 8.6 MG/1; MG/1
TABLET, FILM COATED ORAL
Start: 2020-01-01

## 2020-01-01 RX ORDER — PROCHLORPERAZINE MALEATE 5 MG
5 TABLET ORAL EVERY 6 HOURS PRN
Status: DISCONTINUED | OUTPATIENT
Start: 2020-01-01 | End: 2020-01-01

## 2020-01-01 RX ORDER — FLUDROCORTISONE ACETATE 0.1 MG/1
0.2 TABLET ORAL DAILY
Qty: 60 TABLET | Refills: 11 | Status: SHIPPED | OUTPATIENT
Start: 2020-01-01

## 2020-01-01 RX ORDER — CARBIDOPA AND LEVODOPA 25; 100 MG/1; MG/1
0.5 TABLET ORAL 3 TIMES DAILY
DISCHARGE
Start: 2020-01-01 | End: 2020-01-01

## 2020-01-01 RX ORDER — POLYETHYLENE GLYCOL 3350 17 G/17G
1 POWDER, FOR SOLUTION ORAL 2 TIMES DAILY PRN
Qty: 527 G | Refills: 11
Start: 2020-01-01

## 2020-01-01 RX ORDER — CHOLECALCIFEROL (VITAMIN D3) 50 MCG
2000 TABLET ORAL DAILY
Status: DISCONTINUED | OUTPATIENT
Start: 2020-01-01 | End: 2020-01-01 | Stop reason: HOSPADM

## 2020-01-01 RX ORDER — POTASSIUM CHLORIDE 1.5 G/1.58G
20-40 POWDER, FOR SOLUTION ORAL
Status: DISCONTINUED | OUTPATIENT
Start: 2020-01-01 | End: 2020-01-01 | Stop reason: HOSPADM

## 2020-01-01 RX ORDER — PROCHLORPERAZINE 25 MG
12.5 SUPPOSITORY, RECTAL RECTAL EVERY 12 HOURS PRN
Status: DISCONTINUED | OUTPATIENT
Start: 2020-01-01 | End: 2020-01-01

## 2020-01-01 RX ORDER — POTASSIUM CHLORIDE 1500 MG/1
20-40 TABLET, EXTENDED RELEASE ORAL
Status: DISCONTINUED | OUTPATIENT
Start: 2020-01-01 | End: 2020-01-01 | Stop reason: HOSPADM

## 2020-01-01 RX ORDER — LEVOTHYROXINE SODIUM 75 UG/1
75 TABLET ORAL
Status: DISCONTINUED | OUTPATIENT
Start: 2020-01-01 | End: 2020-01-01 | Stop reason: HOSPADM

## 2020-01-01 RX ORDER — POTASSIUM CHLORIDE 7.45 MG/ML
10 INJECTION INTRAVENOUS
Status: DISCONTINUED | OUTPATIENT
Start: 2020-01-01 | End: 2020-01-01 | Stop reason: HOSPADM

## 2020-01-01 RX ORDER — CITALOPRAM HYDROBROMIDE 10 MG/1
10 TABLET ORAL DAILY
COMMUNITY
End: 2020-01-01

## 2020-01-01 RX ORDER — DIAZEPAM 5 MG
5 TABLET ORAL EVERY 12 HOURS PRN
COMMUNITY
End: 2020-01-01

## 2020-01-01 RX ORDER — POLYETHYLENE GLYCOL 3350 17 G/17G
17 POWDER, FOR SOLUTION ORAL DAILY PRN
Status: DISCONTINUED | OUTPATIENT
Start: 2020-01-01 | End: 2020-01-01 | Stop reason: HOSPADM

## 2020-01-01 RX ORDER — SODIUM CHLORIDE 9 MG/ML
INJECTION, SOLUTION INTRAVENOUS CONTINUOUS
Status: ACTIVE | OUTPATIENT
Start: 2020-01-01 | End: 2020-01-01

## 2020-01-01 RX ORDER — CARBIDOPA AND LEVODOPA 25; 100 MG/1; MG/1
0.5 TABLET ORAL 2 TIMES DAILY
COMMUNITY
Start: 2020-01-01 | End: 2020-01-01

## 2020-01-01 RX ORDER — NALOXONE HYDROCHLORIDE 0.4 MG/ML
.1-.4 INJECTION, SOLUTION INTRAMUSCULAR; INTRAVENOUS; SUBCUTANEOUS
Status: DISCONTINUED | OUTPATIENT
Start: 2020-01-01 | End: 2020-01-01 | Stop reason: HOSPADM

## 2020-01-01 RX ORDER — ASPIRIN 81 MG/1
81 TABLET ORAL AT BEDTIME
Status: DISCONTINUED | OUTPATIENT
Start: 2020-01-01 | End: 2020-01-01 | Stop reason: HOSPADM

## 2020-01-01 RX ORDER — UREA 10 %
500 LOTION (ML) TOPICAL DAILY
Status: DISCONTINUED | OUTPATIENT
Start: 2020-01-01 | End: 2020-01-01 | Stop reason: HOSPADM

## 2020-01-01 RX ORDER — DIAZEPAM 5 MG
5 TABLET ORAL 2 TIMES DAILY PRN
Qty: 60 TABLET | Refills: 5 | Status: SHIPPED | OUTPATIENT
Start: 2020-01-01

## 2020-01-01 RX ORDER — ESCITALOPRAM OXALATE 5 MG/1
5 TABLET ORAL DAILY
Status: DISCONTINUED | OUTPATIENT
Start: 2020-01-01 | End: 2020-01-01 | Stop reason: HOSPADM

## 2020-01-01 RX ORDER — ESCITALOPRAM OXALATE 10 MG/1
10 TABLET ORAL DAILY
Refills: 3 | DISCHARGE
Start: 2020-01-01 | End: 2020-01-01

## 2020-01-01 RX ORDER — BISACODYL 10 MG
10 SUPPOSITORY, RECTAL RECTAL DAILY PRN
Status: DISCONTINUED | OUTPATIENT
Start: 2020-01-01 | End: 2020-01-01 | Stop reason: HOSPADM

## 2020-01-01 RX ORDER — PRAVASTATIN SODIUM 40 MG
40 TABLET ORAL DAILY
Status: DISCONTINUED | OUTPATIENT
Start: 2020-01-01 | End: 2020-01-01 | Stop reason: HOSPADM

## 2020-01-01 RX ORDER — CARBIDOPA AND LEVODOPA 25; 100 MG/1; MG/1
1 TABLET ORAL DAILY
COMMUNITY
Start: 2020-01-01 | End: 2020-01-01

## 2020-01-01 RX ORDER — ESCITALOPRAM OXALATE 5 MG/1
5 TABLET ORAL DAILY
Status: DISCONTINUED | OUTPATIENT
Start: 2020-01-01 | End: 2020-01-01

## 2020-01-01 RX ORDER — POTASSIUM CHLORIDE 29.8 MG/ML
20 INJECTION INTRAVENOUS
Status: DISCONTINUED | OUTPATIENT
Start: 2020-01-01 | End: 2020-01-01

## 2020-01-01 RX ORDER — DIAZEPAM 5 MG
5 TABLET ORAL 2 TIMES DAILY PRN
Qty: 60 TABLET | Refills: 5
Start: 2020-01-01 | End: 2020-01-01

## 2020-01-01 RX ORDER — FLUDROCORTISONE ACETATE 0.1 MG/1
0.1 TABLET ORAL DAILY
DISCHARGE
Start: 2020-01-01 | End: 2020-01-01

## 2020-01-01 RX ORDER — DIAZEPAM 5 MG
5 TABLET ORAL EVERY 12 HOURS PRN
Qty: 14 TABLET | Refills: 0 | Status: SHIPPED | OUTPATIENT
Start: 2020-01-01 | End: 2020-01-01

## 2020-01-01 RX ORDER — LIDOCAINE 40 MG/G
CREAM TOPICAL
Status: DISCONTINUED | OUTPATIENT
Start: 2020-01-01 | End: 2020-01-01 | Stop reason: HOSPADM

## 2020-01-01 RX ORDER — DIAZEPAM 5 MG
TABLET ORAL
Qty: 6 TABLET | Refills: 0 | Status: SHIPPED | OUTPATIENT
Start: 2020-01-01 | End: 2020-01-01

## 2020-01-01 RX ORDER — ACETAMINOPHEN 325 MG/1
650 TABLET ORAL EVERY 6 HOURS PRN
COMMUNITY

## 2020-01-01 RX ORDER — ACETAMINOPHEN AND CODEINE PHOSPHATE 300; 30 MG/1; MG/1
1 TABLET ORAL EVERY 8 HOURS PRN
Qty: 60 TABLET | Refills: 0 | Status: SHIPPED | OUTPATIENT
Start: 2020-01-01 | End: 2020-05-10

## 2020-01-01 RX ORDER — ONDANSETRON 4 MG/1
4 TABLET, ORALLY DISINTEGRATING ORAL EVERY 6 HOURS PRN
Status: DISCONTINUED | OUTPATIENT
Start: 2020-01-01 | End: 2020-01-01 | Stop reason: HOSPADM

## 2020-01-01 RX ORDER — CARBIDOPA 25 MG/1
25 TABLET ORAL 3 TIMES DAILY
COMMUNITY

## 2020-01-01 RX ORDER — ESCITALOPRAM OXALATE 10 MG/1
15 TABLET ORAL DAILY
Qty: 135 TABLET | Refills: 3 | Status: ON HOLD | OUTPATIENT
Start: 2020-01-01 | End: 2020-01-01

## 2020-01-01 RX ORDER — AMOXICILLIN 250 MG
1 CAPSULE ORAL 2 TIMES DAILY PRN
Status: DISCONTINUED | OUTPATIENT
Start: 2020-01-01 | End: 2020-01-01 | Stop reason: HOSPADM

## 2020-01-01 RX ORDER — DIAZEPAM 5 MG
5 TABLET ORAL EVERY 12 HOURS
Qty: 60 TABLET | Refills: 5 | Status: SHIPPED | OUTPATIENT
Start: 2020-01-01 | End: 2020-01-01

## 2020-01-01 RX ORDER — METOCLOPRAMIDE 5 MG/1
5 TABLET ORAL 4 TIMES DAILY PRN
Status: DISCONTINUED | OUTPATIENT
Start: 2020-01-01 | End: 2020-01-01 | Stop reason: HOSPADM

## 2020-01-01 RX ORDER — DIAZEPAM 5 MG
5 TABLET ORAL EVERY 12 HOURS PRN
Status: DISCONTINUED | OUTPATIENT
Start: 2020-01-01 | End: 2020-01-01

## 2020-01-01 RX ORDER — LANOLIN ALCOHOL/MO/W.PET/CERES
3 CREAM (GRAM) TOPICAL 2 TIMES DAILY PRN
Status: DISCONTINUED | OUTPATIENT
Start: 2020-01-01 | End: 2020-01-01 | Stop reason: HOSPADM

## 2020-01-01 RX ORDER — ACETAMINOPHEN 650 MG/1
650 SUPPOSITORY RECTAL EVERY 4 HOURS PRN
Status: DISCONTINUED | OUTPATIENT
Start: 2020-01-01 | End: 2020-01-01 | Stop reason: HOSPADM

## 2020-01-01 RX ORDER — AMOXICILLIN 250 MG
2 CAPSULE ORAL 2 TIMES DAILY PRN
Status: DISCONTINUED | OUTPATIENT
Start: 2020-01-01 | End: 2020-01-01 | Stop reason: HOSPADM

## 2020-01-01 RX ORDER — CARBIDOPA/LEVODOPA 25MG-250MG
TABLET ORAL
Start: 2020-01-01

## 2020-01-01 RX ORDER — CARBIDOPA AND LEVODOPA 25; 100 MG/1; MG/1
0.5 TABLET ORAL 3 TIMES DAILY PRN
COMMUNITY
End: 2020-01-01

## 2020-01-01 RX ORDER — FLUDROCORTISONE ACETATE 0.1 MG/1
0.2 TABLET ORAL DAILY
COMMUNITY
End: 2020-01-01

## 2020-01-01 RX ORDER — ONDANSETRON 4 MG/1
4 TABLET, FILM COATED ORAL EVERY 6 HOURS PRN
COMMUNITY

## 2020-01-01 RX ORDER — DIPHENHYDRAMINE HCL 25 MG
25 TABLET ORAL
COMMUNITY
End: 2020-01-01

## 2020-01-01 RX ADMIN — ESCITALOPRAM 5 MG: 5 TABLET, FILM COATED ORAL at 08:52

## 2020-01-01 RX ADMIN — DIAZEPAM 5 MG: 5 TABLET ORAL at 23:15

## 2020-01-01 RX ADMIN — LEVOTHYROXINE SODIUM 75 MCG: 75 TABLET ORAL at 07:02

## 2020-01-01 RX ADMIN — CARBIDOPA AND LEVODOPA 1 HALF-TAB: 25; 100 TABLET ORAL at 20:58

## 2020-01-01 RX ADMIN — CARBIDOPA AND LEVODOPA 1 HALF-TAB: 25; 100 TABLET ORAL at 21:41

## 2020-01-01 RX ADMIN — PRAVASTATIN SODIUM 40 MG: 40 TABLET ORAL at 08:33

## 2020-01-01 RX ADMIN — CYANOCOBALAMIN TAB 500 MCG 500 MCG: 500 TAB at 09:12

## 2020-01-01 RX ADMIN — DIAZEPAM 5 MG: 5 TABLET ORAL at 13:58

## 2020-01-01 RX ADMIN — SENNOSIDES AND DOCUSATE SODIUM 2 TABLET: 8.6; 5 TABLET ORAL at 11:04

## 2020-01-01 RX ADMIN — ASPIRIN 81 MG: 81 TABLET, DELAYED RELEASE ORAL at 20:14

## 2020-01-01 RX ADMIN — CHOLECALCIFEROL TAB 50 MCG (2000 UNIT) 2000 UNITS: 50 TAB at 08:52

## 2020-01-01 RX ADMIN — CARBIDOPA AND LEVODOPA 1 HALF-TAB: 25; 100 TABLET ORAL at 08:52

## 2020-01-01 RX ADMIN — ASPIRIN 81 MG: 81 TABLET, DELAYED RELEASE ORAL at 21:41

## 2020-01-01 RX ADMIN — ACETAMINOPHEN AND CODEINE PHOSPHATE 1 TABLET: 300; 30 TABLET ORAL at 21:41

## 2020-01-01 RX ADMIN — ACETAMINOPHEN AND CODEINE PHOSPHATE 1 TABLET: 300; 30 TABLET ORAL at 00:47

## 2020-01-01 RX ADMIN — METOCLOPRAMIDE HYDROCHLORIDE 5 MG: 5 TABLET ORAL at 14:49

## 2020-01-01 RX ADMIN — LEVOTHYROXINE SODIUM 75 MCG: 75 TABLET ORAL at 06:27

## 2020-01-01 RX ADMIN — LEVOTHYROXINE SODIUM 75 MCG: 75 TABLET ORAL at 06:14

## 2020-01-01 RX ADMIN — FLUDROCORTISONE ACETATE 50 MCG: 0.1 TABLET ORAL at 18:27

## 2020-01-01 RX ADMIN — Medication 1 MG: at 21:24

## 2020-01-01 RX ADMIN — SENNOSIDES AND DOCUSATE SODIUM 2 TABLET: 8.6; 5 TABLET ORAL at 21:02

## 2020-01-01 RX ADMIN — SODIUM CHLORIDE: 9 INJECTION, SOLUTION INTRAVENOUS at 19:18

## 2020-01-01 RX ADMIN — DIAZEPAM 5 MG: 5 TABLET ORAL at 23:07

## 2020-01-01 RX ADMIN — CYANOCOBALAMIN TAB 500 MCG 500 MCG: 500 TAB at 18:27

## 2020-01-01 RX ADMIN — CHOLECALCIFEROL TAB 50 MCG (2000 UNIT) 2000 UNITS: 50 TAB at 09:11

## 2020-01-01 RX ADMIN — SENNOSIDES AND DOCUSATE SODIUM 2 TABLET: 8.6; 5 TABLET ORAL at 19:35

## 2020-01-01 RX ADMIN — CARBIDOPA AND LEVODOPA 1 HALF-TAB: 25; 100 TABLET ORAL at 09:40

## 2020-01-01 RX ADMIN — CHOLECALCIFEROL TAB 50 MCG (2000 UNIT) 2000 UNITS: 50 TAB at 17:59

## 2020-01-01 RX ADMIN — ACETAMINOPHEN AND CODEINE PHOSPHATE 1 TABLET: 300; 30 TABLET ORAL at 09:02

## 2020-01-01 RX ADMIN — SENNOSIDES AND DOCUSATE SODIUM 2 TABLET: 8.6; 5 TABLET ORAL at 11:50

## 2020-01-01 RX ADMIN — CYANOCOBALAMIN TAB 500 MCG 500 MCG: 500 TAB at 08:23

## 2020-01-01 RX ADMIN — FLUDROCORTISONE ACETATE 100 MCG: 0.1 TABLET ORAL at 09:39

## 2020-01-01 RX ADMIN — ACETAMINOPHEN 650 MG: 325 TABLET, FILM COATED ORAL at 21:03

## 2020-01-01 RX ADMIN — ESCITALOPRAM 5 MG: 5 TABLET, FILM COATED ORAL at 09:39

## 2020-01-01 RX ADMIN — METOCLOPRAMIDE HYDROCHLORIDE 5 MG: 5 TABLET ORAL at 09:31

## 2020-01-01 RX ADMIN — CARBIDOPA AND LEVODOPA 1 HALF-TAB: 25; 100 TABLET ORAL at 14:22

## 2020-01-01 RX ADMIN — ESCITALOPRAM 5 MG: 5 TABLET, FILM COATED ORAL at 08:23

## 2020-01-01 RX ADMIN — ASPIRIN 81 MG: 81 TABLET, DELAYED RELEASE ORAL at 20:58

## 2020-01-01 RX ADMIN — CYANOCOBALAMIN TAB 500 MCG 500 MCG: 500 TAB at 10:21

## 2020-01-01 RX ADMIN — SENNOSIDES AND DOCUSATE SODIUM 2 TABLET: 8.6; 5 TABLET ORAL at 09:37

## 2020-01-01 RX ADMIN — POLYETHYLENE GLYCOL 3350 17 G: 17 POWDER, FOR SOLUTION ORAL at 12:08

## 2020-01-01 RX ADMIN — ACETAMINOPHEN AND CODEINE PHOSPHATE 1 TABLET: 300; 30 TABLET ORAL at 17:05

## 2020-01-01 RX ADMIN — CYANOCOBALAMIN TAB 500 MCG 500 MCG: 500 TAB at 08:52

## 2020-01-01 RX ADMIN — PRAVASTATIN SODIUM 40 MG: 40 TABLET ORAL at 10:20

## 2020-01-01 RX ADMIN — PRAVASTATIN SODIUM 40 MG: 40 TABLET ORAL at 09:39

## 2020-01-01 RX ADMIN — CHOLECALCIFEROL TAB 50 MCG (2000 UNIT) 2000 UNITS: 50 TAB at 08:23

## 2020-01-01 RX ADMIN — ACETAMINOPHEN 650 MG: 325 TABLET, FILM COATED ORAL at 10:36

## 2020-01-01 RX ADMIN — OXYCODONE HYDROCHLORIDE 2.5 MG: 5 TABLET ORAL at 00:36

## 2020-01-01 RX ADMIN — BISACODYL 10 MG: 10 SUPPOSITORY RECTAL at 06:24

## 2020-01-01 RX ADMIN — CARBIDOPA AND LEVODOPA 1 HALF-TAB: 25; 100 TABLET ORAL at 17:04

## 2020-01-01 RX ADMIN — CARBIDOPA AND LEVODOPA 1 HALF-TAB: 25; 100 TABLET ORAL at 14:49

## 2020-01-01 RX ADMIN — CARBIDOPA AND LEVODOPA 1 HALF-TAB: 25; 100 TABLET ORAL at 08:33

## 2020-01-01 RX ADMIN — DIAZEPAM 5 MG: 5 TABLET ORAL at 21:40

## 2020-01-01 RX ADMIN — PROCHLORPERAZINE EDISYLATE 5 MG: 5 INJECTION INTRAMUSCULAR; INTRAVENOUS at 02:25

## 2020-01-01 RX ADMIN — ACETAMINOPHEN AND CODEINE PHOSPHATE 1 TABLET: 300; 30 TABLET ORAL at 00:53

## 2020-01-01 RX ADMIN — PRAVASTATIN SODIUM 40 MG: 40 TABLET ORAL at 09:11

## 2020-01-01 RX ADMIN — CARBIDOPA AND LEVODOPA 1 HALF-TAB: 25; 100 TABLET ORAL at 08:24

## 2020-01-01 RX ADMIN — PRAVASTATIN SODIUM 40 MG: 40 TABLET ORAL at 08:23

## 2020-01-01 RX ADMIN — ACETAMINOPHEN 650 MG: 325 TABLET, FILM COATED ORAL at 15:21

## 2020-01-01 RX ADMIN — FLUDROCORTISONE ACETATE 100 MCG: 0.1 TABLET ORAL at 08:33

## 2020-01-01 RX ADMIN — CHOLECALCIFEROL TAB 50 MCG (2000 UNIT) 2000 UNITS: 50 TAB at 10:21

## 2020-01-01 RX ADMIN — ONDANSETRON 4 MG: 2 INJECTION INTRAMUSCULAR; INTRAVENOUS at 17:44

## 2020-01-01 RX ADMIN — CHOLECALCIFEROL TAB 50 MCG (2000 UNIT) 2000 UNITS: 50 TAB at 08:33

## 2020-01-01 RX ADMIN — LEVOTHYROXINE SODIUM 75 MCG: 75 TABLET ORAL at 08:33

## 2020-01-01 RX ADMIN — MELATONIN 3 MG: 3 TAB ORAL at 20:58

## 2020-01-01 RX ADMIN — FLUDROCORTISONE ACETATE 100 MCG: 0.1 TABLET ORAL at 09:11

## 2020-01-01 RX ADMIN — FLUDROCORTISONE ACETATE 100 MCG: 0.1 TABLET ORAL at 08:24

## 2020-01-01 RX ADMIN — CARBIDOPA AND LEVODOPA 1 HALF-TAB: 25; 100 TABLET ORAL at 20:31

## 2020-01-01 RX ADMIN — BISACODYL 10 MG: 10 SUPPOSITORY RECTAL at 10:39

## 2020-01-01 RX ADMIN — ASPIRIN 81 MG: 81 TABLET, DELAYED RELEASE ORAL at 21:24

## 2020-01-01 RX ADMIN — ASPIRIN 81 MG: 81 TABLET, DELAYED RELEASE ORAL at 20:31

## 2020-01-01 RX ADMIN — CYANOCOBALAMIN TAB 500 MCG 500 MCG: 500 TAB at 08:33

## 2020-01-01 RX ADMIN — PRAVASTATIN SODIUM 40 MG: 40 TABLET ORAL at 10:04

## 2020-01-01 RX ADMIN — CARBIDOPA AND LEVODOPA 1 HALF-TAB: 25; 100 TABLET ORAL at 11:04

## 2020-01-01 RX ADMIN — CARBIDOPA AND LEVODOPA 1 HALF-TAB: 25; 100 TABLET ORAL at 16:02

## 2020-01-01 RX ADMIN — LEVOTHYROXINE SODIUM 75 MCG: 75 TABLET ORAL at 08:52

## 2020-01-01 RX ADMIN — ACETAMINOPHEN AND CODEINE PHOSPHATE 1 TABLET: 300; 30 TABLET ORAL at 20:58

## 2020-01-01 RX ADMIN — ESCITALOPRAM 5 MG: 5 TABLET, FILM COATED ORAL at 08:33

## 2020-01-01 RX ADMIN — ONDANSETRON 4 MG: 4 TABLET, ORALLY DISINTEGRATING ORAL at 01:21

## 2020-01-01 RX ADMIN — FLUDROCORTISONE ACETATE 50 MCG: 0.1 TABLET ORAL at 10:20

## 2020-01-01 RX ADMIN — SODIUM CHLORIDE 1000 ML: 9 INJECTION, SOLUTION INTRAVENOUS at 12:30

## 2020-01-01 RX ADMIN — SODIUM CHLORIDE 1000 ML: 9 INJECTION, SOLUTION INTRAVENOUS at 16:40

## 2020-01-01 RX ADMIN — DIAZEPAM 5 MG: 5 TABLET ORAL at 20:14

## 2020-01-01 RX ADMIN — CARBIDOPA AND LEVODOPA 1 HALF-TAB: 25; 100 TABLET ORAL at 19:35

## 2020-01-01 RX ADMIN — FLUDROCORTISONE ACETATE 50 MCG: 0.1 TABLET ORAL at 08:53

## 2020-01-01 RX ADMIN — CARBIDOPA AND LEVODOPA 1 HALF-TAB: 25; 100 TABLET ORAL at 18:27

## 2020-01-01 RX ADMIN — ACETAMINOPHEN AND CODEINE PHOSPHATE 1 TABLET: 300; 30 TABLET ORAL at 20:33

## 2020-01-01 RX ADMIN — CARBIDOPA AND LEVODOPA 1 HALF-TAB: 25; 100 TABLET ORAL at 17:29

## 2020-01-01 RX ADMIN — DIAZEPAM 5 MG: 5 TABLET ORAL at 11:06

## 2020-01-01 RX ADMIN — SENNOSIDES AND DOCUSATE SODIUM 2 TABLET: 8.6; 5 TABLET ORAL at 15:22

## 2020-01-01 RX ADMIN — PROCHLORPERAZINE EDISYLATE 5 MG: 5 INJECTION INTRAMUSCULAR; INTRAVENOUS at 10:39

## 2020-01-01 RX ADMIN — CHOLECALCIFEROL TAB 50 MCG (2000 UNIT) 2000 UNITS: 50 TAB at 09:39

## 2020-01-01 RX ADMIN — ESCITALOPRAM 5 MG: 5 TABLET, FILM COATED ORAL at 15:21

## 2020-01-01 RX ADMIN — Medication 1 MG: at 20:58

## 2020-01-01 RX ADMIN — CYANOCOBALAMIN TAB 500 MCG 500 MCG: 500 TAB at 09:40

## 2020-01-01 RX ADMIN — DIAZEPAM 5 MG: 5 TABLET ORAL at 21:00

## 2020-01-01 RX ADMIN — SENNOSIDES AND DOCUSATE SODIUM 2 TABLET: 8.6; 5 TABLET ORAL at 10:24

## 2020-01-01 RX ADMIN — SODIUM CHLORIDE 1000 ML: 9 INJECTION, SOLUTION INTRAVENOUS at 07:06

## 2020-01-01 RX ADMIN — SODIUM CHLORIDE 1000 ML: 9 INJECTION, SOLUTION INTRAVENOUS at 00:10

## 2020-01-01 RX ADMIN — Medication 1 MG: at 21:41

## 2020-01-01 RX ADMIN — ASPIRIN 81 MG: 81 TABLET, DELAYED RELEASE ORAL at 21:02

## 2020-01-01 RX ADMIN — DIAZEPAM 5 MG: 5 TABLET ORAL at 11:16

## 2020-01-01 RX ADMIN — MELATONIN 3 MG: 3 TAB ORAL at 22:40

## 2020-01-01 RX ADMIN — ESCITALOPRAM 5 MG: 5 TABLET, FILM COATED ORAL at 10:21

## 2020-01-01 RX ADMIN — CARBIDOPA AND LEVODOPA 1 HALF-TAB: 25; 100 TABLET ORAL at 20:14

## 2020-01-01 RX ADMIN — CARBIDOPA AND LEVODOPA 1 HALF-TAB: 25; 100 TABLET ORAL at 10:20

## 2020-01-01 RX ADMIN — DIAZEPAM 5 MG: 5 TABLET ORAL at 20:58

## 2020-01-01 RX ADMIN — Medication 1 MG: at 21:04

## 2020-01-01 RX ADMIN — CARBIDOPA AND LEVODOPA 1 HALF-TAB: 25; 100 TABLET ORAL at 16:32

## 2020-01-01 RX ADMIN — ACETAMINOPHEN AND CODEINE PHOSPHATE 1 TABLET: 300; 30 TABLET ORAL at 17:40

## 2020-01-01 RX ADMIN — MELATONIN 3 MG: 3 TAB ORAL at 20:15

## 2020-01-01 RX ADMIN — DIAZEPAM 5 MG: 5 TABLET ORAL at 21:24

## 2020-01-01 RX ADMIN — LEVOTHYROXINE SODIUM 75 MCG: 75 TABLET ORAL at 06:23

## 2020-01-01 ASSESSMENT — ACTIVITIES OF DAILY LIVING (ADL)
ADLS_ACUITY_SCORE: 21
ADLS_ACUITY_SCORE: 20
ADLS_ACUITY_SCORE: 24
ADLS_ACUITY_SCORE: 24
ADLS_ACUITY_SCORE: 23
ADLS_ACUITY_SCORE: 21
ADLS_ACUITY_SCORE: 20
ADLS_ACUITY_SCORE: 21
ADLS_ACUITY_SCORE: 22
ADLS_ACUITY_SCORE: 23
ADLS_ACUITY_SCORE: 21
ADLS_ACUITY_SCORE: 21
ADLS_ACUITY_SCORE: 22
ADLS_ACUITY_SCORE: 21
ADLS_ACUITY_SCORE: 23
ADLS_ACUITY_SCORE: 21
ADLS_ACUITY_SCORE: 20
ADLS_ACUITY_SCORE: 21
ADLS_ACUITY_SCORE: 24
ADLS_ACUITY_SCORE: 21
ADLS_ACUITY_SCORE: 20

## 2020-01-01 ASSESSMENT — MIFFLIN-ST. JEOR
SCORE: 995.06
SCORE: 1032.26
SCORE: 1050.4
SCORE: 1083.29
SCORE: 1082.15
SCORE: 1058.11
SCORE: 1041.33
SCORE: 997.1

## 2020-01-01 ASSESSMENT — ENCOUNTER SYMPTOMS
RHINORRHEA: 1
COUGH: 0
WEAKNESS: 1
FEVER: 0
VOMITING: 0
DIZZINESS: 1
SORE THROAT: 0
DIARRHEA: 0

## 2020-01-03 NOTE — TELEPHONE ENCOUNTER
Call from patient : States the pharmacy does not have the new script for patient. He was told that the clinic will call pharmacy to check on status:    Pharmacy was contacted:  Notes to Pharmacy: Please allow patient to refill this partial Rx fill eary.  Then ok to fill the 30 tablets again on 1/11/20    State they need a reason to refill early. They cannot just do it with an approval from MD. (Reason being: [place] in frequency or lost meds)    Sending to PCP to update.     Clinic will need to update the pharmacy after provider review.

## 2020-01-03 NOTE — TELEPHONE ENCOUNTER
Pharmacy Contact    Called pharmacy and relayed provider message below. No further action needed.

## 2020-01-07 NOTE — TELEPHONE ENCOUNTER
Patient along with  called reporting pt got significantly weaker emily last OV 12/31/19. She usually uses a walker and today she was not able to get up to the bathroom because of weakness. She is weak all over, same on arms and legs, no difference in left or right. History of fall on Dec 11th which caused vertigo - this is manageable. No fever, no pain, no colds, just usual shakiness due to parkinson's (this is worse possibly to anxiety due to weakness). Pt is feeling so weak in the body that they are considering ER.  has to work and this is hard to manage. They are still waiting for a call regarding MRI scheduling - advised to call them again to expedite this. Also advised to get appt with pt's neurologist.     They want to know if JAQUELIN Arenas can suggest anything else pt should do?  wants to know if ER necessary?

## 2020-01-07 NOTE — TELEPHONE ENCOUNTER
I think with an abrupt change in symptoms and no diagnosis with our previous workup, the ER is most appropriate.    Irvin Putnam PA-C

## 2020-01-11 NOTE — TELEPHONE ENCOUNTER
"Requested Prescriptions   Pending Prescriptions Disp Refills     escitalopram (LEXAPRO) 10 MG tablet [Pharmacy Med Name: ESCITALOPRAM 10 MG TABLET 10 TAB]    Last Written Prescription Date:  12/31/2018  Last Fill Quantity: 135 tablet,  # refills: 3   Last office visit: 12/31/2019 with prescribing provider:  [place]   Future Office Visit:     135 tablet 3     Sig: TAKE 1.5 TABLETS (15 MG) BY MOUTH DAILY       SSRIs Protocol Passed - 1/11/2020 10:46 AM  PHQ-9 SCORE 11/26/2018 2/18/2019 8/7/2019   PHQ-9 Total Score - - -   PHQ-9 Total Score MyChart - 4 (Minimal depression) 3 (Minimal depression)   PHQ-9 Total Score 4 4 3             Passed - Recent (12 mo) or future (30 days) visit within the authorizing provider's specialty     Patient has had an office visit with the authorizing provider or a provider within the authorizing providers department within the previous 12 mos or has a future within next 30 days. See \"Patient Info\" tab in inbasket, or \"Choose Columns\" in Meds & Orders section of the refill encounter.              Passed - Medication is active on med list        Passed - Patient is age 18 or older        Passed - No active pregnancy on record        Passed - No positive pregnancy test in last 12 months           "

## 2020-01-13 NOTE — TELEPHONE ENCOUNTER
[facility] from [facility] is seeing pt for PT.  Ronel feels that pt would do better with homecare PT as it is hard for pt to get to facility and pt is having a hard time participating there.  Please advise

## 2020-01-13 NOTE — TELEPHONE ENCOUNTER
[facility].  Alerted that HC order placed.  Will call back if anything else needs to be completed.  No further action needed at this time.

## 2020-01-13 NOTE — TELEPHONE ENCOUNTER
Patient Contact    Called patient to notify of need for OV per providers last OV note. She states she is still having dizziness. [facility] and they said she has floating crystals in left ear. They are going to try and set up home therapy - but will likely contact PCP for orders. Routing to PCP as FYI. Scheduled appointment 1/20/2020. If not needed, let triage know so we can notify patient.    Routing refill request to provider for review/approval because:  Dosage above recommended maximum

## 2020-01-14 NOTE — TELEPHONE ENCOUNTER
Ronel, called back.    States it would be helpful for PT with vestibular specialist.   States she understands that this may not be available.    No further action needed at this time.

## 2020-01-15 PROBLEM — R42 ORTHOSTATIC LIGHTHEADEDNESS: Status: ACTIVE | Noted: 2020-01-01

## 2020-01-15 NOTE — TELEPHONE ENCOUNTER
FYI-  Physical therapist Sharifa called to report she went to see pt for home care  visit. During visit, therapist reviewed follow up orders. Pt complained of dizziness and showed increased weakness. She was unable to walk back to the couch without assistance. Therapist advised pt be seen at ED and 911 was called. Therapist notes pt had 2 falls last year and has been off Sinemet and Trazodone.  Sharifa asked for verbal approval on home care orders for SN , PT, OT and SW pending on ED visit plan. Verbal approval was given. Office visit scheduled today at clinic was cancelled.

## 2020-01-15 NOTE — ED TRIAGE NOTES
Pt coming from home - Home health nurse called EMS  - increase weakness for past 6 weeks - hx of Parkinsons - pt states she was having difficulty to stand

## 2020-01-15 NOTE — PROGRESS NOTES
RECEIVING UNIT ED HANDOFF REVIEW    ED Nurse Handoff Report was reviewed by: Frederic Juares RN on January 15, 2020 at 5:42 PM

## 2020-01-15 NOTE — DISCHARGE INSTRUCTIONS
Symptoms secondary to underlying Parkinson's disease, vertigo and deconditioning.  Home Health resources as already happening  Return if worse

## 2020-01-15 NOTE — ED AVS SNAPSHOT
Emergency Department  [address] [place] [address]  Phone:  [phone]  Fax:  [phone]                                    Sadaf Rubi   MRN: [id number]    Department:   Emergency Department   Date of Visit:  1/15/2020           After Visit Summary Signature Page    I have received my discharge instructions, and my questions have been answered. I have discussed any challenges I see with this plan with the nurse or doctor.    ..........................................................................................................................................  Patient/Patient Representative Signature      ..........................................................................................................................................  Patient Representative Print Name and Relationship to Patient    ..................................................               ................................................  Date                                   Time    ..........................................................................................................................................  Reviewed by Signature/Title    ...................................................              ..............................................  Date                                               Time          22EPIC Rev 08/18

## 2020-01-15 NOTE — ED PROVIDER NOTES
History     Chief Complaint:  Generalized Weakness       HPI   Sadaf Rubi is a 74 year old female with a history of Parkinson's disease who presents with generalized weakness. The patient reports feeling weak 6 weeks ago after falling and hitting her head. She states that she was able to get back up. However, she has been having increasing weakness along with intermittent positional dizziness since the fall. The patient has been evaluated in her primary clinic for her weakness and had an MRI of her brain last week. Today, the patient's home health nurse called EMS due to the patient's increasing weakness and difficulty standing. The patient denies any fevers, vomiting, sore throat, diarrhea, or cough. She does have rhinorrhea but states this is chronic.      Allergies:  Mushroom  Bees  Influenza Vaccine Live  Penicillins  Wasp Venom Protein  Ciprofloxacin     Medications:    carbidopa-levodopa  diazepam   escitalopram   levothyroxine   pravastatin  Trazodone     Past Medical History:    Anxiety   Depressive disorder  Hypertension   Insomnia   Premature ventricular contraction   Spider veins   Hypothyroidism  Dyslipidemia    Osteoarthritis   Atrial flutter  Parkinson's disease  Cerebral aneurysm   Dystonia     Past Surgical History:    Hysterectomy   Knee surgery   lumpectomy   Urethra surgery     Family History:    Breast cancer   Coronary artery disease     Social History:  The patient was accompanied to the ED by family member.  Smoking Status: Never Smoker  Smokeless Tobacco: Never Used  Alcohol Use: Yes  Drug Use: No  PCP: Sean Velasquez     Review of Systems   Constitutional: Negative for fever.   HENT: Positive for rhinorrhea (chronic ). Negative for sore throat.    Respiratory: Negative for cough.    Gastrointestinal: Negative for diarrhea and vomiting.   Neurological: Positive for dizziness and weakness.   All other systems reviewed and are negative.        Physical Exam     Patient Vitals for the  "past 24 hrs:   BP Temp Temp src Pulse Resp SpO2 Height Weight   01/15/20 1640 (!) 152/83 -- -- 86 -- -- -- --   01/15/20 1630 (!) 144/77 -- -- 84 -- 95 % -- --   01/15/20 1500 (!) 172/101 -- -- -- -- -- -- --   01/15/20 1414 (!) 161/94 98.3  F (36.8  C) Oral 94 16 96 % 1.575 m (5' 2\") 63.5 kg (140 lb)        Physical Exam  GENERAL: well developed, pleasant  HEAD: atraumatic  EYES: pupils reactive, extraocular muscles intact, conjunctivae normal  ENT:  mucus membranes moist  NECK:  trachea midline, normal range of motion  RESPIRATORY: no tachypnea, breath sounds clear to auscultation   CVS: normal S1/S2, no murmurs, intact distal pulses  ABDOMEN: soft, nontender, nondistention  MUSCULOSKELETAL: no deformities  SKIN: warm and dry, no acute rashes or ulceration  NEURO: GCS 15, cranial nerves intact, alert and oriented x3  PSYCH:  Mood/affect normal    Emergency Department Course   ECG:  ECG taken at 1513, ECG read at 1520  Sinus rhythm  Abnormal ECG  Vent. rate 91 BPM  CO interval * ms  QRS duration 88 ms  QT/QTc 362/445 ms  P-R-T axes * 77 56    Laboratory:  Laboratory findings were communicated with the patient who voiced understanding of the findings.    UA with microscopic: mucous urine present (A), o/w WNL     CBC:  WBC 7.0, HGB 13.9, , o/w WNL     BMP: AWNL (Creatinine 0.60)     Interventions:  1640 0.9% sodium chloride BOLUS 1000 mL IV     Emergency Department Course:  Past medical records, nursing notes, and vitals reviewed.    1418 I performed an exam of the patient as documented above.    IV was inserted and blood was drawn for laboratory testing, results above.     The patient provided a urine sample here in the emergency department. This was sent for laboratory testing, findings above.      1655 Patient rechecked and updated.       Findings and plan explained to the Patient who consents to admission. Discussed the patient with Dr. Person, who will admit the patient to a observation unit bed for further " "monitoring, evaluation, and treatment.        Impression & Plan     Medical Decision Making:  Sadaf Rubi is a 74 year old female who presents to the emergency department today with weakness and falls.  Home health nurse came out for first-time visit today and noted significant weakness and sent in for evaluation.  She is been struggling with this for some time and notes recent fall and \"crystals being knocked loose\".  Patient has had an MRI of her brain that was negative for stroke during this time.  Patient is not been sick and denies any specific complaints other than weakness.  She feels quite unsteady and concerned about walking and her  still works.  Orthostatics are positive.  Patient given IV fluids.  Certainly could have autonomic dysfunction given her underlying Parkinson's disease.  May benefit from Midrin and or Florinef.  Spoke with the hospitalist regarding admission.      Discharge Diagnosis:    ICD-10-CM    1. Generalized muscle weakness M62.81    2. Orthostatic hypotension I95.1    3. Parkinson's disease (H) G20    4. Autonomic dysfunction G90.9        Disposition:  The patient is admitted into the care of Dr. Person.     Scribe Disclosure:  IGeovani, am serving as a scribe at 2:18 PM on 1/15/2020 to document services personally performed by Johnny Thao MD based on my observations and the provider's statements to me.      1/15/2020   Johnny Thao MD Adams, Shaun L, MD  01/15/20 2322    "

## 2020-01-15 NOTE — ED NOTES
"[facility]  ED Nurse Handoff Report    ED Chief complaint: Generalized Weakness      ED Diagnosis:   Final diagnoses:   Generalized muscle weakness   Orthostatic hypotension   Parkinson's disease (H)   Autonomic dysfunction       Code Status: Full Code    Allergies:   Allergies   Allergen Reactions     Mushroom Hives     Bees      Influenza Vaccine Live      Other [Seasonal Allergies]      msg and mushrooms     Penicillins      Wasp Venom Protein Hives     Ciprofloxacin        Patient Story: Danny comes to the ER for increasing weakness over the last few weeks. She has a hx of parkinsons. She was going to go home however she does have pretty significant orthostatic hypotension with her systolic dropping into the 80's. She is receiving one liter of NS.     Treatments and/or interventions provided: IVF  Patient's response to treatments and/or interventions: good    To be done/followed up on inpatient unit:  neuro consult    Does this patient have any cognitive concerns?: no    Activity level - Baseline/Home:  Stand with Assist  Activity Level - Current:   Stand with Assist    Patient's Preferred language: English   Needed?: No    Isolation: None  Infection: Not Applicable  Bariatric?: No    Vital Signs:   Vitals:    01/15/20 1414 01/15/20 1500 01/15/20 1630 01/15/20 1640   BP: (!) 161/94 (!) 172/101 (!) 144/77 (!) 152/83   Pulse: 94  84 86   Resp: 16      Temp: 98.3  F (36.8  C)      TempSrc: Oral      SpO2: 96%  95%    Weight: 63.5 kg (140 lb)      Height: 1.575 m (5' 2\")          Cardiac Rhythm:     Was the PSS-3 completed:   Yes  What interventions are required if any?               Family Comments:  at bedside  OBS brochure/video discussed/provided to patient/family: yes              Name of person given brochure if not patient: n/a              Relationship to patient: n/a    For the majority of the shift this patient's behavior was Green.   Behavioral interventions performed were " none.    ED NURSE PHONE NUMBER: [phone]

## 2020-01-15 NOTE — ED NOTES
Bed: ED13  Expected date:   Expected time:   Means of arrival:   Comments:  zarina - 515 - 74 F weakness eta 8850

## 2020-01-16 PROBLEM — G20.A1 PARKINSON'S DISEASE (H): Status: ACTIVE | Noted: 2020-01-01

## 2020-01-16 NOTE — PROGRESS NOTES
im cross cover    Pt request morales  pvr 281    Straight cath if pvr > 300  Cont to monitor pvrs    Orders already entered from adm doc  No morales. Follow orders entered please.

## 2020-01-16 NOTE — PROGRESS NOTES
MD Notification    Notified Person: MD    Notified Person Name: Joanna Barthel    Notification Date/Time: 8:45 1/15    Notification Interaction: web paged    Purpose of Notification: pt unable to void. BS is 281. Pt wants indwelling cath    Orders Received: to call MD Fregoso    Comments: PA called back, patient o be straight cath if > 300 cc

## 2020-01-16 NOTE — PROGRESS NOTES
Observation goals PRIOR TO DISCHARGE     Comments: -diagnostic tests and consults completed and resulted- NOT MET   -vital signs normal or at patient baseline- MET   -safe disposition plan has been identified- NOT MET   Nurse to notify provider when observation goals have been met and patient is ready for discharge.

## 2020-01-16 NOTE — PROVIDER NOTIFICATION
MD Notification    Notified Person: MD    Notified Person Name: Nathan Douglas    Notification Date/Time: 1/16 @ 0520    Notification Interaction: phone    Purpose of Notification: pt unable to void, requesting indwelling morales    Orders Received: place morales catheter    Comments:

## 2020-01-16 NOTE — PLAN OF CARE
PT:  Discharge Planner PT   Patient plan for discharge: Pt hopeful to go home but if she has insurance coverage, she is willing to go to rehab.  Current status: Orders received, eval completed, treatment initiated. Pt admitted under observation status with orthostatic hypotension, weakness, urinary retention. Prior to admit pt was living with her spouse in a house, stays on the main level, uses a 4ww. Requires assist for bed mobility but could stand and ambulate independently. Spouse assisted with ADLs. Spouse works almost full time so pt is home alone when he is working. Currently requires SBA for supine to sit with heavy use of bed rail, CGA sit to stand with FWW with posterior lean noted and using legs to brace herself against the bed, declined trying to walk fearing she is too weak and unsteady and she may fall. Orthostatics taken and were positive. Pt demonstrates dec strength, balance, activity tolerance, orthostatic hypotension and difficulty ambulating and transferring and would benefit from skilled PT services in order to improve this.  Barriers to return to prior living situation: Needs assist with all mobility, has had little to no ability to ambulate while here, high falls risk, is home alone when spouse is at work.  Recommendations for discharge: TCU  Rationale for recommendations: Pt agreeable to TCU if she has insurance coverage. If private pay, she prefers to return home with her spouse taking some time off to care for her with home PT. Pt would benefit from continued PT to improve strength, balance, mobility to increase independence, reduce falls risk and increase safety before returning home.       Entered by: Micki Mejia 01/16/2020 11:00 AM

## 2020-01-16 NOTE — PROGRESS NOTES
Observation goals PRIOR TO DISCHARGE    Comments: -diagnostic tests and consults completed and resulted - MET  -vital signs normal or at patient baseline - NOT MET; POSITIVE ORTHOSTATIC PRESSURE  -safe disposition plan has been identified - MET  Nurse to notify provider when observation goals have been met and patient is ready for discharge

## 2020-01-16 NOTE — PROGRESS NOTES
01/16/20 1006   Quick Adds   Type of Visit Initial PT Evaluation   Living Environment   Lives With spouse   Living Arrangements house   Home Accessibility stairs to enter home   Number of Stairs, Main Entrance 4   Stair Railings, Main Entrance railing on right side (ascending)   Self-Care   Usual Activity Tolerance fair   Current Activity Tolerance poor   Regular Exercise No   Equipment Currently Used at Home walker, rolling   Activity/Exercise/Self-Care Comment Pt fell in December and hit her head. Since then has become much weaker. Has needed assist from spouse for bed mobility, assist to the bathroom but pt was independent with toileting, assist for showers and dressing, cooking, etc. They have a cleaning person who comes 2x/month. Pt's spouse still works close to full time so pt is home alone some of the time.   Functional Level Prior   Ambulation 1-->assistive equipment   Transferring 1-->assistive equipment   Fall history within last six months yes   Number of times patient has fallen within last six months 2   Which of the above functional risks had a recent onset or change? ambulation;transferring;fall history   General Information   Onset of Illness/Injury or Date of Surgery - Date 01/15/20   Referring Physician Kenyon Fregoso MD   Patient/Family Goals Statement Return home   Pertinent History of Current Problem (include personal factors and/or comorbidities that impact the POC) Pt admitted under observation status with orthostatic hypotension, weakness, urinary retention. PMH: Parkinsons, depression, anxiety, hypothyroid.   Precautions/Limitations fall precautions   Weight-Bearing Status - LLE full weight-bearing   Weight-Bearing Status - RLE full weight-bearing   Cognitive Status Examination   Orientation orientation to person, place and time   Level of Consciousness alert   Follows Commands and Answers Questions 100% of the time;able to follow multistep instructions   Personal Safety and Judgment  "intact   Pain Assessment   Patient Currently in Pain No   Posture    Posture Forward head position;Protracted shoulders   Range of Motion (ROM)   ROM Quick Adds No deficits were identified   Strength   Strength Comments B hip flex 4/5, knee ext 4+/5, DF 4+/5   Bed Mobility   Bed Mobility Comments Supine to sit with SBA and extra time with heavy use of bed rail   Transfer Skills   Transfer Comments Sit to stand with CGA and FWW, moves slowly   Gait   Gait Comments Pt declined trying to take steps due to her weakness and unsteadiness.   Balance   Balance Comments SBA sitting balance at the EOB, dec standing balance with legs pushing against the bed   Sensory Examination   Sensory Perception Comments Denies numbness or tingling   General Therapy Interventions   Planned Therapy Interventions bed mobility training;gait training;strengthening;transfer training;neuromuscular re-education   Clinical Impression   Criteria for Skilled Therapeutic Intervention yes, treatment indicated   PT Diagnosis Difficulty ambulating   Influenced by the following impairments Pain, dec strength, balance, activity tolerance   Functional limitations due to impairments Difficulty ambulating and transferring   Clinical Presentation Evolving/Changing   Clinical Presentation Rationale medically working up orthostatic hypotension   Clinical Decision Making (Complexity) Moderate complexity   Therapy Frequency 3x/week   Predicted Duration of Therapy Intervention (days/wks) 1 week   Anticipated Discharge Disposition Transitional Care Facility   Risk & Benefits of therapy have been explained Yes   Patient, Family & other staff in agreement with plan of care Yes   [facility] [email or web address] TM \"6 Clicks\"   2016, Trustees of [facility], under license to [employer].  All rights reserved.   6 Clicks Short Forms Basic Mobility Inpatient Short Form   [facility] AM-PAC  \"6 Clicks\" V.2 Basic Mobility Inpatient Short Form   1. Turning from your " back to your side while in a flat bed without using bedrails? 3 - A Little   2. Moving from lying on your back to sitting on the side of a flat bed without using bedrails? 3 - A Little   3. Moving to and from a bed to a chair (including a wheelchair)? 2 - A Lot   4. Standing up from a chair using your arms (e.g., wheelchair, or bedside chair)? 3 - A Little   5. To walk in hospital room? 2 - A Lot   6. Climbing 3-5 steps with a railing? 1 - Total   Basic Mobility Raw Score (Score out of 24.Lower scores equate to lower levels of function) 14   Total Evaluation Time   Total Evaluation Time (Minutes) 15

## 2020-01-16 NOTE — TELEPHONE ENCOUNTER
Currently in the observation unit at [facility].     States she has a HA and wants something more the help this go away. Can she get her normal Tylenol #3, also had a couple questions about her AM medications. Unsure that she received them all.     She was told to press the call light in her room to speak with the nurse about these concerns. This task was complete. No further follow up needed at this time.

## 2020-01-16 NOTE — PROGRESS NOTES
Observation goals PRIOR TO DISCHARGE    Comments: -diagnostic tests and consults completed and resulted - PENDING NEUROLOGY CONSULT  -vital signs normal or at patient baseline - NOT MET; POSITIVE ORTHOSTATIC PRESSURE  -safe disposition plan has been identified - MET  Nurse to notify provider when observation goals have been met and patient is ready for discharge

## 2020-01-16 NOTE — PROGRESS NOTES
Sadaf Rubi, 74 year old,female  1/15/2020 due to Generalized Weakness  .   Full Code. ISOLATION:No. : No. LEVEL: Stand with Assist arrived from emergency room via wheel chair accompanied by NA.  Patient was transferred to bed and was oriented to room and controls.  Observation welcome packet was given. Belongings  remained with patient. Vital signs stable.

## 2020-01-16 NOTE — PROGRESS NOTES
Observation goals PRIOR TO DISCHARGE    Comments: -diagnostic tests and consults completed and resulted - PENDING  -vital signs normal or at patient baseline - NOT MET; POSITIVE ORTHOSTATIC PRESSURE  -safe disposition plan has been identified - MET  Nurse to notify provider when observation goals have been met and patient is ready for discharge.

## 2020-01-16 NOTE — PHARMACY-ADMISSION MEDICATION HISTORY
Pharmacy Medication History  Admission medication history interview status for the 1/15/2020  admission is complete. See EPIC admission navigator for prior to admission medications     Medication history sources: Patient and Surescripts  Medication history source reliability: Good  Adherence assessment: Good    Significant changes made to the medication list:  -Pt's doctor advised her to stop Sinemet, so deleted it. Also deleted trazodone, pt not on it.  -Added melatonin and benadryl      Additional medication history information:       Medication reconciliation completed by provider prior to medication history? Yes    Time spent in this activity: 15 min      Prior to Admission medications    Medication Sig Last Dose Taking? Auth Provider   acetaminophen-codeine (TYLENOL #3) 300-30 MG tablet Take 1 tab up to twice daily as needed prn Yes Sean Velasquez MD   aspirin 81 MG tablet Take 81 mg by mouth At Bedtime  1/14/2020 at Unknown time Yes Sean Velasquez MD   Cholecalciferol (VITAMIN D) 2000 UNITS tablet Take 2,000 Units by mouth daily 1/15/2020 at Unknown time Yes Sean Velasquez MD   cyanocolbalamin (VITAMIN  B-12) 500 MCG tablet Take 1 tablet by mouth daily. 1/15/2020 at Unknown time Yes Sean Velasquez MD   diazepam (VALIUM) 5 MG tablet TAKE 1 TABLET BY MOUTH EVERY DAY AS NEEDED FOR ANXIETY 1/14/2020 at Unknown time Yes Kelly Putnam PA-C   diphenhydrAMINE (BENADRYL) 25 MG tablet Take 25 mg by mouth nightly as needed for itching or allergies prn Yes Unknown, Entered By History   escitalopram (LEXAPRO) 10 MG tablet TAKE 1.5 TABLETS (15 MG) BY MOUTH DAILY  Patient taking differently: Take 15 mg by mouth daily Usually takes 1 tablet in the AM, and 1/2 tablet in the afternoon 1/15/2020 at am Yes Kelly Putnam PA-C   levothyroxine (SYNTHROID/LEVOTHROID) 75 MCG tablet TAKE 1 TABLET (75 MCG) BY MOUTH DAILY 1/15/2020 at Unknown time Yes Micki Berry DO   melatonin 5 MG tablet  Take 15 mg by mouth At Bedtime 1/14/2020 at Unknown time Yes Unknown, Entered By History   polyethylene glycol (MIRALAX/GLYCOLAX) powder TAKE 17 G BY MOUTH 2 TIMES DAILY 1/14/2020 at Unknown time Yes Sean Velasquez MD   pravastatin (PRAVACHOL) 40 MG tablet TAKE 1 TABLET (40 MG) BY MOUTH DAILY 1/15/2020 at Unknown time Yes Sean Velasquez MD

## 2020-01-16 NOTE — PROGRESS NOTES
MD Notification    Notified Person: MD    Notified Person Name:  Kenyon Fregoso MD    Notification Date/Time: 1/15/2020 18:43    Notification Interaction: web paged    Purpose of Notification: need for tele monitor    Orders Received: no tele needed    Comments:

## 2020-01-16 NOTE — CONSULTS
"Consult Date:  01/16/2020      PSYCHIATRY CONSULTATION       REASON FOR CONSULTATION:  Depression and anxiety.      REQUESTING PHYSICIAN:  Kenyon Fregoso MD.       PRIMARY CARE PHYSICIAN:  Sean Velasquez MD.      IDENTIFYING DATA:  Danny Rubi is a 74-year-old woman who reports she is a  mother of 3 with established history of Parkinson's disease, hyperlipidemia, hypothyroidism, and depression and anxiety.  She presented to [facility] ED with generalized weakness and orthostatic lightheadedness in the context of recent medication adjustments, having stopped Sinemet a few days prior to presentation at the recommendation of her outpatient neurologist.  Psychiatry was consulted to render an opinion on her anxiety and depression as her medications may also be contributing to her symptoms.  Information was gathered through direct patient contact as well as chart review.      CHIEF COMPLAINT:  \"I have been dealing with anxiety and depression for a long time, but my Valium works way better than the escitalopram.\"      HISTORY OF PRESENT ILLNESS:  Sadaf Rubi reports a longstanding history of anxiety and depression.  She states she has never experienced a panic attack in her life, but tends to ruminate and perseverate about her health and future.  She states she has been on diazepam for a very long time and was started on escitalopram about 2 years ago.  She states she has not obtained any additional benefit from escitalopram since she started taking it 2 years ago.  She is currently on 15 mg of the medication every day and she has now been advised to discontinue the medication by the hospitalist on account of the orthostasis she has been experiencing.  She reports that she thought her Parkinson's disease was the only thing she had to worry about, but unfortunately she suffered a fall on 12/11/2019 on account of becoming acutely lightheaded upon standing.  Records suggest that she has " a long history of orthostatic symptoms, but on the morning of presentation, she believes she got out of bed too quickly and subsequently became lightheaded and fell, striking her head.  A couple days after that she noted ongoing dizziness and difficulty with balance, and this reportedly has persisted, even up until her presentation at this time.  She reportedly has also experienced increasing weakness and has also had onset of other vertigo type symptoms.  She tells me she has been quite weak and unable to do her daily activities and exercises.  She states that her  has had to  the slack at home.  She reports that they are both 75 years old, but he still works 32 hours per week to address their expenses.  Patient reports that her  is obese and has several medical issues himself, as a result of which she is worried for him.  She tells me he is responsible for making their meals when he returns home from work, and also has to do other chores around the house because she just cannot.  She states her daughter has elected to have them move down to [place] where she resides so that she can care for them, but she states her  is very leery about this plan.  On account of this, she states that she is becoming increasingly anxious and more depressed.  She has been using her Valium at 1-2 tablets per day, but states she never uses more than 2 tablets a day and there are some days that she does not use the Valium at all.  She tells me she was placed on Sinemet on account of her Parkinson's disease, but the medication did not help her with any of her symptoms but rather produced additional side effects, on account of which she saw her neurologist last week and was advised to discontinue the medication.  She denies use of alcohol or illicit chemicals.  She denies feelings of hopelessness, helplessness or worthlessness.  She does not endorse any recent disruption of her sleep, but she notes a decline in  her appetite as well as her weight.      PAST PSYCHIATRIC HISTORY:  The patient reports that she has never been psychiatrically hospitalized, although she saw a psychiatrist once before.      CHEMICAL USE HISTORY:  None reported.      PAST MEDICAL HISTORY:  Parkinson's disease, hypertension, hypothyroidism.      PAST SURGICAL HISTORY:   1.  Total abdominal hysterectomy and bilateral salpingo-oophorectomy.   2.  Left meniscus repair.   3.  Right meniscus repair.   4.  Bilateral knee arthroplasty.   5.  Breast lumpectomy.   6.  Urethral surgery.      MEDICATIONS PRIOR TO ADMISSION:   1.  Vitamin D.   2.  Tylenol #3.   3.  Aspirin.   4.  Sinemet.   5.  Vitamin B12.   6.  Valium.   7.  Lexapro.   8.  Synthroid.   9.  MiraLax.   10.  Pravachol.   11.  Desyrel.      ALLERGIES:     1.  MUSHROOMS.     2.  BEES.    3.  INFLUENZA VACCINE.     4.  PENICILLINS.     5.  WASP VENOM.     6.  CIPROFLOXACIN.      FAMILY PSYCHIATRIC HISTORY:  The patient reports that her mother suffered from depression and  at a young age.      SOCIAL HISTORY:  The patient had an older sister as well as an older half-sister, both of whom are now .  She grew up in [place], [place], and [place].  She is  and has 3 children.  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  She denies  or criminal history.      REVIEW OF SYSTEMS:  I refer the reader to the 10-point review of systems documented by Kenyon Fregoso MD, on 01/15/2019 at 6:11 p.m.      VITAL SIGNS:  Blood pressure 146/82, pulse 91, respirations 16, temperature 97.6, weight 63.5 kg.      MENTAL STATUS EXAMINATION:  This is an elderly woman who appears her stated age of 74.  She is dressed in hospital garb and is not observed ambulating.  She does have evidence of coarse tremors of her lips and extremities.  She is somewhat hypophonic but speaks clearly and coherently.  Her mood is anxious,  and her affect is mood congruent.  Her thought process is logical, relevant and goal directed.  There is no evidence of psychosis.  She reports future orientation.  She denies current self-harm thoughts, plan, or intent.  She is alert and oriented to person and place.  Her recall of recent and remote events is adequate.  Her impulse control is fair.  She displays adequate attention and concentration.  Risk assessment at this time is considered low to moderate.      DIAGNOSTIC IMPRESSION:  Danny Rubi is a 74-year-old  mother of 3, who has been retired from working for the last 10 years and has established history of Parkinson's disease, hyperlipidemia, hypothyroidism, anxiety and depression and had presented to the hospital on account of generalized weakness and orthostatic hypotension.  Psychiatry was consulted to render an opinion on her anxiety and depression given the fact that her current medications may also be contributing to her hypotension.  The patient is agreeable with tapering off her escitalopram but feels she cannot do without her currently prescribed diazepam at 5 mg to 10 mg per day.      DIAGNOSES:   1.  Adjustment disorder with mixed anxiety and depressed mood.   2.  Generalized anxiety disorder.   3.  Generalized weakness.   4.  Orthostatic hypotension.   5.  Parkinson's disease.   6.  Hyperlipidemia.   7.  Hypothyroidism.      RECOMMENDATIONS:   1.  Medical management as you are.   2.  The patient has been advised to taper her Lexapro to 5 mg daily for the next 2 weeks, after which she will discontinue the medication.  She is, however, leery about doing this because of the potential change in her health and also their potential relocation to [place].  She was advised that since she has not benefited from Lexapro, a good alternative to Lexapro would be Zoloft, which she can commence at the time, should that be the case, at 12.5 mg daily.  Her case was discussed with her attending  provider.      Thanks for the consult.         LUCHO OCHOA MD             D: 2020   T: 2020   MT: WT      Name:     KEEGAN HERBERT   MRN:      [id number]        Account:       [id number]   :      1945           Consult Date:  2020      Document: [id number]       cc: Sean Velasquez MD

## 2020-01-16 NOTE — PLAN OF CARE
A&Ox4. VSS on RA except HTN. Repeat EKG with NSR. Denies chest pain. Fearful of getting out of bed, reassurance provided. Up with assist of 1, gait belt, and walker. C/o HA post-ambulation with PT. Given PRN Tylenol. Denies blurred vision/vision changes. Orthostatics positive. Tremulous to BUEs. LS clear. Regular diet, poor appetite this AM. Kim patent. IV SL. Awaiting Neuro and Psych to see patient. PT following. Nursing will continue to monitor.

## 2020-01-16 NOTE — CONSULTS
Consult Date:  01/16/2020      CHIEF COMPLAINT:  Evaluate for Parkinson disease.      HISTORY OF PRESENT ILLNESS:  Sadaf Rubi is a 74-year-old right-handed lady with diagnosis of Parkinson's disease.  I had seen the patient in 09/2018 for consultation referred her to Dr. Jensen in [facility].  She did see Dr. Jensen on a few occasions and last visit in 11/2019.  She actually is not taking any medications for Parkinson's disease because she is concerned that they are causing too much of abdominal discomfort and bladder problems.  She presented to the hospital with gradual deterioration of strength and unable to take care of her, being unable to stand up, having tremulousness, dysphagia, 30-pound weight loss, recent fall with head injury, vertigo and orthostatic hypotension.  She is currently on no anti-parkinsonian medications.  She used to take carbidopa/levodopa, but discontinued that because of the side effects.      I did review her chart.  She had symptoms of orthostatic hypotension and was advised to cut on atenolol, which has been discontinued.  She states that she recently lost weight.  She requires 2 people to help her and she is not able to be at home.  Her  works and he is not at home.      She has been assessed by physical therapy and really is not able to function by herself.      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      ALLERGIES:  BEES AND MUSHROOM.      MEDICATIONS:  At home have been Senna and Zofran as well as Tylenol.      SOCIAL HISTORY:  She currently does not drive a car.  She lives with her .  She is a nondrinker, nonsmoker.      PAST MEDICAL HISTORY:  Again shows Parkinsonian syndrome with orthostatic hypotension, urinary retention, depression, anxiety, hyperlipidemia and hypothyroidism.      REVIEW OF SYSTEMS:  She has no infection, fever.  No UTI.  She has stable  electrolytes and CBC.  UA is negative for urinary tract infection.  She recently fell and hit her head.  CT was done showing no evidence of bleed.  She did develop vertigo after the fall.      PHYSICAL EXAMINATION:   GENERAL:  She is lying in bed, appears to be fragile.   VITAL SIGNS:  Her blood pressure is 146/82 with an orthostatic blood pressure of 94/54.  She is tremulous.   LUNGS:  Clear to auscultation bilaterally.   CARDIOVASCULAR:  S1, S2 cardiac sounds with no murmurs or bruits.   EXTREMITIES:  Warm.  Pedal pulses are present.  There is no evidence of pedal edema.   NEUROLOGIC:  The patient is awake and alert x 3 with hypomasked face, quiet voice, mildly slurred speech.  Overall, patient is generally weak and she does have positive movements again hypomasked facies present as well.  She has generalized tremors.  She has rigidity and she has hypoactive reflexes, downgoing toes.  The remainder of the exam is not performed.      IMPRESSION:  This lady presents with Parkinson's disease and orthostatic hypotension.  As far as the Parkinson's disease, she may have Parkinson plus syndrome as there has been significant deterioration of her condition since I saw her a year and a half ago myself in the clinic up to now and she does have signs of orthostatic hypotension early in the course of Parkinson's disease.  At this point, I would recommend admission to the hospital.  I will start her on carbidopa/levodopa 25/100 half of the dose 3 times a day and Florinef 0.5 mcg once a day to improve her orthostatic hypotension.  I think she would benefit from evaluation of Speech Pathology, nutrition and occupational as well as physical therapies.  Most likely she would need to be discharged to transitional care unit for ongoing rehabilitation.  I had discussed with her that she is undertreated Parkinson syndrome as she had been deteriorating which would explain all of her symptoms including weight loss and I would recommend  medication management.  She had been reluctant in the past to take medications according to the notes of Dr. Jensen because of the side effects.  I will try to go slow medications.  I explained to her that it is a fine balance between the side effects and benefits of medication.  I think observation in the hospital and potentially in the future in transitional care unit will help to start the medication and see if there would be improvement.  I discussed all the above with the patient.  She is in agreement with the plan.         FELICITY HENNING MD             D: 2020   T: 2020   MT: ADAMS      Name:     KEEGAN HERBERT   MRN:      [id number]        Account:       [id number]   :      1945           Consult Date:  2020      Document: [id number]

## 2020-01-16 NOTE — PROGRESS NOTES
END OF SHIFT REPORT :          DATE & TIME: January 15, 2020        SHIFT: 3- PM-11 PM     Sadaf Rubi [id number] 74 year old female was admitted  on 1/15/2020  due to Generalized Weakness    Patient is:Full Code. ISOLATION:No. : No.     Patient's recent vital signs were:   Temp: 98.5  F (36.9  C) BP: 109/56 Pulse: 91   Resp: 16 ,   SpO2: 94 %,O2 Device: None (Room air)     Patient is alert and oriented x 4 , scores green on behavior & aggression tool color. CI WA is 0. Patient is up with Stand with Assist to the bathroom .  Skin is intact, IV is NS @ 75 cc/hr,and is  in bladder pain,  and was treated with Tylenol.  Patient has a no drain and is not on telemetry. Regular diet. Orthostatic BP (+)    SIGNIFICANT LAB/DIAGNOSTIC PROCEDURE : NONE  CONSULT/S: Neuro, Psychiatry, SW, PT  GOALS/PLAN: Ortho BP q shift; bladder scan after void and if residual is > 300 cc to straight cath      Patient was in tears because unable to void.  BS was only 281 cc. Patient asking to have indwelling catheter.  PA on call was paged and patient's request was declined. Patient to be straight cath if residual urine is >300 cc. Given Valium PO. Patient less anxious.

## 2020-01-16 NOTE — PROGRESS NOTES
END OF SHIFT REPORT :          DATE & TIME: January 16, 2020        SHIFT: 11 AM- 3 PM    Sadaf Rubi [id number] 74 year old female was admitted  on 1/15/2020  due to Generalized Weakness    Patient is:Full Code. ISOLATION:No. : No.     Patient's recent vital signs were:   Temp: 97.6  F (36.4  C) BP: (!) 146/82 Pulse: 91 Heart Rate: 79 Resp: 16 ,   SpO2: 95 %,O2 Device: None (Room air)     Patient is alert and oriented x 4, scores green on behavior & aggression tool color. CI WA is NA. Patient is up with Stand with Assist of 1 to the BSC, has a morales .  Skin is intact, IV is SL, and is  not  in pain.  Patient has a no drain and is not on telemetry. Regular Diet    SIGNIFICANT LAB/ DIAGNOSTIC PROCEDURE(+) Orthostatic pressure and symptomatic  CONSULT/S: psychiatry done, neuro to see, PT on board, SW to see  GOALS/PLAN: Continue plan/no change

## 2020-01-16 NOTE — H&P
[facility]    History and Physical - Hospitalist Service       Date of Admission:  1/15/2020    Assessment & Plan   Sadaf Rubi is a 74 year old female admitted on 1/15/2020.  Past history of Parkinson's, hyperlipidemia, hypothyroid, depression, anxiety who presents with generalized weakness and orthostatic lightheadedness.    Generalized weakness  Orthostatic hypotension  Suspect multifactorial including deconditioning (has been limiting activity due to fear of falling), progression of Parkinson's with possible autonomic dysfunction and possible contribution of psychiatric medications.  Orthostatics significantly positive in ED, received 1L IVF.  Also noted to have urinary retention in ED, raising question of autonomic dysfunction as well.  Recent MRI/MRA and TSH wnl.  BMP, CBC, UA upon arrival unremarkable, denies infectious symptoms, no chest pain/pressure or palpitations, no dyspnea.    - neuro and psych consults  - thigh high compression stockings when out of bed  - PT eval  - consider fludrocortisone  - orthostatic blood pressures qshift  - NS 75 ml/hr x12 hours    Urinary retention  - monitor PVR    Parkinson's disease  She stopped Sinemet a few days prior to arrival at the direction of outpatient Neurologist, Dr. Jensen.  Had MRI/MRA brain 1/8 which was negative for acute pathology.  - continue to hold Sinemet  - neuro consult as above    Depression and anxiety  Insomnia  - continue PTA valium to prevent withdrawal  - stop lexapro as may be contributing to unsteadiness  - continue PTA trazodone  - psych consult as above    Hyperlipidemia  - hold pravastatin as Obs    Hypothyroid  TSH wnl 12/31/19.  - hold levothyroxine as Obs       Diet: Regular diet  DVT Prophylaxis: Low Risk/Ambulatory with no VTE prophylaxis indicated  Kim Catheter: not present  Code Status: Full code per patient    Disposition Plan   Expected discharge: Tomorrow, recommended to prior living arrangement once  consults complete, evaluated by PT.  Entered: Kenyon Fregoso MD 01/15/2020, 6:11 PM     The patient's care was discussed with the Patient and Patient's Family.    Kenyon Fregoso MD  [facility]    ______________________________________________________________________    Chief Complaint   Generalized weakness, lightheadedness    History is obtained from the patient, her  who is at bedside, chart review in addition to discussion with emergency room provider presents with    History of Present Illness   Sadaf Rubi is a 74 year old female who several weeks of weakness and lightheadedness.  Patient reports she fell on 12/11/2019 secondary to orthostatic hypotension.  She reports she has a long history of orthostatic symptoms and got out of bed that morning too quickly, subsequently became lightheaded and fell, striking her head.  On 12/13 she noted ongoing dizziness and difficulty with balance which has persisted through to today.  Over the past month she has noted increasing weakness and has also had onset of vertiginous symptoms as well.  She reports due to her weakness, she has been unable to do her daily exercises and has been limiting her activity due to fear of falling.  She has subsequently been quite sedentary.  She reports her primary symptom is orthostatic lightheadedness, though she does occasionally have vertiginous symptoms, exacerbated with movements of the head.  She was seen at the [facility] center and was suggested to have BPPV.  She denies any fevers or chills, shortness of breath, cough, rash or sores or other infectious symptoms.  She denies any chest pain or pressure, palpitations and denies any shortness of breath.  She reports chronically poor appetite and reports a 30 pound weight loss over the past year but reports her weight has actually stabilized in the past week as she has been very conscientous about her choice of foods.  Remainder of review of systems otherwise  negative with exception of difficulty urinating.  Of note, emergency room reports she needed to straight catheter for 700 mL.      She was seen by her primary clinic on 12/31.  They checked a TSH which was within normal limits.  They also recommended following up for MRI and MRA of the brain which was completed on 1/8 and was unremarkable.  They also recommended decreasing her Valium.  She reports due to her medical issues her anxiety is worse than ever and she did not feel comfortable decreasing valium as this medication best manages her anxiety.  She reports she takes 1-2 tabs per day, which is consistent with what was recorded at her primary visit.  She spoke with her primary outpatient neurologist earlier this week and was instructed to quit her Sinemet which she did a few days ago.  Despite this, she has had no improvement in her symptoms.  She was evaluated by home nurse today who recommended she present to the emergency room for further evaluation.    Review of Systems    The 10 point Review of Systems is negative other than noted in the HPI or here.     Past Medical History    Parkinson's disease  Hypertension  Depression  Anxiety  Hypothyroidism    Past Surgical History   I have reviewed this patient's surgical history and updated it with pertinent information if needed.  Past Surgical History:   Procedure Laterality Date     GYN SURGERY  1990's    hysterectomy SAMANTHA & BSO     KNEE SURGERY  1/2006    meniscus left     KNEE SURGERY  2007    right meniscus     KNEE SURGERY  09/10/2012    right knee replacement ; L TKA 3/13     LUMPECTOMY BREAST  1/2001    benign     URETHRA SURGERY  1977       Social History   Denies any alcohol use.  Reports a distant history of tobacco use.  Ambulates with a walker at home.  Resides with her .  Had initial home RN visit today, with anticipation that she would be receiving PT and OT as well.    Family History    reviewed and noncontributory.    Prior to Admission  Medications   Prior to Admission Medications   Prescriptions Last Dose Informant Patient Reported? Taking?   Cholecalciferol (VITAMIN D) 2000 UNITS tablet   Yes No   Sig: Take 2,000 Units by mouth daily   acetaminophen-codeine (TYLENOL #3) 300-30 MG tablet   No No   Sig: Take 1 tab up to twice daily as needed   aspirin 81 MG tablet   Yes No   Sig: Take 1 tablet (81 mg) by mouth daily   carbidopa-levodopa (SINEMET)  MG tablet   Yes No   Si/2 tablet TID as of 2/15/19   cyanocolbalamin (VITAMIN  B-12) 500 MCG tablet   Yes No   Sig: Take 1 tablet by mouth daily.   diazepam (VALIUM) 5 MG tablet   No No   Sig: TAKE 1 TABLET BY MOUTH EVERY DAY AS NEEDED FOR ANXIETY   escitalopram (LEXAPRO) 10 MG tablet   No No   Sig: TAKE 1.5 TABLETS (15 MG) BY MOUTH DAILY   levothyroxine (SYNTHROID/LEVOTHROID) 75 MCG tablet   No No   Sig: TAKE 1 TABLET (75 MCG) BY MOUTH DAILY   polyethylene glycol (MIRALAX/GLYCOLAX) powder   No No   Sig: TAKE 17 G BY MOUTH 2 TIMES DAILY   pravastatin (PRAVACHOL) 40 MG tablet   No No   Sig: TAKE 1 TABLET (40 MG) BY MOUTH DAILY   traZODone (DESYREL) 50 MG tablet   Yes No   Sig: Try 25 mg one hour before bedtime.      Facility-Administered Medications: None     Allergies   Allergies   Allergen Reactions     Mushroom Hives     Bees      Influenza Vaccine Live      Other [Seasonal Allergies]      msg and mushrooms     Penicillins      Wasp Venom Protein Hives     Ciprofloxacin        Physical Exam   Vital Signs: Temp: 98.3  F (36.8  C) Temp src: Oral BP: (!) 146/76 Pulse: 91   Resp: 16 SpO2: 95 % O2 Device: None (Room air)    Weight: 140 lbs 0 oz    General Appearance: Well-nourished female in no acute distress  Eyes: Pupils equal, round reactive to light, sclera anicteric  HEENT: Mucous membranes moist, no neck lymphadenopathy  Respiratory: Lungs clear to auscultation bilaterally, no wheeze or crackles, no tachypnea  Cardiovascular: Regular rate and rhythm, normal S1/S2, no murmurs, rubs or  gallops  GI: Abdomen soft, nontender, nondistended, normal bowel sounds  Lymph/Hematologic: No peripheral edema  Skin: No rash or bruising  Musculoskeletal: Extremities warm well perfused  Neurologic: Alert and appropriate, cranial nerves grossly intact, gross motor movements intact  Psychiatric: Normal affect    Data   Data reviewed today: I reviewed all medications, new labs and imaging results over the last 24 hours. I personally reviewed no images or EKG's today.    Recent Labs   Lab 01/15/20  1418   WBC 7.0   HGB 13.9   MCV 91         POTASSIUM 4.6   CHLORIDE 105   CO2 24   BUN 12   CR 0.60   ANIONGAP 5   JULIO 10.1   GLC 96

## 2020-01-16 NOTE — CONSULTS
Pt seen for initial psychiatric evaluation, please see my dictation for details and recommendations. Recommend tapering her off her Lexapro as she had been on it for about 2 years. She is comfortable taking 5 mg daily for 2 weeks before stopping. She was advised that if she does become overtly depressed after stopping Lexapro, Zoloft may be an option for her anxiety and depression.     Lance Boateng MD

## 2020-01-16 NOTE — CONSULTS
SW  Care Transition Initial Assessment - SW     Met with: Patient  Active Problems:    Orthostatic lightheadedness       DATA  Lives With: (P) spouse   Living Arrangements: house     Pt states she is deeply concerned about the financial aspects of her care and worried that her  is still working and that he also must do most household tasks as pt is unable.  Pt states her shaking and weakness have gotten much worse in a short amount of time and she is very anxious and worried about herself and her .  Pt states if she needs TCU, she would like to go to [facility], as her friend had surgery and went there and felt the care was excellent.  Pt states hse has not been to TCU before.  Referrals not sent due to unclear discharge timeframe.        Identified issues/concerns regarding health management: Severity of symtoms     ASSESSMENT  Cognitive Status: Alert and oriented.  Very slow speech pattern   Concerns to be addressed:  TBD   PLAN  Financial costs for the patient includes TBD .  Patient given options and choices for discharge Yes .  Patient/family is agreeable to the plan?  TBD  Transportation/person available to transport on day of discharge  is TBD  Patient Goals and Preferences: Agrees to TCU  Patient anticipates discharging to:  TCU .

## 2020-01-16 NOTE — PLAN OF CARE
Observation Goals  -diagnostic tests and consults completed and resulted- Pending  -vital signs normal or at patient baseline- No, positive orthostatic BP's  -safe disposition plan has been identified - Yes  Neuro- A&Ox4  Most Recent Vitals- Temp: 96.4  F (35.8  C) Temp src: Oral BP: (!) 146/81 Pulse: 91 Heart Rate: 84 Resp: 16 SpO2: 95 % O2 Device: None (Room air)     Resp- stable on RA  Activity- A-1 with GB + walker  Pain- c/o abdominal discomfort  Drips- NS @ 75ml/hr  Drains/Tubes- PIV, morales  Skin- WDL  GI/- Pt unable to void, BS showed roughly 585ml, straight cath with output of 550ml, indwelling morales placed around 0530 d/t inability to urinate   Aggression Color- Green  Plan- Neuro consult, Psyc consult, PT&OT    Nadine Fulton, RN

## 2020-01-17 NOTE — PLAN OF CARE
Discharge Planner SLP   Patient plan for discharge: unknown  Current status: Bedside swallow evaluation completed. Patient presents with mild oropharyngeal dysphagia characterized by generalized oral motor weakness and suspected intermittent premature pharyngeal entry of thin liquids. Also suspect reduced pharyngeal clearance with pills. Patient would likely benefit from Dysphagia 3 diet but she prefers to self select soft/moist items. She reports occasionally coughing with thin liquids though this was not observed today.     Recommend: Continue regular solids/thin liquids using swallow strategies mentioned above. Chin tuck when needed to assist with pharyngeal clearance of if coughing occurs with liquids.  Pills in applesauce/pudding. Alternate between small bites and sips of food/liquid;maintain upright posture during/after eating for 30 mins;small sips/bites, self select soft/moist items. Consider video swallow study if dysphagia worsens or if further concerns arise. After rehab course if completed, patient may benefit from OP speech therapy to address voice changes related to PD.  Barriers to return to prior living situation: dysphagia  Recommendations for discharge: TCU  Rationale for recommendations: Swallow remains below baseline.       Entered by: Kavita Duong 01/17/2020 11:35 AM

## 2020-01-17 NOTE — UTILIZATION REVIEW
"  Admission Status; Secondary Review Determination         Under the authority of the Utilization Management Committee, the utilization review process indicated a secondary review on the above patient.  The review outcome is based on review of the medical records, discussions with staff, and applying clinical experience noted on the date of the review.          (x) Observation Status Appropriate - This patient does not meet hospital inpatient criteria and is placed in observation status. If this patient's primary payer is Medicare and was admitted as an inpatient, Condition Code 44 should be used and patient status changed to \"observation\".     RATIONALE FOR DETERMINATION     The severity of illness, intensity of service provided, expected LOS and risk for adverse outcome make the care appropriate for further observation; however, doesn't meet criteria for hospital inpatient admission.   notified of this determination.    The patient is a 74-year-old female who was admitted through the emergency room on 1/15/2020 who had symptoms of generalized weakness and orthostatic lightheadedness.  She was noted to have some hypotension with standing.  She does have deconditioning and also underlying Parkinson's disease.  She had not taken Parkinson's medicines for a long period of time.  She was admitted to the hospital for assessment.  Labs are generally all within normal limits.  She was seen by neurology and psychiatry and recommendations for medication changes were made.  She is getting close to discharge.  There is no more aggressive treatment plan recommended.  Discussed with Dr. Jac Duong and at this time she will be switched back to observation status based on the level of acuity and treatment being received in the hospital setting.      The information on this document is developed by the utilization review team in order for the business office to ensure compliance.  This only denotes the appropriateness " of proper admission status and does not reflect the quality of care rendered.         The definitions of Inpatient Status and Observation Status used in making the determination above are those provided in the CMS Coverage Manual, Chapter 1 and Chapter 6, section 70.4.      Sincerely,     Luis Daniel Forte MD  Physician Advisor  Utilization Review/ Case Management  [facility].

## 2020-01-17 NOTE — PROGRESS NOTES
01/17/20 1100   General Information   Onset Date 01/08/20   Start of Care Date 01/17/20   Referring Physician Miranda Barbosa MD    Patient Profile Review/OT: Additional Occupational Profile Info See Profile for full history and prior level of function   Patient/Family Goals Statement Pt is concerned about difficulty swallowing pills.   Swallowing Evaluation Bedside swallow evaluation   Behaviorial Observations WFL (within functional limits);Lethargic   Mode of current nutrition Oral diet   Type of oral diet Regular;Thin liquid   Respiratory Status Room air   Comments The patient is a 74-year-old female with history of Parkinsons who was admitted through the emergency room on 1/15/2020 who had symptoms of generalized weakness and orthostatic lightheadedness. She presented to the hospital with gradual deterioration of strength and unable to take care of her, being unable to stand up, having tremulousness, dysphagia, 30-pound weight loss, recent fall with head injury, vertigo and orthostatic hypotension.  Patient reports difficulty swallowing pills and states she usually self-selects soft/moist foods. She also reports coughing with thin liquids about once a day.   Clinical Swallow Evaluation   Oral Musculature generally intact   Dentition present and adequate   Mucosal Quality adequate;dry   Mandibular Strength and Mobility   (mild-mod generalized weakness.)   Oral Labial Strength and Mobility   (mild generalized weakness.)   Lingual Strength and Mobility   (mild-mod generalized weakness.)   Buccal Strength and Mobility   (mild-mod generalized weakness.)   Laryngeal Function Swallow;Voicing initiated  (weak, breathy vocal quality.)   Oral Musculature Comments labial tremor noted.   Clinical Swallow Eval: Thin Liquid Texture Trial   Mode of Presentation, Thin Liquids cup;straw;self-fed   Volume of Liquid or Food Presented 1 oz   Oral Phase of Swallow WFL   Pharyngeal Phase of Swallow repeated swallows  (no overt  s/s aspiration)   Successful Strategies Trialed During Procedure chin tuck  (Pt instructed in use of chin tuck)   Clinical Swallow Eval: Pudding Thick Liquid Texture Trial   Mode of Presentation, Pudding spoon;self-fed   Volume of Pudding Presented 2 oz   Oral Phase, Pudding Poor AP movement  (reduced A-P propulsion but appears WFL)   Pharyngeal Phase, Pudding   (no overt s/s aspiration)   Clinical Swallow Eval: Solid Food Texture Trial   Mode of Presentation, Solid self-fed   Volume of Solid Food Presented merritt cracker   Oral Phase, Solid Poor AP movement  (prolonged mastication/bolus manipulation)   Swallow Compensations   Swallow Compensations Alternate viscosity of consistencies;Chin tuck;Reduce amounts  (pills in applesauce/pudding or crushed, double swallow)   Swallow Eval: Clinical Impressions   Skilled Criteria for Therapy Intervention Skilled criteria met.  Treatment indicated.   Functional Assessment Scale (FAS) 5   Dysphagia Outcome Severity Scale (IFTIKHAR) Level 5 - IFTIKHAR   Treatment Diagnosis dysphagia   Diet texture recommendations Regular diet;Thin liquids  (pt declined DD3 diet)   Recommended Feeding/Eating Techniques alternate between small bites and sips of food/liquid;maintain upright posture during/after eating for 30 mins;small sips/bites  (self select soft/moist items. Pills in applesauce/pudding )   Therapy Frequency Daily   Predicted Duration of Therapy Intervention (days/wks) 5 days   Risks and Benefits of Treatment have been explained. Yes   Patient, family and/or staff in agreement with Plan of Care Yes   Clinical Impression Comments Bedside swallow evaluation completed. Patient presents with mild oropharyngeal dysphagia characterized by generalized oral motor weakness and suspected intermittent premature pharyngeal entry of thin liquids. Also suspect reduced pharyngeal clearance with pills. Patient would likely benefit from Dysphagia 3 diet but she prefers to self select soft/moist items.  She reports occasionally coughing with thin liquids though this was not observed today. Recommend continue regular solids/thin liquids using swallow strategies mentioned above. Chin tuck when needed.  Pills in applesauce/pudding. Consider video swallow study if dysphagia worsens or if further concerns arise. Pt will benefit from SLP tx to train recommended swallow strategies. Patient may also benefit from outpatient speech language pathology to address voice changes associated with Parkinsons Disease.   Total Evaluation Time   Total Evaluation Time (Minutes) 20

## 2020-01-17 NOTE — PLAN OF CARE
A&Ox4. VSS on RA. C/o of migraine gave prn tylenol 3 and compazine. Pt had emesis and c/o of nausea prn zofran given- ineffective, compazine given-effective. Regular diet. Judy with good UOP. R PIV SL. A2,

## 2020-01-17 NOTE — PROGRESS NOTES
"Writer went to see the patient regarding \"what is outpatient observation.\"  Patient is sleeping so writer called Spouse, Jean Pierre [phone]. Writer answered all questions. Patient has coverage with [employer] primary (spouse is working aged) and Medicare A/B secondary.    Writer informed Jean Pierre that the patient is ready for TCU per MD/Neuro notes and that PT/OT/SLP support TCU.    Informed Jean Pierre that SW will be working with Danny/Jean Pierre to get more choices if needed and that a prior authorization would need to be started with [employer] for TCU as Danny identified [facility] would be her first choice. Patient may likely be here over the weekend as we would need a prior authorization.  Will continue to follow if needed for additional discharge planning needs as identified.     Addendum: 1/17 15:45  Patient was up when this writer delivered the Observation brochure writer again explained that SW/CC would work with both her and her spouse to get a safe discharge plan. We would have to send supporting documentation to the TCU who would be working with [employer] to get a prior auth.  Did inform the patient that Commercial insurance may not be open during the weekend and possibly not on 1/20/2020 but that we would continue to work on a safe discharge plan.     "

## 2020-01-17 NOTE — PROGRESS NOTES
[facility]  Patient is currently open to home care services with [facility]. The patient was admitted to [facility] on 1/15/20 and is currently receiving PT services.  [facility]  and team have been notified that patient is under OBSERVATION STATUS. [facility] Liaison will continue to follow patient during stay. If patient is admitted to inpatient status please provide orders to resume home care at time of discharge if appropriate.   Drysol Pregnancy And Lactation Text: This medication is considered safe during pregnancy and breast feeding.

## 2020-01-17 NOTE — PROGRESS NOTES
Telephone report was given to [address] RN.  Patient will be transported to Room. 33-2 via wheelchair accompanied by NA.  Belongings: clothes sent with patient. Family : son at bedside

## 2020-01-17 NOTE — PROGRESS NOTES
[facility]    Medicine Progress Note - Hospitalist Service       Date of Admission:  1/15/2020  Assessment & Plan   Sadaf Rubi is a 74 year old female admitted on 1/15/2020.  Past history of Parkinson's, hyperlipidemia, hypothyroid, depression, anxiety who presents with generalized weakness and orthostatic lightheadedness.     Generalized weakness  Orthostatic hypotension  Suspect multifactorial including deconditioning (has been limiting activity due to fear of falling), progression of Parkinson's with possible autonomic dysfunction and possible contribution of psychiatric medications. Orthostatics significantly positive in ED and during hospital stay, received 1L IVF.  Also noted to have urinary retention in ED, raising question of autonomic dysfunction as well.    Recent MRI/MRA and TSH wnl.    BMP, CBC, UA upon arrival unremarkable, denies infectious symptoms, no chest pain/pressure or palpitations, no dyspnea.    - neuro and psych consults  - thigh high compression stockings when out of bed  - continue fludrocortisone  - orthostatic blood pressures qshift  - encourage good oral intake  - Neuro started carbidopa/levodopa 1/16  - PT/ ST rec TCU - SW/CT referrals sent     Urinary retention  - Kim placed overnight 1/15/20  - Voiding trial tomorrow as able     Parkinson's disease  She stopped Sinemet a few days prior to arrival at the direction of outpatient Neurologist, Dr. Jensen.  Had MRI/MRA brain 1/8 which was negative for acute pathology.  - continue to hold Sinemet  - neuro consult as above    Non-severe malnutrition  - In context of: acute illness or injury on Chronic illness   - continue intake per SLP     Adjustment disorder with mixed anxiety and depressed mood  Generalized anxiety disorder  Insomnia  - continue PTA valium PRN to prevent withdrawal  - Per psychiatry recommendations, wean Lexapro to 5mg daily x2 weeks then discontinue  - Consider starting Zoloft at 12.5mg daily  when Lexapro weaned off  - psych consult as above     Hyperlipidemia  - hold pravastatin as Obs     Hypothyroid  TSH wnl 12/31/19.  - hold levothyroxine as Obs      Diet: Regular Diet Adult  Snacks/Supplements Adult: Other; pt may order supplements as desired; With Meals    DVT Prophylaxis: PCD  Kim Catheter: in place, indication: Obstruction  Code Status: Full Code      Disposition Plan   Expected discharge: pending TCU acceptance  Entered: Jac Duong MD 01/17/2020, 1:55 PM       The patient's care was discussed with the Bedside Nurse, Care Coordinator/ and Patient.    Jac Duong MD  Hospitalist Service  [facility]    ______________________________________________________________________    Interval History   Seen and examined. Super pleasant lady. No new complaints. Awaiting TCU.     Data reviewed today: I reviewed all medications, new labs and imaging results over the last 24 hours. I personally reviewed no images or EKG's today.    Physical Exam   Vital Signs: Temp: 96  F (35.6  C) Temp src: Axillary BP: 115/65 Pulse: 91 Heart Rate: 84 Resp: 16 SpO2: 93 % O2 Device: None (Room air)    Weight: 140 lbs 0 oz    Gen: NAD, pleasant  HEENT: Normocephalic, EOMI, MMM  Resp: no crackles,  no wheezes, no increased work of resp  CV: S1S2 heard, reg rhythm, reg rate, no pedal edema  Abdo: soft, nontender, nondistended, bowel sounds present  Ext: calves nontender, well perfused  Neuro: AAOx3, CN grossly intact, no facial asymmetry, tremor noted      Data   Recent Labs   Lab 01/15/20  1418   WBC 7.0   HGB 13.9   MCV 91         POTASSIUM 4.6   CHLORIDE 105   CO2 24   BUN 12   CR 0.60   ANIONGAP 5   JULIO 10.1   GLC 96     No results found for this or any previous visit (from the past 24 hour(s)).

## 2020-01-17 NOTE — CONSULTS
"CLINICAL NUTRITION SERVICES  -  ASSESSMENT NOTE      Future/Additional Recommendations:     Discussed current diet order and meal ordering process.  Reviewed supplements that pt can purchase for home.  Encouraged small, frequent meals.  Pt may order some of the supplements we have available here to sample (I wrote a list on her menu)     Malnutrition:   % Weight Loss:  Up to 7.5% in 3 months (non-severe malnutrition) - 4% past 2 months  % Intake:  <75% for >/= 1 month (non-severe malnutrition)  Subcutaneous Fat Loss:  None observed  Muscle Loss:  Temporal region - mild depletion  Fluid Retention:  None noted    Malnutrition Diagnosis: Non-Severe malnutrition  In Context of:  Acute illness or injury on Chronic illness or disease         REASON FOR ASSESSMENT  Sadaf Rubi is a 74 year old female seen by Registered Dietitian for Provider Order - decreased oral intake in setting of parkinsons      NUTRITION HISTORY  Chart reviewed  Per SLP - \"Patient reports difficulty swallowing pills and states she usually self-selects soft/moist foods. She also reports coughing with thin liquids about once a day.\"  Per H&P - \"She reports chronically poor appetite and reports a 30 pound weight loss over the past year but reports her weight has actually stabilized in the past week as she has been very conscientous about her choice of foods.\"    Visited with pt and  this morning  Pt notes that she often gets constipated with her Parkinson's  At home has a decaff coffee, a fried egg and cereal for breakfast  Lunch might be ham slices wrapped around cheese  She tries to drink water throughout the day   states that they have been going to [facility] a lot lately - \"she gets a cheeseburger and chocolate shake\"  Pt has not been consuming any nutrition supplements   went to the store to look for [employer] (per PA rec) - \"I haven't been able to find it\"  Pt loves ice cream  Has some trouble " "swallowing \"thick\" foods   Used to love cheese, but now limits 2' to constipation    With weakness and lightheadedness over the past month, pt has been eating less  \"Plus, we have been having so many people visiting the house - friends and health workers- that I have been off my schedule\"     states pt takes Senna at home      CURRENT NUTRITION ORDERS  Diet Order:     Regular  Allergies = MSG, mushrooms    Breakfast order - omelet, fruit cup, blueberry muffin, coffee  Pt ate ~25% breakfast - \"that is pretty good considering I feel uncomfortable with constipation\"    1/17: SLP nancy ---> mild oropharyngeal dysphagia characterized by generalized oral motor weakness and suspected intermittent premature pharyngeal entry of thin liquids. Also suspect reduced pharyngeal clearance with pills. Patient would likely benefit from Dysphagia 3 diet but she prefers to self select soft/moist items.  Rec regular diet with thin liquids (pt declined DD3).      NUTRITION FOCUSED PHYSICAL ASSESSMENT FOR DIAGNOSING MALNUTRITION)  Yes              Observed:    Muscle wasting (refer to documentation in Malnutrition section)    Obtained from Chart/Interdisciplinary Team:  No edema    ANTHROPOMETRICS  Height: 5' 2\"  Weight:(1/15) 63.5 kg / 140 lbs 0 oz  Body mass index is 25.61 kg/m .  Weight Status:  Overweight BMI 25-29.9  IBW: 50 kg  % IBW: 127%  Weight History: records reflect wt is down ~6# (4%) from 2 months ago, down 20# (12%) from 1 year ago  Wt Readings from Last 10 Encounters:   01/15/20 63.5 kg (140 lb)   12/31/19 63.5 kg (140 lb)   11/25/19 64.9 kg (143 lb)   11/11/19 66.2 kg (146 lb)   08/07/19 67.4 kg (148 lb 8 oz)   06/03/19 69.4 kg (153 lb)   05/06/19 69.4 kg (153 lb)   02/18/19 72.6 kg (160 lb)   01/29/19 72.6 kg (160 lb)   12/31/18 74.8 kg (165 lb)         LABS  Labs reviewed    MEDICATIONS  Medications reviewed      ASSESSED NUTRITION NEEDS PER APPROVED PRACTICE GUIDELINES:    Dosing Weight 53.3 kg (adjusted wt for " overwt)  Estimated Energy Needs: 2408-8924 kcals (25-30 Kcal/Kg)  Justification: overweight  Estimated Protein Needs: 65-80 grams protein (1.2-1.5 g pro/Kg)  Justification: preservation of lean body mass  Estimated Fluid Needs: 0586-0617  mL (1 mL/Kcal)  Justification: maintenance    MALNUTRITION:  % Weight Loss:  Up to 7.5% in 3 months (non-severe malnutrition) - 4% past 2 months  % Intake:  <75% for >/= 1 month (non-severe malnutrition)  Subcutaneous Fat Loss:  None observed  Muscle Loss:  Temporal region - mild depletion  Fluid Retention:  None noted    Malnutrition Diagnosis: Non-Severe malnutrition  In Context of:  Acute illness or injury on Chronic illness or disease    NUTRITION DIAGNOSIS:  Inadequate oral intake related to decreased appetite with constipation as evidenced by pt consumed 25% breakfast today      NUTRITION INTERVENTIONS  Recommendations / Nutrition Prescription  Regular diet, thin liquids  Nutrition supplements  .      Implementation  Nutrition education ---> Discussed current diet order and meal ordering process.  Reviewed supplements that pt can purchase for home.  Encouraged small, frequent meals.  Pt may order some of the supplements we have available here to sample (I wrote a list on her menu)  .      Nutrition Goals  Pt to consume >50% meals  .      MONITORING AND EVALUATION:  Progress towards goals will be monitored and evaluated per protocol and Practice Guidelines

## 2020-01-17 NOTE — PROVIDER NOTIFICATION
MD Notification    Notified Person: MD    Notified Person Name: Stella     Notification Date/Time: 1/17 0140    Notification Interaction: Phone     Purpose of Notification: c/o of migraine, no prn pain meds, pt nauseous     Orders Received:    Comments:

## 2020-01-17 NOTE — PROGRESS NOTES
"[facility]  Neuroscience and Spine [place]  Neurology Daily Note     10/6/11 2:09 PM        Assessment and Plan:     Orthostatic lightheadedness    Parkinson's disease (H)     PD disease vs MSA (Parkinson-plus).    We just started Sinimet and Florinef.  It will take 5 days ot longer to see effect.    I recommend to continue the same doses.     OK to plan for discharge to TCU and f/up with Dr. Patrick.    I discussed all with pt. And her .        Interval History:   No new symptoms           Review of Systems:   Orthostasis is still present.          Medications:          aspirin  81 mg Oral At Bedtime     carbidopa-levodopa  1 half-tab Oral TID     cyanocobalamin  500 mcg Oral Daily     escitalopram  5 mg Oral Daily     fludrocortisone  50 mcg Oral Daily     levothyroxine  75 mcg Oral QAM AC     pravastatin  40 mg Oral Daily     sodium chloride (PF)  3 mL Intracatheter Q8H     vitamin D3  2,000 Units Oral Daily          Physical Exam:   Vitals: Blood pressure 115/65, pulse 91, temperature 96  F (35.6  C), temperature source Axillary, resp. rate 16, height 1.575 m (5' 2\"), weight 63.5 kg (140 lb), SpO2 93 %, not currently breastfeeding.  General Appearance: Pt. Is awake, alert, no change in NE                 Data:   No results found for this or any previous visit (from the past 24 hour(s)).                 "

## 2020-01-18 NOTE — PLAN OF CARE
Discharge Planner SLP   Patient plan for discharge: pt/son report deciding between TCU and  services pending insurance authorization  Current status: Swallow treatment completed. Pt finishing up late lunch, limited intake as she fills up quickly. Did observe her with bites of hard chocolate bar which she tolerated without difficulty. Patient able to recall chin tuck strategy and reports she has been utilizing for medication taking, which she reports has been helping greatly with pill clearance.     Recommendations: Continue regular solids/thin liquids, chin tuck as needed with liquids and with medication administration. Alternate between small bites and sips of food/liquid; maintain upright posture during/after eating for 30 mins; small sips/bites; self select soft/moist items as needed.      Barriers to return to prior living situation: dysphagia  Recommendations for discharge: TCU  Rationale for recommendations: Ongoing SLP for dysphagia management; pill would benefit from full voice/speech assessment at next level of care for decline in function related to parkinson's disease advancement.       Entered by: Katiana Reid 01/18/2020 4:08 PM

## 2020-01-18 NOTE — PLAN OF CARE
Discharge Planner OT   Patient plan for discharge: TCU  Current status: OT orders received on this 75yo female admitted under observation status with orthostatic hypotension, weakness, urinary retention as well as progression of her Parkinson's disease. Patient was evaluated by physical therapy on 1/16 and found to be unsafe to return home d/t her high level of fall risk and as she is home alone during day. Per nursing, patient is requiring assist of 1-2 for amb to/from bathroom, her orthostatic BP's positive, and she is tolerating minimal activity. Family is in agreement with recommendation for TCU. Therefore, OT will defer skilled intervention o next level of care when patient may be better able to tolerate multiple therapies and will sign off and complete inpatient order.  PT to continue to follow during stay and see as needed. Nursing agreed to amb patient per her tolerance to activity.   Barriers to return to prior living situation: see PT recommendations  Recommendations for discharge: see PT recommendations  Rationale for recommendations: see PT recommendations       Entered by: Mychal Leal 01/18/2020 8:55 AM

## 2020-01-18 NOTE — PLAN OF CARE
9050-6255: Patient alert and oriented, VSS on room air, denied pain. Complains of constipation, suppository given x1 this AM. Up with 2 to BSC. Kim with adequate output, plan for voiding trial today. Discharge pending, will most likely discharge to TCU.

## 2020-01-18 NOTE — PROGRESS NOTES
[facility]    Medicine Progress Note - Hospitalist Service       Date of Admission:  1/15/2020  Assessment & Plan   Sadaf Rubi is a 74 year old female admitted on 1/15/2020.  Past history of Parkinson's, hyperlipidemia, hypothyroid, depression, anxiety who presents with generalized weakness and orthostatic lightheadedness.     Generalized weakness  Orthostatic hypotension  Suspect multifactorial including deconditioning (has been limiting activity due to fear of falling), progression of Parkinson's with possible autonomic dysfunction and possible contribution of psychiatric medications. Orthostatics significantly positive in ED and during hospital stay, received 1L IVF.  Also noted to have urinary retention in ED, raising question of autonomic dysfunction as well.    Recent MRI/MRA and TSH wnl.    BMP, CBC, UA upon arrival unremarkable, denies infectious symptoms, no chest pain/pressure or palpitations, no dyspnea.    - neuro and psych consults  - thigh high compression stockings when out of bed  - continue fludrocortisone --> 100 mcg starting 1/19  - orthostatic blood pressures qshift  - encourage good oral intake  - Neuro started carbidopa/levodopa 1/16  - PT/ ST rec TCU - SW/CT referrals sent     Urinary retention  - Kim placed overnight 1/15/20  - Voiding trial tomorrow as able     Parkinson's disease  She stopped Sinemet a few days prior to arrival at the direction of outpatient Neurologist, Dr. Jensen.  Had MRI/MRA brain 1/8 which was negative for acute pathology.  - continue to hold Sinemet  - neuro consult as above     Non-severe malnutrition  - In context of: acute illness or injury on Chronic illness   - continue intake per SLP     Adjustment disorder with mixed anxiety and depressed mood  Generalized anxiety disorder  Insomnia  - continue PTA valium PRN to prevent withdrawal  - Per psychiatry recommendations, wean Lexapro to 5mg daily x2 weeks then discontinue  - Consider  starting Zoloft at 12.5mg daily when Lexapro weaned off  - psych consult as above     Hyperlipidemia  - hold pravastatin as Obs     Hypothyroid  TSH wnl 12/31/19.  - hold levothyroxine as Obs    Diet: Regular Diet Adult  Snacks/Supplements Adult: Other; pt may order supplements as desired; With Meals    DVT Prophylaxis: Pneumatic Compression Devices  Kim Catheter: in place, indication: Retention  Code Status: Full Code      Disposition Plan   Expected discharge: pending TCU and improvement of orthostatic   Entered: Jac Duong MD 01/18/2020, 5:32 PM       The patient's care was discussed with the Bedside Nurse, Patient and Patient's Family.    Jac Duong MD  Hospitalist Service  [facility]    ______________________________________________________________________    Interval History   Seen and examined. Son at bedside, updated. Still very orthostatic. Increasing fluorinef for tomorrow. Possibly TCU when improved and accepted.    Addendum - 1730 lactic fired because of orthostatic hypotension, no fever, no coughing etc - informed RN to NOT draw lactic    Data reviewed today: I reviewed all medications, new labs and imaging results over the last 24 hours. I personally reviewed no images or EKG's today.    Physical Exam   Vital Signs: Temp: 97  F (36.1  C) Temp src: Oral BP: (!) 145/71 Pulse: 80 Heart Rate: 93 Resp: 18 SpO2: 94 % O2 Device: None (Room air)    Weight: 140 lbs 0 oz    Gen: NAD, pleasant  HEENT: Normocephalic, EOMI, MMM  Resp: no crackles,  no wheezes, no increased work of resp  CV: S1S2 heard, reg rhythm, reg rate, no pedal edema  Abdo: soft, nontender, nondistended, bowel sounds present  Ext: calves nontender, well perfused  Neuro: AAOx3, CN grossly intact, no facial asymmetry      Data   Recent Labs   Lab 01/15/20  1418   WBC 7.0   HGB 13.9   MCV 91         POTASSIUM 4.6   CHLORIDE 105   CO2 24   BUN 12   CR 0.60   ANIONGAP 5   JULIO 10.1   GLC 96     No  results found for this or any previous visit (from the past 24 hour(s)).

## 2020-01-18 NOTE — PLAN OF CARE
A&Ox4. VSS on RA. Constipated, ambulated to bathroom with A2, walker and GB but complained of feeling dizzy.  No BM despite senna.  Positive orthostatic hypotension.  Nausea relieved in AM with compazine, now discontinued but reglan ordered.  Fair appetite on regular diet.  Migraine relieved with tylenol 3.  Fair appetite.  Kim with good urine output.  Giovany stockings and PCD on.  Valium and melatonin given at hs.  Nursing will continue to monitor.

## 2020-01-18 NOTE — PLAN OF CARE
Oriented x4.  Orthostatic hypotension and pt feels dizzy when standing.  IV saline locked.  Nausea relieved with reglan.  Tolerated pills well with water.  Up to BSC with 2 assist, walker and GB and had 2 BMs today.  Kim with adequate urine output.  Denied pain.  Parkinson tremor in lips and feet.  Giovany stockings and PCD on.  Plan for shower later today.  Discharge pending, will most likely discharge to TCU after prior authorization approved.  Nursing will continue to monitor.

## 2020-01-18 NOTE — PROVIDER NOTIFICATION
MD Notification    Notified Person: MD    Notified Person Name:  Duong    Notification Date/Time: 1/18/2020 8961    Notification Interaction: Sent text message    Purpose of Notification: Lactic fired and standing BP on orthostatic was 45/30. MD is aware    Orders Received: Do not order lactic, per MD. MD aware of orthostatic BP    Comments:

## 2020-01-18 NOTE — PROGRESS NOTES
Observation goals PRIOR TO DISCHARGE     Comments: -diagnostic tests and consults completed and resulted-not met.   -vital signs normal or at patient baseline- met.  -safe disposition plan has been identified-not met.  Nurse to notify provider when observation goals have been met and patient is ready for discharge.

## 2020-01-18 NOTE — PROGRESS NOTES
"[facility]  Neuroscience and Spine [place]  Neurology Daily Note     10/6/11 2:09 PM        Assessment and Plan:     Orthostatic lightheadedness    Parkinson's disease (H)      Mild improvements of PD - I recommend Sinemet at this dose and Florinef.    Pt. Can discharge to TCU with f/up with Dr. Jensen. Sinemet should be further increased to 1 tab TID in nest 3 weeks or so. I rec pt. Will discuss with Dr. Jensen as she has been reluctant to do so in a past.    Neurology will sigh off for now.          Interval History:   Improving, no side effects from Sinemet.           Review of Systems:   Improvement on orthostatic BPs, better movements due to PD        Medications:          aspirin  81 mg Oral At Bedtime     carbidopa-levodopa  1 half-tab Oral TID     cyanocobalamin  500 mcg Oral Daily     escitalopram  5 mg Oral Daily     fludrocortisone  50 mcg Oral Daily     levothyroxine  75 mcg Oral QAM AC     pravastatin  40 mg Oral Daily     sodium chloride (PF)  3 mL Intracatheter Q8H     vitamin D3  2,000 Units Oral Daily          Physical Exam:   Vitals: Blood pressure 133/76, pulse 77, temperature 95.1  F (35.1  C), temperature source Oral, resp. rate 16, height 1.575 m (5' 2\"), weight 63.5 kg (140 lb), SpO2 94 %, not currently breastfeeding.  General Appearance:  Stronger voice and better facial expression and facial mobility.    Still tremors, cog-wheeling rigidity, paucity of movements, weakness.              Data:   No results found for this or any previous visit (from the past 24 hour(s)).                 "

## 2020-01-18 NOTE — PROGRESS NOTES
JUNG Note:    D/I:  SW met with patient and son to answer additional questions regarding discharge planning with TCU and patient needing [employer] prior authorization.  SW provided SNF list to son along with Medicare.gov information to research ratings and encouraged them to choose some additional facility choices in the event that [facility] does not have any availability for patient.  Due to [employer] being closed on the weekend, [facility] will review on Monday.     P: SW will continue to assist and follow for discharge.      TIM Shelton, LGSW  [phone]  [facility]

## 2020-01-18 NOTE — PROVIDER NOTIFICATION
MD Notification    Person notified:Dr. Douglas    Person Name:    Date/Time:1.18.20 0514    Interaction:page    Purpose of Notification:Can I have order for PRN suppository? Pt constipated and has had no success with senna or mirilax. Thank you!  Eve MENDENHALL [id number]    Orders Received:    Comments:

## 2020-01-19 PROBLEM — I95.1 ORTHOSTATIC HYPOTENSION: Status: ACTIVE | Noted: 2020-01-01

## 2020-01-19 NOTE — PLAN OF CARE
Physical Therapy Evaluation    Visit Count: 1  Plan of Care Dates: Initial: 5/19/2017 Through: 6/30/17  Insurance Information:  WORK COMP INFORMATION:  WORK COMP CARRIER: TRAVELERS   : MARIA ALEJANDRA ([facility])   PHONE: [phone]  Fax# [phone]   DIAGNOSIS: S97.81XD   DATE OF INJURY:   CLAIM #: [id number]  STATUS: OPEN   NOTES: SPOKE TO ; CONFIRMED CLAIM; NEEDS NOTES AND DOCTOR ORDER  Work Injury Information:   Current Employer: [employer]   : Maria Alejandra  Restrictions: Limit standing and walking to 5-7 hours per day. No squatting, kneeling or climbing. No lifting, pushing or pulling greater than 25 lbs. May ice the right foot for 15-20 minutes at a time 1-2 times per shift and at home.   Full Duty Work Demands: medium (20 - 50 lbs)   Claim Number: [id number]  Claim Status: claim open and in good standing  Current Work Status: full time occupation: [employer]  Next Referring Provider Visit: 6/9/17    Referred by: Binu Gilliland DO  Medical Diagnosis (from order):  V58.89, 928.20 (ICD-9-CM) - S97.81XD (ICD-10-CM) - Crush injury of right foot, subsequent encounter  Treatment Diagnosis: Ankle/Foot Symptoms with Pain, Impaired Posture, Impaired Joint Mobility, Impaired Range of Motion, Impaired Motor Function/Muscle Performance, Impaired Gait/Locomotion Deficits, Impaired Mobility and Impaired Balance  Insurance: 1. [employer]  2. N/A    Date of Onset: 3/21/17   Diagnosis Precautions: none, follow restrictions as above for work  Relevant co-morbidities and medications: none   Relevant Tests: Relevant diagnostic tests: plain film radiograph, MRI   Radiograph IMPRESSION: No evidence for acute fracture or dislocation.   MRI: unable to review report at this time, but patient stated that the reported stated there was a lot of inflammation    Medical/surgical history, medications and relevant tests have been reviewed.    SUBJECTIVE   Patient was pulling a cage at work that was  Oriented x4, pleasant.  Soft spoken due to parkinsons.  VSS on room air.  Denied pain or headache.  Positive for Orthostatic hypotension so IVF bolus initiated.  Fair appetite on regular diet.  Took pills individually with water.  Son and  here today, and daughter in [place] updated via phone.  Pivot to Bedside commode with MARY TAVERA and walker.  Constipation relieved with senna and bisacodyl, loose BM.  Kim catheter.  Tremor noted in lips and feet.  Giovany stockings and PCDs on.  Nursing will continue to monitor.     full of clothes. The cage was not on the wheels/base and fell on the patient's right foot. Patient was initially limping, but has since improved.    Pain:  Intensity: Now: 1-2/10; Best: 1-2/10; Worst: 8/10 (in the last 2 weeks)  Location: dorsal side of right foot, anterior ankle  Quality/Description: Sore  Relieving/Alleviating factors: positional comfort  Pain Frequency: Intermittent  Pain Cycle: Pain worse in PM    Function:  Limitations and exacerbating factors (patient reported): pain, pain avoidance patterns/compensation, difficulty, increased time to complete, increased pain level with all activities/tasks with involved extremity, all weight bearing activities/tasks with involved extremity, house/yard work , kneeling, standing, lifting, squatting, stairs, twisting on foot and certain positions  Prior level (patient reported): independent with all activities of daily living and instrumental activites of daily living, pain free    Prior Treatment: no therapies in the past year for current condition. Hospitalization, home health services or skilled nursing facility in the last 30 days: No, per patient.    Home Environment/Social Support: Patient lives with significant other and children.  Patient has assistance as needed from family/friends.      Safety:  Do you feel safe at home, work and/or school? yes, per patient  Falls: balance history in last year (< or equal to 2 falls/near falls and/or reasons) does not indicate further testing    Patient Goals/Concerns:  Return to normal use of foot and work unrestricted    OBJECTIVE   Posture/Observation:  Bilateral internal rotation of hip and ankle with sitting position  No signs of trauma, redness, swelling    Gait Analysis:  unremarkable    Range of Motion (degrees) Norm Left Right   Ankle                          Date  Initial Initial   Dorsiflexion 20     Plantar Flexion 50     Inversion 35     Eversion 15     First MTP Dorsiflexion 60     First MTP Plantar  Flexion 45     standard testing positions unless otherwise noted; Key: ranges are reported in active range of motion unless noted as AA=active assistive or P=passive range of motion, MTP=metatarsophalangeal, * denotes pain   Comments: All motions within functional/normal limits except as noted.    Strength (out of 5) Left Right   Ankle                            Date Initial Initial   Dorsiflexion 5 4*   Plantar Flexion 5 4*   Inversion 5 4*   Eversion 5 4*   First MTP Dorsiflexion 5 4*   First MTP Plantar Flexion 5 4*   standard testing positions unless otherwise noted, key: MTP=metatarsophalangeal,* denotes pain  Comments: Only muscle strength that was assessed are noted.    Muscle Flexibility:  Restricted anterior left ankle musculature or dorsiflexors     Joint Play Assessment:  Deferred due to pain    Palpation:  Tenderness of anterior tibialis and other invertor/dorsiflexors of ankle    Outcome Measures:   Lower Extremity Functional Scale: LEFS Calculated Total: 60 (0=extreme difficulty; 80=no difficulty)     Initial Treatment   Initial evaluation completed.    Therapeutic Exercise:   -Active ankle x 10 dorsiflexion/plantarflexion, inversion/eversion, circles clockwise/counterclockwise, alphabet  -Manual ankle stretching dorsiflexion, plantarflexion, inversion, eversion, great toe plantarflexion/dorsiflexion    Manual Therapy:   -Seated at edge of mat STM of dorsiflexors and soft tissue of ankle    Initial Home Program:  * above denotes home program issuance as well  Home Exercise Program:  Exercise: Date issued Date DC   Ankle AROM exercises, stretching of ankle all planes 5/19/17                           Plan for next session: Ultrasound, manual therapy to ankle, progress program to standing strengthening, balance activity    ASSESSMENT   54 year old female presents to therapy with significant decline in prior level of function due to signs and symptoms consistent with left ankle crush injury of the foot.  Currently patient has very tender dorsiflexors and are painful when active or stretched. Gait is very good, but pain with certain positions and movement.    Outcome:    Benefit from skilled therapy: yes   Rehab potential is good due to positive factors age, family support, motivation level, pain level, activity tolerance, recent onset, improvement in symptoms since onset, healthy lifestyle and negative factors not apparent at this time    Predicted patient presentation: stable and/or uncomplicated characteristics  Plan of care to increase strength/stability, increase range of motion, decrease pain, decrease swelling/edema, improve balance/proprioception, reduce falls/fall risk, improve muscle coordination, improve joint mobility, improve safety at home and in community, improve activity tolerance, improve quality of gait to address functional limitations listed above.    Goals:  To be obtained by end of this plan of care:  1. Patient independent with modified and progressed home exercise program.  2. Patient will decrease involved ankle pain/symptoms to 0/10  to aid in ambulating on level and unlevel surfaces.   3. Patient will increase involved ankle strength to 5/5 to aid in age appropriate activities and squatting for lifting for household independent living.  4. Lower Extremity Functional Scale: Patient will complete form to reflect an improved score from initial score of 60 to greater than or equal to 69 (0=extreme difficulty; 80=no difficulty) to indicate pt reported improvement in function/disability/impairment (minimal detectable change: 9 points).     PLAN   Frequency/Duration: 2 times per week for 6 weeks with tapering as the patient progresses  Skilled training and instruction for the following interventions:  Gait Training (35665)  Manual Therapy (45720)  Neuromuscular Re-Education (68119)  Therapeutic Activity (07545)  Therapeutic Exercise (90940)  Electrical Stimulation (10680//38973)   Heat/Cold  (23998)  Ultrasound/Phonophoresis (93031)    The plan of care and goals were established with the patient who concurs.  Patient has been given attendance policy at time of initial evaluation.    Patient Education:  Who will be receiving education: patient  Are they ready to learn: yes  Preferred learning style: written, verbal, demonstration  Barriers to learning: no barriers apparent at this time   Result of initial outlined education: Verbalizes understanding, Demonstrates understanding and Needs reinforcement    THERAPY DAILY BILLING   Primary Insurance: WC-TRAVELERS  Secondary Insurance: N/A    Evaluation Procedures:  Physical Therapy Evaluation: Low Complexity    Timed Procedures:  Manual Therapy, 10 minutes  Therapeutic Exercise, 15 minutes    Untimed Procedures:  No untimed codes were used on this date of service    Total Treatment Time: 45 minutes

## 2020-01-19 NOTE — PLAN OF CARE
Pt A&O- VSS on RA. No orthostatic BP taken overnight. Back pain controlled with tylenol 3 x1. PRN valium and melatonin given at bedtime- pt did not sleep well overnight. Kim patent. No BM- continue bowel meds. Skin intact- microshifting independently d/t vertigo when sleeping on sides. No OOB activity due to orthostatic hypotension. Plan for voiding trial today.

## 2020-01-19 NOTE — PLAN OF CARE
Oriented x4, pleasant.  Soft spoken due to parkinsons.  Denied pain or headache.  Positive orthostatics, 1 L fluid bolus given on day shift.  Fair appetite on regular diet.  here today.  [place] [facility] [place] MARY TAVERA and walker.  Constipation relieved with senna and bisacodyl.  Kim catheter.  Tremor at times.  Giovany stockings and PCDs on.

## 2020-01-19 NOTE — PLAN OF CARE
DATE & TIME: 1/18/2020 4558-8266    Cognitive Concerns/ Orientation : A+Ox4  BEHAVIOR & AGGRESSION TOOL COLOR: Green; Calm and cooperative  CIWA SCORE: NA   ABNL VS/O2: NA  MOBILITY: Unable to ambulate due to dizziness, orthostatic hypotension  PAIN MANAGMENT: denies  DIET: Reg + Boost supplement  BOWEL/BLADDER: Commode + Kim  ABNL LAB/BG: NA  DRAIN/DEVICES: Kim  TELEMETRY RHYTHM: NA  SKIN: Bruises  TESTS/PROCEDURES: TBD  D/C DAY/GOALS/PLACE: TBD  OTHER IMPORTANT INFO: NA

## 2020-01-19 NOTE — PROGRESS NOTES
[facility]    Medicine Progress Note - Hospitalist Service       Date of Admission:  1/15/2020  Assessment & Plan   Sadaf Rubi is a 74 year old female admitted on 1/15/2020.  Past history of Parkinson's, hyperlipidemia, hypothyroid, depression, anxiety who presents with generalized weakness and orthostatic lightheadedness.     Generalized weakness  Orthostatic hypotension, severe  Suspect multifactorial including deconditioning (has been limiting activity due to fear of falling), likely progression of Parkinson's with possible autonomic dysfunction and possible contribution of psychiatric medications. Orthostatics significantly positive in ED and during hospital stay, received 1L IVF.  Also noted to have urinary retention in ED, raising question of autonomic dysfunction as well.    Recent MRI/MRA and TSH wnl.    BMP, CBC, UA upon arrival unremarkable, denies infectious symptoms, no chest pain/pressure or palpitations, no dyspnea.    - neuro and psych consults - appears likely secondary to progression of Parkinson's  - thigh high compression stockings when out of bed  - continue fludrocortisone  --> 100 mcg  - orthostatic blood pressures qshift  - encourage good oral intake -   - Neuro started carbidopa/levodopa 1/16  - PT/ ST rec TCU - SW/CT referrals sent  - 1/19 will give 1L NS over 4 hours     Near syncope 1/18  Upon arising to stand, SBP as low as 45.   - still orthostatic and symptomatic 1/19, will give 1L NS over 4 hours     Insomnia  - could be due to hospital environment etc or progression of Parkinson's or tx for PD  - increase prn melatonin - can take 3mg at around dinner time and repeat a few hours later    Parkinson's disease  She stopped Sinemet a few days prior to arrival at the direction of outpatient Neurologist, Dr. Jensen.  Had MRI/MRA brain 1/8 which was negative for acute pathology.  - sinemet resumed  - neuro consult as above  - follow up with Dr Jensen after  discharge     Urinary retention  - Kim placed overnight 1/15/20  - Voiding trial tomorrow as able     Non-severe malnutrition  - In context of: acute illness or injury on Chronic illness   - continue intake per SLP     Adjustment disorder with mixed anxiety and depressed mood  Generalized anxiety disorder  Insomnia  - continue PTA valium PRN to prevent withdrawal  - Per psychiatry recommendations, wean Lexapro to 5mg daily x2 weeks then discontinue  - Consider starting Zoloft at 12.5mg daily when Lexapro weaned off  - psych consult as above     Hyperlipidemia  - PTA statin     Hypothyroid  TSH wnl 12/31/19.  - PTA levothyroxine      Diet: Regular Diet Adult  Snacks/Supplements Adult: Other; pt may order supplements as desired; With Meals    DVT Prophylaxis: Pneumatic Compression Devices  Kim Catheter: in place, indication: Retention  Code Status: Full Code      Disposition Plan   Expected discharge: pending improvement of orthostatic hypotension, anticipate TCU  Entered: Jac Duong MD 01/19/2020, 9:12 AM       The patient's care was discussed with the Bedside Nurse, Patient and Patient's Family.    Jac Duong MD  Hospitalist Service  [facility]    ______________________________________________________________________    Interval History   Seen and examined. A little better overall, but still orthostatic. Nearly syncopized last evening with orthostatic check - SBP read as 45.  Improved slightly today. Fludro increased and IVF ordered. Patient continues to have significant insomnia and is struggling with constipation.    This is a patient that clearly cannot be managed as an outpatient and has major medical problems and complications that require inpatient hospitalization and medical management titration. She has been returned to inpatient status 1/19. UR likely to review 1/20.    Data reviewed today: I reviewed all medications, new labs and imaging results over the last 24 hours. I  personally reviewed no images or EKG's today.    Physical Exam   Vital Signs: Temp: 97.4  F (36.3  C) Temp src: Oral BP: 136/76 Pulse: 80 Heart Rate: 78 Resp: 18 SpO2: 97 % O2 Device: None (Room air)    Weight: 140 lbs 0 oz    Gen: NAD, pleasant  HEENT: Normocephalic, EOMI, MMM  Resp: no crackles,  no wheezes, no increased work of resp  CV: S1S2 heard, reg rhythm, reg rate  Abdo: soft, nontender, nondistended, bowel sounds present  Ext: calves nontender, well perfused  Neuro: AAOx3, CN grossly intact, no facial asymmetry      Data   Recent Labs   Lab 01/15/20  1418   WBC 7.0   HGB 13.9   MCV 91         POTASSIUM 4.6   CHLORIDE 105   CO2 24   BUN 12   CR 0.60   ANIONGAP 5   JULIO 10.1   GLC 96     No results found for this or any previous visit (from the past 24 hour(s)).

## 2020-01-20 NOTE — PROVIDER NOTIFICATION
Brief update:    Paged regarding low urine output    Additional 1 L normal saline administered over 4 hours.  Recheck orthostatics following fluid bolus    Adriano Pabon MD  6:16 AM

## 2020-01-20 NOTE — PLAN OF CARE
Discharge Planner PT   Patient plan for discharge: Acknowledges she cannot manage at this time at home and is open to TCU  Current status: Patient long sitting upon PT entering.  Agreeable to participate.  Supine with HOB raised to sitting on EOB with increased time and Min A at times and Mod A at times to scoot forward.  Sitting with CGA.  Increased time for foot placement with patient following directions of therapist.  Sit to stand into WW with Mod A plus Min A.  Initially Mod A plus CGA to maintain upright stance secondary to heavy posterior lean.  Able to eventually only require Min A plus CGA and pivot transfer with WW with increased time.  Once squared with chair took 5 steps forward and back with WW and Min A of 2.  Stand to sit with Min A and cueing for slow decent.  Chair alarm in place but power cord missing so nursing is finding.   present with patient in room.  /67.  Tolerated well.   Barriers to return to prior living situation:  Needs assist with all mobility, has had little to no ability to ambulate while here, high falls risk, is home alone when spouse is at work.  Recommendations for discharge: TCU  Rationale for recommendations:  Pt agreeable to TCU if she has insurance coverage. If private pay, she prefers to return home with her spouse taking some time off to care for her with home PT. Pt would benefit from continued PT to improve strength, balance, mobility to increase independence, reduce falls risk and increase safety before returning home.Is no longer under observation status; is inpatient.  Would benefit from increased PT while in hospital; will increase treatment plan to daily.        Entered by: Janett Grewal 01/20/2020 2:23 PM

## 2020-01-20 NOTE — PROVIDER NOTIFICATION
"MD Notification    Person notified:hosp    Person Name:Dr. Pabon    Date/Time:1/20/20  0037    Interaction:Page    Purpose of Notification:Patient requesting additional bedtime dose of valium stating the she \"sometimes takes 2-5 mg tablets at home when she is very anxious\". Please advise.    Orders Received: No order for additional dose because of age and fall risk.    Comments:    "

## 2020-01-20 NOTE — PROVIDER NOTIFICATION
MD Notification    Person notified:hosp    Person Name:Dr. Pabon    Date/Time:0613 1/20/20    Interaction:page    Purpose of Notification: FYI, pt's urine output was low this shift, 175 ml total.     Orders Received:NS bolus of 1000 ml at 250 ml/hr rate ordered    Comments:

## 2020-01-20 NOTE — PROVIDER NOTIFICATION
Brief update:    Paged w/ request for additional valium dose to help w/ sleep as she is anxious    Here for fear of falling, weakness.     Would not add additional Valium in the setting of presenting complaints given high risk for falls and morbidity/mortality in the elderly on benzodiazepines.    Adriano Pabon MD  12:48 AM

## 2020-01-20 NOTE — PROGRESS NOTES
Clinic Care Coordination Contact  Care Team Conversations    SW CC made phone call to Zachary Flores, in patient  at [facility] and left a message for an update on discharge plans.     Rosa Owens, [facility]  Clinic Care Coordinator   [facility] and [facility]  [phone]  [email or web address]

## 2020-01-20 NOTE — PROGRESS NOTES
"SPIRITUAL HEALTH SERVICES Progress Note  [facility]    Initiated visit due to pt request.  Pt was in room w/ spouse, Jean Pierre.  Pt shared that she was feeling overwhelmed, stating that she was being sent to a care facility but was unsure if insurance was going to cover it.  Pt's spouse tried to affirm pt, stating that \"everything would be alright.\"  Pt requested prayer support, asking if there were any prayers for those feeling overwhelmed with life.  Pt is Muslim, and has enjoyed previous visits with Eucharistic ministers.  SH assesses that pt is expressing challenged coping over financial concerns.  SH offered emotional support and prayer.  Pt/family are aware they can request  for further support.  SH will f/u per pt/family request.       Jevon Dietrich  Chaplain Resident    "

## 2020-01-20 NOTE — PLAN OF CARE
[id number]: Patient alert and oriented, very pleasant. VSS on room air. Up with assist of 2 to BSC, BM this shift. Kim with low output overnight. Regular diet, fair appetite, encouraging fluids. Plan for TCU at discharge, discharge plan pending.

## 2020-01-20 NOTE — PROGRESS NOTES
[facility]    Medicine Progress Note - Hospitalist Service       Date of Admission:  1/15/2020  Assessment & Plan   Sadaf Rubi is a 74 year old female admitted on 1/15/2020.  Past history of Parkinson's, hyperlipidemia, hypothyroid, depression, anxiety who presents with generalized weakness and orthostatic lightheadedness.     Generalized weakness  Orthostatic hypotension. Improved   Suspect multifactorial including deconditioning (has been limiting activity due to fear of falling), likely progression of Parkinson's with possible autonomic dysfunction and possible contribution of psychiatric medications. Orthostatics significantly positive in ED and during hospital stay, received 1L IVF.  Also noted to have urinary retention in ED, raising question of autonomic dysfunction as well.    Recent MRI/MRA and TSH wnl.    BMP, CBC, UA upon arrival unremarkable, denies infectious symptoms, no chest pain/pressure or palpitations, no dyspnea.    - Thigh high compression stockings when out of bed  - Continue fludrocortisone 100 mcg daily.  If continues to have orthostatic hypotension can uptitrate further   - Orthostatic blood pressures qshift  - PT/ ST rec TCU - SW/CT referrals sent  - Neuro and psych consulted and appreciate their recommendations.  Appears likely secondary to progression of Parkinson's     Near syncopal episode  Occurred on 1/18.  Upon arising to stand, SBP as low as 45.  No further issues      Insomnia  Could be due to hospital environment etc or progression of Parkinson's or tx for PD  - increase prn melatonin - can take 3mg at around dinner time and repeat a few hours later     Parkinson's disease  She stopped Sinemet a few days prior to arrival at the direction of outpatient Neurologist, Dr. Jensen.  Had MRI/MRA brain 1/8 which was negative for acute pathology.  - Sinemet resumed by neurology   - Follow up with Dr Jensen after discharge  - Neurology consulted as above       Urinary retention  Kim placed overnight 1/15/20  - Voiding trial tomorrow as able     Non-severe malnutrition  In context of: acute illness or injury on Chronic illness   - Continue intake per SLP     Adjustment disorder with mixed anxiety and depressed mood  Generalized anxiety disorder  Insomnia  - Continue PTA valium PRN to prevent withdrawal  - Psychiatry consulted previously and appreciate their recommendations.  Per their recommendations, wean Lexapro to 5mg daily x2 weeks then discontinue.  Consider starting Zoloft at 12.5mg daily when Lexapro weaned off     Hyperlipidemia  - PTA statin     Hypothyroid  TSH wnl 12/31/19.  - PTA levothyroxine       Diet: Regular Diet Adult  Snacks/Supplements Adult: Other; pt may order supplements as desired; With Meals    DVT Prophylaxis: Pneumatic Compression Devices  Kim Catheter: in place, indication: Retention  Code Status: Full Code      Disposition Plan   Expected discharge: Tomorrow, recommended to transitional care unit once safe disposition plan/ TCU bed available.  Entered: Adriano Vergara DO 01/20/2020, 1:30 PM       The patient's care was discussed with the Bedside Nurse, Care Coordinator/ and Patient.    Adriano Vergara DO  Hospitalist Service  [facility]    ______________________________________________________________________    Interval History   Patient seen and examined.  No acute events over night.  No chest pain or SOB.  No fevers or chills.  No abdominal pain.  No N/V    Data reviewed today: I reviewed all medications, new labs and imaging results over the last 24 hours. I personally reviewed no images or EKG's today.    Physical Exam   Vital Signs: Temp: 96.3  F (35.7  C) Temp src: Oral BP: (!) 143/70 Pulse: 85 Heart Rate: 92 Resp: 16 SpO2: 97 % O2 Device: None (Room air)    Weight: 140 lbs 0 oz  General Appearance: Resting comfortably in bed.  NAD   Respiratory: Clear to auscultation.  No respiratory  distress  Cardiovascular: RRR.  No murmurs  GI: Bowel sounds present.  Non-tender  Skin: No rashes.  No cyanosis  Other: No edema.  No calf tenderness      Data   Recent Labs   Lab 01/15/20  1418   WBC 7.0   HGB 13.9   MCV 91         POTASSIUM 4.6   CHLORIDE 105   CO2 24   BUN 12   CR 0.60   ANIONGAP 5   JULIO 10.1   GLC 96     No results found for this or any previous visit (from the past 24 hour(s)).

## 2020-01-20 NOTE — PLAN OF CARE
A&Ox4. VSS. Denies pain. Regular diet; poor appetite. Kim patent; adequate UO. One-time fluid bolus completed today d/t low UO overnight. Up with Ax2 + GB & walker to BSC. Discharge pending TCU placement.

## 2020-01-21 NOTE — PROVIDER NOTIFICATION
MD Notification    Notified Person: MD    Notified Person Name: Dr. Pabon    Notification Date/Time: January 19, 2020 0480    Notification Interaction: TORB    Purpose of Notification: Orthostatics almost (+), pt symptomatic.     Orders Received: 1L bolus over 2 hours    Comments:

## 2020-01-21 NOTE — PLAN OF CARE
Discharge Planner SLP   Patient plan for discharge: TCU  Current status: Pt tolerated thin liquids by straw without s/s aspiration. She reports good tolerance to regular diet and is self-selecting softer solids. She reports using chin tuck with pills which is helpful with clearance.    Recommendations: Continue regular solids/thin liquids, chin tuck as needed with liquids and with medication administration. Alternate between small bites and sips of food/liquid; maintain upright posture during/after eating for 30 mins; small sips/bites; self select soft/moist items as needed.     Barriers to return to prior living situation: No SLP barriers identified.  Recommendations for discharge: TCU. Recommend continue SLP services at next level of care for dysphagia management. Pt may benefit from full speech/voice assessment to address decline in communication related to Parkinsons Disease.  Rationale for recommendations: Swallow remains below baseline.       Entered by: Kavita Duong 01/21/2020 1:34 PM

## 2020-01-21 NOTE — LETTER
[place] CARE COORDINATION  [address]   [place] [address]    January 21, 2020    Sadaf Rubi  [address]  [facility] [address]    Dear Sadaf,    I am a clinic care coordinator who works with Sean Velasquez MD at [facility]. I wanted to thank you for spending the time to talk with me. I wanted to introduce myself and provide you with my contact information so that you can call me with questions or concerns about your health care. Below is a description of clinic care coordination and how I can further assist you.     The clinic care coordinator is a registered nurse and/or  who understand the health care system. The goal of clinic care coordination is to help you manage your health and improve access to the [facility] system in the most efficient manner. The registered nurse can assist you in meeting your health care goals by providing education, coordinating services, and strengthening the communication among your providers. The  can assist you with financial, behavioral, psychosocial, chemical dependency, counseling, and/or psychiatric resources.    Please feel free to contact me with any questions or concerns. We at [facility] are focused on providing you with the highest-quality healthcare experience possible and that all starts with you.     Sincerely,     Rosa Owens, [facility]  Clinic Care Coordinator   [facility], and [facility]  [phone]  [email or web address]  Enclosed: I have enclosed a copy of a 24 Hour Access Plan. This has helpful phone numbers for you to call when needed. Please keep this in an easy to access place to use as needed.

## 2020-01-21 NOTE — LETTER
Health Care Home - Access Care Plan    About Me:    Patient Name:  Sadaf Herbert    YOB: 1945  Age:                      74 year old   Hi MRN:     [id number] Telephone Information:   Home Phone [phone]   Mobile [phone]       Address:  [address]  [facility] [address] Email address:  No e-mail address on record      Emergency Contact(s)   Name Relationship Lgl Grd Work Phone Home Phone Mobile Phone   1. FABIEN HERBERT Spouse  [phone]   2. ANETTE BARNES Daughter   [phone]    3. FABIEN HERBERT    [phone]              Health Maintenance: Routine Health maintenance Reviewed: Due/Overdue     My Access Plan  Medical Emergency 911   Questions or concerns during clinic hours Primary Clinic Line, I will call the clinic directly: [facility] - [phone]   24 Hour Appointment Line [phone] or  [phone] [place] [phone]) (toll free)   24 Hour Nurse Line [phone] (toll free)   Questions or concerns outside clinic hours 24 Hour Appointment Line, I will call the after-hours on-call line:   [facility] [phone] or [phone]) (toll-free)   Preferred Urgent Care Other   [facility]  [phone]   Preferred Pharmacy [address]     Behavioral Health Crisis Line The National Suicide Prevention Lifeline at 1-997.614.9292 or 911     My Care Team Members  Patient Care Team       Relationship Specialty Notifications Start End    Sean Velasquez MD PCP - General Internal Medicine  10/4/12     Phone: [phone] Fax: [phone] [address] [facility] [address]    Leesa Bellamy PA-C Physician Assistant Physician Assistant  2/26/18     Phone: [phone] Fax: [phone] [address]  [facility] [address]    Sean Velasquez MD Assigned PCP    5/20/18     Phone: [phone] Fax: [phone]         [address] [facility] [address]    [facility] [place] [facility]  1/13/20     Phone: [phone]                My Medical and Care Information  Problem List   Patient Active Problem List   Diagnosis     Dysthymic disorder     Palpitation     Shortness of breath     Osteopenia     Preventive measure     Family history of coagulation disorder     Vitamin D deficiency disease     Hypertension goal BP (blood pressure) < 140/80     Hyperlipidemia with target LDL less than 100     Dizziness     Balance problems     Abnormal liver enzymes     ACP (advance care planning)     Episodic tension-type headache, not intractable     Pain in unspecified knee     Slow transit constipation     Persistent insomnia     SHAWNEE (generalized anxiety disorder)     Hypothyroidism, unspecified type     Urinary hesitancy     Other iron deficiency anemia     Pelvic somatic dysfunction     Mixed incontinence urge and stress (male)(female)     Chronic midline thoracic back pain     Chronic upper back pain     Chronic midline low back pain without sciatica     Dysphagia, unspecified type     Left-sided low back pain without sciatica     Fine motor impairment     Parkinson's syndrome (H)     Intracranial aneurysm     Chronic fatigue     Iron deficiency     Dysfunction of right eustachian tube     Internal carotid aneurysm     Insomnia, unspecified type     Labile blood pressure     Orthostatic lightheadedness     Parkinson's disease (H)     Orthostatic hypotension      Current Medications and Allergies:  See printed Medication Report

## 2020-01-21 NOTE — PROGRESS NOTES
JUNG    I) Still awaiting insurance authorization. Have spoken with Maura at [facility] who is providing updates on her attempts to obtain this.  PAS-RR    Per DHS regulation, JUNG completed and submitted PAS-RR to [place] Board on Aging Direct Connect via the Senior LinkAge Line.  PAS-RR confirmation # is :[id number]  Further questions may be directed to Senior LinkAge Line at #[phone], option #4 for PAS-RR staff.  P) Will complete arrangements for discharge to [facility] once authorization has been provided.     Addendum  Maura reports still no response from [employer] despite her repeated calls to them to expedite the authorization request.    [facility] will keep the bed for 1 more day.    Son, Ed called and he plans to provide transport; hopefully Wednesday. He lives in [place] but has an [place] phone number; [phone].    Ed plans to say [employer] himself.

## 2020-01-21 NOTE — PROGRESS NOTES
[facility]    Medicine Progress Note - Hospitalist Service       Date of Admission:  1/15/2020  Assessment & Plan   Sadaf Rubi is a 74 year old female admitted on 1/15/2020.  Past history of Parkinson's, hyperlipidemia, hypothyroid, depression, anxiety who presents with generalized weakness and orthostatic lightheadedness.     Generalized weakness  Orthostatic hypotension. Improved   Suspect multifactorial including deconditioning (has been limiting activity due to fear of falling), likely progression of Parkinson's with possible autonomic dysfunction and possible contribution of psychiatric medications. Orthostatics significantly positive in ED and during hospital stay, received 1L IVF.  Also noted to have urinary retention in ED, raising question of autonomic dysfunction as well.    Recent MRI/MRA and TSH wnl.    BMP, CBC, UA upon arrival unremarkable, denies infectious symptoms, no chest pain/pressure or palpitations, no dyspnea.    - Thigh high compression stockings when out of bed  - Continue fludrocortisone 100 mcg daily.  If continues to have orthostatic hypotension can uptitrate further   - Orthostatic blood pressures qshift  - PT/ ST rec TCU - SW/CT referrals sent  - Neuro and psych consulted and appreciate their recommendations.  Appears likely secondary to progression of Parkinson's     Near syncopal episode  Occurred on 1/18.  Upon arising to stand, SBP as low as 45.  No further issues      Insomnia  Could be due to hospital environment etc or progression of Parkinson's or tx for PD  - increase prn melatonin - can take 3mg at around dinner time and repeat a few hours later     Parkinson's disease  She stopped Sinemet a few days prior to arrival at the direction of outpatient Neurologist, Dr. Jensen.  Had MRI/MRA brain 1/8 which was negative for acute pathology.  - Sinemet resumed by neurology   - Follow up with Dr Jensen after discharge  - Neurology consulted as above       Urinary retention  Kim placed overnight 1/15/20  - Voiding trial tomorrow as able     Non-severe malnutrition  In context of: acute illness or injury on Chronic illness   - Continue intake per SLP     Adjustment disorder with mixed anxiety and depressed mood  Generalized anxiety disorder  Insomnia  - Continue PTA valium PRN to prevent withdrawal  - Psychiatry consulted previously and appreciate their recommendations.  Per their recommendations, wean Lexapro to 5mg daily x2 weeks then discontinue.  Consider starting Zoloft at 12.5mg daily when Lexapro weaned off     Hyperlipidemia  - PTA statin     Hypothyroid  TSH wnl 12/31/19.  - PTA levothyroxine       Diet: Regular Diet Adult  Snacks/Supplements Adult: Other; pt may order supplements as desired; With Meals  Advance Diet as Tolerated    DVT Prophylaxis: Pneumatic Compression Devices  Kim Catheter: in place, indication: Retention  Code Status: Full Code      Disposition Plan   Expected discharge: Tomorrow, recommended to transitional care unit once safe disposition plan/ TCU bed available.  Awaiting prior-authorization  Entered: Adriano Vergara DO 01/21/2020, 3:17 PM       The patient's care was discussed with the Bedside Nurse, Care Coordinator/ and Patient.    Adriano Vergara DO  Hospitalist Service  [facility]    ______________________________________________________________________    Interval History   Patient seen and examined.  No acute events over night.  No chest pain or SOB.  No fevers or chills.  No light headedness    Data reviewed today: I reviewed all medications, new labs and imaging results over the last 24 hours. I personally reviewed no images or EKG's today.    Physical Exam   Vital Signs: Temp: 96  F (35.6  C) Temp src: Axillary BP: (!) 143/74   Heart Rate: 71 Resp: 16 SpO2: 95 % O2 Device: None (Room air)    Weight: 140 lbs 0 oz  General Appearance: Resting comfortably.  NAD   Respiratory: Clear to  auscultation.  No respiratory distress  Cardiovascular: RRR.  No murmurs  GI: Bowel sounds present.  Non-tender  Skin: No rashes.  No cyanosis  Other: No edema.  No calf tenderness      Data   Recent Labs   Lab 01/15/20  1418   WBC 7.0   HGB 13.9   MCV 91         POTASSIUM 4.6   CHLORIDE 105   CO2 24   BUN 12   CR 0.60   ANIONGAP 5   JULIO 10.1   GLC 96     No results found for this or any previous visit (from the past 24 hour(s)).

## 2020-01-21 NOTE — PLAN OF CARE
AOx4, VSS, +ortho static BP. Regular diet, poor appetite. Kim patent, low UOP this shift, fluid bolus on days. Up with A2 GB/W to BSC. Melatonin given x1, acetaminophen #3 given x1, senna x1 for constipation. Discharge pending TCU placement. ARIADNA stockings removed. Please have MD and SW call daughter Smita at [phone]

## 2020-01-21 NOTE — PLAN OF CARE
Discharge Planner PT   Patient plan for discharge: TCU  Current status: PT: Patient motivated for OOB activity this pm; required min A and extra time for supine to sit and scooting to EOB; mod A for sit>stand with use of a walker; tends to lean backward and brace legs against bed; able to get feet under her with cues; able to take steps to bedside chair with min/mod A and walker; min A to get to sitting; alarm placed for safety  Barriers to return to prior living situation: current level of assist; weakness; impaired balance  Recommendations for discharge: TCU  Rationale for recommendations: Patient would benefit from ongoing PT in hospital and at discharge to maximize strength, balance, and return of independence with functional mobility to allow for eventual return home with spouse.       Entered by: Nina Pennington 01/21/2020 3:33 PM

## 2020-01-21 NOTE — PROGRESS NOTES
Clinic Care Coordination Contact    Clinic Care Coordination Contact  OUTREACH    Referral Information:  Referral Source: PCP    Primary Diagnosis: Cognitive Impairment    Chief Complaint   Patient presents with     Clinic Care Coordination - Initial     Universal Utilization: Based on chart review, JUNG GROVE does not have concerns about current utilization.   Clinic Utilization  Difficulty keeping appointments: No  Compliance Concerns: No  No-Show Concerns: No  No PCP office visit in Past Year: No  Utilization    Last refreshed: 1/21/2020 10:52 AM:  Hospital Admissions 1           Last refreshed: 1/21/2020 10:52 AM:  ED Visits 1           Last refreshed: 1/21/2020 10:52 AM:  No Show Count (past year) 1              Current as of: 1/21/2020 10:52 AM            Clinical Concerns:  Current Medical Concerns:  Patient presented to [facility] 1/15/20 for generalized weakness and Parkinson's disease. Patient was admitted for observation and is awaiting transfer to TCU.    Current Behavioral Concerns: JUNG GROVE received order from PCP requesting outreach to family to discuss discharge planning and next steps. Patient's daughter is unsure of what to expect and would like clarification.     JUNG CC outreach to patient's daughter, Michelle to discuss concerns and next steps. JUNG CC explained the discharge plan to TCU. Patient's daughter inquired about what services patient would have access to upon discharge and how they are covered. JUNG CC explained Medicare coverage requirements and that services can be private paid after Medicare coverage has ended. TCU staff will evaluate patient's needs for HHA/RN/SW/PT/OT/ST upon discharge and order accordingly. Patient's daughter expressed relief as she is unsure of what to expect or ask for in this process. Patient's daughter inquired what would happen if patient was unable to return home at all; JUNG CC encouraged family to establish a relationship with TCU SW to assist in discharge  planning and next steps. Family may need to pursue additional insurance options or private pay for a facility. Patient's daughter expressed that she wished her parents moved to [place] last year like she suggested, this situation is hard with her being so far away. JUNG CC inquired if Michelle has heard from inpatient SW; Michelle reported that she talked to her briefly this morning but she needed a release from her mother to discuss further. Michelle connected with her brother who had some information as well. JUNG CC encouraged Michelle to reach out to clinic/SW CC if she feels she needs something in the future. Michelle agreed.     Education Provided to patient: Role of SW CC and clinic care coordination; Medicare coverage for home care; transition process and access to  at facility      Health Maintenance Reviewed: Due/Overdue     Medication Management:  Patient's medications are currently being managed by inpatient team. Patient will be admitted to TCU today.    Functional Status:  Dependent ADLs: Ambulation-walker  Bed or wheelchair confined: No  Mobility Status: Independent w/Device    Living Situation:  Current living arrangement: I live in a private home with family    Diet/Exercise/Sleep:  Inadequate nutrition (GOAL): No  Food Insecurity: No  Tube Feeding: No    Transportation:  Transportation concerns (GOAL): No  Transportation means: Family, Accessible car     Psychosocial:  Orthodox or spiritual beliefs that impact treatment: No  Mental health DX: Yes  Mental health DX how managed: Medication  Mental health management concern (GOAL): No  Informal Support system: Children, Family, Spouse     Financial/Insurance:   Financial/Insurance concerns (GOAL): No     Resources and Interventions:  Current Resources:   Community Resources: None  Equipment Currently Used at Home: walker, rolling    Advance Care Plan/Directive  Advanced Care Plans/Directives on file: No  Advanced Care Plan/Directive Status: Not  Applicable    Referrals Placed: None     Plan: No further outreaches will be made at this time unless a new referral is made or a change in the pt's status occurs. Patient was provided with this writer's contact information and encouraged to call with any questions or concerns.     Rosa Owens, [facility]  Clinic Care Coordinator   [facility] [employer]  [phone]  [email or web address]

## 2020-01-21 NOTE — PROVIDER NOTIFICATION
Brief update:    Paged re: positive orthostatics    Positive earlier in the day as well    Normal saline bolus over 2 hours.  Note that patient was having low urine output as of yesterday suggesting volume depletion    Adriano Pabon MD  11:55 PM

## 2020-01-21 NOTE — PLAN OF CARE
A&Ox4, valium for sleep, dose given today at 1106 per hospitalist request. VSS on RA. Denies pain/nausea. FRANCESCO Kim patent, UOP adequate. A2 + walker/GB. Plan: TCU, awaiting insurance authorization, daughter and son updated by RN.

## 2020-01-21 NOTE — PLAN OF CARE
A&Ox4, valium for sleep. Orthostatics almost (+), symptomatic (dizzy); MD notified, given bolus. Other VSS on RA. Denies pain/nausea. PIV SL after bolus. Kim patent, UOP adequate. A2 + walker/GB. Plan: TCU.

## 2020-01-22 NOTE — PROVIDER NOTIFICATION
Brief update    Paged re: headache, requests additional tylenol #3    One time dose 2.5 mg oxycodone    Adriano Pabon MD  12:28 AM

## 2020-01-22 NOTE — PROVIDER NOTIFICATION
MD Notification    Notified Person: MD    Notified Person Name: Dr. Pabon    Notification Date/Time: January 21, 2020 9167    Notification Interaction: TORB    Purpose of Notification: (+) orthostatics, now asymptomatic.    Orders Received: None, see MD note.    Comments:

## 2020-01-22 NOTE — PLAN OF CARE
A&Ox4, valium for sleep. Orthos (+), but asymptomatic; MD notified, no changes. Other VSS on RA. C/o HA, given once dose Oxycodone. Denies nausea. FRANCESCO Kim patent, UOP adequate; TOV 1/22. A2 + walker/GB. Plan: possible discharge 1/22 to TCU.

## 2020-01-22 NOTE — PLAN OF CARE
Discharge Planner PT   Patient plan for discharge: TCU  Current status: Pt was received supine in bed and agreed to PT interventions. Pt is at SBA with supine>sit with increased time and using bed rails. Pt sat at EOB x 5 min with supervision. STS x 2 with min assist x 1 and verbal cues for proper hand placement, LE placement and technique. Pt stood x 5 min with RW for support and CGA for safety. Pt required verbal and tactile cues for upright posture. Pt participated in standing lateral weight shifts and performed marching in place x 1 min. Pt participated in both seated and supine LE there ex with PT's guidance. Pt required min assist x 1 with sit>supine.   Barriers to return to prior living situation: current level of assist; weakness; impaired balance  Recommendations for discharge: TCU  Rationale for recommendations: Patient would benefit from ongoing PT in hospital and at discharge to maximize strength, balance, and return of independence with functional mobility to allow for eventual return home with spouse.       Entered by: Clifford Tomas 01/22/2020 1:32 PM     Physical Therapy Discharge Summary    Reason for therapy discharge:    Discharged to transitional care facility.    Progress towards therapy goal(s). See goals on Care Plan in [facility] electronic health record for goal details.  Goals not met.  Barriers to achieving goals:   discharge from facility.    Therapy recommendation(s):    Continued therapy is recommended.  Rationale/Recommendations:  to achieve increased independence with fucntional mobility.

## 2020-01-22 NOTE — PLAN OF CARE
A&O. Pain controlled with Tylenol. Ambulating Ax2 pivot with walker. Denies dizziness/lightheadedness when standing. Tolerating regular diet. Kim in place, plan for voiding trial tomorrow. VSS. Room air. Accepted to [facility] for discharge, awaiting insurance authorization. Possibly discharging tomorrow.

## 2020-01-22 NOTE — DISCHARGE SUMMARY
[facility]  Hospitalist Discharge Summary       Date of Admission:  1/15/2020  Date of Discharge:  1/22/2020  Discharging Provider: Adriano Vergara DO      Discharge Diagnoses   1. Generalized weakness due to deconditioning  2. Orthostatic hypotension w/near syncope  3. Parkinson's disease  4. Insomnia   5. Urinary retention   6. Non-severe malnutrition   7. Adjustment disorder with mixed anxiety and depressed mood  8. HLP   9. Hypothyroidism    Follow-ups Needed After Discharge   Follow-up Appointments     Follow Up and recommended labs and tests      Follow up with Nursing home physician.  No follow up labs or test are   needed.  Follow up with PCP 1-2 weeks after TCU discharge           Unresulted Labs Ordered in the Past 30 Days of this Admission     No orders found from 12/16/2019 to 1/16/2020.        Discharge Disposition   Discharged to short-term care facility  Condition at discharge: Stable    Hospital Course   Sadaf Rubi is a 74 year old female admitted on 1/15/2020.  Past history of Parkinson's, hyperlipidemia, hypothyroid, depression, anxiety who presents with generalized weakness and orthostatic lightheadedness.     Generalized weakness  Orthostatic hypotension. Improved   Suspect multifactorial including deconditioning (has been limiting activity due to fear of falling), likely progression of Parkinson's with possible autonomic dysfunction and possible contribution of psychiatric medications. Orthostatics significantly positive in ED and during hospital stay, received 1L IVF.  Also noted to have urinary retention in ED, raising question of autonomic dysfunction as well.    Recent MRI/MRA and TSH wnl.    BMP, CBC, UA upon arrival unremarkable, denies infectious symptoms, no chest pain/pressure or palpitations, no dyspnea.    - Thigh high compression stockings when out of bed  - Continue fludrocortisone 100 mcg daily.  If continues to have orthostatic hypotension can uptitrate  further   - Orthostatic blood pressures qshift  - PT/ ST rec TCU - SW/CT referrals sent  - Neuro and psych consulted and appreciate their recommendations.  Appears likely secondary to progression of Parkinson's     Parkinson's disease  She stopped Sinemet a few days prior to arrival at the direction of outpatient Neurologist, Dr. Jensen.  Had MRI/MRA brain 1/8 which was negative for acute pathology.  - Sinemet resumed by neurology   - Follow up with Dr Jensen after discharge  - Neurology consulted as above       Consultations This Hospital Stay   NEUROLOGY IP CONSULT  PSYCHIATRY IP CONSULT  SOCIAL WORK IP CONSULT  PHYSICAL THERAPY ADULT IP CONSULT  SPEECH LANGUAGE PATH ADULT IP CONSULT  OCCUPATIONAL THERAPY ADULT IP CONSULT  NUTRITION SERVICES ADULT IP CONSULT  PHYSICAL THERAPY ADULT IP CONSULT  OCCUPATIONAL THERAPY ADULT IP CONSULT    Code Status   Full Code    Time Spent on this Encounter   IAdriano DO, personally saw the patient today and spent greater than 30 minutes discharging this patient.       Adriano Vergara DO  [facility]  ______________________________________________________________________    Physical Exam   Vital Signs: Temp: 97.3  F (36.3  C) Temp src: Oral BP: (!) 140/71 Pulse: 107 Heart Rate: 80 Resp: 18 SpO2: 95 % O2 Device: None (Room air)    Weight: 140 lbs 0 oz  General Appearance: Resting comfortably.  NAD   Respiratory: Clear to auscultation.  No respiratory distress  Cardiovascular: RRR.  No murmurs  GI: Bowel sounds present.  Non-tender  Skin: No rashes.  No cyanosis  Other: No edema.  No calf tenderness         Primary Care Physician   Sean Velasquez    Discharge Orders      General info for SNF    Length of Stay Estimate: Short Term Care: Estimated # of Days <30  Condition at Discharge: Stable  Level of care:skilled   Rehabilitation Potential: Fair  Admission H&P remains valid and up-to-date: Yes  Recent Chemotherapy: N/A  Use Nursing Home Standing  Orders: Yes     Mantoux instructions    Give two-step Mantoux (PPD) Per Facility Policy Yes     Reason for your hospital stay    Orthostatic hypotension     Follow Up and recommended labs and tests    Follow up with Nursing home physician.  No follow up labs or test are needed.  Follow up with PCP 1-2 weeks after TCU discharge     Activity - Up with nursing assistance     Physical Therapy Adult Consult    Evaluate and treat as clinically indicated.    Reason:  Deconditioning     Occupational Therapy Adult Consult    Evaluate and treat as clinically indicated.    Reason:  Deconditioning     Advance Diet as Tolerated    Follow this diet upon discharge: Orders Placed This Encounter      Snacks/Supplements Adult: Other; pt may order supplements as desired; With Meals      Regular Diet Adult       Significant Results and Procedures   Most Recent 3 CBC's:  Recent Labs   Lab Test 01/15/20  1418 12/31/19  1232 11/25/19  1017   WBC 7.0 6.6 8.4   HGB 13.9 14.3 14.2   MCV 91 92 93    193 187     Most Recent 3 BMP's:  Recent Labs   Lab Test 01/15/20  1418 12/31/19  1232 11/25/19  1017    137 136   POTASSIUM 4.6 4.3 4.2   CHLORIDE 105 105 106   CO2 24 21 27   BUN 12 16 17   CR 0.60 0.67 0.74   ANIONGAP 5 11 3   JULIO 10.1 9.8 9.7   GLC 96 87 91   ,   Results for orders placed or performed during the hospital encounter of 01/08/20   MRA Brain ([place]) wo Contrast    Narrative    MRA BRAIN ([place] [facility] [place]) WITHOUT CONTRAST 1/8/2020 6:53 PM     HISTORY: Brain aneurysm.    TECHNIQUE: MR angiography was performed through the [place] [facility] [place]  without contrast.    COMPARISON: 1/29/2019.    FINDINGS: The distal internal carotid arteries, basilar artery, and  proximal anterior, middle, and posterior cerebral arteries are patent.  There is a distal right supraclinoid internal carotid aneurysm  projecting inferomedially measuring approximately 3.5 mm in base to  apex height and 2.5 to 3 mm in width. This is not  appreciably changed.  No other aneurysms are noted. There is no evidence for any stenosis.      Impression    IMPRESSION: Stable 3.5 mm right supraclinoid internal carotid artery  aneurysm projecting inferomedially.    SHARITA SESAY MD       Discharge Medications   Current Discharge Medication List      START taking these medications    Details   carbidopa-levodopa (SINEMET)  MG tablet Take 0.5 tablets by mouth 3 times daily    Comments: Future refills by PCP Dr. Sean Velasquez with phone number [phone].  Associated Diagnoses: Parkinson's disease (H)      fludrocortisone (FLORINEF) 0.1 MG tablet Take 1 tablet (0.1 mg) by mouth daily    Comments: Future refills by PCP Dr. Sean Velasquez with phone number [phone].  Associated Diagnoses: Orthostatic lightheadedness         CONTINUE these medications which have CHANGED    Details   acetaminophen-codeine (TYLENOL #3) 300-30 MG tablet Take 1 tab up to twice daily as needed  Qty: 10 tablet, Refills: 0    Associated Diagnoses: Episodic tension-type headache, not intractable      diazepam (VALIUM) 5 MG tablet TAKE 1 TABLET BY MOUTH EVERY DAY AS NEEDED FOR ANXIETY  Qty: 6 tablet, Refills: 0    Comments: Future refills by PCP Dr. Sean Velasquez with phone number [phone].  Associated Diagnoses: SHAWNEE (generalized anxiety disorder)      escitalopram (LEXAPRO) 10 MG tablet Take 1 tablet (10 mg) by mouth daily for 10 days  Refills: 3    Associated Diagnoses: SHAWNEE (generalized anxiety disorder)         CONTINUE these medications which have NOT CHANGED    Details   aspirin 81 MG tablet Take 81 mg by mouth At Bedtime   Qty: 120 tablet      Cholecalciferol (VITAMIN D) 2000 UNITS tablet Take 2,000 Units by mouth daily  Qty: 100 tablet, Refills: 3      cyanocolbalamin (VITAMIN  B-12) 500 MCG tablet Take 1 tablet by mouth daily.      diphenhydrAMINE (BENADRYL) 25 MG tablet Take 25 mg by mouth nightly as needed for itching or allergies      levothyroxine  (SYNTHROID/LEVOTHROID) 75 MCG tablet TAKE 1 TABLET (75 MCG) BY MOUTH DAILY  Qty: 90 tablet, Refills: 1    Associated Diagnoses: Hypothyroidism, unspecified type      melatonin 5 MG tablet Take 15 mg by mouth At Bedtime      polyethylene glycol (MIRALAX/GLYCOLAX) powder TAKE 17 G BY MOUTH 2 TIMES DAILY  Qty: 527 g, Refills: 11    Associated Diagnoses: Slow transit constipation      pravastatin (PRAVACHOL) 40 MG tablet TAKE 1 TABLET (40 MG) BY MOUTH DAILY  Qty: 90 tablet, Refills: 2    Associated Diagnoses: Hyperlipidemia with target LDL less than 100           Allergies   Allergies   Allergen Reactions     Mushroom Hives     Bees      Influenza Vaccine Live      Other [Seasonal Allergies]      msg and mushrooms     Penicillins      Wasp Venom Protein Hives     Ciprofloxacin

## 2020-01-22 NOTE — PROGRESS NOTES
Discharge Note:    D/I:  Received discharge orders for patient.  Bed is available for patient to discharge to [facility] today. Patient in need of wheelchair transport for discharge.  Discussed private pay fees with son Ed and he is in understanding.  Call Placed to [facility] for wheelchair ride for 7:30 tonight.  Updated family and they are in agreement with discharge plans.  Updated facility and faxed the orders and scripts.    TIM Shelton, LGSW  [phone]  [facility]

## 2020-01-22 NOTE — PROVIDER NOTIFICATION
Brief update:    Paged regarding persistent orthostasis    As noted previously, multifactorial related both to poor intake as well as neurogenic orthostasis.    Remains on Florinef, compression stockings when out of bed.    At this time, though she remains orthostatic by vitals, patient asymptomatic    Note that low urine output has resolved following serial fluid boluses over the past few nights.    In the absence of symptoms and with resolution of low urine output, will hold on further IV volume currently.  Orthostatic can be reassessed in a.m. to ensure patient is appropriate for discharge.    Adriano Pabon MD  11:25 PM

## 2020-01-22 NOTE — PROVIDER NOTIFICATION
MD Notification    Person notified: primary hospitalist    Person Name: Dr. Vergara    Date/Time: 1/22/2020    Interaction: web-based page    Purpose of Notification: FYI patient still has morales cath, if she could leave today would you like me to remove it this AM and ensure she can void prior to discharge?    Orders Received:    Comments:

## 2020-01-22 NOTE — PROVIDER NOTIFICATION
MD Notification    Notified Person: MD    Notified Person Name: Dr. Pabon    Notification Date/Time: January 22, 2020 0030    Notification Interaction: TORB    Purpose of Notification: Pt c/o severe HA, given Tylenol #3 2030 w/o relief. Requesting something else.    Orders Received: Once dose Oxycodone 2.5 mg    Comments:

## 2020-01-22 NOTE — PROGRESS NOTES
"CLINICAL NUTRITION SERVICES - REASSESSMENT NOTE      Future/Additional Recommendations:     We talked about alternate supplements pt can try at home that are less sweet - wrote them down and put the list in her purse ([place] Breakfast Essentials, Premier Protein, Muscle Milk).  Pt likes coffee, so suggested she try putting some of the supps in her coffee - \"I love mochas!\"     Malnutrition: (1/17)  % Weight Loss:  Up to 7.5% in 3 months (non-severe malnutrition) - 4% past 2 months  % Intake:  <75% for >/= 1 month (non-severe malnutrition)  Subcutaneous Fat Loss:  None observed  Muscle Loss:  Temporal region - mild depletion  Fluid Retention:  None noted     Malnutrition Diagnosis: Non-Severe malnutrition  In Context of:  Acute illness or injury on Chronic illness or disease         EVALUATION OF PROGRESS TOWARD GOALS   Diet:    Regular    Intake/Tolerance:    Chart reviewed  Pt has been ordering 2-3 meals per day  Breakfast today - eggs, cereal, milk, coffee  Flowsheets reflect intake has been 50-75%    Visited with pt this morning  She tells me that she is eating better  \"I ate all of my cod and most of the potatoes and carrots last night.\"  This morning ate all of her eggs and cereal  She did try the Boost supplements - \"it was awful, too sweet and thick\"      NEW FINDINGS:   TCU at d/c    Previous Goals (1/17):   Pt to consume >50% meals  Evaluation: Met    Previous Nutrition Diagnosis (1/17):   Inadequate oral intake related to decreased appetite with constipation as evidenced by pt consumed 25% breakfast today   Evaluation: Improving      CURRENT NUTRITION DIAGNOSIS  No nutrition diagnosis identified at this time     INTERVENTIONS  Recommendations / Nutrition Prescription  Regular diet    Implementation  We talked about alternate supplements pt can try at home that are less sweet - wrote them down and put the list in her purse ([place] Breakfast Essentials, Premier Protein, Muscle Milk).  Pt likes coffee, " "so suggested she try putting some of the supps in her coffee - \"I love mochas!\"    Goals  Pt to consume 75% meals      MONITORING AND EVALUATION:  Progress towards goals will be monitored and evaluated per protocol and Practice Guidelines        "

## 2020-01-23 NOTE — PLAN OF CARE
A&Ox4, valium for sleep/pain. VSS on RA. Kim removed and voided multiple times.  A2 + walker/GB. Plan: discharge to TCU at 1930.  Pt dressed, IV out, belongings together.

## 2020-01-23 NOTE — PROGRESS NOTES
[facility]  PRIMARY CARE PROVIDER AND CLINIC:  Sean Velasquez MD, [address] [facility] [address]  Chief Complaint   Patient presents with     Hospital F/U     [place] Medical Record Number:  [id number]  Place of Service where encounter took place:  [facility] ([facility]) [[id number]]    Sadaf Rubi  is a 74 year old  (1945), Past history of Parkinson's, hyperlipidemia, hypothyroid, depression, anxiety who presents with generalized weakness and orthostatic lightheadedness. admitted to the above facility from  [facility]. Hospital stay 1/15/2020 through 1/22/2020..  Admitted to this facility for  rehab, medical management and nursing care.    HPI:    HPI information obtained from: facility chart records, facility staff, patient report and [facility] chart review.   Brief Summary of Hospital Course:   Weakness/orthostatis hypotension: deconditioning and progression of Parkinson's as well as drug related. No infectious process found.   Parkinson's: followed by Dr. Jensen, did have MRI/MRA on 1/8 which was negative, sinemet initially stopped prior to admission, resumed by neurology, f/u as OP  Updates on Status Since Skilled nursing Admission: On exam today patient is alert, sitting up in chair, states she has ongoing positional vertigo from falling and hitting her head, the symptoms come from her turning or tilting her head to the left and she has had PT for this in the past, the vertigo is ongoing, patient denies nausea, vomiting, CP, palpitations, cough, congestion, palpitations, states she does not sleep well at night and valium works well for her in past.     CODE STATUS/ADVANCE DIRECTIVES DISCUSSION:   CPR/Full code   Patient's living condition: lives alone  ALLERGIES: Mushroom; Bees; Influenza vaccine live; Other [seasonal allergies]; Penicillins; Wasp venom protein; and Ciprofloxacin  PAST MEDICAL HISTORY:  has a past medical history of  Anxiety, Depressive disorder, not elsewhere classified, Hypertension, Insomnia, Knee pain, Palpitation (11/11), Premature ventricular contraction, Shortness of breath (11/11), Spider veins, and Unspecified hypothyroidism. She also has no past medical history of Asymptomatic human immunodeficiency virus (HIV) infection status (H), Cerebral palsy (H), Complication of anesthesia, Congenital renal agenesis and dysgenesis, Goiter, Gout, Hernia, abdominal, History of spinal cord injury, History of thrombophlebitis, Horseshoe kidney, Hydrocephalus (H), Malignant hyperthermia, Mumps, Palpitations, Parkinsons disease (H), Spina bifida (H), STD (sexually transmitted disease), Tethered cord (H), or Tuberculosis.  PAST SURGICAL HISTORY:   has a past surgical history that includes knee surgery (1/2006); Lumpectomy breast (1/2001); Urethra surgery (1977); knee surgery (2007); knee surgery (09/10/2012); and GYN surgery (1990's).  FAMILY HISTORY: family history includes Breast Cancer in her mother; C.A.D. in her father.  SOCIAL HISTORY:   reports that she has never smoked. She has never used smokeless tobacco. She reports current alcohol use. She reports that she does not use drugs.    Post Discharge Medication Reconciliation Status: discharge medications reconciled, continue medications without change    Current Outpatient Medications   Medication Sig Dispense Refill     acetaminophen-codeine (TYLENOL #3) 300-30 MG tablet Take 1 tab up to twice daily as needed 10 tablet 0     aspirin 81 MG tablet Take 81 mg by mouth At Bedtime  120 tablet      carbidopa-levodopa (SINEMET)  MG tablet Take 0.5 tablets by mouth 3 times daily       Cholecalciferol (VITAMIN D) 2000 UNITS tablet Take 2,000 Units by mouth daily 100 tablet 3     cyanocolbalamin (VITAMIN  B-12) 500 MCG tablet Take 1 tablet by mouth daily.       diazepam (VALIUM) 5 MG tablet TAKE 1 TABLET BY MOUTH EVERY DAY AS NEEDED FOR ANXIETY 6 tablet 0     diphenhydrAMINE  "(BENADRYL) 25 MG tablet Take 25 mg by mouth nightly as needed for itching or allergies       escitalopram (LEXAPRO) 10 MG tablet Take 1 tablet (10 mg) by mouth daily for 10 days  3     fludrocortisone (FLORINEF) 0.1 MG tablet Take 1 tablet (0.1 mg) by mouth daily       levothyroxine (SYNTHROID/LEVOTHROID) 75 MCG tablet TAKE 1 TABLET (75 MCG) BY MOUTH DAILY 90 tablet 1     melatonin 5 MG tablet Take 15 mg by mouth At Bedtime       polyethylene glycol (MIRALAX/GLYCOLAX) powder TAKE 17 G BY MOUTH 2 TIMES DAILY 527 g 11     pravastatin (PRAVACHOL) 40 MG tablet TAKE 1 TABLET (40 MG) BY MOUTH DAILY 90 tablet 2       ROS:  10 point ROS of systems including Constitutional, Eyes, Respiratory, Cardiovascular, Gastroenterology, Genitourinary, Integumentary, Musculoskeletal, Psychiatric were all negative except for pertinent positives noted in my HPI.    Vitals:  /84   Pulse 75   Temp 97  F (36.1  C)   Resp 16   Ht 1.588 m (5' 2.5\")   SpO2 94%   BMI 25.20 kg/m    Exam:  GENERAL APPEARANCE:  Alert, in no distress  ENT:  Mouth and posterior oropharynx normal, moist mucous membranes, normal hearing acuity  EYES:  EOM, conjunctivae, lids, pupils and irises normal, PERRL  RESP:  respiratory effort and palpation of chest normal, lungs clear to auscultation , no respiratory distress  CV:  Palpation and auscultation of heart done , regular rate and rhythm, no murmur, rub, or gallop, peripheral edema trace+ in LE bilaterally  ABDOMEN:  normal bowel sounds, soft, nontender, no hepatosplenomegaly or other masses  M/S:   Examination of:   right upper extremity, left upper extremity and right lower extremity  Inspection, ROM, stability and muscle strength normal  SKIN:  Inspection of skin and subcutaneous tissue baseline  NEURO:   Cranial nerves 2-12 are normal tested and grossly at patient's baseline, speech WNL    Lab/Diagnostic data:  Recent labs in [facility] reviewed by me today.     ASSESSMENT/PLAN:  Parkinson's disease " (H)  Orthostatic hypotension  Physical deconditioning  BPPV left ear  Acute/ongoing: PT (work on maneuvers for BPPV)  and OT for strengthening, sinemet 25/100mg take 1/2 tab TID, florinef 0.1mg QD    Dysphagia, unspecified type  Ongoing: diet regular with thin liquids, patient states she has some trouble swallowing foods, she pays attention to chewing food well and sometimes has to swallow 2-3 times to get food down, no trouble swallowing liquids    SHAWNEE (generalized anxiety disorder)  Ongoing: patient is on valium 5mg QD prn, lexapro 10mg QD       Orders written by provider at facility  BMP and CBC on Monday  PT to work with patient for BPPV left sided    Total time spent with patient visit at the skilled nursing facility was 45 min including patient visit and review of past records. Greater than 50% of total time spent with counseling and coordinating care due to discussed BPPV and vertigo, therapy plan and will work with BPPV and strengthening, will continue current medications no changes. .     Electronically signed by:  Tonya Lynn Haase, APRN CNP

## 2020-01-23 NOTE — LETTER
1/23/2020        RE: Sadaf Rubi  [address]  [facility] [address]        [place] [facility]  PRIMARY CARE PROVIDER AND CLINIC:  Sean Velasquez MD, [address] / [facility] [address]  Chief Complaint   Patient presents with     Hospital F/U     [place] Medical Record Number:  [id number]  Place of Service where encounter took place:  [facility] ([facility] [[id number]]    Sadaf Rubi  is a 74 year old  (1945), Past history of Parkinson's, hyperlipidemia, hypothyroid, depression, anxiety who presents with generalized weakness and orthostatic lightheadedness. admitted to the above facility from  [facility]. Hospital stay 1/15/2020 through 1/22/2020..  Admitted to this facility for  rehab, medical management and nursing care.    HPI:    HPI information obtained from: facility chart records, facility staff, patient report and [facility] chart review.   Brief Summary of Hospital Course:   Weakness/orthostatis hypotension: deconditioning and progression of Parkinson's as well as drug related. No infectious process found.   Parkinson's: followed by Dr. Jensen, did have MRI/MRA on 1/8 which was negative, sinemet initially stopped prior to admission, resumed by neurology, f/u as OP  Updates on Status Since Skilled nursing Admission: On exam today patient is alert, sitting up in chair, states she has ongoing positional vertigo from falling and hitting her head, the symptoms come from her turning or tilting her head to the left and she has had PT for this in the past, the vertigo is ongoing, patient denies nausea, vomiting, CP, palpitations, cough, congestion, palpitations, states she does not sleep well at night and valium works well for her in past.     CODE STATUS/ADVANCE DIRECTIVES DISCUSSION:   CPR/Full code   Patient's living condition: lives alone  ALLERGIES: Mushroom; Bees; Influenza vaccine live; Other [seasonal allergies]; Penicillins;  Wasp venom protein; and Ciprofloxacin  PAST MEDICAL HISTORY:  has a past medical history of Anxiety, Depressive disorder, not elsewhere classified, Hypertension, Insomnia, Knee pain, Palpitation (11/11), Premature ventricular contraction, Shortness of breath (11/11), Spider veins, and Unspecified hypothyroidism. She also has no past medical history of Asymptomatic human immunodeficiency virus (HIV) infection status (H), Cerebral palsy (H), Complication of anesthesia, Congenital renal agenesis and dysgenesis, Goiter, Gout, Hernia, abdominal, History of spinal cord injury, History of thrombophlebitis, Horseshoe kidney, Hydrocephalus (H), Malignant hyperthermia, Mumps, Palpitations, Parkinsons disease (H), Spina bifida (H), STD (sexually transmitted disease), Tethered cord (H), or Tuberculosis.  PAST SURGICAL HISTORY:   has a past surgical history that includes knee surgery (1/2006); Lumpectomy breast (1/2001); Urethra surgery (1977); knee surgery (2007); knee surgery (09/10/2012); and GYN surgery (1990's).  FAMILY HISTORY: family history includes Breast Cancer in her mother; C.A.D. in her father.  SOCIAL HISTORY:   reports that she has never smoked. She has never used smokeless tobacco. She reports current alcohol use. She reports that she does not use drugs.    Post Discharge Medication Reconciliation Status: discharge medications reconciled, continue medications without change    Current Outpatient Medications   Medication Sig Dispense Refill     acetaminophen-codeine (TYLENOL #3) 300-30 MG tablet Take 1 tab up to twice daily as needed 10 tablet 0     aspirin 81 MG tablet Take 81 mg by mouth At Bedtime  120 tablet      carbidopa-levodopa (SINEMET)  MG tablet Take 0.5 tablets by mouth 3 times daily       Cholecalciferol (VITAMIN D) 2000 UNITS tablet Take 2,000 Units by mouth daily 100 tablet 3     cyanocolbalamin (VITAMIN  B-12) 500 MCG tablet Take 1 tablet by mouth daily.       diazepam (VALIUM) 5 MG tablet  "TAKE 1 TABLET BY MOUTH EVERY DAY AS NEEDED FOR ANXIETY 6 tablet 0     diphenhydrAMINE (BENADRYL) 25 MG tablet Take 25 mg by mouth nightly as needed for itching or allergies       escitalopram (LEXAPRO) 10 MG tablet Take 1 tablet (10 mg) by mouth daily for 10 days  3     fludrocortisone (FLORINEF) 0.1 MG tablet Take 1 tablet (0.1 mg) by mouth daily       levothyroxine (SYNTHROID/LEVOTHROID) 75 MCG tablet TAKE 1 TABLET (75 MCG) BY MOUTH DAILY 90 tablet 1     melatonin 5 MG tablet Take 15 mg by mouth At Bedtime       polyethylene glycol (MIRALAX/GLYCOLAX) powder TAKE 17 G BY MOUTH 2 TIMES DAILY 527 g 11     pravastatin (PRAVACHOL) 40 MG tablet TAKE 1 TABLET (40 MG) BY MOUTH DAILY 90 tablet 2       ROS:  10 point ROS of systems including Constitutional, Eyes, Respiratory, Cardiovascular, Gastroenterology, Genitourinary, Integumentary, Musculoskeletal, Psychiatric were all negative except for pertinent positives noted in my HPI.    Vitals:  /84   Pulse 75   Temp 97  F (36.1  C)   Resp 16   Ht 1.588 m (5' 2.5\")   SpO2 94%   BMI 25.20 kg/m     Exam:  GENERAL APPEARANCE:  Alert, in no distress  ENT:  Mouth and posterior oropharynx normal, moist mucous membranes, normal hearing acuity  EYES:  EOM, conjunctivae, lids, pupils and irises normal, PERRL  RESP:  respiratory effort and palpation of chest normal, lungs clear to auscultation , no respiratory distress  CV:  Palpation and auscultation of heart done , regular rate and rhythm, no murmur, rub, or gallop, peripheral edema trace+ in LE bilaterally  ABDOMEN:  normal bowel sounds, soft, nontender, no hepatosplenomegaly or other masses  M/S:   Examination of:   right upper extremity, left upper extremity and right lower extremity  Inspection, ROM, stability and muscle strength normal  SKIN:  Inspection of skin and subcutaneous tissue baseline  NEURO:   Cranial nerves 2-12 are normal tested and grossly at patient's baseline, speech WNL    Lab/Diagnostic " data:  Recent labs in [facility] reviewed by me today.     ASSESSMENT/PLAN:  Parkinson's disease (H)  Orthostatic hypotension  Physical deconditioning  BPPV left ear  Acute/ongoing: PT (work on maneuvers for BPPV)  and OT for strengthening, sinemet 25/100mg take 1/2 tab TID, florinef 0.1mg QD    Dysphagia, unspecified type  Ongoing: diet regular with thin liquids, patient states she has some trouble swallowing foods, she pays attention to chewing food well and sometimes has to swallow 2-3 times to get food down, no trouble swallowing liquids    SHAWNEE (generalized anxiety disorder)  Ongoing: patient is on valium 5mg QD prn, lexapro 10mg QD       Orders written by provider at facility  BMP and CBC on Monday  PT to work with patient for BPPV left sided    Total time spent with patient visit at the skilled nursing facility was 45 min including patient visit and review of past records. Greater than 50% of total time spent with counseling and coordinating care due to discussed BPPV and vertigo, therapy plan and will work with BPPV and strengthening, will continue current medications no changes. .     Electronically signed by:  Tonya Lynn Haase, APRN CNP                       Sincerely,        Tonya Lynn Haase, APRN CNP

## 2020-01-26 NOTE — TELEPHONE ENCOUNTER
Resident complaining of urinary frequency and dysuria. Vital signs are within normal limits. No hematuria noted by staff or resident. Has history of UTIs    Plan:  1.) UA/UC  2.) Encourage fluids     JENAE Zamora [facility]

## 2020-01-27 NOTE — PROGRESS NOTES
[place] [facility]  [place] [facility] Record Number:  [id number]  Place of Service where encounter took place:  [facility] ([facility] [[id number]]  Chief Complaint   Patient presents with     Nursing Home Acute       HPI:    Sadaf Rubi  is a 74 year old (1945), who is being seen today for an episodic care visit.  HPI information obtained from: facility chart records, facility staff, patient report and [facility] chart review. Today's concern is:  BPPV: on exam today patient is alert, sitting up in WC, states she is feeling better, states dizziness has improved, denies N/V, in therapy she is walking 30-40 feet with RW with CTG assist, needing assist with toileting,   Parkinson's/deconditioning: see above  HTN: BP as follows: 145/78, 158/92, 152/78 with HR 70-80 range, denies CP, palpitations, SOB  Dysphagia: denies cough, choking with meals    Past Medical and Surgical History reviewed in Epic today.    MEDICATIONS:  Current Outpatient Medications   Medication Sig Dispense Refill     acetaminophen (TYLENOL) 325 MG tablet Take 650 mg by mouth every 6 hours as needed for mild pain       acetaminophen-codeine (TYLENOL #3) 300-30 MG tablet Take 1 tab up to twice daily as needed 10 tablet 0     aspirin 81 MG tablet Take 81 mg by mouth At Bedtime  120 tablet      carbidopa-levodopa (SINEMET)  MG tablet Take 0.5 tablets by mouth 3 times daily       Cholecalciferol (VITAMIN D) 2000 UNITS tablet Take 2,000 Units by mouth daily 100 tablet 3     cyanocolbalamin (VITAMIN  B-12) 500 MCG tablet Take 1 tablet by mouth daily.       diazepam (VALIUM) 5 MG tablet TAKE 1 TABLET BY MOUTH EVERY DAY AS NEEDED FOR ANXIETY 6 tablet 0     diphenhydrAMINE (BENADRYL) 25 MG tablet Take 25 mg by mouth nightly as needed for itching or allergies       escitalopram (LEXAPRO) 10 MG tablet Take 1 tablet (10 mg) by mouth daily for 10 days  3     fludrocortisone (FLORINEF) 0.1 MG tablet Take 1 tablet (0.1 mg) by mouth  "daily       levothyroxine (SYNTHROID/LEVOTHROID) 75 MCG tablet TAKE 1 TABLET (75 MCG) BY MOUTH DAILY 90 tablet 1     melatonin 5 MG tablet Take 15 mg by mouth At Bedtime       polyethylene glycol (MIRALAX/GLYCOLAX) powder TAKE 17 G BY MOUTH 2 TIMES DAILY 527 g 11     pravastatin (PRAVACHOL) 40 MG tablet TAKE 1 TABLET (40 MG) BY MOUTH DAILY 90 tablet 2         REVIEW OF SYSTEMS:  10 point ROS of systems including Constitutional, Eyes, Respiratory, Cardiovascular, Gastroenterology, Genitourinary, Integumentary, Musculoskeletal, Psychiatric were all negative except for pertinent positives noted in my HPI.    Objective:  BP (!) 145/78   Pulse 85   Temp 97.4  F (36.3  C)   Resp 18   Ht 1.588 m (5' 2.5\")   Wt 62.6 kg (138 lb)   SpO2 96%   BMI 24.84 kg/m    Exam:  Exam:  GENERAL APPEARANCE:  Alert, in no distress  ENT:  Mouth and posterior oropharynx normal, moist mucous membranes, normal hearing acuity  EYES:  EOM, conjunctivae, lids, pupils and irises normal, PERRL  RESP:  respiratory effort and palpation of chest normal, lungs clear to auscultation , no respiratory distress  CV:  Palpation and auscultation of heart done , regular rate and rhythm, no murmur, rub, or gallop, peripheral edema trace+ in LE bilaterally  ABDOMEN:  normal bowel sounds, soft, nontender, no hepatosplenomegaly or other masses  M/S:   Examination of:   right upper extremity, left upper extremity and right lower extremity  Inspection, ROM, stability and muscle strength normal  SKIN:  Inspection of skin and subcutaneous tissue baseline  NEURO:   Cranial nerves 2-12 are normal tested and grossly at patient's baseline, speech WNL    Labs:   Recent labs in [facility] reviewed by me today.     ASSESSMENT/PLAN:  Parkinson's disease (H)  Orthostatic hypotension  Physical deconditioning  BPPV left ear  Acute/ongoing: PT (work on maneuvers for BPPV)  and OT for strengthening, sinemet 25/100mg take 1/2 tab TID, florinef 0.1mg QD     Dysphagia, unspecified " type  Ongoing: diet regular with thin liquids, patient states she has some trouble swallowing foods, she pays attention to chewing food well and sometimes has to swallow 2-3 times to get food down, no trouble swallowing liquids     SHAWNEE (generalized anxiety disorder)  Ongoing: patient is on valium 5mg QD prn, lexapro 10mg QD         Orders written by provider at facility  No new orders    Total time spent with patient visit at the [facility] nursing [facility] was 45 min including patient visit and review of past records. Greater than 50% of total time spent with counseling and coordinating care due to discussed current medications and lab monitoring, discussed therapy and how things are going in therapy, discussed disccharge plan, will continue to work on strengthening .  Electronically signed by:  Tonya Lynn Haase, APRN CNP

## 2020-01-28 NOTE — LETTER
1/28/2020        RE: Sadaf Rubi  [address]  [facility] [address]        [place] [facility]  [place] Medical Record Number:  [id number]  Place of Service where encounter took place:  [facility] [[id number]]  Chief Complaint   Patient presents with     Nursing Home Acute       HPI:    Sadaf Rubi  is a 74 year old (1945), who is being seen today for an episodic care visit.  HPI information obtained from: facility chart records, facility staff, patient report and [facility] chart review. Today's concern is:  BPPV: on exam today patient is alert, sitting up in WC, states she is feeling better, states dizziness has improved, denies N/V, in therapy she is walking 30-40 feet with RW with CTG assist, needing assist with toileting,   Parkinson's/deconditioning: see above  HTN: BP as follows: 145/78, 158/92, 152/78 with HR 70-80 range, denies CP, palpitations, SOB  Dysphagia: denies cough, choking with meals    Past Medical and Surgical History reviewed in Epic today.    MEDICATIONS:  Current Outpatient Medications   Medication Sig Dispense Refill     acetaminophen (TYLENOL) 325 MG tablet Take 650 mg by mouth every 6 hours as needed for mild pain       acetaminophen-codeine (TYLENOL #3) 300-30 MG tablet Take 1 tab up to twice daily as needed 10 tablet 0     aspirin 81 MG tablet Take 81 mg by mouth At Bedtime  120 tablet      carbidopa-levodopa (SINEMET)  MG tablet Take 0.5 tablets by mouth 3 times daily       Cholecalciferol (VITAMIN D) 2000 UNITS tablet Take 2,000 Units by mouth daily 100 tablet 3     cyanocolbalamin (VITAMIN  B-12) 500 MCG tablet Take 1 tablet by mouth daily.       diazepam (VALIUM) 5 MG tablet TAKE 1 TABLET BY MOUTH EVERY DAY AS NEEDED FOR ANXIETY 6 tablet 0     diphenhydrAMINE (BENADRYL) 25 MG tablet Take 25 mg by mouth nightly as needed for itching or allergies       escitalopram (LEXAPRO) 10 MG tablet Take 1 tablet (10 mg) by mouth  "daily for 10 days  3     fludrocortisone (FLORINEF) 0.1 MG tablet Take 1 tablet (0.1 mg) by mouth daily       levothyroxine (SYNTHROID/LEVOTHROID) 75 MCG tablet TAKE 1 TABLET (75 MCG) BY MOUTH DAILY 90 tablet 1     melatonin 5 MG tablet Take 15 mg by mouth At Bedtime       polyethylene glycol (MIRALAX/GLYCOLAX) powder TAKE 17 G BY MOUTH 2 TIMES DAILY 527 g 11     pravastatin (PRAVACHOL) 40 MG tablet TAKE 1 TABLET (40 MG) BY MOUTH DAILY 90 tablet 2         REVIEW OF SYSTEMS:  10 point ROS of systems including Constitutional, Eyes, Respiratory, Cardiovascular, Gastroenterology, Genitourinary, Integumentary, Musculoskeletal, Psychiatric were all negative except for pertinent positives noted in my HPI.    Objective:  BP (!) 145/78   Pulse 85   Temp 97.4  F (36.3  C)   Resp 18   Ht 1.588 m (5' 2.5\")   Wt 62.6 kg (138 lb)   SpO2 96%   BMI 24.84 kg/m     Exam:  Exam:  GENERAL APPEARANCE:  Alert, in no distress  ENT:  Mouth and posterior oropharynx normal, moist mucous membranes, normal hearing acuity  EYES:  EOM, conjunctivae, lids, pupils and irises normal, PERRL  RESP:  respiratory effort and palpation of chest normal, lungs clear to auscultation , no respiratory distress  CV:  Palpation and auscultation of heart done , regular rate and rhythm, no murmur, rub, or gallop, peripheral edema trace+ in LE bilaterally  ABDOMEN:  normal bowel sounds, soft, nontender, no hepatosplenomegaly or other masses  M/S:   Examination of:   right upper extremity, left upper extremity and right lower extremity  Inspection, ROM, stability and muscle strength normal  SKIN:  Inspection of skin and subcutaneous tissue baseline  NEURO:   Cranial nerves 2-12 are normal tested and grossly at patient's baseline, speech WNL    Labs:   Recent labs in [facility] reviewed by me today.     ASSESSMENT/PLAN:  Parkinson's disease (H)  Orthostatic hypotension  Physical deconditioning  BPPV left ear  Acute/ongoing: PT (work on maneuvers for BPPV)  and OT " for strengthening, sinemet 25/100mg take 1/2 tab TID, florinef 0.1mg QD     Dysphagia, unspecified type  Ongoing: diet regular with thin liquids, patient states she has some trouble swallowing foods, she pays attention to chewing food well and sometimes has to swallow 2-3 times to get food down, no trouble swallowing liquids     SHAWNEE (generalized anxiety disorder)  Ongoing: patient is on valium 5mg QD prn, lexapro 10mg QD         Orders written by provider at facility  No new orders    Total time spent with patient visit at the [facility] nursing [facility] was 45 min including patient visit and review of past records. Greater than 50% of total time spent with counseling and coordinating care due to discussed current medications and lab monitoring, discussed therapy and how things are going in therapy, discussed disccharge plan, will continue to work on strengthening .  Electronically signed by:  Tonya Lynn Haase, APRN CNP             Sincerely,        Tonya Lynn Haase, APRN CNP

## 2020-01-29 NOTE — TELEPHONE ENCOUNTER
Nurse called reporting UC >100,000 E coli.  Allergies include cipro and PCN.   Susceptible to nitrofurantoin.     Order given for macrobid 100 mg bid for 5 days. Update primary NP in am.     Josef EMANUEL CNP

## 2020-02-04 NOTE — PROGRESS NOTES
[place] [facility]  PRIMARY CARE PROVIDER AND CLINIC:  Sean Velasquez MD, [address] [facility] [address]    Patient was seen by Dr. Brown at the [facility] on February 1, 2020, for an initial TCU visit.      Patient is a 74 year old  (1945), Past history of Parkinson's disease who was hospitalized at [facility] from January 15, 2020 through January 22, 2020 for the management of weakness and dizziness.     Patient's weakness and dizziness were felt to be multifactorial i.e. secondary to orthostatic hypotension related to Parkinson's disease and dehydration.  Symptoms improved with use of Florinef and with IV fluids  MRI/MRA within normal limits.  Sinemet was resumed following discontinuation shortly prior to hospitalization.    Following admission to the TCU, patient developed more typical positional vertigo for which she received physical therapy, with improvement.    Overall, patient notes she is feeling improved.  She is walking with a walker.  She primarily notes feeling dizzy when she looks down.  She denies syncope, near syncope, chest pain, shortness of breath.  Appetite has been good.  She does have chronic dysphagia with difficulty swallowing solid foods at times.  She has been constipated since admission to the TCU though she has had a bowel movement.      CODE STATUS/ADVANCE DIRECTIVES DISCUSSION:   CPR/Full code   Patient's living condition: lives alone  ALLERGIES: Mushroom; Bees; Influenza vaccine live; Other [seasonal allergies]; Penicillins; Wasp venom protein; and Ciprofloxacin  PAST MEDICAL HISTORY:  has a past medical history of Anxiety, Depressive disorder, not elsewhere classified, Hypertension, Insomnia, Knee pain, Palpitation (11/11), Premature ventricular contraction, Shortness of breath (11/11), Spider veins, and Unspecified hypothyroidism. She also has no past medical history of Asymptomatic human immunodeficiency virus (HIV)  infection status (H), Cerebral palsy (H), Complication of anesthesia, Congenital renal agenesis and dysgenesis, Goiter, Gout, Hernia, abdominal, History of spinal cord injury, History of thrombophlebitis, Horseshoe kidney, Hydrocephalus (H), Malignant hyperthermia, Mumps, Palpitations, Parkinsons disease (H), Spina bifida (H), STD (sexually transmitted disease), Tethered cord (H), or Tuberculosis.  PAST SURGICAL HISTORY:   has a past surgical history that includes knee surgery (1/2006); Lumpectomy breast (1/2001); Urethra surgery (1977); knee surgery (2007); knee surgery (09/10/2012); and GYN surgery (1990's).  FAMILY HISTORY: family history includes Breast Cancer in her mother; C.A.D. in her father.  SOCIAL HISTORY:   reports that she has never smoked. She has never used smokeless tobacco. She reports current alcohol use. She reports that she does not use drugs.      Current Outpatient Medications   Medication Sig Dispense Refill     acetaminophen (TYLENOL) 325 MG tablet Take 650 mg by mouth every 6 hours as needed for mild pain       acetaminophen-codeine (TYLENOL #3) 300-30 MG tablet Take 1 tab up to twice daily as needed 10 tablet 0     aspirin 81 MG tablet Take 81 mg by mouth At Bedtime  120 tablet      carbidopa-levodopa (SINEMET)  MG tablet Take 0.5 tablets by mouth 3 times daily       Cholecalciferol (VITAMIN D) 2000 UNITS tablet Take 2,000 Units by mouth daily 100 tablet 3     cyanocolbalamin (VITAMIN  B-12) 500 MCG tablet Take 1 tablet by mouth daily.       diazepam (VALIUM) 5 MG tablet TAKE 1 TABLET BY MOUTH EVERY DAY AS NEEDED FOR ANXIETY 6 tablet 0     diphenhydrAMINE (BENADRYL) 25 MG tablet Take 25 mg by mouth nightly as needed for itching or allergies       escitalopram (LEXAPRO) 10 MG tablet Take 1 tablet (10 mg) by mouth daily for 10 days  3     fludrocortisone (FLORINEF) 0.1 MG tablet Take 1 tablet (0.1 mg) by mouth daily       levothyroxine (SYNTHROID/LEVOTHROID) 75 MCG tablet TAKE 1 TABLET  (75 MCG) BY MOUTH DAILY 90 tablet 1     melatonin 5 MG tablet Take 15 mg by mouth At Bedtime       polyethylene glycol (MIRALAX/GLYCOLAX) powder TAKE 17 G BY MOUTH 2 TIMES DAILY 527 g 11     pravastatin (PRAVACHOL) 40 MG tablet TAKE 1 TABLET (40 MG) BY MOUTH DAILY 90 tablet 2       ROS:  10 point ROS negative except as noted above.    Vitals:    Exam:  GENERAL APPEARANCE:  Alert, in no distress, lying in bed, very pleasant.  ENT: Oral mucosa moist.  EYES: Extraocular muscles intact.  No eye redness or drainage  RESP: Lungs clear  CV: Regular rhythm  ABDOMEN: Soft, nontender, nondistended  M/S:   No lower extremity edema  NEURO: Alert, fully oriented.  Voice soft.  Face somewhat masked.  Slight mouth tremor, no rigidity.  Gait was not assessed.            ASSESSMENT/PLAN:    Dizziness and vertigo.  Appears multifactorial i.e. secondary to Parkinson's disease with prominent orthostatic hypotension.  Patient appears also to have a component of peripheral labyrinth dysfunction, possibly benign positional vertigo.  This has improved with physical therapy.  Plan: Continue therapies.  Continue medications for Parkinson's disease.  Maintain adequate hydration.  Continue Florinef, compressive stockings on legs.     Dysphagia, unspecified type  Chronic, likely secondary to Parkinson's disease.  Plan: Monitor swallowing.  Speech therapy.    SHAWNEE (generalized anxiety disorder)  Ongoing: patient is on valium 5mg QD prn, lexapro 10mg QD       Phu Brown MD

## 2020-02-05 NOTE — LETTER
2/5/2020        RE: Sadaf Rubi  [address]  [facility] [address]        [place] [facility]  [place] Medical Record Number:  [id number]  Place of Service where encounter took place:  [facility] (S) [[id number]]  Chief Complaint   Patient presents with     Nursing Home Acute       HPI:    Sadaf Rubi  is a 74 year old (1945), who is being seen today for an episodic care visit.  HPI information obtained from: facility chart records, facility staff, patient report and [facility] chart review. Today's concern is:  BPPV: on exam today patient states dizziness has improved, denies N/V, in therapy she is walking 600RW with CTG assist  Patient feels sinemet is causing dizziness, she states she feels better off sinemet  Parkinson's/deconditioning: see above, patient does not want to take sinemet, she feels the medication makes her more dizzy does not feel the medication helps, patient denies any freezing or slowing of her movement off sinemet.   HTN: BP as follows: /80, 144/84, 136/86 with HR 70 range, denies CP, palpitations, SOB, discussed DC florinef and watching BP  Dysphagia: denies cough, choking with meals    Past Medical and Surgical History reviewed in Epic today.    MEDICATIONS:  Current Outpatient Medications   Medication Sig Dispense Refill     acetaminophen (TYLENOL) 325 MG tablet Take 650 mg by mouth every 6 hours as needed for mild pain       acetaminophen-codeine (TYLENOL W/CODEINE NO. 3) 300-30 MG per tablet Take 1 tablet by mouth every 8 hours as needed for severe pain       aspirin 81 MG tablet Take 81 mg by mouth At Bedtime  120 tablet      Cholecalciferol (VITAMIN D) 2000 UNITS tablet Take 2,000 Units by mouth daily 100 tablet 3     cyanocolbalamin (VITAMIN  B-12) 500 MCG tablet Take 1 tablet by mouth daily.       diazepam (VALIUM) 5 MG tablet Take 5 mg by mouth every 12 hours as needed for anxiety       diphenhydrAMINE (BENADRYL) 25 MG  "tablet Take 25 mg by mouth nightly as needed for itching or allergies       fludrocortisone (FLORINEF) 0.1 MG tablet Take 1 tablet (0.1 mg) by mouth daily       levothyroxine (SYNTHROID/LEVOTHROID) 75 MCG tablet TAKE 1 TABLET (75 MCG) BY MOUTH DAILY 90 tablet 1     melatonin 5 MG tablet Take 15 mg by mouth At Bedtime       polyethylene glycol (MIRALAX/GLYCOLAX) powder TAKE 17 G BY MOUTH 2 TIMES DAILY 527 g 11     pravastatin (PRAVACHOL) 40 MG tablet TAKE 1 TABLET (40 MG) BY MOUTH DAILY 90 tablet 2     acetaminophen-codeine (TYLENOL #3) 300-30 MG tablet Take 1 tab up to twice daily as needed (Patient not taking: Reported on 2/5/2020) 10 tablet 0     carbidopa-levodopa (SINEMET)  MG tablet Take 0.5 tablets by mouth 3 times daily (Patient not taking: Reported on 2/5/2020)       diazepam (VALIUM) 5 MG tablet TAKE 1 TABLET BY MOUTH EVERY DAY AS NEEDED FOR ANXIETY (Patient not taking: Reported on 2/5/2020) 6 tablet 0     escitalopram (LEXAPRO) 10 MG tablet Take 1 tablet (10 mg) by mouth daily for 10 days  3       REVIEW OF SYSTEMS:  10 point ROS of systems including Constitutional, Eyes, Respiratory, Cardiovascular, Gastroenterology, Genitourinary, Integumentary, Musculoskeletal, Psychiatric were all negative except for pertinent positives noted in my HPI.    Objective:  /80   Pulse 92   Temp 96.7  F (35.9  C)   Resp 16   Ht 1.588 m (5' 2.5\")   Wt 60.2 kg (132 lb 11.2 oz)   SpO2 96%   BMI 23.88 kg/m     Exam:  GENERAL APPEARANCE:  Alert, in no distress  ENT:  Mouth and posterior oropharynx normal, moist mucous membranes, normal hearing acuity  EYES:  EOM, conjunctivae, lids, pupils and irises normal, PERRL  RESP:  respiratory effort and palpation of chest normal, lungs clear to auscultation , no respiratory distress  CV:  Palpation and auscultation of heart done , regular rate and rhythm, no murmur, rub, or gallop, peripheral edema trace+ in LE bilaterally  ABDOMEN:  normal bowel sounds, soft, " nontender, no hepatosplenomegaly or other masses  M/S:   Examination of:   right upper extremity, left upper extremity and right lower extremity  Inspection, ROM, stability and muscle strength normal  SKIN:  Inspection of skin and subcutaneous tissue baseline  NEURO:   Cranial nerves 2-12 are normal tested and grossly at patient's baseline, speech WNL    Labs:   Recent labs in [facility] reviewed by me today.     ASSESSMENT/PLAN:  Parkinson's disease (H)  Orthostatic hypotension  Physical deconditioning  BPPV left ear  Acute/ongoing: PT (work on maneuvers for BPPV)  and OT for strengthening, sinemet 25/100mg take 1/2 tab TID (patient took herself off medication)    DC florinef, discussed with patient since no further sinemet will try off florinef and monitor     Dysphagia, unspecified type  Ongoing: diet regular with thin liquids, patient states she has some trouble swallowing foods, she pays attention to chewing food well and sometimes has to swallow 2-3 times to get food down, no trouble swallowing liquids     SHAWNEE (generalized anxiety disorder)  Ongoing: patient is on valium 5mg QD prn, lexapro 10mg QD         Orders written by provider at facility  DC florinef    Total time spent with patient visit at the [facility] nursing facility was 45 min including patient visit and review of past records. Greater than 50% of total time spent with counseling and coordinating care due to discussed current medications, discussed DC of sinemet per patient choice, also discussed DC florinef and will continue to monitor BP, explained that sinemet can cause orthostasis and low BP now that patient not taking sinemet will trial off florinef, she agreed with POC.   Electronically signed by:  Tonya Lynn Haase, APRN CNP             Sincerely,        Tonya Lynn Haase, APRN CNP

## 2020-02-05 NOTE — PROGRESS NOTES
[place] [facility]  [place] Medical Record Number:  [id number]  Place of Service where encounter took place:  [facility] (S) [[id number]]  Chief Complaint   Patient presents with     Nursing Home Acute       HPI:    Sadaf Rubi  is a 74 year old (1945), who is being seen today for an episodic care visit.  HPI information obtained from: facility chart records, facility staff, patient report and [facility] chart review. Today's concern is:  BPPV: on exam today patient states dizziness has improved, denies N/V, in therapy she is walking 600RW with CTG assist  Patient feels sinemet is causing dizziness, she states she feels better off sinemet  Parkinson's/deconditioning: see above, patient does not want to take sinemet, she feels the medication makes her more dizzy does not feel the medication helps, patient denies any freezing or slowing of her movement off sinemet.   HTN: BP as follows: /80, 144/84, 136/86 with HR 70 range, denies CP, palpitations, SOB, discussed DC florinef and watching BP  Dysphagia: denies cough, choking with meals    Past Medical and Surgical History reviewed in Epic today.    MEDICATIONS:  Current Outpatient Medications   Medication Sig Dispense Refill     acetaminophen (TYLENOL) 325 MG tablet Take 650 mg by mouth every 6 hours as needed for mild pain       acetaminophen-codeine (TYLENOL W/CODEINE NO. 3) 300-30 MG per tablet Take 1 tablet by mouth every 8 hours as needed for severe pain       aspirin 81 MG tablet Take 81 mg by mouth At Bedtime  120 tablet      Cholecalciferol (VITAMIN D) 2000 UNITS tablet Take 2,000 Units by mouth daily 100 tablet 3     cyanocolbalamin (VITAMIN  B-12) 500 MCG tablet Take 1 tablet by mouth daily.       diazepam (VALIUM) 5 MG tablet Take 5 mg by mouth every 12 hours as needed for anxiety       diphenhydrAMINE (BENADRYL) 25 MG tablet Take 25 mg by mouth nightly as needed for itching or allergies       fludrocortisone  "(FLORINEF) 0.1 MG tablet Take 1 tablet (0.1 mg) by mouth daily       levothyroxine (SYNTHROID/LEVOTHROID) 75 MCG tablet TAKE 1 TABLET (75 MCG) BY MOUTH DAILY 90 tablet 1     melatonin 5 MG tablet Take 15 mg by mouth At Bedtime       polyethylene glycol (MIRALAX/GLYCOLAX) powder TAKE 17 G BY MOUTH 2 TIMES DAILY 527 g 11     pravastatin (PRAVACHOL) 40 MG tablet TAKE 1 TABLET (40 MG) BY MOUTH DAILY 90 tablet 2     acetaminophen-codeine (TYLENOL #3) 300-30 MG tablet Take 1 tab up to twice daily as needed (Patient not taking: Reported on 2/5/2020) 10 tablet 0     carbidopa-levodopa (SINEMET)  MG tablet Take 0.5 tablets by mouth 3 times daily (Patient not taking: Reported on 2/5/2020)       diazepam (VALIUM) 5 MG tablet TAKE 1 TABLET BY MOUTH EVERY DAY AS NEEDED FOR ANXIETY (Patient not taking: Reported on 2/5/2020) 6 tablet 0     escitalopram (LEXAPRO) 10 MG tablet Take 1 tablet (10 mg) by mouth daily for 10 days  3       REVIEW OF SYSTEMS:  10 point ROS of systems including Constitutional, Eyes, Respiratory, Cardiovascular, Gastroenterology, Genitourinary, Integumentary, Musculoskeletal, Psychiatric were all negative except for pertinent positives noted in my HPI.    Objective:  /80   Pulse 92   Temp 96.7  F (35.9  C)   Resp 16   Ht 1.588 m (5' 2.5\")   Wt 60.2 kg (132 lb 11.2 oz)   SpO2 96%   BMI 23.88 kg/m    Exam:  GENERAL APPEARANCE:  Alert, in no distress  ENT:  Mouth and posterior oropharynx normal, moist mucous membranes, normal hearing acuity  EYES:  EOM, conjunctivae, lids, pupils and irises normal, PERRL  RESP:  respiratory effort and palpation of chest normal, lungs clear to auscultation , no respiratory distress  CV:  Palpation and auscultation of heart done , regular rate and rhythm, no murmur, rub, or gallop, peripheral edema trace+ in LE bilaterally  ABDOMEN:  normal bowel sounds, soft, nontender, no hepatosplenomegaly or other masses  M/S:   Examination of:   right upper extremity, left " upper extremity and right lower extremity  Inspection, ROM, stability and muscle strength normal  SKIN:  Inspection of skin and subcutaneous tissue baseline  NEURO:   Cranial nerves 2-12 are normal tested and grossly at patient's baseline, speech WNL    Labs:   Recent labs in [facility] reviewed by me today.     ASSESSMENT/PLAN:  Parkinson's disease (H)  Orthostatic hypotension  Physical deconditioning  BPPV left ear  Acute/ongoing: PT (work on maneuvers for BPPV)  and OT for strengthening, sinemet 25/100mg take 1/2 tab TID (patient took herself off medication)    DC florinef, discussed with patient since no further sinemet will try off florinef and monitor     Dysphagia, unspecified type  Ongoing: diet regular with thin liquids, patient states she has some trouble swallowing foods, she pays attention to chewing food well and sometimes has to swallow 2-3 times to get food down, no trouble swallowing liquids     SHAWNEE (generalized anxiety disorder)  Ongoing: patient is on valium 5mg QD prn, lexapro 10mg QD         Orders written by provider at facility  DC florinef    Total time spent with patient visit at the skilled nursing facility was 45 min including patient visit and review of past records. Greater than 50% of total time spent with counseling and coordinating care due to discussed current medications, discussed DC of sinemet per patient choice, also discussed DC florinef and will continue to monitor BP, explained that sinemet can cause orthostasis and low BP now that patient not taking sinemet will trial off florinef, she agreed with POC.   Electronically signed by:  Tonya Lynn Haase, APRN CNP

## 2020-02-05 NOTE — TELEPHONE ENCOUNTER
Reason for Call:  Form, our goal is to have forms completed with 72 hours, however, some forms may require a visit or additional information.    Type of letter, form or note:  Home Health Certification    Who is the form from?: Home care    Where did the form come from: form was faxed in    What clinic location was the form placed at?: [facility]    Where the form was placed: Given to physician    What number is listed as a contact on the form?: [phone]       Additional comments: HC : [facility] POC 1/15/20-3/14/20    Call taken on 2/5/2020 at 3:51 PM by Guillermina Rose

## 2020-02-13 NOTE — LETTER
2/13/2020        RE: Sadaf Rubi  [address]  [facility] [address]        [place] [facility]  [place] Medical Record Number:  [id number]  Place of Service where encounter took place:  [facility] (S) [[id number]]  Chief Complaint   Patient presents with     Nursing Home Acute       HPI:    Sadaf Rubi  is a 74 year old (1945), who is being seen today for an episodic care visit.  HPI information obtained from: facility chart records, facility staff, patient report and [facility] chart review. Today's concern is:  BPPV: on exam today patient states dizziness has improved, denies N/V, in therapy she is walking 600 4ww with CTG assist    Depression: situational, patient very down and sad today, states she just found out that her  of 35 years will not accept her home becaues he is unable to take care of her, she will be moving to LTC facility and she is very sad about that. States she did not sleep well last night, I did offer house psychologist and patient declined, also offered antidepressant and patient would rather take valium and tylenol #3,   Parkinson's/deconditioning: walking as above continues off sinemet   HTN: BP as follows: /72, 130/91, 118/73 with HR 80 range, denies CP, palpitations, SOB, discussed DC florinef and watching BP  Dysphagia: denies cough, choking with meals    Past Medical and Surgical History reviewed in Epic today.    MEDICATIONS:  Current Outpatient Medications   Medication Sig Dispense Refill     acetaminophen (TYLENOL) 325 MG tablet Take 650 mg by mouth every 6 hours as needed for mild pain       acetaminophen-codeine (TYLENOL W/CODEINE NO. 3) 300-30 MG per tablet Take 1 tablet by mouth every 8 hours as needed for severe pain       aspirin 81 MG tablet Take 81 mg by mouth At Bedtime  120 tablet      Cholecalciferol (VITAMIN D) 2000 UNITS tablet Take 2,000 Units by mouth daily 100 tablet 3     cyanocolbalamin (VITAMIN   "B-12) 500 MCG tablet Take 1 tablet by mouth daily.       diazepam (VALIUM) 5 MG tablet Take 5 mg by mouth every 12 hours as needed for anxiety       diphenhydrAMINE (BENADRYL) 25 MG tablet Take 25 mg by mouth nightly as needed for itching or allergies       levothyroxine (SYNTHROID/LEVOTHROID) 75 MCG tablet TAKE 1 TABLET (75 MCG) BY MOUTH DAILY 90 tablet 1     melatonin 5 MG tablet Take 15 mg by mouth At Bedtime       polyethylene glycol (MIRALAX/GLYCOLAX) powder TAKE 17 G BY MOUTH 2 TIMES DAILY 527 g 11     pravastatin (PRAVACHOL) 40 MG tablet TAKE 1 TABLET (40 MG) BY MOUTH DAILY 90 tablet 2     fludrocortisone (FLORINEF) 0.1 MG tablet Take 1 tablet (0.1 mg) by mouth daily         REVIEW OF SYSTEMS:  10 point ROS of systems including Constitutional, Eyes, Respiratory, Cardiovascular, Gastroenterology, Genitourinary, Integumentary, Musculoskeletal, Psychiatric were all negative except for pertinent positives noted in my HPI.    Objective:  BP (!) 130/91   Pulse 98   Temp 98.6  F (37  C)   Resp 18   Ht 1.588 m (5' 2.5\")   Wt 59.4 kg (131 lb)   SpO2 96%   BMI 23.58 kg/m     Exam:  GENERAL APPEARANCE:  Alert, in no distress, affect flat today  ENT:  Mouth and posterior oropharynx normal, moist mucous membranes, normal hearing acuity  EYES:  EOM, conjunctivae, lids, pupils and irises normal, PERRL  RESP:  respiratory effort and palpation of chest normal, lungs clear to auscultation , no respiratory distress  CV:  Palpation and auscultation of heart done , regular rate and rhythm, no murmur, rub, or gallop, peripheral edema trace+ in LE bilaterally  ABDOMEN:  normal bowel sounds, soft, nontender, no hepatosplenomegaly or other masses  M/S:   Examination of:   right upper extremity, left upper extremity and right lower extremity  Inspection, ROM, stability and muscle strength normal  SKIN:  Inspection of skin and subcutaneous tissue baseline  NEURO:   Cranial nerves 2-12 are normal tested and grossly at patient's " baseline, speech WNL    Labs:   Recent labs in [facility] reviewed by me today.    UA/UC negative 2/12/20    ASSESSMENT/PLAN:  Parkinson's disease (H)  Orthostatic hypotension  Physical deconditioning  BPPV left ear  Acute/ongoing: PT (work on maneuvers for BPPV)  and OT for strengthening, no further sinemet or florinef  Vitals stable     Dysphagia, unspecified type  Ongoing: diet regular with thin liquids, patient states she has some trouble swallowing foods, she pays attention to chewing food well and sometimes has to swallow 2-3 times to get food down, no trouble swallowing liquids     SHAWNEE (generalized anxiety disorder)  Situational depression  Ongoing: patient is on valium 5mg QD prn, lexapro 10mg QD  Patient declined house psychologist or change in medications     Orders written by provider at facility  No new orders    Total time spent with patient visit at the skilled nursing facility was 45 min including patient visit and review of past records. Greater than 50% of total time spent with counseling and coordinating care due to discussed current medications, Discharge disposition, patient will be going to LTC due to  not being able to care for her at home, patient very sad about that, did offer support as above, patient declined. Also discussed UA/UC negative, no change in medications or POC today  Electronically signed by:  Tonya Lynn Haase, APRN CNP           Sincerely,        Tonya Lynn Haase, APRN CNP

## 2020-02-13 NOTE — PROGRESS NOTES
[place] [facility]  [place] Medical Record Number:  [id number]  Place of Service where encounter took place:  [facility] (S) [[id number]]  Chief Complaint   Patient presents with     Nursing Home Acute       HPI:    Sadaf Rubi  is a 74 year old (1945), who is being seen today for an episodic care visit.  HPI information obtained from: facility chart records, facility staff, patient report and [facility] chart review. Today's concern is:  BPPV: on exam today patient states dizziness has improved, denies N/V, in therapy she is walking 600 4ww with CTG assist    Depression: situational, patient very down and sad today, states she just found out that her  of 35 years will not accept her home becaues he is unable to take care of her, she will be moving to LTC facility and she is very sad about that. States she did not sleep well last night, I did offer house psychologist and patient declined, also offered antidepressant and patient would rather take valium and tylenol #3,   Parkinson's/deconditioning: walking as above continues off sinemet   HTN: BP as follows: /72, 130/91, 118/73 with HR 80 range, denies CP, palpitations, SOB, discussed DC florinef and watching BP  Dysphagia: denies cough, choking with meals    Past Medical and Surgical History reviewed in Epic today.    MEDICATIONS:  Current Outpatient Medications   Medication Sig Dispense Refill     acetaminophen (TYLENOL) 325 MG tablet Take 650 mg by mouth every 6 hours as needed for mild pain       acetaminophen-codeine (TYLENOL W/CODEINE NO. 3) 300-30 MG per tablet Take 1 tablet by mouth every 8 hours as needed for severe pain       aspirin 81 MG tablet Take 81 mg by mouth At Bedtime  120 tablet      Cholecalciferol (VITAMIN D) 2000 UNITS tablet Take 2,000 Units by mouth daily 100 tablet 3     cyanocolbalamin (VITAMIN  B-12) 500 MCG tablet Take 1 tablet by mouth daily.       diazepam (VALIUM) 5 MG tablet Take 5 mg by  "mouth every 12 hours as needed for anxiety       diphenhydrAMINE (BENADRYL) 25 MG tablet Take 25 mg by mouth nightly as needed for itching or allergies       levothyroxine (SYNTHROID/LEVOTHROID) 75 MCG tablet TAKE 1 TABLET (75 MCG) BY MOUTH DAILY 90 tablet 1     melatonin 5 MG tablet Take 15 mg by mouth At Bedtime       polyethylene glycol (MIRALAX/GLYCOLAX) powder TAKE 17 G BY MOUTH 2 TIMES DAILY 527 g 11     pravastatin (PRAVACHOL) 40 MG tablet TAKE 1 TABLET (40 MG) BY MOUTH DAILY 90 tablet 2     fludrocortisone (FLORINEF) 0.1 MG tablet Take 1 tablet (0.1 mg) by mouth daily         REVIEW OF SYSTEMS:  10 point ROS of systems including Constitutional, Eyes, Respiratory, Cardiovascular, Gastroenterology, Genitourinary, Integumentary, Musculoskeletal, Psychiatric were all negative except for pertinent positives noted in my HPI.    Objective:  BP (!) 130/91   Pulse 98   Temp 98.6  F (37  C)   Resp 18   Ht 1.588 m (5' 2.5\")   Wt 59.4 kg (131 lb)   SpO2 96%   BMI 23.58 kg/m    Exam:  GENERAL APPEARANCE:  Alert, in no distress, affect flat today  ENT:  Mouth and posterior oropharynx normal, moist mucous membranes, normal hearing acuity  EYES:  EOM, conjunctivae, lids, pupils and irises normal, PERRL  RESP:  respiratory effort and palpation of chest normal, lungs clear to auscultation , no respiratory distress  CV:  Palpation and auscultation of heart done , regular rate and rhythm, no murmur, rub, or gallop, peripheral edema trace+ in LE bilaterally  ABDOMEN:  normal bowel sounds, soft, nontender, no hepatosplenomegaly or other masses  M/S:   Examination of:   right upper extremity, left upper extremity and right lower extremity  Inspection, ROM, stability and muscle strength normal  SKIN:  Inspection of skin and subcutaneous tissue baseline  NEURO:   Cranial nerves 2-12 are normal tested and grossly at patient's baseline, speech WNL    Labs:   Recent labs in [facility] reviewed by me today.    UA/UC negative " 2/12/20    ASSESSMENT/PLAN:  Parkinson's disease (H)  Orthostatic hypotension  Physical deconditioning  BPPV left ear  Acute/ongoing: PT (work on maneuvers for BPPV)  and OT for strengthening, no further sinemet or florinef  Vitals stable     Dysphagia, unspecified type  Ongoing: diet regular with thin liquids, patient states she has some trouble swallowing foods, she pays attention to chewing food well and sometimes has to swallow 2-3 times to get food down, no trouble swallowing liquids     SHAWNEE (generalized anxiety disorder)  Situational depression  Ongoing: patient is on valium 5mg QD prn, lexapro 10mg QD  Patient declined house psychologist or change in medications     Orders written by provider at facility  No new orders    Total time spent with patient visit at the skilled nursing facility was 45 min including patient visit and review of past records. Greater than 50% of total time spent with counseling and coordinating care due to discussed current medications, Discharge disposition, patient will be going to LTC due to  not being able to care for her at home, patient very sad about that, did offer support as above, patient declined. Also discussed UA/UC negative, no change in medications or POC today  Electronically signed by:  Tonya Lynn Haase, APRN CNP

## 2020-02-18 NOTE — PROGRESS NOTES
"[facility]  [facility] Record Number:  [id number]  Place of Service where encounter took place:  [facility] ([facility]) [[id number]]  Chief Complaint   Patient presents with     RECHECK       HPI:    Sadaf Rubi  is a 74 year old (1945), who is being seen today for an episodic care visit.  HPI information obtained from: facility chart records, facility staff, patient report and [facility] chart review. Today's concern is:  BPPV: patient  is walking>  600 4ww with CTG assist, looking at being placed in LTC facility as  unable to care for patient at home    Depression: situational, today patient affect flat, she did say she is feeling sad and \"scared\" about her future plans for LTC placement, I did discuss again seeing Dr. Cordero our house psychologist and patient declined, also offered antidepressant and again patient declined  Parkinson's/deconditioning: walking as above requests sinemet prn for tremors, patient states medication helps with tremors   HTN: BP as follows: /86, 127/82, 124/84 with HR 80 range, denies CP, palpitations, SOB, discussed DC florinef and watching BP  Dysphagia: denies cough, choking with meals    Past Medical and Surgical History reviewed in Epic today.    MEDICATIONS:  Current Outpatient Medications   Medication Sig Dispense Refill     acetaminophen (TYLENOL) 325 MG tablet Take 650 mg by mouth every 6 hours as needed for mild pain       acetaminophen-codeine (TYLENOL W/CODEINE NO. 3) 300-30 MG per tablet Take 1 tablet by mouth every 8 hours as needed for severe pain       aspirin 81 MG tablet Take 81 mg by mouth At Bedtime  120 tablet      carbidopa-levodopa (SINEMET)  MG tablet Take 0.5 tablets by mouth 3 times daily as needed       Cholecalciferol (VITAMIN D) 2000 UNITS tablet Take 2,000 Units by mouth daily 100 tablet 3     cyanocolbalamin (VITAMIN  B-12) 500 MCG tablet Take 1 tablet by mouth daily.       diazepam (VALIUM) 5 MG " "tablet Take 5 mg by mouth every 12 hours as needed for anxiety       diphenhydrAMINE (BENADRYL) 25 MG tablet Take 25 mg by mouth nightly as needed for itching or allergies       levothyroxine (SYNTHROID/LEVOTHROID) 75 MCG tablet TAKE 1 TABLET (75 MCG) BY MOUTH DAILY 90 tablet 1     melatonin 5 MG tablet Take 15 mg by mouth At Bedtime       ondansetron (ZOFRAN) 4 MG tablet Take 4 mg by mouth every 6 hours as needed for nausea       polyethylene glycol (MIRALAX/GLYCOLAX) powder TAKE 17 G BY MOUTH 2 TIMES DAILY 527 g 11     pravastatin (PRAVACHOL) 40 MG tablet TAKE 1 TABLET (40 MG) BY MOUTH DAILY 90 tablet 2     fludrocortisone (FLORINEF) 0.1 MG tablet Take 1 tablet (0.1 mg) by mouth daily         REVIEW OF SYSTEMS:  10 point ROS of systems including Constitutional, Eyes, Respiratory, Cardiovascular, Gastroenterology, Genitourinary, Integumentary, Musculoskeletal, Psychiatric were all negative except for pertinent positives noted in my HPI.    Objective:  /86   Pulse 97   Temp 97.2  F (36.2  C)   Resp 16   Ht 1.588 m (5' 2.5\")   Wt 57.6 kg (127 lb)   SpO2 96%   BMI 22.86 kg/m    Exam:  GENERAL APPEARANCE:  Alert, in no distress, affect flat   ENT:  Mouth and posterior oropharynx normal, moist mucous membranes, normal hearing acuity  EYES:  EOM, conjunctivae, lids, pupils and irises normal, PERRL  RESP:  respiratory effort and palpation of chest normal, lungs clear to auscultation , no respiratory distress  CV:  Palpation and auscultation of heart done , regular rate and rhythm, no murmur, rub, or gallop, peripheral edema trace+ in LE bilaterally  ABDOMEN:  normal bowel sounds, soft, nontender, no hepatosplenomegaly or other masses  M/S:   Examination of:   right upper extremity, left upper extremity and right lower extremity  Inspection, ROM, stability and muscle strength normal  SKIN:  Inspection of skin and subcutaneous tissue baseline  NEURO:   Cranial nerves 2-12 are normal tested and grossly at " patient's baseline, speech WNL    Labs:   Recent labs in [facility] reviewed by me today.    UA/UC negative 2/12/20    ASSESSMENT/PLAN:  Parkinson's disease (H)  Orthostatic hypotension  Physical deconditioning  BPPV left ear  Acute/ongoing: PT  and OT for strengthening, patient request to take sinemet prn, no further  florinef  Vitals stable     Dysphagia, unspecified type  Ongoing: diet regular with thin liquids, patient states she has some trouble swallowing foods, she pays attention to chewing food well and sometimes has to swallow 2-3 times to get food down, no trouble swallowing liquids     SHAWNEE (generalized anxiety disorder)  Situational depression  Ongoing: patient is on valium 5mg QD prn  Patient declined house psychologist or change in medications     Orders written by provider at facility  No new orders      Electronically signed by:  Tonya Lynn Haase, APRN CNP

## 2020-02-18 NOTE — LETTER
"    2/18/2020        RE: Sadaf Rubi  [address]  [place] [address]        [facility]  [facility] Record Number:  [id number]  Place of Service where encounter took place:  [facility] ([facility] [[id number]]  Chief Complaint   Patient presents with     RECHECK       HPI:    Sadaf Rubi  is a 74 year old (1945), who is being seen today for an episodic care visit.  HPI information obtained from: facility chart records, facility staff, patient report and [facility] chart review. Today's concern is:  BPPV: patient  is walking>  600 4ww with CTG assist, looking at being placed in LTC facility as  unable to care for patient at home    Depression: situational, today patient affect flat, she did say she is feeling sad and \"scared\" about her future plans for LTC placement, I did discuss again seeing Dr. Cordero our house psychologist and patient declined, also offered antidepressant and again patient declined  Parkinson's/deconditioning: walking as above requests sinemet prn for tremors, patient states medication helps with tremors   HTN: BP as follows: /86, 127/82, 124/84 with HR 80 range, denies CP, palpitations, SOB, discussed DC florinef and watching BP  Dysphagia: denies cough, choking with meals    Past Medical and Surgical History reviewed in Epic today.    MEDICATIONS:  Current Outpatient Medications   Medication Sig Dispense Refill     acetaminophen (TYLENOL) 325 MG tablet Take 650 mg by mouth every 6 hours as needed for mild pain       acetaminophen-codeine (TYLENOL W/CODEINE NO. 3) 300-30 MG per tablet Take 1 tablet by mouth every 8 hours as needed for severe pain       aspirin 81 MG tablet Take 81 mg by mouth At Bedtime  120 tablet      carbidopa-levodopa (SINEMET)  MG tablet Take 0.5 tablets by mouth 3 times daily as needed       Cholecalciferol (VITAMIN D) 2000 UNITS tablet Take 2,000 Units by mouth daily 100 tablet 3     " "cyanocolbalamin (VITAMIN  B-12) 500 MCG tablet Take 1 tablet by mouth daily.       diazepam (VALIUM) 5 MG tablet Take 5 mg by mouth every 12 hours as needed for anxiety       diphenhydrAMINE (BENADRYL) 25 MG tablet Take 25 mg by mouth nightly as needed for itching or allergies       levothyroxine (SYNTHROID/LEVOTHROID) 75 MCG tablet TAKE 1 TABLET (75 MCG) BY MOUTH DAILY 90 tablet 1     melatonin 5 MG tablet Take 15 mg by mouth At Bedtime       ondansetron (ZOFRAN) 4 MG tablet Take 4 mg by mouth every 6 hours as needed for nausea       polyethylene glycol (MIRALAX/GLYCOLAX) powder TAKE 17 G BY MOUTH 2 TIMES DAILY 527 g 11     pravastatin (PRAVACHOL) 40 MG tablet TAKE 1 TABLET (40 MG) BY MOUTH DAILY 90 tablet 2     fludrocortisone (FLORINEF) 0.1 MG tablet Take 1 tablet (0.1 mg) by mouth daily         REVIEW OF SYSTEMS:  10 point ROS of systems including Constitutional, Eyes, Respiratory, Cardiovascular, Gastroenterology, Genitourinary, Integumentary, Musculoskeletal, Psychiatric were all negative except for pertinent positives noted in my HPI.    Objective:  /86   Pulse 97   Temp 97.2  F (36.2  C)   Resp 16   Ht 1.588 m (5' 2.5\")   Wt 57.6 kg (127 lb)   SpO2 96%   BMI 22.86 kg/m     Exam:  GENERAL APPEARANCE:  Alert, in no distress, affect flat   ENT:  Mouth and posterior oropharynx normal, moist mucous membranes, normal hearing acuity  EYES:  EOM, conjunctivae, lids, pupils and irises normal, PERRL  RESP:  respiratory effort and palpation of chest normal, lungs clear to auscultation , no respiratory distress  CV:  Palpation and auscultation of heart done , regular rate and rhythm, no murmur, rub, or gallop, peripheral edema trace+ in LE bilaterally  ABDOMEN:  normal bowel sounds, soft, nontender, no hepatosplenomegaly or other masses  M/S:   Examination of:   right upper extremity, left upper extremity and right lower extremity  Inspection, ROM, stability and muscle strength normal  SKIN:  Inspection of " skin and subcutaneous tissue baseline  NEURO:   Cranial nerves 2-12 are normal tested and grossly at patient's baseline, speech WNL    Labs:   Recent labs in [facility] reviewed by me today.    UA/UC negative 2/12/20    ASSESSMENT/PLAN:  Parkinson's disease (H)  Orthostatic hypotension  Physical deconditioning  BPPV left ear  Acute/ongoing: PT  and OT for strengthening, patient request to take sinemet prn, no further  florinef  Vitals stable     Dysphagia, unspecified type  Ongoing: diet regular with thin liquids, patient states she has some trouble swallowing foods, she pays attention to chewing food well and sometimes has to swallow 2-3 times to get food down, no trouble swallowing liquids     SHAWNEE (generalized anxiety disorder)  Situational depression  Ongoing: patient is on valium 5mg QD prn  Patient declined house psychologist or change in medications     Orders written by provider at facility  No new orders      Electronically signed by:  Tonya Lynn Haase, APRN CNP         Sincerely,        Tonya Lynn Haase, APRN CNP

## 2020-02-20 NOTE — TELEPHONE ENCOUNTER
Called re: patient out of diazepam. There is a hard Rx from 2/11, but pharmacy is saying they don't have a script.     Electronically Rx diazepam 5 mg q12h PRN #[id number] to Gregorio Fuller MD

## 2020-02-24 PROBLEM — F43.23 ADJUSTMENT DISORDER WITH MIXED ANXIETY AND DEPRESSED MOOD: Status: ACTIVE | Noted: 2020-01-01

## 2020-02-24 PROBLEM — H81.12 BENIGN PAROXYSMAL POSITIONAL VERTIGO OF LEFT EAR: Status: ACTIVE | Noted: 2020-01-01

## 2020-02-24 PROBLEM — G43.919 INTRACTABLE MIGRAINE WITHOUT STATUS MIGRAINOSUS, UNSPECIFIED MIGRAINE TYPE: Status: ACTIVE | Noted: 2020-01-01

## 2020-02-24 PROBLEM — R11.0 NAUSEA: Status: ACTIVE | Noted: 2020-01-01

## 2020-02-24 PROBLEM — E53.8 VITAMIN B12 DEFICIENCY (NON ANEMIC): Status: ACTIVE | Noted: 2020-01-01

## 2020-02-24 NOTE — LETTER
"    2/24/2020        RE: Sadaf Rubi  [address]  [facility] [address]        [facility]  PRIMARY CARE PROVIDER AND CLINIC:  Sean Velasquez MD, [address] / [facility] [address]  Chief Complaint   Patient presents with     [facility]     [place] Medical Record Number:  [id number]  Place of Service where encounter took place:  [facility] ([facility]) [[id number]]    Sadaf Rubi  is a 74 year old  (1945), admitted to the above facility from  [facility]. Hospital stay 1/5/2020 through 1/22/2020..  Admitted to this facility for  medical management and nursing care.    HPI:    HPI information obtained from: {[facility] HPI:[id number]}.   Brief Summary of Hospital Course: ***morales cath in hospital for urinary retention.   Updates on Status Since Skilled nursing Admission: ***last seen by PCP 12/2019 for weakness, syncope & dizziness. Appears this is a chronic issue. Previous PCP has had discussions with patient regarding use of medications which may be contributing, including diazepam, codeine, and diphenhydramine. Was recommended she wean from these medications however this did not happen. Remains on Valium for anxiety, has actually increased her dose from 1x/day to 2x/day. Documented unintentional weight loss of 30 pounds in the past year along with recent falls (2 falls in Dec/Danny). TSH & free T4 normal. Hgb normal.   -154 for the last week with HR in 90s. SBP went from 158 lying to 111 standing. Patient reports ongoing dizziness \"once in a while\" without regard to position changes, no falls since arriving to TCU. Denies h/a. No longer taking florinef. This was discontinued when Sinemet was switched to PRN per patient request.   Weight 128lbs on 2/24; 137lbs on 1/23. BIMS 15/15.  Codeine has been on med list since at least 2012, does not indicate why it was started. Appears patient uses this med for migraines.     CODE STATUS/ADVANCE " DIRECTIVES DISCUSSION:   CPR/Full code   Patient's living condition: lives in a skilled nursing facility  ALLERGIES: Mushroom; Bees; Influenza vaccine live; Other [seasonal allergies]; Penicillins; Wasp venom protein; and Ciprofloxacin  PAST MEDICAL HISTORY:  has a past medical history of Anxiety, Depressive disorder, not elsewhere classified, Hypertension, Insomnia, Knee pain, Palpitation (11/11), Premature ventricular contraction, Shortness of breath (11/11), Spider veins, and Unspecified hypothyroidism. She also has no past medical history of Asymptomatic human immunodeficiency virus (HIV) infection status (H), Cerebral palsy (H), Complication of anesthesia, Congenital renal agenesis and dysgenesis, Goiter, Gout, Hernia, abdominal, History of spinal cord injury, History of thrombophlebitis, Horseshoe kidney, Hydrocephalus (H), Malignant hyperthermia, Mumps, Palpitations, Parkinsons disease (H), Spina bifida (H), STD (sexually transmitted disease), Tethered cord (H), or Tuberculosis.  PAST SURGICAL HISTORY:   has a past surgical history that includes knee surgery (1/2006); Lumpectomy breast (1/2001); Urethra surgery (1977); knee surgery (2007); knee surgery (09/10/2012); and GYN surgery (1990's).  FAMILY HISTORY: family history includes Breast Cancer in her mother; C.A.D. in her father.  SOCIAL HISTORY:   reports that she has never smoked. She has never used smokeless tobacco. She reports current alcohol use. She reports that she does not use drugs.    Post Discharge Medication Reconciliation Status: {[facility] Med Rec (Provider):643324}  ***  Current Outpatient Medications   Medication Sig Dispense Refill     acetaminophen (TYLENOL) 325 MG tablet Take 650 mg by mouth every 6 hours as needed for mild pain       acetaminophen-codeine (TYLENOL W/CODEINE NO. 3) 300-30 MG per tablet Take 1 tablet by mouth every 8 hours as needed for severe pain       aspirin 81 MG tablet Take 81 mg by mouth At Bedtime  120 tablet       carbidopa-levodopa (SINEMET)  MG tablet Take 0.5 tablets by mouth 3 times daily as needed       Cholecalciferol (VITAMIN D) 2000 UNITS tablet Take 2,000 Units by mouth daily 100 tablet 3     cyanocolbalamin (VITAMIN  B-12) 500 MCG tablet Take 1 tablet by mouth daily.       diazepam 5 MG PO tablet Take 1 tablet (5 mg) by mouth every 12 hours as needed for anxiety 14 tablet 0     diphenhydrAMINE (BENADRYL) 25 MG tablet Take 25 mg by mouth nightly as needed for itching or allergies       levothyroxine (SYNTHROID/LEVOTHROID) 75 MCG tablet TAKE 1 TABLET (75 MCG) BY MOUTH DAILY 90 tablet 1     melatonin 5 MG tablet Take 15 mg by mouth At Bedtime       ondansetron (ZOFRAN) 4 MG tablet Take 4 mg by mouth every 6 hours as needed for nausea       polyethylene glycol (MIRALAX/GLYCOLAX) powder TAKE 17 G BY MOUTH 2 TIMES DAILY 527 g 11     pravastatin (PRAVACHOL) 40 MG tablet TAKE 1 TABLET (40 MG) BY MOUTH DAILY 90 tablet 2     {Providers Please double check the med list (in the plan section >> meds & orders tab) and Discontinue any of the meds flagged by the TC to be discontinued}  ***  ROS:  {ROS [facility]:286505}    Vitals:  There were no vitals taken for this visit.  Exam:  {[facility] physical exam :141105}    Lab/Diagnostic data:    Most Recent 3 CBC's:  Recent Labs   Lab Test 01/15/20  1418 12/31/19  1232 11/25/19  1017   WBC 7.0 6.6 8.4   HGB 13.9 14.3 14.2   MCV 91 92 93    193 187     Most Recent 3 BMP's:  Recent Labs   Lab Test 01/15/20  1418 12/31/19  1232 11/25/19  1017    137 136   POTASSIUM 4.6 4.3 4.2   CHLORIDE 105 105 106   CO2 24 21 27   BUN 12 16 17   CR 0.60 0.67 0.74   ANIONGAP 5 11 3   JULIO 10.1 9.8 9.7   GLC 96 87 91     Most Recent TSH and T4:  Recent Labs   Lab Test 12/31/19  1232  02/18/19  1123   TSH 2.62   < > 1.09   T4  --   --  1.26    < > = values in this interval not displayed.       ASSESSMENT/PLAN:  Parkinson's disease (H)  Orthostatic hypotension  Physical deconditioning  BPPV  "left ear  Acute/ongoing: PT (work on maneuvers for BPPV)  and OT for strengthening, sinemet 25/100mg take 1/2 tab TID, florinef 0.1mg QD  Insomnia: benadryl & melatonin   Dysphagia, unspecified type  Ongoing: diet regular with thin liquids, patient states she has some trouble swallowing foods, she pays attention to chewing food well and sometimes has to swallow 2-3 times to get food down, no trouble swallowing liquids  Vit B def: last B12 in 2016 was normal. Vit D in 2012 low however elevated in 90s in 2013.  Nausea since hospitalization, requiring prn Zofran. Compazine in hospital was effective. Reglan also given & effective. Consider scheduling Reglan daily [facility]   PD: prn Sinemet? Follow up Dr Jensen, Neurology tomorrow   Orthostatic BP/generalized weakness: :Suspect multifactorial including deconditioning (has been limiting activity due to fear of falling), likely progression of Parkinson's with possible autonomic dysfunction and possible contribution of psychiatric medications. Orthostatics significantly positive in ED and during hospital stay, received 1L IVF.  Also noted to have urinary retention in ED, raising question of autonomic dysfunction as well.    Recent MRI/MRA and TSH wnl\".   Lexapro, amitriptyline, Zoloft, escitalopram, Trazodone,        Orders written by provider at facility and transcribed by : Sheryl An CMA  1.  Vit D level tomorrow.  Dx:  Vit D def.  2.  Vit B-12 tomorrow.  DX:  Vit B-12 def.    Total time spent with patient visit at the skilled nursing facility was {1/2/3/4/5:952622} including {1/2/3/4/5:038691}. Greater than 50% of total time spent with counseling and coordinating care due to ***.     Electronically signed by:  Sheryl An CMA ***  {Providers Please double check the med list (in the plan section >> meds & orders tab) and Discontinue any of the meds flagged by the TC to be " "discontinued}                  [place] [facility]  PRIMARY CARE PROVIDER AND CLINIC:  Sean Velasquez MD, [address] [facility] [address]  Chief Complaint   Patient presents with     Establish Care     [place] Medical Record Number:  [id number]  Place of Service where encounter took place:  [facility] ([facility]) [[id number]]    Sadaf Rubi  is a 74 year old  (1945), admitted to the above facility from  [facility]. Hospital stay 1/5/2020 through 1/22/2020.  Admitted to this facility for  medical management and nursing care.    HPI:    HPI information obtained from: facility chart records, facility staff, patient report and [facility] chart review.   Brief Summary of Hospital Course: patient admitted to hospital for ongoing/ worsening weakness & dizziness. \"Suspect multifactorial including deconditioning (has been limiting activity due to fear of falling), likely progression of Parkinson's with possible autonomic dysfunction and possible contribution of psychiatric medications. Orthostatics significantly positive in ED and during hospital stay, received 1L IVF.  Also noted to have urinary retention in ED, raising question of autonomic dysfunction as well\". Transferred to [facility] TCU prior to LTC placement. Previously resided with  who does not feel he can safely care for her any longer.   Updates on Status Since Skilled nursing Admission: staff feels patient has been depressed with current situation. Patient verbalized depressed mood with new living environment. Long history of anxiety, Valium for years.  Documented unintentional weight loss of 30 pounds in the past year along with recent falls (2 falls in Dec/Jan).   -154 for the last week with HR in 90s. SBP went from 158 lying to 111 standing. Patient reports ongoing dizziness \"once in a while\" without regard to position changes, no falls since arriving to TCU. Denies h/a. No longer taking " florinef. This was discontinued when Sinemet was switched to PRN per patient request. Weight 128lbs on 2/24; 137lbs on 1/23. BIMS 15/15.  Codeine has been on med list since at least 2012, appears patient uses this med for migraines.     CODE STATUS/ADVANCE DIRECTIVES DISCUSSION:   CPR/Full code   Patient's living condition: lives in a skilled nursing facility  ALLERGIES: Mushroom; Bees; Influenza vaccine live; Other [seasonal allergies]; Penicillins; Wasp venom protein; and Ciprofloxacin  PAST MEDICAL HISTORY:  has a past medical history of Anxiety, Depressive disorder, not elsewhere classified, Hypertension, Insomnia, Knee pain, Palpitation (11/11), Premature ventricular contraction, Shortness of breath (11/11), Spider veins, and Unspecified hypothyroidism. She also has no past medical history of Asymptomatic human immunodeficiency virus (HIV) infection status (H), Cerebral palsy (H), Complication of anesthesia, Congenital renal agenesis and dysgenesis, Goiter, Gout, Hernia, abdominal, History of spinal cord injury, History of thrombophlebitis, Horseshoe kidney, Hydrocephalus (H), Malignant hyperthermia, Mumps, Palpitations, Parkinsons disease (H), Spina bifida (H), STD (sexually transmitted disease), Tethered cord (H), or Tuberculosis.  PAST SURGICAL HISTORY:   has a past surgical history that includes knee surgery (1/2006); Lumpectomy breast (1/2001); Urethra surgery (1977); knee surgery (2007); knee surgery (09/10/2012); and GYN surgery (1990's).  FAMILY HISTORY: family history includes Breast Cancer in her mother; C.A.D. in her father.  SOCIAL HISTORY:   reports that she has never smoked. She has never used smokeless tobacco. She reports current alcohol use. She reports that she does not use drugs.    Post Discharge Medication Reconciliation Status: discharge medications reconciled, continue medications without change    Current Outpatient Medications   Medication Sig Dispense Refill     acetaminophen (TYLENOL)  "325 MG tablet Take 650 mg by mouth every 6 hours as needed for mild pain       acetaminophen-codeine (TYLENOL W/CODEINE NO. 3) 300-30 MG per tablet Take 1 tablet by mouth every 8 hours as needed for severe pain       aspirin 81 MG tablet Take 81 mg by mouth At Bedtime  120 tablet      carbidopa-levodopa (SINEMET)  MG tablet Take 0.5 tablets by mouth 3 times daily as needed       Cholecalciferol (VITAMIN D) 2000 UNITS tablet Take 2,000 Units by mouth daily 100 tablet 3     cyanocolbalamin (VITAMIN  B-12) 500 MCG tablet Take 1 tablet by mouth daily.       diazepam 5 MG PO tablet Take 1 tablet (5 mg) by mouth every 12 hours as needed for anxiety 14 tablet 0     diphenhydrAMINE (BENADRYL) 25 MG tablet Take 25 mg by mouth nightly as needed for itching or allergies       levothyroxine (SYNTHROID/LEVOTHROID) 75 MCG tablet TAKE 1 TABLET (75 MCG) BY MOUTH DAILY 90 tablet 1     melatonin 5 MG tablet Take 15 mg by mouth At Bedtime       ondansetron (ZOFRAN) 4 MG tablet Take 4 mg by mouth every 6 hours as needed for nausea       polyethylene glycol (MIRALAX/GLYCOLAX) powder TAKE 17 G BY MOUTH 2 TIMES DAILY 527 g 11     pravastatin (PRAVACHOL) 40 MG tablet TAKE 1 TABLET (40 MG) BY MOUTH DAILY 90 tablet 2       ROS:  10 point ROS of systems including Constitutional, Eyes, Respiratory, Cardiovascular, Gastroenterology, Genitourinary, Integumentary, Musculoskeletal, Psychiatric were all negative except for pertinent positives noted in my HPI.    Vitals:  /70   Pulse 97   Temp 97  F (36.1  C)   Resp 16   Ht 1.588 m (5' 2.5\")   Wt 58.5 kg (129 lb)   SpO2 98%   BMI 23.22 kg/m     Exam:  GENERAL APPEARANCE:  Alert, in no distress, oriented, cooperative  ENT:  Mouth and posterior oropharynx normal, moist mucous membranes  EYES:  EOM normal, Conjunctiva and lids normal  RESP:  lungs clear to auscultation , no respiratory distress  CV:  RRR  ABDOMEN:  bowel sounds normal, soft, non-tender  M/S:   decreased muscle tone, " no edema  NEURO: mouth tremor, soft voice, no rigidity  PSYCH:  oriented X 3, flat affect, sad    Lab/Diagnostic data:  Most Recent 3 CBC's:  Recent Labs   Lab Test 01/15/20  1418 12/31/19  1232 11/25/19  1017   WBC 7.0 6.6 8.4   HGB 13.9 14.3 14.2   MCV 91 92 93    193 187     Most Recent 3 BMP's:  Recent Labs   Lab Test 01/15/20  1418 12/31/19  1232 11/25/19  1017    137 136   POTASSIUM 4.6 4.3 4.2   CHLORIDE 105 105 106   CO2 24 21 27   BUN 12 16 17   CR 0.60 0.67 0.74   ANIONGAP 5 11 3   JULIO 10.1 9.8 9.7   GLC 96 87 91     Most Recent TSH and T4:  Recent Labs   Lab Test 12/31/19  1232  02/18/19  1123   TSH 2.62   < > 1.09   T4  --   --  1.26    < > = values in this interval not displayed.       ASSESSMENT/PLAN:  (G20) Parkinson's disease (H)  (primary encounter diagnosis)  (I95.1) Orthostatic hypotension  (H81.12) Benign paroxysmal positional vertigo of left ear  Comment: admitted for weakness, syncope & dizziness. Appears this is a chronic issue. Previous PCP has had discussions with patient regarding use of medications which may be contributing, including diazepam, codeine, and diphenhydramine. Was recommended she wean from these medications however this did not happen. Remains on Valium for anxiety, has actually increased her dose from 1x/day to 2x/day. TSH & free T4 normal. Hgb normal.   Plan:   --continue prn Sinemet   --follow up Dr Jensen, Neurology tomorrow   --[facility] daily [facility]    (R11.0) Nausea  Comment: nausea since hospitalization, requiring prn Zofran. Compazine & Reglan effective while in hospital; silent migraine? Minimal PO intake likely contributing   Plan:   --monitor   --continue prn Zofran for now    (E53.8) Vitamin B12 deficiency (non anemic)  Comment: last B12 in 2016 was normal  Plan:   --recheck B12 tomorrow  --continue supp for now    (F43.23) Adjustment disorder with mixed anxiety and depressed mood  (F41.1) SHAWNEE  (generalized anxiety disorder)  Comment: patient has repeatedly declined to meet with psychologist or start medication for symptoms; therapies tried in the past include Lexapro, amitriptyline, Zoloft, escitalopram, Trazodone; has been on Valium for years   Plan:   --continue Valium 5mg PO q 12 hours prn   --will continue to encourage alternate therapy    (G47.00) Insomnia, unspecified type  Comment: appears has been taking benadryl for quite some time to help her sleep   Plan:   --will recommend discontinuing Benadryl & retryingTrazodone at next visit  --continue Melatonin 15mg at HS  --monitor sleep pattern     (G43.919) Intractable migraine without status migrainosus, unspecified migraine type  Comment: chronic, long-term use Tylenol 3  Plan:   --continue Tylenol 3 for severe pain q 8 hour prn & Tylenol 650mg prn for mod pain- max 4,000mg/24 hours     (Z71.89) Advance care planning  Comment: Full code  Plan:   --continue Full code per patient wishes     Orders written by provider at facility and transcribed by : Sheryl An CMA  1.  Vit D level tomorrow.  Dx:  Vit D def.  2.  Vit B-12 tomorrow.  DX:  Vit B-12 def.    Total time spent with patient visit at the skilled nursing [facility] was 36 including patient visit, review of past records and phone call to patient contact. Greater than 50% of total time spent with counseling and coordinating care due to alternate options for nausea, possible causes with patient & nurse manager.     Electronically signed by:  Kecia Dykes NP                       Sincerely,        Kecia Dykes NP

## 2020-02-24 NOTE — PROGRESS NOTES
"[place] [facility]  PRIMARY CARE PROVIDER AND CLINIC:  Sean Velasquez MD, [address] [facility] [address]  Chief Complaint   Patient presents with     Establish Care     [place] Medical Record Number:  [id number]  Place of Service where encounter took place:  [facility] ([facility] [[id number]]    Sadaf Rubi  is a 74 year old  (1945), admitted to the above facility from  [facility]. Hospital stay 1/5/2020 through 1/22/2020.  Admitted to this facility for  medical management and nursing care.    HPI:    HPI information obtained from: facility chart records, facility staff, patient report and [facility] chart review.   Brief Summary of Hospital Course: patient admitted to hospital for ongoing/ worsening weakness & dizziness. \"Suspect multifactorial including deconditioning (has been limiting activity due to fear of falling), likely progression of Parkinson's with possible autonomic dysfunction and possible contribution of psychiatric medications. Orthostatics significantly positive in ED and during hospital stay, received 1L IVF.  Also noted to have urinary retention in ED, raising question of autonomic dysfunction as well\". Transferred to [facility] TCU prior to LTC placement. Previously resided with  who does not feel he can safely care for her any longer.   Updates on Status Since Skilled nursing Admission: staff feels patient has been depressed with current situation. Patient verbalized depressed mood with new living environment. Long history of anxiety, Valium for years.  Documented unintentional weight loss of 30 pounds in the past year along with recent falls (2 falls in Dec/Jan).   -154 for the last week with HR in 90s. SBP went from 158 lying to 111 standing. Patient reports ongoing dizziness \"once in a while\" without regard to position changes, no falls since arriving to TCU. Denies h/a. No longer taking florinef. This was discontinued when " Sinemet was switched to PRN per patient request. Weight 128lbs on 2/24; 137lbs on 1/23. BIMS 15/15.  Codeine has been on med list since at least 2012, appears patient uses this med for migraines.     CODE STATUS/ADVANCE DIRECTIVES DISCUSSION:   CPR/Full code   Patient's living condition: lives in a skilled nursing facility  ALLERGIES: Mushroom; Bees; Influenza vaccine live; Other [seasonal allergies]; Penicillins; Wasp venom protein; and Ciprofloxacin  PAST MEDICAL HISTORY:  has a past medical history of Anxiety, Depressive disorder, not elsewhere classified, Hypertension, Insomnia, Knee pain, Palpitation (11/11), Premature ventricular contraction, Shortness of breath (11/11), Spider veins, and Unspecified hypothyroidism. She also has no past medical history of Asymptomatic human immunodeficiency virus (HIV) infection status (H), Cerebral palsy (H), Complication of anesthesia, Congenital renal agenesis and dysgenesis, Goiter, Gout, Hernia, abdominal, History of spinal cord injury, History of thrombophlebitis, Horseshoe kidney, Hydrocephalus (H), Malignant hyperthermia, Mumps, Palpitations, Parkinsons disease (H), Spina bifida (H), STD (sexually transmitted disease), Tethered cord (H), or Tuberculosis.  PAST SURGICAL HISTORY:   has a past surgical history that includes knee surgery (1/2006); Lumpectomy breast (1/2001); Urethra surgery (1977); knee surgery (2007); knee surgery (09/10/2012); and GYN surgery (1990's).  FAMILY HISTORY: family history includes Breast Cancer in her mother; C.A.D. in her father.  SOCIAL HISTORY:   reports that she has never smoked. She has never used smokeless tobacco. She reports current alcohol use. She reports that she does not use drugs.    Post Discharge Medication Reconciliation Status: discharge medications reconciled, continue medications without change    Current Outpatient Medications   Medication Sig Dispense Refill     acetaminophen (TYLENOL) 325 MG tablet Take 650 mg by mouth  "every 6 hours as needed for mild pain       acetaminophen-codeine (TYLENOL W/CODEINE NO. 3) 300-30 MG per tablet Take 1 tablet by mouth every 8 hours as needed for severe pain       aspirin 81 MG tablet Take 81 mg by mouth At Bedtime  120 tablet      carbidopa-levodopa (SINEMET)  MG tablet Take 0.5 tablets by mouth 3 times daily as needed       Cholecalciferol (VITAMIN D) 2000 UNITS tablet Take 2,000 Units by mouth daily 100 tablet 3     cyanocolbalamin (VITAMIN  B-12) 500 MCG tablet Take 1 tablet by mouth daily.       diazepam 5 MG PO tablet Take 1 tablet (5 mg) by mouth every 12 hours as needed for anxiety 14 tablet 0     diphenhydrAMINE (BENADRYL) 25 MG tablet Take 25 mg by mouth nightly as needed for itching or allergies       levothyroxine (SYNTHROID/LEVOTHROID) 75 MCG tablet TAKE 1 TABLET (75 MCG) BY MOUTH DAILY 90 tablet 1     melatonin 5 MG tablet Take 15 mg by mouth At Bedtime       ondansetron (ZOFRAN) 4 MG tablet Take 4 mg by mouth every 6 hours as needed for nausea       polyethylene glycol (MIRALAX/GLYCOLAX) powder TAKE 17 G BY MOUTH 2 TIMES DAILY 527 g 11     pravastatin (PRAVACHOL) 40 MG tablet TAKE 1 TABLET (40 MG) BY MOUTH DAILY 90 tablet 2       ROS:  10 point ROS of systems including Constitutional, Eyes, Respiratory, Cardiovascular, Gastroenterology, Genitourinary, Integumentary, Musculoskeletal, Psychiatric were all negative except for pertinent positives noted in my HPI.    Vitals:  /70   Pulse 97   Temp 97  F (36.1  C)   Resp 16   Ht 1.588 m (5' 2.5\")   Wt 58.5 kg (129 lb)   SpO2 98%   BMI 23.22 kg/m    Exam:  GENERAL APPEARANCE:  Alert, in no distress, oriented, cooperative  ENT:  Mouth and posterior oropharynx normal, moist mucous membranes  EYES:  EOM normal, Conjunctiva and lids normal  RESP:  lungs clear to auscultation , no respiratory distress  CV:  RRR  ABDOMEN:  bowel sounds normal, soft, non-tender  M/S:   decreased muscle tone, no edema  NEURO: mouth tremor, soft " voice, no rigidity  PSYCH:  oriented X 3, flat affect, sad    Lab/Diagnostic data:  Most Recent 3 CBC's:  Recent Labs   Lab Test 01/15/20  1418 12/31/19  1232 11/25/19  1017   WBC 7.0 6.6 8.4   HGB 13.9 14.3 14.2   MCV 91 92 93    193 187     Most Recent 3 BMP's:  Recent Labs   Lab Test 01/15/20  1418 12/31/19  1232 11/25/19  1017    137 136   POTASSIUM 4.6 4.3 4.2   CHLORIDE 105 105 106   CO2 24 21 27   BUN 12 16 17   CR 0.60 0.67 0.74   ANIONGAP 5 11 3   JULIO 10.1 9.8 9.7   GLC 96 87 91     Most Recent TSH and T4:  Recent Labs   Lab Test 12/31/19  1232  02/18/19  1123   TSH 2.62   < > 1.09   T4  --   --  1.26    < > = values in this interval not displayed.       ASSESSMENT/PLAN:  (G20) Parkinson's disease (H)  (primary encounter diagnosis)  (I95.1) Orthostatic hypotension  (H81.12) Benign paroxysmal positional vertigo of left ear  Comment: admitted for weakness, syncope & dizziness. Appears this is a chronic issue. Previous PCP has had discussions with patient regarding use of medications which may be contributing, including diazepam, codeine, and diphenhydramine. Was recommended she wean from these medications however this did not happen. Remains on Valium for anxiety, has actually increased her dose from 1x/day to 2x/day. TSH & free T4 normal. Hgb normal.   Plan:   --continue prn Sinemet   --follow up Dr Jensen, Neurology tomorrow   --consider Reglan daily [facility]    (R11.0) Nausea  Comment: nausea since hospitalization, requiring prn Zofran. Compazine & Reglan effective while in hospital; silent migraine? Minimal PO intake likely contributing   Plan:   --monitor   --continue prn Zofran for now    (E53.8) Vitamin B12 deficiency (non anemic)  Comment: last B12 in 2016 was normal  Plan:   --recheck B12 tomorrow  --continue supp for now    (F43.23) Adjustment disorder with mixed anxiety and depressed mood  (F41.1) SHAWNEE (generalized anxiety disorder)  Comment:  patient has repeatedly declined to meet with psychologist or start medication for symptoms; therapies tried in the past include Lexapro, amitriptyline, Zoloft, escitalopram, Trazodone; has been on Valium for years   Plan:   --continue Valium 5mg PO q 12 hours prn   --will continue to encourage alternate therapy    (G47.00) Insomnia, unspecified type  Comment: appears has been taking benadryl for quite some time to help her sleep   Plan:   --will recommend discontinuing Benadryl & retryingTrazodone at next visit  --continue Melatonin 15mg at HS  --monitor sleep pattern     (G43.919) Intractable migraine without status migrainosus, unspecified migraine type  Comment: chronic, long-term use Tylenol 3  Plan:   --continue Tylenol 3 for severe pain q 8 hour prn & Tylenol 650mg prn for mod pain- max 4,000mg/24 hours     (Z71.89) Advance care planning  Comment: Full code  Plan:   --continue Full code per patient wishes     Orders written by provider at facility and transcribed by : Sheryl An CMA  1.  Vit D level tomorrow.  Dx:  Vit D def.  2.  Vit B-12 tomorrow.  DX:  Vit B-12 def.    Total time spent with patient visit at the skilled nursing facility was 36 including patient visit, review of past records and phone call to patient contact. Greater than 50% of total time spent with counseling and coordinating care due to alternate options for nausea, possible causes with patient & nurse manager.     Electronically signed by:  Kecia Dykes NP

## 2020-02-24 NOTE — LETTER
"    2/24/2020        RE: Sadaf Rubi  [address]  [facility] [address]        [place] [facility]  PRIMARY CARE PROVIDER AND CLINIC:  Sean Velasquez MD, [address] / [facility] [address]  Chief Complaint   Patient presents with     Establish Care     [place] Medical Record Number:  [id number]  Place of Service where encounter took place:  [facility] ([facility] [[id number]]    Sadaf Rubi  is a 74 year old  (1945), admitted to the above facility from  [facility]. Hospital stay 1/5/2020 through 1/22/2020.  Admitted to this facility for  medical management and nursing care.    HPI:    HPI information obtained from: facility chart records, facility staff, patient report and [facility] chart review.   Brief Summary of Hospital Course: patient admitted to hospital for ongoing/ worsening weakness & dizziness. \"Suspect multifactorial including deconditioning (has been limiting activity due to fear of falling), likely progression of Parkinson's with possible autonomic dysfunction and possible contribution of psychiatric medications. Orthostatics significantly positive in ED and during hospital stay, received 1L IVF.  Also noted to have urinary retention in ED, raising question of autonomic dysfunction as well\". Transferred to [facility] TCU prior to LTC placement. Previously resided with  who does not feel he can safely care for her any longer.   Updates on Status Since Skilled nursing Admission: staff feels patient has been depressed with current situation. Patient verbalized depressed mood with new living environment. Long history of anxiety, Valium for years.  Documented unintentional weight loss of 30 pounds in the past year along with recent falls (2 falls in Dec/Jan).   -154 for the last week with HR in 90s. SBP went from 158 lying to 111 standing. Patient reports ongoing dizziness \"once in a while\" without regard to position changes, " no falls since arriving to TCU. Denies h/a. No longer taking florinef. This was discontinued when Sinemet was switched to PRN per patient request. Weight 128lbs on 2/24; 137lbs on 1/23. BIMS 15/15.  Codeine has been on med list since at least 2012, appears patient uses this med for migraines.     CODE STATUS/ADVANCE DIRECTIVES DISCUSSION:   CPR/Full code   Patient's living condition: lives in a skilled nursing facility  ALLERGIES: Mushroom; Bees; Influenza vaccine live; Other [seasonal allergies]; Penicillins; Wasp venom protein; and Ciprofloxacin  PAST MEDICAL HISTORY:  has a past medical history of Anxiety, Depressive disorder, not elsewhere classified, Hypertension, Insomnia, Knee pain, Palpitation (11/11), Premature ventricular contraction, Shortness of breath (11/11), Spider veins, and Unspecified hypothyroidism. She also has no past medical history of Asymptomatic human immunodeficiency virus (HIV) infection status (H), Cerebral palsy (H), Complication of anesthesia, Congenital renal agenesis and dysgenesis, Goiter, Gout, Hernia, abdominal, History of spinal cord injury, History of thrombophlebitis, Horseshoe kidney, Hydrocephalus (H), Malignant hyperthermia, Mumps, Palpitations, Parkinsons disease (H), Spina bifida (H), STD (sexually transmitted disease), Tethered cord (H), or Tuberculosis.  PAST SURGICAL HISTORY:   has a past surgical history that includes knee surgery (1/2006); Lumpectomy breast (1/2001); Urethra surgery (1977); knee surgery (2007); knee surgery (09/10/2012); and GYN surgery (1990's).  FAMILY HISTORY: family history includes Breast Cancer in her mother; C.A.D. in her father.  SOCIAL HISTORY:   reports that she has never smoked. She has never used smokeless tobacco. She reports current alcohol use. She reports that she does not use drugs.    Post Discharge Medication Reconciliation Status: discharge medications reconciled, continue medications without change    Current Outpatient Medications  "  Medication Sig Dispense Refill     acetaminophen (TYLENOL) 325 MG tablet Take 650 mg by mouth every 6 hours as needed for mild pain       acetaminophen-codeine (TYLENOL W/CODEINE NO. 3) 300-30 MG per tablet Take 1 tablet by mouth every 8 hours as needed for severe pain       aspirin 81 MG tablet Take 81 mg by mouth At Bedtime  120 tablet      carbidopa-levodopa (SINEMET)  MG tablet Take 0.5 tablets by mouth 3 times daily as needed       Cholecalciferol (VITAMIN D) 2000 UNITS tablet Take 2,000 Units by mouth daily 100 tablet 3     cyanocolbalamin (VITAMIN  B-12) 500 MCG tablet Take 1 tablet by mouth daily.       diazepam 5 MG PO tablet Take 1 tablet (5 mg) by mouth every 12 hours as needed for anxiety 14 tablet 0     diphenhydrAMINE (BENADRYL) 25 MG tablet Take 25 mg by mouth nightly as needed for itching or allergies       levothyroxine (SYNTHROID/LEVOTHROID) 75 MCG tablet TAKE 1 TABLET (75 MCG) BY MOUTH DAILY 90 tablet 1     melatonin 5 MG tablet Take 15 mg by mouth At Bedtime       ondansetron (ZOFRAN) 4 MG tablet Take 4 mg by mouth every 6 hours as needed for nausea       polyethylene glycol (MIRALAX/GLYCOLAX) powder TAKE 17 G BY MOUTH 2 TIMES DAILY 527 g 11     pravastatin (PRAVACHOL) 40 MG tablet TAKE 1 TABLET (40 MG) BY MOUTH DAILY 90 tablet 2       ROS:  10 point ROS of systems including Constitutional, Eyes, Respiratory, Cardiovascular, Gastroenterology, Genitourinary, Integumentary, Musculoskeletal, Psychiatric were all negative except for pertinent positives noted in my HPI.    Vitals:  /70   Pulse 97   Temp 97  F (36.1  C)   Resp 16   Ht 1.588 m (5' 2.5\")   Wt 58.5 kg (129 lb)   SpO2 98%   BMI 23.22 kg/m     Exam:  GENERAL APPEARANCE:  Alert, in no distress, oriented, cooperative  ENT:  Mouth and posterior oropharynx normal, moist mucous membranes  EYES:  EOM normal, Conjunctiva and lids normal  RESP:  lungs clear to auscultation , no respiratory distress  CV:  RRR  ABDOMEN:  bowel " sounds normal, soft, non-tender  M/S:   decreased muscle tone, no edema  NEURO: mouth tremor, soft voice, no rigidity  PSYCH:  oriented X 3, flat affect, sad    Lab/Diagnostic data:  Most Recent 3 CBC's:  Recent Labs   Lab Test 01/15/20  1418 12/31/19  1232 11/25/19  1017   WBC 7.0 6.6 8.4   HGB 13.9 14.3 14.2   MCV 91 92 93    193 187     Most Recent 3 BMP's:  Recent Labs   Lab Test 01/15/20  1418 12/31/19  1232 11/25/19  1017    137 136   POTASSIUM 4.6 4.3 4.2   CHLORIDE 105 105 106   CO2 24 21 27   BUN 12 16 17   CR 0.60 0.67 0.74   ANIONGAP 5 11 3   JULIO 10.1 9.8 9.7   GLC 96 87 91     Most Recent TSH and T4:  Recent Labs   Lab Test 12/31/19  1232  02/18/19  1123   TSH 2.62   < > 1.09   T4  --   --  1.26    < > = values in this interval not displayed.       ASSESSMENT/PLAN:  (G20) Parkinson's disease (H)  (primary encounter diagnosis)  (I95.1) Orthostatic hypotension  (H81.12) Benign paroxysmal positional vertigo of left ear  Comment: admitted for weakness, syncope & dizziness. Appears this is a chronic issue. Previous PCP has had discussions with patient regarding use of medications which may be contributing, including diazepam, codeine, and diphenhydramine. Was recommended she wean from these medications however this did not happen. Remains on Valium for anxiety, has actually increased her dose from 1x/day to 2x/day. TSH & free T4 normal. Hgb normal.   Plan:   --continue prn Sinemet   --follow up Dr Jensen, Neurology tomorrow   --consider Reglan daily pending [facility] recommendation at [facility] tomorrow    (R11.0) Nausea  Comment: nausea since hospitalization, requiring prn Zofran. Compazine & Reglan effective while in hospital; silent migraine? Minimal PO intake likely contributing   Plan:   --monitor   --continue prn Zofran for now    (E53.8) Vitamin B12 deficiency (non anemic)  Comment: last B12 in 2016 was normal  Plan:   --recheck B12 tomorrow  --continue supp for now    (F43.23) Adjustment  disorder with mixed anxiety and depressed mood  (F41.1) SHAWNEE (generalized anxiety disorder)  Comment: patient has repeatedly declined to meet with psychologist or start medication for symptoms; therapies tried in the past include Lexapro, amitriptyline, Zoloft, escitalopram, Trazodone; has been on Valium for years   Plan:   --continue Valium 5mg PO q 12 hours prn   --will continue to encourage alternate therapy    (G47.00) Insomnia, unspecified type  Comment: appears has been taking benadryl for quite some time to help her sleep   Plan:   --will recommend discontinuing Benadryl & retryingTrazodone at next visit  --continue Melatonin 15mg at HS  --monitor sleep pattern     (G43.919) Intractable migraine without status migrainosus, unspecified migraine type  Comment: chronic, long-term use Tylenol 3  Plan:   --continue Tylenol 3 for severe pain q 8 hour prn & Tylenol 650mg prn for mod pain- max 4,000mg/24 hours     (Z71.89) Advance care planning  Comment: Full code  Plan:   --continue Full code per patient wishes     Orders written by provider at facility and transcribed by : Sheryl An CMA  1.  Vit D level tomorrow.  Dx:  Vit D def.  2.  Vit B-12 tomorrow.  DX:  Vit B-12 def.    Total time spent with patient visit at the skilled nursing facility was 36 including patient visit, review of past records and phone call to patient contact. Greater than 50% of total time spent with counseling and coordinating care due to alternate options for nausea, possible causes with patient & nurse manager.     Electronically signed by:  Kecia Dykes NP                       Sincerely,        Kecia Dykes NP

## 2020-02-27 NOTE — TELEPHONE ENCOUNTER
Reason for Call:  Other Patient FYI    Detailed comments: Renu a nurse manager at [facility] where the patient is currently staying. She stated that Dr. Velasquez will no longer be seeing the patient as she will be seeing Dr. Brown at her facility. She just wanted to make the clinic and Dr. Velasquez aware of this so that if her family calls to get medications filled or anything else they can be told that everything needs to go through Dr. Brown. She stated that if there are any questions she can be called.     Phone Number Patient can be reached at: Other phone number: [phone]    Best Time: Any    Can we leave a detailed message on this number? Not Applicable    Call taken on 2/27/2020 at 2:43 PM by Guillermina Eid

## 2020-02-27 NOTE — TELEPHONE ENCOUNTER
Reason for Call:  Other Requesting Letter    Detailed comments: The Patient's  called and stated that the patient is at [facility] and is using their Diazapam medication.  He is requesting that Dr. Velasquez write a letter stating that it is okay for the patient to use her own Diazapam prescription instead of the facilities.  If Dr. Velasquez types this letter up then the patient's  would like a call so he can pick it up.       Phone Number Patient can be reached at: Work number on file:  [phone]    Best Time: Any    Can we leave a detailed message on this number? YES    Call taken on 2/27/2020 at 9:38 AM by Guillermina Rose

## 2020-02-27 NOTE — TELEPHONE ENCOUNTER
Left message asking pt's  Jean Pierre to call triage back. On call back, please get more information on request. Why is pt opting to take her own valium. Is there a dose or frequency difference on how she has taken Valium and dispensed at [facility]?

## 2020-02-27 NOTE — TELEPHONE ENCOUNTER
calling back to triage.    -Not quite sure why he needs a note for pt to take her own meds  -States she is in a LTC/TCU   -States the medications are the same dosage     [facility]:  [phone]  [facility]-, room number 301    -RN at nursing home states that there is no way pt is able to take medication by herself  -States that pt is not able to take any medication by herself   -Pt has not passed a self-administration test    To most recent provider:    -Phu Brown is the primary who is going to be further taking over for pt care since she is in LTC. Further orders and such shall be going through them.     RN called back to  to alert of this info.   states understanding.

## 2020-02-27 NOTE — TELEPHONE ENCOUNTER
CURTIS:    On a different call I let Irvin know since she was most recently seen by her. Just letting you know as well.    No further action should be needed on your part.    Thanks.

## 2020-03-02 NOTE — LETTER
"    3/2/2020        RE: Sadaf Rubi  [address]  [facility] [address]        [facility]  Chief Complaint   Patient presents with     skilled nursing Regulatory     dizziness     [place] Medical Record Number:  [id number]  Place of Service where encounter took place:  [facility] ([facility]) [[id number]]    HPI:    Sadaf Rubi  is 74 year old (1945), who is being seen today for a federally mandated E/M visit.  HPI information obtained from: facility chart records, facility staff, patient report and [facility] chart review.     Today's concerns are:  Patient reports poor sleep the last few nights \"I can't live like this\". Notably anxious this morning. Long discussion about anxiety & depression symptoms, worsening with current situation. Patient feels both are significantly worse since she can no longer live at home with her . She did endorse to waking to void 10x last night. Denies pelvic pain or burning with urination. Reports normal BMs. Denies fever or chills.   Staff reports increase in complaints for dizziness. Have been checking orthostatic BPs over the weekend which were significantly positive. No recent falls. Patient denies h/a. Florinef was discontinued when Sinemet was switched to PRN however was restarted last week. BIMS 15/15.        ALLERGIES:Mushroom; Bees; Influenza vaccine live; Other [seasonal allergies]; Penicillins; Wasp venom protein; and Ciprofloxacin  PAST MEDICAL HISTORY:   has a past medical history of Anxiety, Depressive disorder, not elsewhere classified, Hypertension, Insomnia, Knee pain, Palpitation (11/11), Premature ventricular contraction, Shortness of breath (11/11), Spider veins, and Unspecified hypothyroidism. She also has no past medical history of Asymptomatic human immunodeficiency virus (HIV) infection status (H), Cerebral palsy (H), Complication of anesthesia, Congenital renal agenesis and dysgenesis, Goiter, Gout, " Hernia, abdominal, History of spinal cord injury, History of thrombophlebitis, Horseshoe kidney, Hydrocephalus (H), Malignant hyperthermia, Mumps, Palpitations, Parkinsons disease (H), Spina bifida (H), STD (sexually transmitted disease), Tethered cord (H), or Tuberculosis.  PAST SURGICAL HISTORY:   has a past surgical history that includes knee surgery (1/2006); Lumpectomy breast (1/2001); Urethra surgery (1977); knee surgery (2007); knee surgery (09/10/2012); and GYN surgery (1990's).  FAMILY HISTORY: family history includes Breast Cancer in her mother; C.A.D. in her father.  SOCIAL HISTORY:  reports that she has never smoked. She has never used smokeless tobacco. She reports current alcohol use. She reports that she does not use drugs.    MEDICATIONS:  Current Outpatient Medications   Medication Sig Dispense Refill     acetaminophen (TYLENOL) 325 MG tablet Take 650 mg by mouth every 6 hours as needed for mild pain       acetaminophen-codeine (TYLENOL W/CODEINE NO. 3) 300-30 MG per tablet Take 1 tablet by mouth every 8 hours as needed for severe pain       aspirin 81 MG tablet Take 81 mg by mouth At Bedtime  120 tablet      carbidopa (LODOSYN) 25 MG TABS tablet Take 25 mg by mouth 3 times daily       carbidopa-levodopa (SINEMET)  MG tablet Take 0.5 tablets by mouth 2 times daily       carbidopa-levodopa (SINEMET)  MG tablet Take 1 tablet by mouth daily       carbidopa-levodopa (SINEMET)  MG tablet Take 0.5 tablets by mouth 3 times daily as needed       Cholecalciferol (VITAMIN D) 2000 UNITS tablet Take 2,000 Units by mouth daily 100 tablet 3     citalopram (CELEXA) 10 MG tablet Take 10 mg by mouth daily       diazepam (VALIUM) 5 MG tablet Take 1 tablet (5 mg) by mouth every 12 hours 60 tablet 5     diphenhydrAMINE (BENADRYL) 25 MG tablet Take 25 mg by mouth nightly as needed for itching or allergies       fludrocortisone (FLORINEF) 0.1 MG tablet Take 0.1 mg by mouth daily       levothyroxine  (SYNTHROID/LEVOTHROID) 75 MCG tablet TAKE 1 TABLET (75 MCG) BY MOUTH DAILY 90 tablet 1     melatonin 5 MG tablet Take 15 mg by mouth At Bedtime       ondansetron (ZOFRAN) 4 MG tablet Take 4 mg by mouth every 6 hours as needed for nausea       polyethylene glycol (MIRALAX/GLYCOLAX) powder TAKE 17 G BY MOUTH 2 TIMES DAILY 527 g 11     pravastatin (PRAVACHOL) 40 MG tablet TAKE 1 TABLET (40 MG) BY MOUTH DAILY 90 tablet 2     cyanocolbalamin (VITAMIN  B-12) 500 MCG tablet Take 1 tablet by mouth daily.         Case Management:  I have reviewed the care plan and MDS and do agree with the plan. Patient's desire to return to the community is present, but is not able due to care needs . Information reviewed:  Medications, vital signs, orders, and nursing notes.    ROS:  10 point ROS of systems including Constitutional, Eyes, Respiratory, Cardiovascular, Gastroenterology, Genitourinary, Integumentary, Musculoskeletal, Psychiatric were all negative except for pertinent positives noted in my HPI.    Vitals:  /78   Temp 97.6  F (36.4  C)   Resp 18   Wt 57.2 kg (126 lb)   SpO2 97%   BMI 22.68 kg/m     Body mass index is 22.68 kg/m .  Exam:  GENERAL APPEARANCE:  Alert, in no distress, oriented, cooperative  ENT:  Mouth and posterior oropharynx normal, moist mucous membranes  EYES:  EOM normal, Conjunctiva and lids normal  RESP:  lungs clear to auscultation, no respiratory distress  CV:  RRR  ABDOMEN:  bowel sounds normal, soft, non-tender  M/S:   decreased muscle tone, no edema  NEURO: mouth tremor, soft voice, no rigidity  PSYCH:  oriented X 3, flat affect, sad    Lab/Diagnostic data:   Most Recent 3 CBC's:  Recent Labs   Lab Test 01/15/20  1418 12/31/19  1232 11/25/19  1017   WBC 7.0 6.6 8.4   HGB 13.9 14.3 14.2   MCV 91 92 93    193 187     Most Recent 3 BMP's:  Recent Labs   Lab Test 01/15/20  1418 12/31/19  1232 11/25/19  1017    137 136   POTASSIUM 4.6 4.3 4.2   CHLORIDE 105 105 106   CO2 24 21 27    BUN 12 16 17   CR 0.60 0.67 0.74   ANIONGAP 5 11 3   JULIO 10.1 9.8 9.7   GLC 96 87 91     Most Recent TSH and T4:  Recent Labs   Lab Test 12/31/19  1232  02/18/19  1123   TSH 2.62   < > 1.09   T4  --   --  1.26    < > = values in this interval not displayed.       ASSESSMENT/PLAN  (G20) Parkinson's disease (H)  (primary encounter diagnosis)  (I95.1) Orthostatic hypotension  (H81.12) Benign paroxysmal positional vertigo of left ear  Comment: admitted for weakness, syncope & dizziness. Appears this is a chronic issue. Previous PCP has had discussions with patient regarding use of medications which may be contributing, including diazepam, codeine, and diphenhydramine. Was recommended she wean from these medications however this did not happen. Remains on Valium for anxiety, has actually increased her dose from 1x/day to 2x/day. TSH & free T4 normal. Hgb normal.  Followed up with Neurology who restarted scheduled Sinemet, again positive for orthostatic hypotension   Plan:   --continue Sinemet  per Neurology  --restart Florinef 0.1mg daily  --weekly orthostatic BPs  --follow up Dr Jensen, Neurology as recommended      (R35.0) Urinary frequency  Comment: acute, voided 10x over night, denies burning, afebrile   Plan:   --check UA/UC today  --push fluids    (F43.23) Adjustment disorder with mixed anxiety and depressed mood  (F41.1) SHAWNEE (generalized anxiety disorder)  Comment: patient has repeatedly declined to meet with psychologist or start medication for symptoms however today she is in agreement to try SSRI; therapies tried in the past include Lexapro, amitriptyline, Zoloft, escitalopram, Trazodone- unclear why each was stopped, patient does not remember; has been on Valium for years- again discussed likelihood of Valium contributing to dizziness & increased risk of falls, patient verbalizes understanding but does not want to stop taking medication. Discussed serotonin specific reuptake inhibitor for maintanence while  continuing prn Valium. Patient in agreement. Discussed possible adverse affects with SSRI  Plan:   --start Celexa 10mg PO daily, increase as patient tolerates  --plan follow up in 2-4 weeks   --continue Valium 5mg PO q 12 hours prn   --continue to encourage onsite psych     (G47.00) Insomnia, unspecified type  Comment:  poor sleep last few nights, increase in anxiety & restlessness; has been taking prn benadryl for quite some time to help her sleep- long conversation about current medications likely contributing to current symptoms: dizziness & anxiety   Plan:   --will consider Trazodone following therapeutic level of Celexa- patient in agreement   --continue Melatonin 15mg at HS  --monitor sleep pattern     (R11.0) Nausea  Comment: nausea since hospitalization, requiring prn Zofran; silent migraine? Minimal PO intake likely contributing   Plan:   --monitor   --continue prn Zofran for now    (E53.8) Vitamin B12 deficiency (non anemic)  Comment: B12 elevated >1,000; supp discontinued  Plan:   --B12 levels & Hgb prn    Orders written by provider at facility and transcribed by : Sheryl An CMA  1.  Celexa 10 mg po every day.  Dx:  Anxiety.  2.  Florinef 0.1 mg po every day.  DX:  Orthostatic hypotension.  3.  UA/UC today-urinary frequency, dysuria.  Total time spent with patient visit at the skilled nursing facility was 38 minutes including including patient visit, review of past records & discussions about the POC. Greater than 50% of total time spent time on patient's counseling and coordinating care regarding conditions and treatment options about starting new medication & adverse effects of current medications.       Electronically signed by:  Kecia Dykes NP            Sincerely,        Kecia Dykes NP

## 2020-03-02 NOTE — PROGRESS NOTES
"[facility]  Chief Complaint   Patient presents with     [facility] Regulatory     dizziness     [facility] Record Number:  [id number]  Place of Service where encounter took place:  [facility] ([facility]) [[id number]]    HPI:    Sadaf Rubi  is 74 year old (1945), who is being seen today for a federally mandated E/M visit.  HPI information obtained from: facility chart records, facility staff, patient report and [facility] chart review.     Today's concerns are:  Patient reports poor sleep the last few nights \"I can't live like this\". Notably anxious this morning. Long discussion about anxiety & depression symptoms, worsening with current situation. Patient feels both are significantly worse since she can no longer live at home with her . She did endorse to waking to void 10x last night. Denies pelvic pain or burning with urination. Reports normal BMs. Denies fever or chills.   Staff reports increase in complaints for dizziness. Have been checking orthostatic BPs over the weekend which were significantly positive. No recent falls. Patient denies h/a. Florinef was discontinued when Sinemet was switched to PRN however was restarted last week. BIMS 15/15.        ALLERGIES:Mushroom; Bees; Influenza vaccine live; Other [seasonal allergies]; Penicillins; Wasp venom protein; and Ciprofloxacin  PAST MEDICAL HISTORY:   has a past medical history of Anxiety, Depressive disorder, not elsewhere classified, Hypertension, Insomnia, Knee pain, Palpitation (11/11), Premature ventricular contraction, Shortness of breath (11/11), Spider veins, and Unspecified hypothyroidism. She also has no past medical history of Asymptomatic human immunodeficiency virus (HIV) infection status (H), Cerebral palsy (H), Complication of anesthesia, Congenital renal agenesis and dysgenesis, Goiter, Gout, Hernia, abdominal, History of spinal cord injury, History of thrombophlebitis, Horseshoe kidney, " Hydrocephalus (H), Malignant hyperthermia, Mumps, Palpitations, Parkinsons disease (H), Spina bifida (H), STD (sexually transmitted disease), Tethered cord (H), or Tuberculosis.  PAST SURGICAL HISTORY:   has a past surgical history that includes knee surgery (1/2006); Lumpectomy breast (1/2001); Urethra surgery (1977); knee surgery (2007); knee surgery (09/10/2012); and GYN surgery (1990's).  FAMILY HISTORY: family history includes Breast Cancer in her mother; C.A.D. in her father.  SOCIAL HISTORY:  reports that she has never smoked. She has never used smokeless tobacco. She reports current alcohol use. She reports that she does not use drugs.    MEDICATIONS:  Current Outpatient Medications   Medication Sig Dispense Refill     acetaminophen (TYLENOL) 325 MG tablet Take 650 mg by mouth every 6 hours as needed for mild pain       acetaminophen-codeine (TYLENOL W/CODEINE NO. 3) 300-30 MG per tablet Take 1 tablet by mouth every 8 hours as needed for severe pain       aspirin 81 MG tablet Take 81 mg by mouth At Bedtime  120 tablet      carbidopa (LODOSYN) 25 MG TABS tablet Take 25 mg by mouth 3 times daily       carbidopa-levodopa (SINEMET)  MG tablet Take 0.5 tablets by mouth 2 times daily       carbidopa-levodopa (SINEMET)  MG tablet Take 1 tablet by mouth daily       carbidopa-levodopa (SINEMET)  MG tablet Take 0.5 tablets by mouth 3 times daily as needed       Cholecalciferol (VITAMIN D) 2000 UNITS tablet Take 2,000 Units by mouth daily 100 tablet 3     citalopram (CELEXA) 10 MG tablet Take 10 mg by mouth daily       diazepam (VALIUM) 5 MG tablet Take 1 tablet (5 mg) by mouth every 12 hours 60 tablet 5     diphenhydrAMINE (BENADRYL) 25 MG tablet Take 25 mg by mouth nightly as needed for itching or allergies       fludrocortisone (FLORINEF) 0.1 MG tablet Take 0.1 mg by mouth daily       levothyroxine (SYNTHROID/LEVOTHROID) 75 MCG tablet TAKE 1 TABLET (75 MCG) BY MOUTH DAILY 90 tablet 1     melatonin  5 MG tablet Take 15 mg by mouth At Bedtime       ondansetron (ZOFRAN) 4 MG tablet Take 4 mg by mouth every 6 hours as needed for nausea       polyethylene glycol (MIRALAX/GLYCOLAX) powder TAKE 17 G BY MOUTH 2 TIMES DAILY 527 g 11     pravastatin (PRAVACHOL) 40 MG tablet TAKE 1 TABLET (40 MG) BY MOUTH DAILY 90 tablet 2     cyanocolbalamin (VITAMIN  B-12) 500 MCG tablet Take 1 tablet by mouth daily.         Case Management:  I have reviewed the care plan and MDS and do agree with the plan. Patient's desire to return to the community is present, but is not able due to care needs . Information reviewed:  Medications, vital signs, orders, and nursing notes.    ROS:  10 point ROS of systems including Constitutional, Eyes, Respiratory, Cardiovascular, Gastroenterology, Genitourinary, Integumentary, Musculoskeletal, Psychiatric were all negative except for pertinent positives noted in my HPI.    Vitals:  /78   Temp 97.6  F (36.4  C)   Resp 18   Wt 57.2 kg (126 lb)   SpO2 97%   BMI 22.68 kg/m    Body mass index is 22.68 kg/m .  Exam:  GENERAL APPEARANCE:  Alert, in no distress, oriented, cooperative  ENT:  Mouth and posterior oropharynx normal, moist mucous membranes  EYES:  EOM normal, Conjunctiva and lids normal  RESP:  lungs clear to auscultation, no respiratory distress  CV:  RRR  ABDOMEN:  bowel sounds normal, soft, non-tender  M/S:   decreased muscle tone, no edema  NEURO: mouth tremor, soft voice, no rigidity  PSYCH:  oriented X 3, flat affect, sad    Lab/Diagnostic data:   Most Recent 3 CBC's:  Recent Labs   Lab Test 01/15/20  1418 12/31/19  1232 11/25/19  1017   WBC 7.0 6.6 8.4   HGB 13.9 14.3 14.2   MCV 91 92 93    193 187     Most Recent 3 BMP's:  Recent Labs   Lab Test 01/15/20  1418 12/31/19  1232 11/25/19  1017    137 136   POTASSIUM 4.6 4.3 4.2   CHLORIDE 105 105 106   CO2 24 21 27   BUN 12 16 17   CR 0.60 0.67 0.74   ANIONGAP 5 11 3   JULIO 10.1 9.8 9.7   GLC 96 87 91     Most Recent TSH  and T4:  Recent Labs   Lab Test 12/31/19  1232  02/18/19  1123   TSH 2.62   < > 1.09   T4  --   --  1.26    < > = values in this interval not displayed.       ASSESSMENT/PLAN  (G20) Parkinson's disease (H)  (primary encounter diagnosis)  (I95.1) Orthostatic hypotension  (H81.12) Benign paroxysmal positional vertigo of left ear  Comment: admitted for weakness, syncope & dizziness. Appears this is a chronic issue. Previous PCP has had discussions with patient regarding use of medications which may be contributing, including diazepam, codeine, and diphenhydramine. Was recommended she wean from these medications however this did not happen. Remains on Valium for anxiety, has actually increased her dose from 1x/day to 2x/day. TSH & free T4 normal. Hgb normal. Followed up with Neurology who restarted scheduled Sinemet, again positive for orthostatic hypotension   Plan:   --continue Sinemet per Neurology  --restart Florinef 0.1mg daily  --weekly orthostatic BPs  --follow up Dr Jensen, Neurology as recommended      (R35.0) Urinary frequency  Comment: acute, voided 10x over night, denies burning, afebrile   Plan:   --check UA/UC today  --push fluids    (F43.23) Adjustment disorder with mixed anxiety and depressed mood  (F41.1) SHAWNEE (generalized anxiety disorder)  Comment: patient has repeatedly declined to meet with psychologist or start medication for symptoms however today she is in agreement to try SSRI; therapies tried in the past include Lexapro, amitriptyline, Zoloft, escitalopram, Trazodone- unclear why each was stopped, patient does not remember; has been on Valium for years- again discussed likelihood of Valium contributing to dizziness & increased risk of falls, patient verbalizes understanding but does not want to stop taking medication. Discussed serotonin specific reuptake inhibitor for maintanence while continuing prn Valium. Patient in agreement. Discussed possible adverse affects with SSRI  Plan:   --start  Celexa 10mg PO daily, increase as patient tolerates  --plan follow up in 2-4 weeks   --continue Valium 5mg PO q 12 hours prn   --continue to encourage onsite psych     (G47.00) Insomnia, unspecified type  Comment: poor sleep last few nights, increase in anxiety & restlessness; has been taking prn benadryl for quite some time to help her sleep- long conversation about current medications likely contributing to current symptoms: dizziness & anxiety   Plan:   --will consider Trazodone following therapeutic level of Celexa- patient in agreement   --continue Melatonin 15mg at HS  --monitor sleep pattern     (R11.0) Nausea  Comment: nausea since hospitalization, requiring prn Zofran; silent migraine? Minimal PO intake likely contributing   Plan:   --monitor   --continue prn Zofran for now    (E53.8) Vitamin B12 deficiency (non anemic)  Comment: B12 elevated >1,000; supp discontinued  Plan:   --B12 levels & Hgb prn    Orders written by provider at facility and transcribed by : Sheryl An CMA  1.  Celexa 10 mg po every day.  Dx:  Anxiety.  2.  Florinef 0.1 mg po every day.  DX:  Orthostatic hypotension.  3.  UA/UC today-urinary frequency, dysuria.  Total time spent with patient visit at the skilled nursing facility was 38 minutes including including patient visit, review of past records & discussions about the POC. Greater than 50% of total time spent time on patient's counseling and coordinating care regarding conditions and treatment options about starting new medication & adverse effects of current medications.       Electronically signed by:  Kecia Dykes NP

## 2020-04-02 NOTE — LETTER
"    4/2/2020        RE: Sadaf Rubi  [address]  [facility] [address]        [place] [facility] E-VISIT   Sadaf Rubi is being evaluated via a billable video visit due to the restrictions of the Covid-19 pandemic.   The patient has been notified of following:  \"This video visit will be conducted via a call between you and your provider. We have found that certain health care needs can be provided without the need for an in-person physical exam.  This service lets us provide the care you need with a video conversation. If during the course of the call the provider feels a video visit is not appropriate, you will not be charged for this service.\"   The provider has received verbal consent for a Video Visit from the patient or first contact? Yes  Patient  or facility staff would like the video invitation sent by: Text to cell phone: [phone]  Video Start Time: 1345  [place] Medical Record Number:  [id number]  Place of Location at the time of visit: [facility]   Chief Complaint   Patient presents with     Nursing Home Acute     anxiety f/u     HPI:  Sadaf Rubi  is a 74 year old (1945), who is being seen today for an E-visit.  HPI information obtained from: facility chart records, facility staff, patient report and [facility] chart review.     Today's concern is:  Patient continues to experience symptoms of severe anxiety despite starting Celexa >4 wks ago. She is not sleeping well on a fairly regular basis however she did report sleeping from [id number] last night which she feels \"is a miracle\". These are well documented chronic issues. Her son states his mother has been \"abusing\" Valium for greater than 55 years. He questioned whether we can increase her dose to better control her symptoms. Staff reports poor appetite however patient feels she is getting too much food with each meal & the food is too \"heavy\". She generally enjoys eating fish, veggies & " pasta. She also feels the food is upsetting her stomach. Patient has trouble describing how her stomach feels but does endorse to diarrhea. She remains on prn Miralax. Denies blood in stool. Staff also called last week to report loose stools. It was recommended at that time to hold Miralax. Chronic nausea relieved with prn Nausea.   Patient feels Sinemet worsens her tremors at the higher dose so has halved the dose. She endorses to ongoing tremors to mouth, upper & lower extremities.    Past Medical and Surgical History reviewed in Epic today.  MEDICATIONS:  Current Outpatient Medications   Medication Sig Dispense Refill     acetaminophen (TYLENOL) 325 MG tablet Take 650 mg by mouth every 6 hours as needed for mild pain       acetaminophen-codeine (TYLENOL W/CODEINE #3) 300-30 MG per tablet Take 1 tablet by mouth every 8 hours as needed for severe pain 60 tablet 0     aspirin 81 MG tablet Take 81 mg by mouth At Bedtime  120 tablet      carbidopa (LODOSYN) 25 MG TABS tablet Take 25 mg by mouth 3 times daily       carbidopa-levodopa (SINEMET)  MG tablet Take 0.5 tablets by mouth 3 times daily. May also take 0.5 tablets 3 times daily as needed (tremors).       Cholecalciferol (VITAMIN D) 2000 UNITS tablet Take 2,000 Units by mouth daily 100 tablet 3     diazepam (VALIUM) 5 MG tablet Take 1 tablet (5 mg) by mouth 2 times daily as needed for anxiety 60 tablet 5     fludrocortisone (FLORINEF) 0.1 MG tablet Take 0.2 mg by mouth daily       levothyroxine (SYNTHROID/LEVOTHROID) 75 MCG tablet TAKE 1 TABLET (75 MCG) BY MOUTH DAILY 90 tablet 1     melatonin 5 MG tablet Take 15 mg by mouth At Bedtime       ondansetron (ZOFRAN) 4 MG tablet Take 4 mg by mouth every 6 hours as needed for nausea       polyethylene glycol (MIRALAX) powder Take 17 g (1 capful) by mouth 2 times daily as needed for constipation (HOLD FOR LOOSE STOOLS) 527 g 11     pravastatin (PRAVACHOL) 40 MG tablet TAKE 1 TABLET (40 MG) BY MOUTH DAILY 90 tablet  "2     REVIEW OF SYSTEMS: 10 point ROS of systems including Constitutional, Eyes, Respiratory, Cardiovascular, Gastroenterology, Genitourinary, Integumentary, Musculoskeletal, Psychiatric were all negative except for pertinent positives noted in my HPI.  Objective: /70   Pulse 71   Temp 98  F (36.7  C)   Resp 18   Ht 1.575 m (5' 2\")   Wt 54.9 kg (121 lb)   SpO2 97%   BMI 22.13 kg/m    E-visit exam done given COVID-19 precautions.   General: alert, cooperative, in no distress   Eyes: conjunctiva and lids normal, tracking, no crusting or mattering to lids  Pulmonary:  relaxed respiratory effort, no cough  MS: no edema   Skin:  skin color normal  Neuro: soft speech, tremor to lips   Psych: anxious, irritable, oriented, insight & judgement impaired    Labs:     Most Recent 3 CBC's:  Recent Labs   Lab Test 01/15/20  1418 12/31/19  1232 11/25/19  1017   WBC 7.0 6.6 8.4   HGB 13.9 14.3 14.2   MCV 91 92 93    193 187     Most Recent 3 BMP's:  Recent Labs   Lab Test 01/15/20  1418 12/31/19  1232 11/25/19  1017    137 136   POTASSIUM 4.6 4.3 4.2   CHLORIDE 105 105 106   CO2 24 21 27   BUN 12 16 17   CR 0.60 0.67 0.74   ANIONGAP 5 11 3   JULIO 10.1 9.8 9.7   GLC 96 87 91     Most Recent TSH and T4:  Recent Labs   Lab Test 12/31/19  1232  02/18/19  1123   TSH 2.62   < > 1.09   T4  --   --  1.26    < > = values in this interval not displayed.       ASSESSMENT/PLAN:  (G20) Parkinson's disease (H)  (primary encounter diagnosis)  (I95.1) Orthostatic hypotension  Comment: tremors persist, appear worse than last visit- patient decreased dose as she feels the higher dose worsens tremors; no recent falls; discussed importance of following up with Neurology to further discuss symptoms & appropriate dosing, even if this is done via phone or televisit. Orthostasis persists, likely multifactorial  Plan:   --continue Sinemet  --increase Florinef to 0.2mg daily  --weekly orthostatic BPs  --follow up Dr Jensen, " Neurology     (G47.00) Insomnia, unspecified type  Comment: chronic insomnia with anxiety & restlessness- poorly controlled; had been taking benadryl on a near nightly basis- patient has agreed to hold off on taking for now.   Plan:   --discussed options with G pharmacist who recommends Remeron over Trazodone: start Remeron 7.5mg PO at HS  --monitor closely for adverse effects    --continue Melatonin 15mg at HS  --monitor sleep pattern     (F43.23) Adjustment disorder with mixed anxiety and depressed mood  (F41.1) SHAWNEE (generalized anxiety disorder)  Comment: chronic issue- poorly controlled despite addition of Celexa; has been on Valium for ~60 years.   Plan:   --discontinue Celexa  --start Remeron 7.5mg PO at HS  --close follow up    --ok to change Valium 5mg to BID prn  --continue to encourage onsite psych     (R19.7) Diarrhea  Comment: acute, history of constipation- remains on softeners/ laxatives; appears she has received Miralax 5x in the last week & senna-s x1  Plan:  --HOLD Miralax, discussed with patient, son & staff nurse  --monitor bowel pattern  --further workup if diarrhea persists despite zero use of stool softeners or any laxatives    (Z71.89) Advance care planning  Comment: discussed advanced care planning & goals of care with patient's spouse who requested NOT to discus with patient at this time d/t her severe anxiety. Spouse verbalizes this is patient's known request & he has had the discussion with her in the past. Completed POLST  Plan:   --complete & e- sign new POLST  --fax to     Discussed with patient options for better treatment of insomnia, anxiety, diarrhea & PD along with adverse effects of current medications and safer alternatives. Also engaged in discussion with son for 11 additonal minutes and with  for 9 additional minutes. Plan to further discuss nausea as well as follow up on above issues at next appointment.      Orders written by provider at facility via phone  1.  Discontinue Celexa  2. Start Remeron 7.5mg PO at HS  3. Change Miralax to BID prn, avoid use for at least 1 week  4. Change diazepam order to BID prn rather than q 12 hrs prn  5. Increase Florinef to 0.2mg daily    Electronically signed by:  Kecia Dykes NP     Video-Visit Details  Type of service:  Video Visit  Video End Time (time video stopped): 1405  Distant Location (provider location):  [place] [facility]           Sincerely,        Kecia Dykes NP

## 2020-04-02 NOTE — PROGRESS NOTES
"[place] [facility] E-VISIT   Sadaf Rubi is being evaluated via a billable video visit due to the restrictions of the Covid-19 pandemic.   The patient has been notified of following:  \"This video visit will be conducted via a call between you and your provider. We have found that certain health care needs can be provided without the need for an in-person physical exam.  This service lets us provide the care you need with a video conversation. If during the course of the call the provider feels a video visit is not appropriate, you will not be charged for this service.\"   The provider has received verbal consent for a Video Visit from the patient or first contact? Yes  Patient  or facility staff would like the video invitation sent by: Text to cell phone: [phone]  Video Start Time: 1345  [place] Medical Record Number:  [id number]  Place of Location at the time of visit: [facility]   Chief Complaint   Patient presents with     Nursing Home Acute     anxiety f/u     HPI:  Sadaf Rubi  is a 74 year old (1945), who is being seen today for an E-visit.  HPI information obtained from: facility chart records, facility staff, patient report and [place] Epic chart review.     Today's concern is:  Patient continues to experience symptoms of severe anxiety despite starting Celexa >4 wks ago. She is not sleeping well on a fairly regular basis however she did report sleeping from [id number] last night which she feels \"is a miracle\". These are well documented chronic issues. Her son states his mother has been \"abusing\" Valium for greater than 55 years. He questioned whether we can increase her dose to better control her symptoms. Staff reports poor appetite however patient feels she is getting too much food with each meal & the food is too \"heavy\". She generally enjoys eating fish, veggies & pasta. She also feels the food is upsetting her stomach. Patient has trouble describing how her " stomach feels but does endorse to diarrhea. She remains on prn Miralax. Denies blood in stool. Staff also called last week to report loose stools. It was recommended at that time to hold Miralax. Chronic nausea relieved with prn Nausea.   Patient feels Sinemet worsens her tremors at the higher dose so has halved the dose. She endorses to ongoing tremors to mouth, upper & lower extremities.    Past Medical and Surgical History reviewed in Epic today.  MEDICATIONS:  Current Outpatient Medications   Medication Sig Dispense Refill     acetaminophen (TYLENOL) 325 MG tablet Take 650 mg by mouth every 6 hours as needed for mild pain       acetaminophen-codeine (TYLENOL W/CODEINE #3) 300-30 MG per tablet Take 1 tablet by mouth every 8 hours as needed for severe pain 60 tablet 0     aspirin 81 MG tablet Take 81 mg by mouth At Bedtime  120 tablet      carbidopa (LODOSYN) 25 MG TABS tablet Take 25 mg by mouth 3 times daily       carbidopa-levodopa (SINEMET)  MG tablet Take 0.5 tablets by mouth 3 times daily. May also take 0.5 tablets 3 times daily as needed (tremors).       Cholecalciferol (VITAMIN D) 2000 UNITS tablet Take 2,000 Units by mouth daily 100 tablet 3     diazepam (VALIUM) 5 MG tablet Take 1 tablet (5 mg) by mouth 2 times daily as needed for anxiety 60 tablet 5     fludrocortisone (FLORINEF) 0.1 MG tablet Take 0.2 mg by mouth daily       levothyroxine (SYNTHROID/LEVOTHROID) 75 MCG tablet TAKE 1 TABLET (75 MCG) BY MOUTH DAILY 90 tablet 1     melatonin 5 MG tablet Take 15 mg by mouth At Bedtime       ondansetron (ZOFRAN) 4 MG tablet Take 4 mg by mouth every 6 hours as needed for nausea       polyethylene glycol (MIRALAX) powder Take 17 g (1 capful) by mouth 2 times daily as needed for constipation (HOLD FOR LOOSE STOOLS) 527 g 11     pravastatin (PRAVACHOL) 40 MG tablet TAKE 1 TABLET (40 MG) BY MOUTH DAILY 90 tablet 2     REVIEW OF SYSTEMS: 10 point ROS of systems including Constitutional, Eyes, Respiratory,  "Cardiovascular, Gastroenterology, Genitourinary, Integumentary, Musculoskeletal, Psychiatric were all negative except for pertinent positives noted in my HPI.  Objective: /70   Pulse 71   Temp 98  F (36.7  C)   Resp 18   Ht 1.575 m (5' 2\")   Wt 54.9 kg (121 lb)   SpO2 97%   BMI 22.13 kg/m    E-visit exam done given COVID-19 precautions.   General: alert, cooperative, in no distress   Eyes: conjunctiva and lids normal, tracking, no crusting or mattering to lids  Pulmonary:  relaxed respiratory effort, no cough  MS: no edema   Skin:  skin color normal  Neuro: soft speech, tremor to lips   Psych: anxious, irritable, oriented, insight & judgement impaired    Labs:     Most Recent 3 CBC's:  Recent Labs   Lab Test 01/15/20  1418 12/31/19  1232 11/25/19  1017   WBC 7.0 6.6 8.4   HGB 13.9 14.3 14.2   MCV 91 92 93    193 187     Most Recent 3 BMP's:  Recent Labs   Lab Test 01/15/20  1418 12/31/19  1232 11/25/19  1017    137 136   POTASSIUM 4.6 4.3 4.2   CHLORIDE 105 105 106   CO2 24 21 27   BUN 12 16 17   CR 0.60 0.67 0.74   ANIONGAP 5 11 3   JULIO 10.1 9.8 9.7   GLC 96 87 91     Most Recent TSH and T4:  Recent Labs   Lab Test 12/31/19  1232  02/18/19  1123   TSH 2.62   < > 1.09   T4  --   --  1.26    < > = values in this interval not displayed.       ASSESSMENT/PLAN:  (G20) Parkinson's disease (H)  (primary encounter diagnosis)  (I95.1) Orthostatic hypotension  Comment: tremors persist, appear worse than last visit- patient decreased dose as she feels the higher dose worsens tremors; no recent falls; discussed importance of following up with Neurology to further discuss symptoms & appropriate dosing, even if this is done via phone or televisit. Orthostasis persists, likely multifactorial  Plan:   --continue Sinemet  --increase Florinef to 0.2mg daily  --weekly orthostatic BPs  --follow up Dr Jensen, Neurology     (G47.00) Insomnia, unspecified type  Comment: chronic insomnia with anxiety & " restlessness- poorly controlled; had been taking benadryl on a near nightly basis- patient has agreed to hold off on taking for now.   Plan:   --discussed options with G pharmacist who recommends Remeron over Trazodone: start Remeron 7.5mg PO at HS  --monitor closely for adverse effects    --continue Melatonin 15mg at HS  --monitor sleep pattern     (F43.23) Adjustment disorder with mixed anxiety and depressed mood  (F41.1) SHAWNEE (generalized anxiety disorder)  Comment: chronic issue- poorly controlled despite addition of Celexa; has been on Valium for ~60 years.   Plan:   --discontinue Celexa  --start Remeron 7.5mg PO at HS  --close follow up    --ok to change Valium 5mg to BID prn  --continue to encourage onsite psych     (R19.7) Diarrhea  Comment: acute, history of constipation- remains on softeners/ laxatives; appears she has received Miralax 5x in the last week & senna-s x1  Plan:  --HOLD Miralax, discussed with patient, son & staff nurse  --monitor bowel pattern  --further workup if diarrhea persists despite zero use of stool softeners or any laxatives    (Z71.89) Advance care planning  Comment: discussed advanced care planning & goals of care with patient's spouse who requested NOT to discus with patient at this time d/t her severe anxiety. Spouse verbalizes this is patient's known request & he has had the discussion with her in the past. Completed POLST  Plan:   --complete & e- sign new POLST  --fax to     Discussed with patient options for better treatment of insomnia, anxiety, diarrhea & PD along with adverse effects of current medications and safer alternatives. Also engaged in discussion with son for 11 additonal minutes and with  for 9 additional minutes. Plan to further discuss nausea as well as follow up on above issues at next appointment.      Orders written by provider at facility via phone  1. Discontinue Celexa  2. Start Remeron 7.5mg PO at HS  3. Change Miralax to BID prn, avoid use for  at least 1 week  4. Change diazepam order to BID prn rather than q 12 hrs prn  5. Increase Florinef to 0.2mg daily    Electronically signed by:  Kecia Dykes NP     Video-Visit Details  Type of service:  Video Visit  Video End Time (time video stopped): 1405  Distant Location (provider location):  [facility]

## 2020-04-02 NOTE — LETTER
"    4/2/2020        RE: Sadaf Rubi  [address]  [facility] [address]        [facility] E-VISIT   Sadaf Rubi is being evaluated via a billable video visit due to the restrictions of the Covid-19 pandemic.   The patient has been notified of following:  \"This video visit will be conducted via a call between you and your provider. We have found that certain health care needs can be provided without the need for an in-person physical exam.  This service lets us provide the care you need with a video conversation. If during the course of the call the provider feels a video visit is not appropriate, you will not be charged for this service.\"   The provider has received verbal consent for a Video Visit from the patient or first contact? Yes  Patient  or facility staff would like the video invitation sent by: Text to cell phone: [phone]  Video Start Time: 1345  [facility] Record Number:  [id number]  Place of [facility] at the time of visit[facility]   Chief Complaint   Patient presents with     Nursing Home Acute     anxiety f/u     HPI:  Sadaf Rubi  is a 74 year old (1945), who is being seen today for an E-visit.  HPI information obtained from: facility chart records, facility staff, patient report and [facility] chart review.     Today's concern is:  Patient continues to experience symptoms of severe anxiety despite starting Celexa >4 wks ago. She is not sleeping well on a fairly regular basis however she did report sleeping from 4458-8568 last night which she feels \"is a miracle\". These are well documented chronic issues. Her son states his mother has been \"abusing\" Valium for greater than 55 years. He questioned whether we can increase her dose to better control her symptoms. Staff reports poor appetite however patient feels she is getting too much food with each meal & the food is too \"heavy\". She generally enjoys eating fish, veggies & " pasta. She also feels the food is upsetting her stomach. Patient has trouble describing how her stomach feels but does endorse to diarrhea. She remains on prn Miralax. Denies blood in stool. Staff also called last week to report loose stools. It was recommended at that time to hold Miralax. Chronic nausea relieved with prn Nausea.   Patient feels Sinemet worsens her tremors at the higher dose so has halved the dose. She endorses to ongoing tremors to mouth, upper & lower extremities.    Past Medical and Surgical History reviewed in Epic today.  MEDICATIONS:  Current Outpatient Medications   Medication Sig Dispense Refill     acetaminophen (TYLENOL) 325 MG tablet Take 650 mg by mouth every 6 hours as needed for mild pain       acetaminophen-codeine (TYLENOL W/CODEINE #3) 300-30 MG per tablet Take 1 tablet by mouth every 8 hours as needed for severe pain 60 tablet 0     aspirin 81 MG tablet Take 81 mg by mouth At Bedtime  120 tablet      carbidopa (LODOSYN) 25 MG TABS tablet Take 25 mg by mouth 3 times daily       carbidopa-levodopa (SINEMET)  MG tablet Take 0.5 tablets by mouth 3 times daily. May also take 0.5 tablets 3 times daily as needed (tremors).       Cholecalciferol (VITAMIN D) 2000 UNITS tablet Take 2,000 Units by mouth daily 100 tablet 3     diazepam (VALIUM) 5 MG tablet Take 1 tablet (5 mg) by mouth 2 times daily as needed for anxiety 60 tablet 5     fludrocortisone (FLORINEF) 0.1 MG tablet Take 0.2 mg by mouth daily       levothyroxine (SYNTHROID/LEVOTHROID) 75 MCG tablet TAKE 1 TABLET (75 MCG) BY MOUTH DAILY 90 tablet 1     melatonin 5 MG tablet Take 15 mg by mouth At Bedtime       ondansetron (ZOFRAN) 4 MG tablet Take 4 mg by mouth every 6 hours as needed for nausea       polyethylene glycol (MIRALAX) powder Take 17 g (1 capful) by mouth 2 times daily as needed for constipation (HOLD FOR LOOSE STOOLS) 527 g 11     pravastatin (PRAVACHOL) 40 MG tablet TAKE 1 TABLET (40 MG) BY MOUTH DAILY 90 tablet  "2     REVIEW OF SYSTEMS: 10 point ROS of systems including Constitutional, Eyes, Respiratory, Cardiovascular, Gastroenterology, Genitourinary, Integumentary, Musculoskeletal, Psychiatric were all negative except for pertinent positives noted in my HPI.  Objective: /70   Pulse 71   Temp 98  F (36.7  C)   Resp 18   Ht 1.575 m (5' 2\")   Wt 54.9 kg (121 lb)   SpO2 97%   BMI 22.13 kg/m    E-visit exam done given COVID-19 precautions.   General: alert, cooperative, in no distress   Eyes: conjunctiva and lids normal, tracking, no crusting or mattering to lids  Pulmonary:  relaxed respiratory effort, no cough  MS: no edema   Skin:  skin color normal  Neuro: soft speech, tremor to lips   Psych: anxious, irritable, oriented, insight & judgement impaired    Labs:     Most Recent 3 CBC's:  Recent Labs   Lab Test 01/15/20  1418 12/31/19  1232 11/25/19  1017   WBC 7.0 6.6 8.4   HGB 13.9 14.3 14.2   MCV 91 92 93    193 187     Most Recent 3 BMP's:  Recent Labs   Lab Test 01/15/20  1418 12/31/19  1232 11/25/19  1017    137 136   POTASSIUM 4.6 4.3 4.2   CHLORIDE 105 105 106   CO2 24 21 27   BUN 12 16 17   CR 0.60 0.67 0.74   ANIONGAP 5 11 3   JULIO 10.1 9.8 9.7   GLC 96 87 91     Most Recent TSH and T4:  Recent Labs   Lab Test 12/31/19  1232  02/18/19  1123   TSH 2.62   < > 1.09   T4  --   --  1.26    < > = values in this interval not displayed.       ASSESSMENT/PLAN:  (G20) Parkinson's disease (H)  (primary encounter diagnosis)  (I95.1) Orthostatic hypotension  Comment: tremors persist, appear worse than last visit- patient decreased dose as she feels the higher dose worsens tremors; no recent falls; discussed importance of following up with Neurology to further discuss symptoms & appropriate dosing, even if this is done via phone or televisit. Orthostasis persists, likely multifactorial  Plan:   --continue Sinemet  --increase Florinef to 0.2mg daily  --weekly orthostatic BPs  --follow up Dr Jensen, " Neurology     (G47.00) Insomnia, unspecified type  Comment: chronic insomnia with anxiety & restlessness- poorly controlled; had been taking benadryl on a near nightly basis- patient has agreed to hold off on taking for now.   Plan:   --discussed options with [facility] pharmacist who recommends Remeron over Trazodone: start Remeron 7.5mg PO at HS  --monitor closely for adverse effects    --continue Melatonin 15mg at HS  --monitor sleep pattern     (F43.23) Adjustment disorder with mixed anxiety and depressed mood  (F41.1) SHAWNEE (generalized anxiety disorder)  Comment: chronic issue- poorly controlled despite addition of Celexa; has been on Valium for ~60 years.   Plan:   --discontinue Celexa  --start Remeron 7.5mg PO at HS  --close follow up    --ok to change Valium 5mg to BID prn  --continue to encourage onsite psych     (R19.7) Diarrhea  Comment: acute, history of constipation- remains on softeners/ laxatives; appears she has received Miralax 5x in the last week & senna-s x1  Plan:  --HOLD Miralax, discussed with patient, son & staff nurse  --monitor bowel pattern  --further workup if diarrhea persists despite zero use of stool softeners or any laxatives    Patient discussion regarding options for better treatment of insomnia, anxiety, diarrhea & PD along with adverse effects of current medications and safer alternatives. Also engaged in discussion with son for 11 additonal minutes and with  for 9 additional minutes. Plan to further discuss nausea as well as follow up on above issues at next appointment.      Orders written by provider at facility via phone  1. Discontinue Celexa  2. Start Remeron 7.5mg PO at HS  3. Change Miralax to BID prn, avoid use for at least 1 week  4. Change diazepam order to BID prn rather than q 12 hrs prn  5. Increase Florinef to 0.2mg daily    Electronically signed by:  Kecia Dykes NP     Video-Visit Details  Type of service:  Video Visit  Video End Time (time video stopped):  5761  Distant Location (provider location):  [facility]           Sincerely,        Kecia Dykes NP

## 2020-04-04 PROBLEM — F41.9 ANXIETY: Status: ACTIVE | Noted: 2020-01-01

## 2020-04-15 NOTE — PROGRESS NOTES
"[facility] Regulatory   Sadaf Rubi is being evaluated via a billable video visit due to the restrictions of the Covid-19 pandemic.   The patient has been notified of following:  ***\"This video visit will be conducted via a call between you and your provider. We have found that certain health care needs can be provided without the need for an in-person physical exam.  This service lets us provide the care you need with a video conversation. If during the course of the call the provider feels a video visit is not appropriate, you will not be charged for this service.\"   The provider has received verbal consent for a Video Visit from the patient or first contact? {YES-NO  Default Yes:4444::\"Yes\"}  Patient or facility staff would like the video invitation sent by: {s video visit invitation:579605::\"N/A \"}  Video Start Time: {video visit start time:152948}  {if you used Care Everywhere, delete this and enter \".fgscarewhere\"}  [facility] Record Number:  [id number]  Place of Location at the time of visit: [facility]   Chief Complaint   Patient presents with     [facility] Regulatory     HPI:  Sadaf Rubi  is a 74 year old (1945), who is being seen today for an E-REG visit.  HPI information obtained from: {FGS HPI:159106}. Today's concern is:  {FGS DX:013833}      Past Medical and Surgical History reviewed in Epic today.  MEDICATIONS:  {Providers Please double check the med list (in the plan section >> meds & orders tab) and Discontinue any of the meds flagged by the TC to be discontinued}  Current Outpatient Medications   Medication Sig Dispense Refill     acetaminophen (TYLENOL) 325 MG tablet Take 650 mg by mouth every 6 hours as needed for mild pain       acetaminophen-codeine (TYLENOL W/CODEINE #3) 300-30 MG per tablet Take 1 tablet by mouth every 8 hours as needed for severe pain 60 tablet 0     aspirin 81 MG tablet Take 81 mg by mouth At Bedtime  120 tablet      " "carbidopa (LODOSYN) 25 MG TABS tablet Take 25 mg by mouth 3 times daily       carbidopa-levodopa (SINEMET)  MG tablet Take 0.5 tablets by mouth 3 times daily. May also take 0.5 tablets 3 times daily as needed (tremors).       Cholecalciferol (VITAMIN D) 2000 UNITS tablet Take 2,000 Units by mouth daily 100 tablet 3     diazepam (VALIUM) 5 MG tablet Take 1 tablet (5 mg) by mouth 2 times daily as needed for anxiety 60 tablet 5     fludrocortisone (FLORINEF) 0.1 MG tablet Take 2 tablets (0.2 mg) by mouth daily 60 tablet 11     levothyroxine (SYNTHROID/LEVOTHROID) 75 MCG tablet TAKE 1 TABLET (75 MCG) BY MOUTH DAILY 90 tablet 1     melatonin 5 MG tablet Take 15 mg by mouth At Bedtime       ondansetron (ZOFRAN) 4 MG tablet Take 4 mg by mouth every 6 hours as needed for nausea       polyethylene glycol (MIRALAX) powder Take 17 g (1 capful) by mouth 2 times daily as needed for constipation (HOLD FOR LOOSE STOOLS) 527 g 11     pravastatin (PRAVACHOL) 40 MG tablet TAKE 1 TABLET (40 MG) BY MOUTH DAILY 90 tablet 2   ***  REVIEW OF SYSTEMS: {ROS FGS:969715}  Objective: /68   Pulse 71   Temp 96.8  F (36  C)   Resp 18   Ht 1.588 m (5' 2.5\")   Wt 53.9 kg (118 lb 12.8 oz)   SpO2 97%   BMI 21.38 kg/m    Limited visit exam done given COVID-19 precautions.   {Nursing home physical exam :273445}  Labs:   {fgslab:264278}    ASSESSMENT/PLAN:  {FGS DX:330868}    {fgsorders:689544}  ***  Electronically signed by:  Etta Gotti ***  {Providers Please double check the med list (in the plan section >> meds & orders tab) and Discontinue any of the meds flagged by the TC to be discontinued}  Video-Visit Details  Type of service:  Video Visit  Video End Time (time video stopped): ***  Distant Location (provider location):  [facility]       "

## 2020-04-16 NOTE — PROGRESS NOTES
Patient was seen for a virtual video regulatory long-term-care visit    Video time was 10 minutes    Case was reviewed with nurse practitioner.  Charting in epic reviewed    Patient reports feeling fairly well.  She continues to have frequent nausea, partially improved with Zofran.  She has mild orthostatic dizziness.  Florinef has recently been increased to 0.2 mg daily.  Carbidopa/levodopa, 25/100 has been decreased 2.5 tablets 3 times a day by her neurologist 1 month ago.  Patient denies sense of worsening of her Parkinson symptoms with this change.  Her other concern is that of chronic insomnia.  Remeron was recently started at 7.5 mg every evening dose.  Celexa has been discontinued.    Exam  Very pleasant female, lying in bed, appears comfortable  Face slightly masked, slight tremor of lips.  No rigidity of her extremities.  Gait was not attempted      Assessment    Parkinson's disease, without significant change with reduction in Sinemet.  Patient will consider trial discontinuation of Sinemet as this may be contributing to nausea.     Chronic nausea, as above, possibly related to Sinemet.    Symptomatic orthostatic hypotension, stable on Florinef    Chronic insomnia.    Plan  Consider discontinuation of Sinemet versus waiting until next neurology appointment  Increase Remeron to 15 mg nightly  Patient to continue to ambulate with walker in room, as possible given restrictions of COVID-19 staffing

## 2020-04-27 NOTE — TELEPHONE ENCOUNTER
Patient's , Jean Pierre, called this morning and left a voicemail on the on-call messaging system stating that Sadaf has a bladder infection and is having a lot of pain. They would like a UA/UC done and a prescription for an antibiotic. Please call  at [phone]. Thank you!    Spoke to nurse at 0948 patient appears impacted, lower abd symptoms r/t to this. Using supp at this time, will monitor.     Electronically signed by JENAE Alvarado, GNP

## 2020-04-27 NOTE — TELEPHONE ENCOUNTER
"Staff called to report new onset lower back pain since yesterday. Patient complains of \"upset stomach\". upon review of charted, noted patient had 7 small. Loose stools yesterday & 1 small loose stool the day prior. Patient had been experiencing large loose stools while taking Miralax so this has been held the last few weeks. This resolved however now questioning if patient constipated & experiencing overflow diarrhea.   Staff called back following abdominal exam to report hypoactive bowel sounds with firmness & tenderness to LLQ. Patient has not been receiving prn bowel medication.    Orders:  1. Dulcolax suppository now  2. Schedule senna-s 1 tab PO daily & 1 tab PO daily prn  3. Push fluids, goal for BM today     MARIO Gonsalez  4/27/20     "

## 2020-04-28 NOTE — TELEPHONE ENCOUNTER
[facility] TELEPHONE ENCOUNTER    Chief Complaint   Patient presents with     Pain       Sadaf Rubi is a 74 year old  (1945),Nurse called today to report:   Patient complaints of abdominal pain and back pain. Patient received suppository: patient had a large stool per charting but patient said she only had loose stools.     Left side of abdomen distended still  Hypoactive bowel sounds upper abdomen and lower abdomen no bowel sounds.     No vomiting, takes zofran daily at baseline.     VSS last check    ASSESSMENT/PLAN  Abdominal pain, ?obstruction    Abdominal xray stat two views and update NP with results.     Electronically signed by:   JENAE Alvarado CNP     ADDENDUM  At 140pm notified patient became unresponsive while going to the bathroom, at this time staff believe she has passed. Code status DNR/DNI.    PLAN  Verify no pulse, notify family per facility protocol  May release the body    Electronically signed by JENAE Alvarado, GNP

## 2021-05-26 ENCOUNTER — RECORDS - HEALTHEAST (OUTPATIENT)
Dept: ADMINISTRATIVE | Facility: CLINIC | Age: 76
End: 2021-05-26

## 2021-08-24 NOTE — NURSING NOTE
"Chief Complaint   Patient presents with     Vaginal Problem       Initial /82 (BP Location: Right arm, Patient Position: Chair, Cuff Size: Adult Regular)  Pulse 68  Temp 99.9  F (37.7  C) (Tympanic)  Resp 14  Wt 176 lb (79.8 kg)  SpO2 96%  BMI 32.19 kg/m2 Estimated body mass index is 32.19 kg/(m^2) as calculated from the following:    Height as of 8/1/16: 5' 2\" (1.575 m).    Weight as of this encounter: 176 lb (79.8 kg).  Medication Reconciliation: complete    " Render In Strict Bullet Format?: No Detail Level: Zone Initiate Treatment: Metronidazole cream

## 2023-05-24 NOTE — TELEPHONE ENCOUNTER
Insurance Authorization for admission:   Phone call placed to [employer]  Phone number: [phone]  Spoke to [facility]  3 days approved  Level of care: Acute Inpt  (201)  Review on 05/26/2023  Authorization # to be obtained by accepting facility upon arrival         EVS (Eligibility Verification System) called - [phone]  Automated system indicates: Active with ROBIN Wayne (ID# [id number])  Insurance Authorization for Transportation:    Not required for CTS transport      DIONE Champagne  05/23/23     7632 Refill too soon.  Juanita Toro RN  Triage [employer]

## 2023-06-29 NOTE — PATIENT INSTRUCTIONS
I will let you know your lab and xray  results.   Let me know when you are finished with your dental work.                             regular